# Patient Record
Sex: FEMALE | Race: WHITE | NOT HISPANIC OR LATINO | Employment: OTHER | URBAN - METROPOLITAN AREA
[De-identification: names, ages, dates, MRNs, and addresses within clinical notes are randomized per-mention and may not be internally consistent; named-entity substitution may affect disease eponyms.]

---

## 2017-04-28 ENCOUNTER — OFFICE VISIT (OUTPATIENT)
Dept: GASTROENTEROLOGY | Facility: CLINIC | Age: 57
End: 2017-04-28

## 2017-04-28 VITALS
BODY MASS INDEX: 27.17 KG/M2 | SYSTOLIC BLOOD PRESSURE: 124 MMHG | HEIGHT: 60 IN | WEIGHT: 138.4 LBS | DIASTOLIC BLOOD PRESSURE: 78 MMHG

## 2017-04-28 DIAGNOSIS — K21.9 GASTROESOPHAGEAL REFLUX DISEASE WITHOUT ESOPHAGITIS: ICD-10-CM

## 2017-04-28 DIAGNOSIS — Z83.71 FAMILY HISTORY OF POLYPS IN THE COLON: ICD-10-CM

## 2017-04-28 DIAGNOSIS — R12 HEARTBURN: Primary | ICD-10-CM

## 2017-04-28 PROCEDURE — 99204 OFFICE O/P NEW MOD 45 MIN: CPT | Performed by: INTERNAL MEDICINE

## 2017-04-28 RX ORDER — ASPIRIN 81 MG/1
81 TABLET ORAL DAILY
Status: ON HOLD | COMMUNITY
End: 2021-10-29

## 2017-04-28 NOTE — PROGRESS NOTES
Chief Complaint   Patient presents with   • Pre-op Exam     consult cs        Fiordaliza Lyons is a 57 y.o. female who presents with GERD, in need of screening EGD, family history of colon polyps multiple first-degree family members, in need of screening colonoscopy    HPI Comments: 57-year-old female who had a colonoscopy 10 years ago that was normal.  Her father has had polyps with advanced precancer.  Her brother has had a large polyps removed just recently.  She is here to schedule high risk screening colonoscopy.  She also has GERD.  Well controlled on omeprazole.  No dysphagia, odynophagia.  She's never had a screening EGD for Pacheco's esophagus.    No weight loss, nausea, vomiting, dysphagia,  change in bowel habits, rectal bleeding, melena or abdominal pain      Past Medical History:   Diagnosis Date   • Breast cyst    • Diabetes mellitus    • Endometriosis    • GERD (gastroesophageal reflux disease)    • Ovarian cyst    • Pancreatitis    • Postmenopausal    • Sleep apnea        Past Surgical History:   Procedure Laterality Date   • BILATERAL SALPINGO OOPHORECTOMY  2008   • CHOLECYSTECTOMY  1996   • COLONOSCOPY  2007    Dr Murphy- per patient normal    • DILATION AND CURETTAGE, DIAGNOSTIC / THERAPEUTIC     • KNEE SURGERY  1982         Current Outpatient Prescriptions:   •  aspirin 81 MG EC tablet, Take 81 mg by mouth Daily., Disp: , Rfl:   •  insulin lispro (HumaLOG) 100 UNIT/ML injection, Inject  under the skin 3 (three) times a day before meals., Disp: , Rfl:   •  l-methylfolate-algae-B6-B12 (METANX) 3-90.314-2-35 MG capsule capsule, , Disp: , Rfl:   •  lisinopril (PRINIVIL,ZESTRIL) 10 MG tablet, , Disp: , Rfl:   •  Multiple Vitamins-Minerals (MULTI COMPLETE PO), Take  by mouth., Disp: , Rfl:   •  naproxen sodium (ALEVE) 220 MG tablet, Take 220 mg by mouth 2 (two) times a day as needed for mild pain (1-3)., Disp: , Rfl:   •  omeprazole (PriLOSEC) 40 MG capsule, Take 40 mg by mouth daily., Disp: , Rfl:   •   pravastatin (PRAVACHOL) 10 MG tablet, Take 10 mg by mouth daily., Disp: , Rfl:   •  FLUoxetine (PROzac) 20 MG capsule, Take 20 mg by mouth daily., Disp: , Rfl:   •  Insulin Glargine (LANTUS SOLOSTAR SC), Inject  under the skin., Disp: , Rfl:   •  LANTUS SOLOSTAR 100 UNIT/ML injection pen, , Disp: , Rfl:     Allergies   Allergen Reactions   • Augmentin [Amoxicillin-Pot Clavulanate]        Social History     Social History   • Marital status: Single     Spouse name: N/A   • Number of children: N/A   • Years of education: N/A     Occupational History   • Not on file.     Social History Main Topics   • Smoking status: Never Smoker   • Smokeless tobacco: Never Used   • Alcohol use Yes   • Drug use: No   • Sexual activity: Not on file     Other Topics Concern   • Not on file     Social History Narrative       Family History   Problem Relation Age of Onset   • Ovarian cancer Mother    • Ovarian cancer Maternal Aunt    • Ovarian cancer Maternal Grandmother    • Ovarian cancer Paternal Grandmother    • Colon polyps Father    • Colon polyps Brother        Review of Systems   Constitutional: Negative.    All other systems reviewed and are negative.      Vitals:    04/28/17 1032   BP: 124/78       Physical Exam   Constitutional: She is oriented to person, place, and time. She appears well-developed and well-nourished.   HENT:   Head: Normocephalic and atraumatic.   Eyes: Pupils are equal, round, and reactive to light.   Cardiovascular: Normal rate, regular rhythm and normal heart sounds.    Pulmonary/Chest: Effort normal and breath sounds normal.   Abdominal: Soft. Bowel sounds are normal. She exhibits no shifting dullness, no distension, no pulsatile liver, no fluid wave, no abdominal bruit, no ascites, no pulsatile midline mass and no mass. There is no hepatosplenomegaly. There is no tenderness. There is no rigidity and no guarding. No hernia.   Musculoskeletal: Normal range of motion.   Neurological: She is alert and  oriented to person, place, and time.   Skin: Skin is warm and dry.   Psychiatric: She has a normal mood and affect. Her behavior is normal. Thought content normal.   Nursing note and vitals reviewed.      Problem list    GERD  Family history of colon polyps multiple family members      Assessment/Plan    based on the above issues, we will schedule EGD and colonoscopy    An EGD and  colonoscopy will be scheduled by my staff, the instructions will either be handed to you or mailed to you.  You'll receive an appointment date and time.  You will need to bring a  with you on that day to drive you home.    Please begin a probiotic.  You can try activia yogurt, align, or florastor.  These can be found over-the-counter at a local grocery store.    Begin a fiber supplement.  Benefiber, Citrucel or Metamucil is acceptable.  Fiber gummies are also acceptable.  Please take 1 dose of fiber in the morning and 1 in the evening for 4 weeks and increase to 2 doses of fiber in the morning and 2 in the evening after that. Please  try to maintain a high-fiber diet.  We obtain 10 g of fiber from a high-fiber diet every day.  Women should consume a total of 25 grams of fiber a day, men 30 grams a day.  We can reach the total daily recommended dose of fiber a day utilizing the high-fiber diet and also fiber supplements.    GERD lifestyle modifications discussed    Continue PPI for GERD    EGD every 10 years    Colonoscopy every 5 years

## 2017-05-03 ENCOUNTER — OUTSIDE FACILITY SERVICE (OUTPATIENT)
Dept: GASTROENTEROLOGY | Facility: CLINIC | Age: 57
End: 2017-05-03

## 2017-05-03 PROCEDURE — 43239 EGD BIOPSY SINGLE/MULTIPLE: CPT | Performed by: INTERNAL MEDICINE

## 2017-05-03 PROCEDURE — 45378 DIAGNOSTIC COLONOSCOPY: CPT | Performed by: INTERNAL MEDICINE

## 2017-05-15 ENCOUNTER — TELEPHONE (OUTPATIENT)
Dept: GASTROENTEROLOGY | Facility: CLINIC | Age: 57
End: 2017-05-15

## 2017-05-22 ENCOUNTER — TRANSCRIBE ORDERS (OUTPATIENT)
Dept: ADMINISTRATIVE | Facility: HOSPITAL | Age: 57
End: 2017-05-22

## 2017-05-22 ENCOUNTER — HOSPITAL ENCOUNTER (OUTPATIENT)
Dept: CARDIOLOGY | Facility: HOSPITAL | Age: 57
Discharge: HOME | End: 2017-05-22
Attending: ORTHOPAEDIC SURGERY | Admitting: ORTHOPAEDIC SURGERY

## 2017-05-22 ENCOUNTER — LAB (OUTPATIENT)
Dept: LAB | Facility: HOSPITAL | Age: 57
End: 2017-05-22
Attending: ORTHOPAEDIC SURGERY

## 2017-05-22 DIAGNOSIS — E11.9 DIABETES MELLITUS WITHOUT COMPLICATION (HCC): ICD-10-CM

## 2017-05-22 DIAGNOSIS — E11.9 DIABETES MELLITUS WITHOUT COMPLICATION (HCC): Primary | ICD-10-CM

## 2017-05-22 LAB
ANION GAP SERPL CALCULATED.3IONS-SCNC: 12.3 MMOL/L
BUN BLD-MCNC: 7 MG/DL (ref 6–20)
BUN/CREAT SERPL: 10 (ref 7–25)
CALCIUM SPEC-SCNC: 10.1 MG/DL (ref 8.6–10.5)
CHLORIDE SERPL-SCNC: 95 MMOL/L (ref 98–107)
CO2 SERPL-SCNC: 28.7 MMOL/L (ref 22–29)
CREAT BLD-MCNC: 0.7 MG/DL (ref 0.57–1)
GFR SERPL CREATININE-BSD FRML MDRD: 87 ML/MIN/1.73
GLUCOSE BLD-MCNC: 162 MG/DL (ref 65–99)
POTASSIUM BLD-SCNC: 4.7 MMOL/L (ref 3.5–5.2)
SODIUM BLD-SCNC: 136 MMOL/L (ref 136–145)

## 2017-05-22 PROCEDURE — 93005 ELECTROCARDIOGRAM TRACING: CPT | Performed by: ORTHOPAEDIC SURGERY

## 2017-05-22 PROCEDURE — 80048 BASIC METABOLIC PNL TOTAL CA: CPT

## 2017-05-22 PROCEDURE — 36415 COLL VENOUS BLD VENIPUNCTURE: CPT

## 2017-05-22 PROCEDURE — 93010 ELECTROCARDIOGRAM REPORT: CPT | Performed by: INTERNAL MEDICINE

## 2017-09-29 ENCOUNTER — HOSPITAL ENCOUNTER (OUTPATIENT)
Dept: GENERAL RADIOLOGY | Facility: HOSPITAL | Age: 57
Discharge: HOME | End: 2017-09-29
Attending: INTERNAL MEDICINE | Admitting: INTERNAL MEDICINE

## 2017-09-29 ENCOUNTER — TRANSCRIBE ORDERS (OUTPATIENT)
Dept: ADMINISTRATIVE | Facility: HOSPITAL | Age: 57
End: 2017-09-29

## 2017-09-29 DIAGNOSIS — M54.9 BACK PAIN, UNSPECIFIED BACK LOCATION, UNSPECIFIED BACK PAIN LATERALITY, UNSPECIFIED CHRONICITY: Primary | ICD-10-CM

## 2017-09-29 DIAGNOSIS — M54.9 BACK PAIN, UNSPECIFIED BACK LOCATION, UNSPECIFIED BACK PAIN LATERALITY, UNSPECIFIED CHRONICITY: ICD-10-CM

## 2017-09-29 PROCEDURE — 72050 X-RAY EXAM NECK SPINE 4/5VWS: CPT

## 2018-05-07 ENCOUNTER — HOSPITAL ENCOUNTER (OUTPATIENT)
Dept: PHYSICAL THERAPY | Facility: HOSPITAL | Age: 58
Discharge: HOME | End: 2018-05-07

## 2018-05-07 PROCEDURE — 97799 UNLISTED PHYSCL MED/REHAB PX: CPT

## 2018-09-19 ENCOUNTER — TELEPHONE (OUTPATIENT)
Dept: INTERNAL MEDICINE | Age: 58
End: 2018-09-19

## 2018-09-21 PROCEDURE — 83036 HEMOGLOBIN GLYCOSYLATED A1C: CPT | Performed by: NURSE PRACTITIONER

## 2018-11-26 ENCOUNTER — HOSPITAL ENCOUNTER (OUTPATIENT)
Dept: GENERAL RADIOLOGY | Facility: HOSPITAL | Age: 58
Discharge: HOME | End: 2018-11-26
Attending: FAMILY MEDICINE | Admitting: FAMILY MEDICINE

## 2018-11-26 ENCOUNTER — HOSPITAL ENCOUNTER (OUTPATIENT)
Dept: GENERAL RADIOLOGY | Facility: HOSPITAL | Age: 58
Discharge: HOME | End: 2018-11-26
Attending: FAMILY MEDICINE

## 2018-11-26 ENCOUNTER — HOSPITAL ENCOUNTER (OUTPATIENT)
Dept: CT IMAGING | Facility: HOSPITAL | Age: 58
Discharge: HOME | End: 2018-11-26
Attending: FAMILY MEDICINE

## 2018-11-26 PROCEDURE — 70450 CT HEAD/BRAIN W/O DYE: CPT

## 2018-11-26 PROCEDURE — 72110 X-RAY EXAM L-2 SPINE 4/>VWS: CPT

## 2018-11-26 PROCEDURE — 73590 X-RAY EXAM OF LOWER LEG: CPT

## 2018-11-26 PROCEDURE — 72170 X-RAY EXAM OF PELVIS: CPT

## 2019-01-03 ENCOUNTER — TRANSCRIBE ORDERS (OUTPATIENT)
Dept: ADMINISTRATIVE | Facility: HOSPITAL | Age: 59
End: 2019-01-03

## 2019-01-03 ENCOUNTER — OFFICE VISIT (OUTPATIENT)
Dept: INTERNAL MEDICINE | Age: 59
End: 2019-01-03
Payer: COMMERCIAL

## 2019-01-03 VITALS
BODY MASS INDEX: 25.13 KG/M2 | OXYGEN SATURATION: 98 % | DIASTOLIC BLOOD PRESSURE: 84 MMHG | SYSTOLIC BLOOD PRESSURE: 130 MMHG | HEIGHT: 60 IN | WEIGHT: 128 LBS | HEART RATE: 100 BPM

## 2019-01-03 DIAGNOSIS — Z78.0 POSTMENOPAUSAL: Primary | ICD-10-CM

## 2019-01-03 DIAGNOSIS — F34.1 DYSTHYMIA: ICD-10-CM

## 2019-01-03 DIAGNOSIS — Z12.31 SCREENING MAMMOGRAM, ENCOUNTER FOR: Primary | ICD-10-CM

## 2019-01-03 DIAGNOSIS — M85.89 OSTEOPENIA OF MULTIPLE SITES: ICD-10-CM

## 2019-01-03 DIAGNOSIS — M25.50 ARTHRALGIA, UNSPECIFIED JOINT: ICD-10-CM

## 2019-01-03 DIAGNOSIS — E78.2 MIXED HYPERLIPIDEMIA: ICD-10-CM

## 2019-01-03 DIAGNOSIS — E10.9 TYPE 1 DIABETES MELLITUS WITHOUT COMPLICATION (HCC): Primary | ICD-10-CM

## 2019-01-03 DIAGNOSIS — Z78.0 POSTMENOPAUSAL: ICD-10-CM

## 2019-01-03 DIAGNOSIS — Z12.31 SCREENING MAMMOGRAM, ENCOUNTER FOR: ICD-10-CM

## 2019-01-03 DIAGNOSIS — K21.9 GASTROESOPHAGEAL REFLUX DISEASE WITHOUT ESOPHAGITIS: ICD-10-CM

## 2019-01-03 PROCEDURE — 99204 OFFICE O/P NEW MOD 45 MIN: CPT | Performed by: INTERNAL MEDICINE

## 2019-01-03 RX ORDER — LISINOPRIL 10 MG/1
10 TABLET ORAL DAILY
COMMUNITY
End: 2021-10-11 | Stop reason: ALTCHOICE

## 2019-01-03 RX ORDER — ASPIRIN 81 MG/1
81 TABLET ORAL DAILY
COMMUNITY
End: 2021-10-25

## 2019-01-03 RX ORDER — OMEPRAZOLE 40 MG/1
40 CAPSULE, DELAYED RELEASE ORAL DAILY
COMMUNITY
End: 2019-01-03 | Stop reason: CLARIF

## 2019-01-03 RX ORDER — FLUOXETINE HYDROCHLORIDE 40 MG/1
40 CAPSULE ORAL DAILY
Qty: 90 CAPSULE | Refills: 3 | Status: SHIPPED | OUTPATIENT
Start: 2019-01-03 | End: 2020-01-27

## 2019-01-03 RX ORDER — OMEPRAZOLE 20 MG/1
20 CAPSULE, DELAYED RELEASE ORAL 2 TIMES DAILY
Qty: 90 CAPSULE | Refills: 3 | Status: SHIPPED | OUTPATIENT
Start: 2019-01-03 | End: 2021-12-10 | Stop reason: CLARIF

## 2019-01-03 RX ORDER — PRAVASTATIN SODIUM 10 MG
10 TABLET ORAL DAILY
COMMUNITY
Start: 2018-09-21 | End: 2021-10-11 | Stop reason: ALTCHOICE

## 2019-01-03 RX ORDER — LISINOPRIL 10 MG/1
10 TABLET ORAL DAILY
Qty: 90 TABLET | Refills: 3 | Status: CANCELLED | OUTPATIENT
Start: 2019-01-03

## 2019-01-03 RX ORDER — FLUOXETINE HYDROCHLORIDE 40 MG/1
40 CAPSULE ORAL DAILY
COMMUNITY
End: 2019-01-03 | Stop reason: SDUPTHER

## 2019-01-03 RX ORDER — NAPROXEN SODIUM 220 MG
220 TABLET ORAL 2 TIMES DAILY
COMMUNITY
End: 2021-10-11 | Stop reason: ALTCHOICE

## 2019-01-03 RX ORDER — OMEPRAZOLE 20 MG/1
20 CAPSULE, DELAYED RELEASE ORAL 2 TIMES DAILY
COMMUNITY
End: 2019-01-03 | Stop reason: SDUPTHER

## 2019-01-03 RX ORDER — PRAVASTATIN SODIUM 10 MG
10 TABLET ORAL DAILY
Qty: 90 TABLET | Refills: 3 | Status: CANCELLED | OUTPATIENT
Start: 2019-01-03

## 2019-01-03 ASSESSMENT — PATIENT HEALTH QUESTIONNAIRE - PHQ9
2. FEELING DOWN, DEPRESSED OR HOPELESS: 0
SUM OF ALL RESPONSES TO PHQ QUESTIONS 1-9: 0
SUM OF ALL RESPONSES TO PHQ9 QUESTIONS 1 & 2: 0
1. LITTLE INTEREST OR PLEASURE IN DOING THINGS: 0
SUM OF ALL RESPONSES TO PHQ QUESTIONS 1-9: 0

## 2019-01-09 PROBLEM — E78.2 MIXED HYPERLIPIDEMIA: Status: ACTIVE | Noted: 2019-01-09

## 2019-01-09 PROBLEM — K21.9 GASTROESOPHAGEAL REFLUX DISEASE WITHOUT ESOPHAGITIS: Status: ACTIVE | Noted: 2019-01-09

## 2019-01-09 ASSESSMENT — ENCOUNTER SYMPTOMS
SHORTNESS OF BREATH: 0
EYE REDNESS: 0
EYE DISCHARGE: 0
SINUS PRESSURE: 0
COUGH: 0
ABDOMINAL DISTENTION: 0
ABDOMINAL PAIN: 0

## 2019-01-11 ENCOUNTER — TRANSCRIBE ORDERS (OUTPATIENT)
Dept: ADMINISTRATIVE | Facility: HOSPITAL | Age: 59
End: 2019-01-11

## 2019-01-11 DIAGNOSIS — M54.5 LOW BACK PAIN, UNSPECIFIED BACK PAIN LATERALITY, UNSPECIFIED CHRONICITY, WITH SCIATICA PRESENCE UNSPECIFIED: Primary | ICD-10-CM

## 2019-01-11 DIAGNOSIS — M51.36 DEGENERATION OF LUMBAR INTERVERTEBRAL DISC: ICD-10-CM

## 2019-01-18 ENCOUNTER — HOSPITAL ENCOUNTER (OUTPATIENT)
Dept: MRI IMAGING | Facility: HOSPITAL | Age: 59
Discharge: HOME | End: 2019-01-18
Admitting: PHYSICIAN ASSISTANT

## 2019-01-18 DIAGNOSIS — M54.5 LOW BACK PAIN, UNSPECIFIED BACK PAIN LATERALITY, UNSPECIFIED CHRONICITY, WITH SCIATICA PRESENCE UNSPECIFIED: ICD-10-CM

## 2019-01-18 DIAGNOSIS — M51.36 DEGENERATION OF LUMBAR INTERVERTEBRAL DISC: ICD-10-CM

## 2019-01-18 PROCEDURE — 72148 MRI LUMBAR SPINE W/O DYE: CPT

## 2019-01-29 ENCOUNTER — HOSPITAL ENCOUNTER (OUTPATIENT)
Dept: MAMMOGRAPHY | Facility: HOSPITAL | Age: 59
Discharge: HOME | End: 2019-01-29
Admitting: INTERNAL MEDICINE

## 2019-01-29 ENCOUNTER — HOSPITAL ENCOUNTER (OUTPATIENT)
Dept: BONE DENSITY | Facility: HOSPITAL | Age: 59
Discharge: HOME | End: 2019-01-29

## 2019-01-29 DIAGNOSIS — Z78.0 POSTMENOPAUSAL: ICD-10-CM

## 2019-01-29 DIAGNOSIS — M85.89 OSTEOPENIA OF MULTIPLE SITES: ICD-10-CM

## 2019-01-29 DIAGNOSIS — Z12.31 SCREENING MAMMOGRAM, ENCOUNTER FOR: ICD-10-CM

## 2019-01-29 PROCEDURE — 77063 BREAST TOMOSYNTHESIS BI: CPT

## 2019-01-29 PROCEDURE — 77080 DXA BONE DENSITY AXIAL: CPT

## 2019-01-29 PROCEDURE — 77067 SCR MAMMO BI INCL CAD: CPT

## 2019-01-30 DIAGNOSIS — E10.9 TYPE 1 DIABETES MELLITUS WITHOUT COMPLICATION (HCC): ICD-10-CM

## 2019-01-30 LAB
ALBUMIN SERPL-MCNC: 4.6 G/DL (ref 3.5–5.2)
ALP BLD-CCNC: 273 U/L (ref 35–104)
ALT SERPL-CCNC: 56 U/L (ref 5–33)
ANION GAP SERPL CALCULATED.3IONS-SCNC: 15 MMOL/L (ref 7–19)
AST SERPL-CCNC: 97 U/L (ref 5–32)
BILIRUB SERPL-MCNC: 0.9 MG/DL (ref 0.2–1.2)
BUN BLDV-MCNC: 5 MG/DL (ref 6–20)
CALCIUM SERPL-MCNC: 9.7 MG/DL (ref 8.6–10)
CHLORIDE BLD-SCNC: 96 MMOL/L (ref 98–111)
CHOLESTEROL, TOTAL: 227 MG/DL (ref 160–199)
CO2: 28 MMOL/L (ref 22–29)
CREAT SERPL-MCNC: <0.5 MG/DL (ref 0.5–0.9)
GFR NON-AFRICAN AMERICAN: >60
GLUCOSE BLD-MCNC: 138 MG/DL (ref 74–109)
HBA1C MFR BLD: 5.9 % (ref 4–6)
HCT VFR BLD CALC: 44.2 % (ref 37–47)
HDLC SERPL-MCNC: 100 MG/DL (ref 65–121)
HEMOGLOBIN: 15.1 G/DL (ref 12–16)
LDL CHOLESTEROL CALCULATED: 101 MG/DL
MCH RBC QN AUTO: 31.9 PG (ref 27–31)
MCHC RBC AUTO-ENTMCNC: 34.2 G/DL (ref 33–37)
MCV RBC AUTO: 93.2 FL (ref 81–99)
PDW BLD-RTO: 11.9 % (ref 11.5–14.5)
PLATELET # BLD: 168 K/UL (ref 130–400)
PMV BLD AUTO: 8.8 FL (ref 9.4–12.3)
POTASSIUM SERPL-SCNC: 4.5 MMOL/L (ref 3.5–5)
RBC # BLD: 4.74 M/UL (ref 4.2–5.4)
SODIUM BLD-SCNC: 139 MMOL/L (ref 136–145)
TOTAL PROTEIN: 7.4 G/DL (ref 6.6–8.7)
TRIGL SERPL-MCNC: 132 MG/DL (ref 0–149)
TSH SERPL DL<=0.05 MIU/L-ACNC: 1.56 UIU/ML (ref 0.27–4.2)
WBC # BLD: 2.6 K/UL (ref 4.8–10.8)

## 2019-01-31 ENCOUNTER — OFFICE VISIT (OUTPATIENT)
Dept: INTERNAL MEDICINE | Age: 59
End: 2019-01-31
Payer: COMMERCIAL

## 2019-01-31 VITALS
WEIGHT: 127 LBS | HEART RATE: 88 BPM | BODY MASS INDEX: 24.8 KG/M2 | DIASTOLIC BLOOD PRESSURE: 72 MMHG | SYSTOLIC BLOOD PRESSURE: 130 MMHG

## 2019-01-31 DIAGNOSIS — D72.818 OTHER DECREASED WHITE BLOOD CELL (WBC) COUNT: ICD-10-CM

## 2019-01-31 DIAGNOSIS — F10.10 ALCOHOL ABUSE: ICD-10-CM

## 2019-01-31 DIAGNOSIS — R74.8 ELEVATED ALKALINE PHOSPHATASE LEVEL: ICD-10-CM

## 2019-01-31 DIAGNOSIS — E10.9 TYPE 1 DIABETES MELLITUS WITHOUT COMPLICATION (HCC): Primary | ICD-10-CM

## 2019-01-31 DIAGNOSIS — H61.22 HEARING LOSS OF LEFT EAR DUE TO CERUMEN IMPACTION: ICD-10-CM

## 2019-01-31 PROCEDURE — 99214 OFFICE O/P EST MOD 30 MIN: CPT | Performed by: INTERNAL MEDICINE

## 2019-02-06 PROBLEM — F10.10 ALCOHOL ABUSE: Status: ACTIVE | Noted: 2019-02-06

## 2019-02-06 ASSESSMENT — ENCOUNTER SYMPTOMS
SHORTNESS OF BREATH: 0
COUGH: 0
ABDOMINAL PAIN: 0
EYE DISCHARGE: 0
BACK PAIN: 1
EYE REDNESS: 0
ABDOMINAL DISTENTION: 0

## 2019-04-10 ENCOUNTER — TRANSCRIBE ORDERS (OUTPATIENT)
Dept: ADMINISTRATIVE | Facility: HOSPITAL | Age: 59
End: 2019-04-10

## 2019-04-10 DIAGNOSIS — R92.2 BREAST DENSITY: Primary | ICD-10-CM

## 2019-04-16 ENCOUNTER — HOSPITAL ENCOUNTER (OUTPATIENT)
Dept: MAMMOGRAPHY | Facility: HOSPITAL | Age: 59
Discharge: HOME | End: 2019-04-16

## 2019-04-16 ENCOUNTER — HOSPITAL ENCOUNTER (OUTPATIENT)
Dept: ULTRASOUND IMAGING | Facility: HOSPITAL | Age: 59
Discharge: HOME | End: 2019-04-16
Admitting: INTERNAL MEDICINE

## 2019-04-16 DIAGNOSIS — R92.2 BREAST DENSITY: ICD-10-CM

## 2019-04-16 PROCEDURE — 77065 DX MAMMO INCL CAD UNI: CPT

## 2019-04-16 PROCEDURE — 76642 ULTRASOUND BREAST LIMITED: CPT

## 2019-04-16 PROCEDURE — G0279 TOMOSYNTHESIS, MAMMO: HCPCS

## 2019-04-19 ENCOUNTER — LAB (OUTPATIENT)
Dept: LAB | Facility: HOSPITAL | Age: 59
End: 2019-04-19

## 2019-04-19 ENCOUNTER — HOSPITAL ENCOUNTER (OUTPATIENT)
Dept: CT IMAGING | Facility: HOSPITAL | Age: 59
Discharge: HOME | End: 2019-04-19

## 2019-04-19 ENCOUNTER — TRANSCRIBE ORDERS (OUTPATIENT)
Dept: ADMINISTRATIVE | Facility: HOSPITAL | Age: 59
End: 2019-04-19

## 2019-04-19 ENCOUNTER — HOSPITAL ENCOUNTER (OUTPATIENT)
Dept: GENERAL RADIOLOGY | Facility: HOSPITAL | Age: 59
Discharge: HOME | End: 2019-04-19
Admitting: PEDIATRICS

## 2019-04-19 DIAGNOSIS — R51.9 NONINTRACTABLE HEADACHE, UNSPECIFIED CHRONICITY PATTERN, UNSPECIFIED HEADACHE TYPE: ICD-10-CM

## 2019-04-19 DIAGNOSIS — E11.3212: Primary | ICD-10-CM

## 2019-04-19 DIAGNOSIS — R06.00 DYSPNEA, UNSPECIFIED TYPE: ICD-10-CM

## 2019-04-19 DIAGNOSIS — E11.3212: ICD-10-CM

## 2019-04-19 LAB
ALBUMIN SERPL-MCNC: 3.9 G/DL (ref 3.5–5)
ALBUMIN/GLOB SERPL: 1.3 G/DL (ref 1.1–2.5)
ALP SERPL-CCNC: 167 U/L (ref 24–120)
ALT SERPL W P-5'-P-CCNC: 20 U/L (ref 0–54)
ANION GAP SERPL CALCULATED.3IONS-SCNC: 14 MMOL/L (ref 4–13)
AST SERPL-CCNC: 28 U/L (ref 7–45)
AUTO MIXED CELLS #: 0.7 10*3/MM3 (ref 0.1–2.6)
AUTO MIXED CELLS %: 10.4 % (ref 0.1–24)
BACTERIA UR QL AUTO: ABNORMAL /HPF
BILIRUB SERPL-MCNC: 1.2 MG/DL (ref 0.1–1)
BILIRUB UR QL STRIP: NEGATIVE
BUN BLD-MCNC: 11 MG/DL (ref 5–21)
BUN/CREAT SERPL: 22.4
CALCIUM SPEC-SCNC: 9.3 MG/DL (ref 8.4–10.4)
CHLORIDE SERPL-SCNC: 87 MMOL/L (ref 98–110)
CLARITY UR: CLEAR
CO2 SERPL-SCNC: 28 MMOL/L (ref 24–31)
COLOR UR: YELLOW
CREAT BLD-MCNC: 0.49 MG/DL (ref 0.5–1.4)
CRP SERPL-MCNC: 7.55 MG/DL (ref 0–0.99)
ERYTHROCYTE [DISTWIDTH] IN BLOOD BY AUTOMATED COUNT: 11.7 % (ref 12–15)
ERYTHROCYTE [SEDIMENTATION RATE] IN BLOOD: 7 MM/HR (ref 0–20)
GFR SERPL CREATININE-BSD FRML MDRD: 130 ML/MIN/1.73
GLOBULIN UR ELPH-MCNC: 3 GM/DL
GLUCOSE BLD-MCNC: 313 MG/DL (ref 70–100)
GLUCOSE UR STRIP-MCNC: ABNORMAL MG/DL
HBA1C MFR BLD: 8.8 %
HCT VFR BLD AUTO: 40.3 % (ref 37–47)
HGB BLD-MCNC: 13.6 G/DL (ref 12–16)
HGB UR QL STRIP.AUTO: ABNORMAL
HYALINE CASTS UR QL AUTO: ABNORMAL /LPF
KETONES UR QL STRIP: ABNORMAL
LEUKOCYTE ESTERASE UR QL STRIP.AUTO: ABNORMAL
LYMPHOCYTES # BLD AUTO: 0.4 10*3/MM3 (ref 0.7–3.1)
LYMPHOCYTES NFR BLD AUTO: 5.5 % (ref 15–45)
MCH RBC QN AUTO: 30.4 PG (ref 28–32)
MCHC RBC AUTO-ENTMCNC: 33.7 G/DL (ref 33–36)
MCV RBC AUTO: 90.2 FL (ref 82–98)
NEUTROPHILS # BLD AUTO: 5.3 10*3/MM3 (ref 1.5–8.3)
NEUTROPHILS NFR BLD AUTO: 84.1 % (ref 39–78)
NITRITE UR QL STRIP: POSITIVE
PH UR STRIP.AUTO: 6 [PH] (ref 5–8)
PLATELET # BLD AUTO: 159 10*3/MM3 (ref 130–400)
PMV BLD AUTO: 8.7 FL (ref 6–12)
POTASSIUM BLD-SCNC: 3.8 MMOL/L (ref 3.5–5.3)
PROT SERPL-MCNC: 6.9 G/DL (ref 6.3–8.7)
PROT UR QL STRIP: NEGATIVE
RBC # BLD AUTO: 4.47 10*6/MM3 (ref 4.2–5.4)
RBC # UR: ABNORMAL /HPF
REF LAB TEST METHOD: ABNORMAL
SODIUM BLD-SCNC: 129 MMOL/L (ref 135–145)
SP GR UR STRIP: 1.01 (ref 1–1.03)
SQUAMOUS #/AREA URNS HPF: ABNORMAL /HPF
UROBILINOGEN UR QL STRIP: ABNORMAL
WBC NRBC COR # BLD: 6.4 10*3/MM3 (ref 4.8–10.8)
WBC UR QL AUTO: ABNORMAL /HPF

## 2019-04-19 PROCEDURE — 87088 URINE BACTERIA CULTURE: CPT | Performed by: PEDIATRICS

## 2019-04-19 PROCEDURE — 87077 CULTURE AEROBIC IDENTIFY: CPT | Performed by: PEDIATRICS

## 2019-04-19 PROCEDURE — 85025 COMPLETE CBC W/AUTO DIFF WBC: CPT

## 2019-04-19 PROCEDURE — 70450 CT HEAD/BRAIN W/O DYE: CPT

## 2019-04-19 PROCEDURE — 36415 COLL VENOUS BLD VENIPUNCTURE: CPT

## 2019-04-19 PROCEDURE — 87186 SC STD MICRODIL/AGAR DIL: CPT | Performed by: PEDIATRICS

## 2019-04-19 PROCEDURE — 83036 HEMOGLOBIN GLYCOSYLATED A1C: CPT

## 2019-04-19 PROCEDURE — 81001 URINALYSIS AUTO W/SCOPE: CPT

## 2019-04-19 PROCEDURE — 85652 RBC SED RATE AUTOMATED: CPT

## 2019-04-19 PROCEDURE — 71046 X-RAY EXAM CHEST 2 VIEWS: CPT

## 2019-04-19 PROCEDURE — 86140 C-REACTIVE PROTEIN: CPT | Performed by: PEDIATRICS

## 2019-04-19 PROCEDURE — 80053 COMPREHEN METABOLIC PANEL: CPT

## 2019-04-19 PROCEDURE — 87086 URINE CULTURE/COLONY COUNT: CPT | Performed by: PEDIATRICS

## 2019-04-22 ENCOUNTER — LAB (OUTPATIENT)
Dept: LAB | Facility: HOSPITAL | Age: 59
End: 2019-04-22

## 2019-04-22 ENCOUNTER — TRANSCRIBE ORDERS (OUTPATIENT)
Dept: ADMINISTRATIVE | Facility: HOSPITAL | Age: 59
End: 2019-04-22

## 2019-04-22 DIAGNOSIS — J18.9 PNEUMONIA DUE TO INFECTIOUS ORGANISM, UNSPECIFIED LATERALITY, UNSPECIFIED PART OF LUNG: ICD-10-CM

## 2019-04-22 DIAGNOSIS — N39.0 URINARY TRACT INFECTION WITHOUT HEMATURIA, SITE UNSPECIFIED: ICD-10-CM

## 2019-04-22 DIAGNOSIS — J18.9 PNEUMONIA DUE TO INFECTIOUS ORGANISM, UNSPECIFIED LATERALITY, UNSPECIFIED PART OF LUNG: Primary | ICD-10-CM

## 2019-04-22 LAB
ALBUMIN SERPL-MCNC: 3.5 G/DL (ref 3.5–5)
ALBUMIN/GLOB SERPL: 1.1 G/DL (ref 1.1–2.5)
ALP SERPL-CCNC: 152 U/L (ref 24–120)
ALT SERPL W P-5'-P-CCNC: 23 U/L (ref 0–54)
ANION GAP SERPL CALCULATED.3IONS-SCNC: 8 MMOL/L (ref 4–13)
AST SERPL-CCNC: 26 U/L (ref 7–45)
AUTO MIXED CELLS #: 0.5 10*3/MM3 (ref 0.1–2.6)
AUTO MIXED CELLS %: 13.1 % (ref 0.1–24)
BACTERIA SPEC AEROBE CULT: ABNORMAL
BACTERIA SPEC AEROBE CULT: ABNORMAL
BACTERIA UR QL AUTO: ABNORMAL /HPF
BILIRUB SERPL-MCNC: 0.7 MG/DL (ref 0.1–1)
BILIRUB UR QL STRIP: NEGATIVE
BUN BLD-MCNC: 4 MG/DL (ref 5–21)
BUN/CREAT SERPL: 9.3
CALCIUM SPEC-SCNC: 9.2 MG/DL (ref 8.4–10.4)
CHLORIDE SERPL-SCNC: 91 MMOL/L (ref 98–110)
CLARITY UR: CLEAR
CO2 SERPL-SCNC: 33 MMOL/L (ref 24–31)
COLOR UR: YELLOW
CREAT BLD-MCNC: 0.43 MG/DL (ref 0.5–1.4)
CRP SERPL-MCNC: 4.83 MG/DL (ref 0–0.99)
ERYTHROCYTE [DISTWIDTH] IN BLOOD BY AUTOMATED COUNT: 11.5 % (ref 12–15)
GFR SERPL CREATININE-BSD FRML MDRD: >150 ML/MIN/1.73
GLOBULIN UR ELPH-MCNC: 3.1 GM/DL
GLUCOSE BLD-MCNC: 291 MG/DL (ref 70–100)
GLUCOSE UR STRIP-MCNC: ABNORMAL MG/DL
HCT VFR BLD AUTO: 37.6 % (ref 37–47)
HGB BLD-MCNC: 13.1 G/DL (ref 12–16)
HGB UR QL STRIP.AUTO: ABNORMAL
HYALINE CASTS UR QL AUTO: ABNORMAL /LPF
KETONES UR QL STRIP: ABNORMAL
LEUKOCYTE ESTERASE UR QL STRIP.AUTO: ABNORMAL
LYMPHOCYTES # BLD AUTO: 0.4 10*3/MM3 (ref 0.7–3.1)
LYMPHOCYTES NFR BLD AUTO: 10.4 % (ref 15–45)
MCH RBC QN AUTO: 31 PG (ref 28–32)
MCHC RBC AUTO-ENTMCNC: 34.8 G/DL (ref 33–36)
MCV RBC AUTO: 88.9 FL (ref 82–98)
NEUTROPHILS # BLD AUTO: 3.1 10*3/MM3 (ref 1.5–8.3)
NEUTROPHILS NFR BLD AUTO: 76.5 % (ref 39–78)
NITRITE UR QL STRIP: NEGATIVE
PH UR STRIP.AUTO: 6 [PH] (ref 5–8)
PLATELET # BLD AUTO: 206 10*3/MM3 (ref 130–400)
PMV BLD AUTO: 9.1 FL (ref 6–12)
POTASSIUM BLD-SCNC: 4.3 MMOL/L (ref 3.5–5.3)
PROT SERPL-MCNC: 6.6 G/DL (ref 6.3–8.7)
PROT UR QL STRIP: NEGATIVE
RBC # BLD AUTO: 4.23 10*6/MM3 (ref 4.2–5.4)
RBC # UR: ABNORMAL /HPF
REF LAB TEST METHOD: ABNORMAL
SODIUM BLD-SCNC: 132 MMOL/L (ref 135–145)
SP GR UR STRIP: <=1.005 (ref 1–1.03)
SQUAMOUS #/AREA URNS HPF: ABNORMAL /HPF
UROBILINOGEN UR QL STRIP: ABNORMAL
WBC NRBC COR # BLD: 4 10*3/MM3 (ref 4.8–10.8)
WBC UR QL AUTO: ABNORMAL /HPF

## 2019-04-22 PROCEDURE — 85025 COMPLETE CBC W/AUTO DIFF WBC: CPT | Performed by: PEDIATRICS

## 2019-04-22 PROCEDURE — 80053 COMPREHEN METABOLIC PANEL: CPT | Performed by: PEDIATRICS

## 2019-04-22 PROCEDURE — 36415 COLL VENOUS BLD VENIPUNCTURE: CPT | Performed by: PEDIATRICS

## 2019-04-22 PROCEDURE — 81001 URINALYSIS AUTO W/SCOPE: CPT | Performed by: PEDIATRICS

## 2019-04-22 PROCEDURE — 86140 C-REACTIVE PROTEIN: CPT | Performed by: PEDIATRICS

## 2019-04-22 PROCEDURE — 87086 URINE CULTURE/COLONY COUNT: CPT | Performed by: PEDIATRICS

## 2019-04-24 LAB — BACTERIA SPEC AEROBE CULT: NORMAL

## 2019-10-07 ENCOUNTER — HOSPITAL ENCOUNTER (OUTPATIENT)
Dept: GENERAL RADIOLOGY | Facility: HOSPITAL | Age: 59
Discharge: HOME | End: 2019-10-07
Admitting: PEDIATRICS

## 2019-10-07 ENCOUNTER — TRANSCRIBE ORDERS (OUTPATIENT)
Dept: ADMINISTRATIVE | Facility: HOSPITAL | Age: 59
End: 2019-10-07

## 2019-10-07 DIAGNOSIS — S99.921A INJURY OF FOOT, RIGHT, INITIAL ENCOUNTER: ICD-10-CM

## 2019-10-07 DIAGNOSIS — S99.921A INJURY OF FOOT, RIGHT, INITIAL ENCOUNTER: Primary | ICD-10-CM

## 2019-10-07 PROCEDURE — 73630 X-RAY EXAM OF FOOT: CPT

## 2019-10-08 ENCOUNTER — TRANSCRIBE ORDERS (OUTPATIENT)
Dept: ADMINISTRATIVE | Facility: HOSPITAL | Age: 59
End: 2019-10-08

## 2019-10-08 DIAGNOSIS — S99.921A UNSPECIFIED INJURY OF RIGHT FOOT, INITIAL ENCOUNTER: Primary | ICD-10-CM

## 2019-10-11 ENCOUNTER — HOSPITAL ENCOUNTER (OUTPATIENT)
Dept: MRI IMAGING | Facility: HOSPITAL | Age: 59
Discharge: HOME | End: 2019-10-11
Admitting: PEDIATRICS

## 2019-10-11 DIAGNOSIS — S99.921A UNSPECIFIED INJURY OF RIGHT FOOT, INITIAL ENCOUNTER: ICD-10-CM

## 2019-10-11 PROCEDURE — 73718 MRI LOWER EXTREMITY W/O DYE: CPT

## 2020-01-27 RX ORDER — FLUOXETINE HYDROCHLORIDE 40 MG/1
40 CAPSULE ORAL DAILY
Qty: 30 CAPSULE | Refills: 0 | Status: SHIPPED | OUTPATIENT
Start: 2020-01-27 | End: 2021-10-11 | Stop reason: ALTCHOICE

## 2021-10-05 ENCOUNTER — HOSPITAL ENCOUNTER (OUTPATIENT)
Dept: GENERAL RADIOLOGY | Facility: HOSPITAL | Age: 61
Discharge: HOME | End: 2021-10-05

## 2021-10-05 ENCOUNTER — HOSPITAL ENCOUNTER (EMERGENCY)
Facility: HOSPITAL | Age: 61
Discharge: HOME | End: 2021-10-05
Attending: EMERGENCY MEDICINE | Admitting: EMERGENCY MEDICINE

## 2021-10-05 ENCOUNTER — LAB (OUTPATIENT)
Dept: LAB | Facility: HOSPITAL | Age: 61
End: 2021-10-05

## 2021-10-05 ENCOUNTER — OFFICE VISIT (OUTPATIENT)
Dept: FAMILY MEDICINE CLINIC | Facility: CLINIC | Age: 61
End: 2021-10-05

## 2021-10-05 ENCOUNTER — HOSPITAL ENCOUNTER (OUTPATIENT)
Dept: CT IMAGING | Facility: HOSPITAL | Age: 61
Discharge: HOME | End: 2021-10-05

## 2021-10-05 VITALS
SYSTOLIC BLOOD PRESSURE: 108 MMHG | RESPIRATION RATE: 20 BRPM | BODY MASS INDEX: 23.95 KG/M2 | DIASTOLIC BLOOD PRESSURE: 72 MMHG | OXYGEN SATURATION: 99 % | HEIGHT: 60 IN | HEART RATE: 94 BPM | TEMPERATURE: 98.8 F | WEIGHT: 122 LBS

## 2021-10-05 VITALS
TEMPERATURE: 97.7 F | WEIGHT: 120 LBS | SYSTOLIC BLOOD PRESSURE: 152 MMHG | HEART RATE: 110 BPM | OXYGEN SATURATION: 100 % | BODY MASS INDEX: 23.56 KG/M2 | RESPIRATION RATE: 20 BRPM | DIASTOLIC BLOOD PRESSURE: 87 MMHG | HEIGHT: 60 IN

## 2021-10-05 DIAGNOSIS — R18.8 CIRRHOSIS OF LIVER WITH ASCITES, UNSPECIFIED HEPATIC CIRRHOSIS TYPE (HCC): ICD-10-CM

## 2021-10-05 DIAGNOSIS — G89.29 CHRONIC HEEL PAIN, LEFT: ICD-10-CM

## 2021-10-05 DIAGNOSIS — R60.0 PEDAL EDEMA: ICD-10-CM

## 2021-10-05 DIAGNOSIS — E10.65 TYPE 1 DIABETES MELLITUS WITH HYPERGLYCEMIA (HCC): ICD-10-CM

## 2021-10-05 DIAGNOSIS — K70.31 ASCITES DUE TO ALCOHOLIC CIRRHOSIS (HCC): ICD-10-CM

## 2021-10-05 DIAGNOSIS — D50.8 IRON DEFICIENCY ANEMIA SECONDARY TO INADEQUATE DIETARY IRON INTAKE: ICD-10-CM

## 2021-10-05 DIAGNOSIS — K74.60 CIRRHOSIS OF LIVER WITH ASCITES, UNSPECIFIED HEPATIC CIRRHOSIS TYPE (HCC): ICD-10-CM

## 2021-10-05 DIAGNOSIS — M79.672 CHRONIC HEEL PAIN, LEFT: ICD-10-CM

## 2021-10-05 DIAGNOSIS — K56.690 OTHER PARTIAL INTESTINAL OBSTRUCTION (HCC): ICD-10-CM

## 2021-10-05 DIAGNOSIS — S93.401A SPRAIN OF RIGHT ANKLE, UNSPECIFIED LIGAMENT, INITIAL ENCOUNTER: Primary | ICD-10-CM

## 2021-10-05 DIAGNOSIS — R60.9 PERIPHERAL EDEMA: ICD-10-CM

## 2021-10-05 DIAGNOSIS — D64.9 ANEMIA, UNSPECIFIED TYPE: ICD-10-CM

## 2021-10-05 DIAGNOSIS — E83.42 HYPOMAGNESEMIA: ICD-10-CM

## 2021-10-05 DIAGNOSIS — E87.6 HYPOKALEMIA: ICD-10-CM

## 2021-10-05 DIAGNOSIS — A09 COLITIS, INFECTIOUS: ICD-10-CM

## 2021-10-05 DIAGNOSIS — R60.0 PEDAL EDEMA: Primary | ICD-10-CM

## 2021-10-05 LAB
ALBUMIN SERPL-MCNC: 3.7 G/DL (ref 3.5–5)
ALBUMIN/GLOB SERPL: 1.3 G/DL (ref 1.1–2.5)
ALP SERPL-CCNC: 334 U/L (ref 24–120)
ALT SERPL W P-5'-P-CCNC: 39 U/L (ref 0–35)
ANION GAP SERPL CALCULATED.3IONS-SCNC: 11 MMOL/L (ref 4–13)
AST SERPL-CCNC: 206 U/L (ref 7–45)
AUTO MIXED CELLS #: 0.5 10*3/MM3 (ref 0.1–2.6)
AUTO MIXED CELLS %: 11.3 % (ref 0.1–24)
BILIRUB SERPL-MCNC: 1.1 MG/DL (ref 0.1–1)
BILIRUB UR QL STRIP: NEGATIVE
BUN SERPL-MCNC: 2 MG/DL (ref 5–21)
BUN/CREAT SERPL: 5.3
CALCIUM SPEC-SCNC: 8.5 MG/DL (ref 8.4–10.4)
CHLORIDE SERPL-SCNC: 89 MMOL/L (ref 98–110)
CLARITY UR: CLEAR
CO2 SERPL-SCNC: 30 MMOL/L (ref 24–31)
COLOR UR: YELLOW
CREAT BLDA-MCNC: 0.6 MG/DL (ref 0.6–1.3)
CREAT SERPL-MCNC: 0.38 MG/DL (ref 0.5–1.4)
DEVELOPER EXPIRATION DATE: NORMAL
DEVELOPER LOT NUMBER: 203
ERYTHROCYTE [DISTWIDTH] IN BLOOD BY AUTOMATED COUNT: 20 % (ref 12.3–15.4)
EXPIRATION DATE: NORMAL
FECAL OCCULT BLOOD SCREEN, POC: NEGATIVE
GFR SERPL CREATININE-BSD FRML MDRD: >150 ML/MIN/1.73
GLOBULIN UR ELPH-MCNC: 2.8 GM/DL
GLUCOSE BLDC GLUCOMTR-MCNC: 293 MG/DL (ref 70–130)
GLUCOSE SERPL-MCNC: 390 MG/DL (ref 70–100)
GLUCOSE UR STRIP-MCNC: ABNORMAL MG/DL
HCT VFR BLD AUTO: 32.2 % (ref 34–46.6)
HGB BLD-MCNC: 8.9 G/DL (ref 12–15.9)
HGB UR QL STRIP.AUTO: NEGATIVE
KETONES UR QL STRIP: ABNORMAL
LEUKOCYTE ESTERASE UR QL STRIP.AUTO: NEGATIVE
LIPASE SERPL-CCNC: 7 U/L (ref 13–60)
LYMPHOCYTES # BLD AUTO: 0.4 10*3/MM3 (ref 0.7–3.1)
LYMPHOCYTES NFR BLD AUTO: 8.6 % (ref 19.6–45.3)
Lab: 203
MAGNESIUM SERPL-MCNC: 1.4 MG/DL (ref 1.6–2.6)
MCH RBC QN AUTO: 20.7 PG (ref 26.6–33)
MCHC RBC AUTO-ENTMCNC: 27.6 G/DL (ref 31.5–35.7)
MCV RBC AUTO: 74.9 FL (ref 79–97)
NEGATIVE CONTROL: NEGATIVE
NEUTROPHILS NFR BLD AUTO: 3.8 10*3/MM3 (ref 1.7–7)
NEUTROPHILS NFR BLD AUTO: 80.1 % (ref 42.7–76)
NITRITE UR QL STRIP: NEGATIVE
NT-PROBNP SERPL-MCNC: 145.7 PG/ML (ref 0–900)
PH UR STRIP.AUTO: 6 [PH] (ref 5–8)
PLATELET # BLD AUTO: 259 10*3/MM3 (ref 140–450)
PMV BLD AUTO: 8.5 FL (ref 6–12)
POSITIVE CONTROL: POSITIVE
POTASSIUM SERPL-SCNC: 2.9 MMOL/L (ref 3.5–5.3)
PROT SERPL-MCNC: 6.5 G/DL (ref 6.3–8.7)
PROT UR QL STRIP: NEGATIVE
RBC # BLD AUTO: 4.3 10*6/MM3 (ref 3.77–5.28)
SARS-COV-2 RNA PNL SPEC NAA+PROBE: NOT DETECTED
SODIUM SERPL-SCNC: 130 MMOL/L (ref 135–145)
SP GR UR STRIP: <=1.005 (ref 1–1.03)
UROBILINOGEN UR QL STRIP: ABNORMAL
WBC # BLD AUTO: 4.7 10*3/MM3 (ref 3.4–10.8)

## 2021-10-05 PROCEDURE — 71046 X-RAY EXAM CHEST 2 VIEWS: CPT

## 2021-10-05 PROCEDURE — 81003 URINALYSIS AUTO W/O SCOPE: CPT

## 2021-10-05 PROCEDURE — 83880 ASSAY OF NATRIURETIC PEPTIDE: CPT | Performed by: EMERGENCY MEDICINE

## 2021-10-05 PROCEDURE — 83880 ASSAY OF NATRIURETIC PEPTIDE: CPT

## 2021-10-05 PROCEDURE — 83735 ASSAY OF MAGNESIUM: CPT | Performed by: EMERGENCY MEDICINE

## 2021-10-05 PROCEDURE — 96365 THER/PROPH/DIAG IV INF INIT: CPT

## 2021-10-05 PROCEDURE — 99283 EMERGENCY DEPT VISIT LOW MDM: CPT

## 2021-10-05 PROCEDURE — 25010000002 IOPAMIDOL 61 % SOLUTION: Performed by: PEDIATRICS

## 2021-10-05 PROCEDURE — 82962 GLUCOSE BLOOD TEST: CPT

## 2021-10-05 PROCEDURE — 96375 TX/PRO/DX INJ NEW DRUG ADDON: CPT

## 2021-10-05 PROCEDURE — 80053 COMPREHEN METABOLIC PANEL: CPT

## 2021-10-05 PROCEDURE — 36415 COLL VENOUS BLD VENIPUNCTURE: CPT

## 2021-10-05 PROCEDURE — 87635 SARS-COV-2 COVID-19 AMP PRB: CPT | Performed by: EMERGENCY MEDICINE

## 2021-10-05 PROCEDURE — 25010000002 KETOROLAC TROMETHAMINE PER 15 MG: Performed by: EMERGENCY MEDICINE

## 2021-10-05 PROCEDURE — 83690 ASSAY OF LIPASE: CPT | Performed by: EMERGENCY MEDICINE

## 2021-10-05 PROCEDURE — C9803 HOPD COVID-19 SPEC COLLECT: HCPCS | Performed by: EMERGENCY MEDICINE

## 2021-10-05 PROCEDURE — 25010000002 MAGNESIUM SULFATE IN D5W 1G/100ML (PREMIX) 1-5 GM/100ML-% SOLUTION: Performed by: EMERGENCY MEDICINE

## 2021-10-05 PROCEDURE — 82565 ASSAY OF CREATININE: CPT

## 2021-10-05 PROCEDURE — 63710000001 INSULIN REGULAR HUMAN PER 5 UNITS: Performed by: EMERGENCY MEDICINE

## 2021-10-05 PROCEDURE — 85025 COMPLETE CBC W/AUTO DIFF WBC: CPT

## 2021-10-05 PROCEDURE — 82270 OCCULT BLOOD FECES: CPT | Performed by: EMERGENCY MEDICINE

## 2021-10-05 PROCEDURE — 99215 OFFICE O/P EST HI 40 MIN: CPT | Performed by: PEDIATRICS

## 2021-10-05 PROCEDURE — 73610 X-RAY EXAM OF ANKLE: CPT

## 2021-10-05 PROCEDURE — 74177 CT ABD & PELVIS W/CONTRAST: CPT

## 2021-10-05 RX ORDER — POTASSIUM CHLORIDE 1.5 G/1.77G
40 POWDER, FOR SOLUTION ORAL 2 TIMES DAILY
Status: DISCONTINUED | OUTPATIENT
Start: 2021-10-05 | End: 2021-10-06 | Stop reason: HOSPADM

## 2021-10-05 RX ORDER — KETOROLAC TROMETHAMINE 15 MG/ML
15 INJECTION, SOLUTION INTRAMUSCULAR; INTRAVENOUS ONCE
Status: COMPLETED | OUTPATIENT
Start: 2021-10-05 | End: 2021-10-05

## 2021-10-05 RX ORDER — POTASSIUM CHLORIDE 750 MG/1
10 TABLET, FILM COATED, EXTENDED RELEASE ORAL 2 TIMES DAILY
Qty: 14 TABLET | Refills: 0 | Status: SHIPPED | OUTPATIENT
Start: 2021-10-05 | End: 2022-03-30

## 2021-10-05 RX ORDER — MAGNESIUM SULFATE 1 G/100ML
1 INJECTION INTRAVENOUS ONCE
Status: COMPLETED | OUTPATIENT
Start: 2021-10-05 | End: 2021-10-05

## 2021-10-05 RX ADMIN — SODIUM CHLORIDE, POTASSIUM CHLORIDE, SODIUM LACTATE AND CALCIUM CHLORIDE 1000 ML: 600; 310; 30; 20 INJECTION, SOLUTION INTRAVENOUS at 20:33

## 2021-10-05 RX ADMIN — MAGNESIUM SULFATE IN DEXTROSE 1 G: 10 INJECTION, SOLUTION INTRAVENOUS at 21:09

## 2021-10-05 RX ADMIN — KETOROLAC TROMETHAMINE 15 MG: 15 INJECTION, SOLUTION INTRAMUSCULAR; INTRAVENOUS at 20:31

## 2021-10-05 RX ADMIN — IOPAMIDOL 100 ML: 612 INJECTION, SOLUTION INTRAVENOUS at 12:39

## 2021-10-05 RX ADMIN — INSULIN HUMAN 5 UNITS: 100 INJECTION, SOLUTION PARENTERAL at 20:31

## 2021-10-05 NOTE — ASSESSMENT & PLAN NOTE
Having significant problem moving bowels, sounds like intermitent prolapse at times past two weeks about when noted swelling of legs bilaterally

## 2021-10-05 NOTE — ASSESSMENT & PLAN NOTE
She has ignored it lately      She is aware of significant backslide in her condition    She is hyperglycemia hyponatremic and hypokalemic due to her condition

## 2021-10-05 NOTE — PROGRESS NOTES
"Chief Complaint  Edema (bilateral leg swelling), Ankle Pain (left), and Foot Pain (left heel feels burning and tearing)    Subjective    History of Present Illness      Patient presents to [facility] for   Patient is here today with complaints of left ankle pain, and bilateral leg swelling. She also states she has left heel burning, feels like it's tearing. Patient says this started about 2 weeks ago she twisted it and 2 days ago she twisted it again.     Legs swelling past two weeks,  They are getting heavy.   Having trouble walking due to heel pain.  No sob or angina /chest pain.    bp is still up.    Having problem with evacuation of bowels , there is an obstruction or prolapse going on, can only defecate when sitting on sofa.    Noted infection after mission trip to [place], her and another had major gi issues with purulent diarrhea (July 19)  Has had issues since then, but it has markedly increased lately.       Review of Systems    I have reviewed and agree with the HPI information as above.  Imtiaz Carvajal MD     Objective   Vital Signs:   /87   Pulse 110   Temp 97.7 °F (36.5 °C)   Resp 20   Ht 152.4 cm (60\")   Wt 54.4 kg (120 lb)   SpO2 100%   BMI 23.44 kg/m²       Physical Exam  Constitutional:       Appearance: Normal appearance. She is normal weight.   Cardiovascular:      Rate and Rhythm: Normal rate and regular rhythm.      Heart sounds: Normal heart sounds.   Pulmonary:      Effort: Pulmonary effort is normal.      Breath sounds: Normal breath sounds.   Abdominal:      General: There is distension (upper quadrants very swollen.).      Palpations: There is mass.   Musculoskeletal:         General: Swelling and tenderness (left heel) present.      Right lower leg: 3+ Edema present.      Left lower leg: 3+ Edema present.        Legs:    Neurological:      Mental Status: She is alert.   Psychiatric:         Mood and Affect: Mood normal.         Behavior: Behavior " normal.          Result Review  Data Reviewed:              CT PAD ABD PELVIS W CONTRAST (10/05/2021)  XR pad chest 2 vw (10/05/2021)  BNP (10/05/2021 12:28)  Urinalysis With Culture If Indicated - Urine, Clean Catch (10/05/2021 12:28)  Comprehensive Metabolic Panel (10/05/2021 12:28)  CBC Auto Differential (10/05/2021 12:28)       Assessment and Plan      Problem List Items Addressed This Visit        Endocrine and Metabolic    Type 1 diabetes mellitus with hyperglycemia (HCC)    Current Assessment & Plan     She has ignored it lately      She is aware of significant backslide in her condition            Gastrointestinal Abdominal     Other partial intestinal obstruction (HCC)    Current Assessment & Plan     Having significant problem moving bowels, sounds like intermitent prolapse at times past two weeks about when noted swelling of legs bilaterally         Relevant Orders    CT Abdomen Pelvis With Contrast (Completed)    Ascites due to alcoholic cirrhosis (HCC)    Current Assessment & Plan     Ct evidence of ascites and liver dz            Hematology and Neoplasia    Iron deficiency anemia secondary to inadequate dietary iron intake       Infectious Diseases    Colitis, infectious    Current Assessment & Plan     Abnormal ct with significant thickening colon    Admission to hospital recommended            Musculoskeletal and Injuries    Chronic heel pain, left    Relevant Orders    XR Ankle 3+ View Left (Completed)       Symptoms and Signs    Pedal edema - Primary    Relevant Orders    CBC Auto Differential (Completed)    Comprehensive Metabolic Panel (Completed)    Urinalysis With Culture If Indicated - Urine, Clean Catch (Completed)    BNP    XR Chest 2 View (Completed)              Follow Up   No follow-ups on file.  Patient was given instructions and counseling regarding her condition or for health maintenance advice. Please see specific information pulled into the AVS if appropriate.

## 2021-10-06 LAB — NT-PROBNP SERPL-MCNC: 125.2 PG/ML (ref 0–900)

## 2021-10-06 NOTE — ED PROVIDER NOTES
Subjective   61-year-old female presents to the ER from outpatient clinic to be evaluated for lower extremity swelling, anemia, poorly controlled glucose, left ankle pain, abdominal pain and possibly new onset cirrhosis.  She has a history of diabetes, daily alcohol use, pancreatitis, GERD.  She takes omeprazole for GERD, supposed to be on Lantus and NovoLog but is been noncompliant for the past several months with her insulin regimen.  She is not currently followed routinely by primary care provider.  Today she went to outpatient clinic for left ankle pain and swelling.  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  States she now has left ankle pain is worse with weightbearing.  She also complained of some epigastric discomfort.  When she went to clinic the provider noted she had some symmetric bilateral lower extremity swelling and obtain labs, CT abdomen and pelvis, left lower extremity x-ray.  Patient was found to have several mild laboratory derangements as well as some abnormal CT findings which prompted patient be sent to the ER for evaluation.  Patient's glucose was 390 but there is no additional evidence for DKA, bicarb was normal at 30.  Sodium was 130 but corrected to 135 for hyperglycemia.  Potassium 2.9.  She had a CT of her abdomen pelvis that revealed some ascites and CT evidence for cirrhosis as well as gastric wall thickening suggestive of gastritis..  Patient does states she drinks several alcoholic beverages nightly.  This has increased in frequency since the beginning of the year.  She is not having any complaints of chest pain, shortness of breath, nausea, vomiting, fevers or chills, cough or congestion, changes in smell or taste sensation.  Her main complaint is left ankle pain which she rates as moderate severity.      History provided by:  Patient      Review of Systems   All other systems  reviewed and are negative.      Past Medical History:   Diagnosis Date   • Breast cyst    • Diabetes mellitus (HCC)    • Endometriosis    • GERD (gastroesophageal reflux disease)    • Ovarian cyst    • Pancreatitis    • Postmenopausal    • Sleep apnea        Allergies   Allergen Reactions   • Augmentin [Amoxicillin-Pot Clavulanate] Nausea And Vomiting       Past Surgical History:   Procedure Laterality Date   • BILATERAL SALPINGO OOPHORECTOMY  2008   • CHOLECYSTECTOMY  1996   • COLONOSCOPY  07/2017    Dr Murphy- per patient normal    • DILATION AND CURETTAGE, DIAGNOSTIC / THERAPEUTIC     • KNEE SURGERY  1982       Family History   Problem Relation Age of Onset   • Ovarian cancer Mother    • Ovarian cancer Maternal Aunt    • Ovarian cancer Maternal Grandmother    • Ovarian cancer Paternal Grandmother    • Colon polyps Father    • Colon polyps Brother    • Breast cancer Neg Hx        Social History     Socioeconomic History   • Marital status: Single     Spouse name: Not on file   • Number of children: Not on file   • Years of education: Not on file   • Highest education level: Not on file   Tobacco Use   • Smoking status: Never Smoker   • Smokeless tobacco: Never Used   Vaping Use   • Vaping Use: Never used   Substance and Sexual Activity   • Alcohol use: Yes     Comment: occ   • Drug use: No   • Sexual activity: Defer           Objective   Physical Exam  Vitals and nursing note reviewed.   Constitutional:       General: She is not in acute distress.     Appearance: Normal appearance. She is normal weight.   HENT:      Head: Normocephalic and atraumatic.      Nose: Nose normal.      Mouth/Throat:      Mouth: Mucous membranes are moist.      Pharynx: Oropharynx is clear. No oropharyngeal exudate or posterior oropharyngeal erythema.   Eyes:      Extraocular Movements: Extraocular movements intact.      Conjunctiva/sclera: Conjunctivae normal.      Pupils: Pupils are equal, round, and reactive to light.   Cardiovascular:       Rate and Rhythm: Normal rate and regular rhythm.      Pulses: Normal pulses.      Heart sounds: Normal heart sounds.   Pulmonary:      Effort: Pulmonary effort is normal.      Breath sounds: Normal breath sounds. No wheezing, rhonchi or rales.   Abdominal:      General: Abdomen is flat. Bowel sounds are normal. There is no distension.      Palpations: Abdomen is soft.      Tenderness: There is abdominal tenderness (Mild epigastric).   Musculoskeletal:         General: No tenderness. Normal range of motion.      Cervical back: Normal range of motion and neck supple. No rigidity. No muscular tenderness.      Right lower leg: Edema present.      Left lower leg: Edema present.   Skin:     General: Skin is warm and dry.      Capillary Refill: Capillary refill takes less than 2 seconds.      Findings: No rash.   Neurological:      General: No focal deficit present.      Mental Status: She is alert and oriented to person, place, and time. Mental status is at baseline.      Cranial Nerves: No cranial nerve deficit.      Sensory: No sensory deficit.      Motor: No weakness.   Psychiatric:         Mood and Affect: Mood normal.         Behavior: Behavior normal.         Thought Content: Thought content normal.         Procedures       Lab Results (last 24 hours)     Procedure Component Value Units Date/Time    CBC Auto Differential [[id number]]  (Abnormal) Collected: 10/05/21 1228    Specimen: Blood Updated: 10/05/21 1232     WBC 4.70 10*3/mm3      RBC 4.30 10*6/mm3      Hemoglobin 8.9 g/dL      Hematocrit 32.2 %      MCV 74.9 fL      MCH 20.7 pg      MCHC 27.6 g/dL      RDW 20.0 %      MPV 8.5 fL      Platelets 259 10*3/mm3      Neutrophil % 80.1 %      Lymphocyte % 8.6 %      Auto Mixed Cells % 11.3 %      Neutrophils, Absolute 3.80 10*3/mm3      Lymphocytes, Absolute 0.40 10*3/mm3      Auto Mixed Cells # 0.50 10*3/mm3     Comprehensive Metabolic Panel [[id number]]  (Abnormal) Collected: 10/05/21 1228    Specimen: Blood  Updated: 10/05/21 1248     Glucose 390 mg/dL      BUN 2 mg/dL      Creatinine 0.38 mg/dL      Sodium 130 mmol/L      Potassium 2.9 mmol/L      Chloride 89 mmol/L      CO2 30.0 mmol/L      Calcium 8.5 mg/dL      Total Protein 6.5 g/dL      Albumin 3.70 g/dL      ALT (SGPT) 39 U/L      AST (SGOT) 206 U/L      Alkaline Phosphatase 334 U/L      Total Bilirubin 1.1 mg/dL      eGFR Non African Amer >150 mL/min/1.73      Globulin 2.8 gm/dL      A/G Ratio 1.3 g/dL      BUN/Creatinine Ratio 5.3     Anion Gap 11.0 mmol/L     Urinalysis With Culture If Indicated - Urine, Clean Catch [[id number]]  (Abnormal) Collected: 10/05/21 1228    Specimen: Urine, Clean Catch Updated: 10/05/21 1255     Color, UA Yellow     Appearance, UA Clear     pH, UA 6.0     Specific Gravity, UA <=1.005     Glucose, UA >=1000 mg/dL (3+)     Ketones, UA Trace     Bilirubin, UA Negative     Blood, UA Negative     Protein, UA Negative     Leuk Esterase, UA Negative     Nitrite, UA Negative     Urobilinogen, UA 0.2 E.U./dL    Narrative:      Urine microscopic not indicated.    BNP [[id number]] Collected: 10/05/21 1228    Specimen: Blood Updated: 10/05/21 1228    POC Creatinine [[id number]]  (Normal) Collected: 10/05/21 1230    Specimen: Blood Updated: 10/05/21 1614     Creatinine 0.60 mg/dL      Comment: Serial Number: [id number]:  [id number]       POC Occult Blood Stool [[id number]]  (Normal) Collected: 10/05/21 1811    Specimen: Stool Updated: 10/05/21 1813     Fecal Occult Blood Negative     Lot Number [id number]     Expiration Date 4,302,022     DEVELOPER LOT NUMBER [id number]     DEVELOPER EXPIRATION DATE 4,302,022     Positive Control Positive     Negative Control Negative    COVID PRE-OP / PRE-PROCEDURE SCREENING ORDER (NO ISOLATION) - Swab, Nasal Cavity [[id number]]  (Normal) Collected: 10/05/21 1818    Specimen: Swab from Nasal Cavity Updated: 10/05/21 1917    Narrative:      The following orders were created for panel order COVID PRE-OP / PRE-PROCEDURE SCREENING  ORDER (NO ISOLATION) - Swab, Nasal Cavity.  Procedure                               Abnormality         Status                     ---------                               -----------         ------                     COVID-19,[facility]...[[id number]]  Normal              Final result                 Please view results for these tests on the individual orders.    COVID-19,[facility] IN-HOUSE,Nasal Swab No Transport Media 3-4 HR TAT - Swab, Nasal Cavity [[id number]]  (Normal) Collected: 10/05/21 1818    Specimen: Swab from Nasal Cavity Updated: 10/05/21 1917     COVID19 Not Detected    Narrative:      Fact sheet for providers: https://www.fda.gov/media/837977/download     Fact sheet for patients: https://www.fda.gov/media/833397/download    Test performed by PCR.    Consider negative results in combination with clinical observations, patient history, and epidemiological information.    POC Glucose Once [[id number]]  (Abnormal) Collected: 10/05/21 1936    Specimen: Blood Updated: 10/05/21 1948     Glucose 293 mg/dL      Comment: : [id number] [facility] ID: [id number]       Lipase [[id number]]  (Abnormal) Collected: 10/05/21 2037    Specimen: Blood Updated: 10/05/21 2057     Lipase 7 U/L     Magnesium [[id number]]  (Abnormal) Collected: 10/05/21 2037    Specimen: Blood Updated: 10/05/21 2056     Magnesium 1.4 mg/dL     BNP [[id number]]  (Normal) Collected: 10/05/21 2037    Specimen: Blood Updated: 10/05/21 2100     proBNP 145.7 pg/mL     Narrative:      Among patients with dyspnea, NT-proBNP is highly sensitive for the detection of acute congestive heart failure. In addition NT-proBNP of <300 pg/ml effectively rules out acute congestive heart failure with 99% negative predictive value.    Results may be falsely decreased if patient taking Biotin.        XR Chest 2 View    Result Date: 10/5/2021  EXAMINATION: XR CHEST 2 VW-  10/5/2021 1:21 PM CDT  TWO-VIEW CHEST:  HISTORY: Pedal edema, question congestive  heart failure  Frontal and lateral projection chest radiograph obtained.  COMPARISON:  4/19/2019 chest radiography  FINDINGS:  Level inspiration is relative shallow and lung volumes mildly diminished.  The lungs are clear without parenchymal infiltrates.  The heart is within the upper limits of normal in size. The pulmonary circulation appropriate, without heart failure.  Bony structures are intact.                                                                                                                  1. No acute cardiopulmonary process.   This report was finalized on 10/05/2021 13:22 by Dr. Luis Felipe Evangelista MD.    XR Ankle 3+ View Left    Result Date: 10/5/2021  EXAMINATION:  XR ANKLE 3+ VW LEFT-  10/5/2021 12:44 PM CDT  HISTORY: pain left heel; M79.672-Pain in left foot; G89.29-Other chronic pain  COMPARISON: No comparison study.  TECHNIQUE: 3 views: AP lateral oblique projection imaging  FINDINGS:    There is diffuse soft tissue swelling particularly on the lateral malleolus.  The tibiotalar relationship and ankle mortise are maintained.  There is no fracture.      1. No acute osseous abnormality. 2. Soft tissue swelling. This report was finalized on 10/05/2021 13:22 by Dr. Luis Felipe Evangelista MD.    CT Abdomen Pelvis With Contrast    Result Date: 10/5/2021  EXAM: CT ABDOMEN PELVIS W CONTRAST- - 10/5/2021 12:29 PM CDT  HISTORY: Bowel obstruction suspected; K56.690-Other partial intestinal obstruction   COMPARISON: No existing relevant imaging studies available.  DOSE LENGTH PRODUCT: 190 mGy cm. Automatic exposure control was utilized to make radiation dose as low as reasonably achievable.  TECHNIQUE: Enhanced  axial images of the abdomen and pelvis obtained with multiplanar reformats.  FINDINGS: VISUALIZED CHEST: No pleural or pericardial effusion. Lung bases clear.  LIVER: Fatty liver. No focal liver lesion. Patent portal and hepatic veins.  BILIARY: Cholecystectomy clips. No bile duct dilation.  PANCREAS:  Marked atrophy.  SPLEEN: Normal size. Homogeneous enhancement.  ADRENAL: Normal appearance of the bilateral adrenal glands.  GENITOURINARY: Scarring at the right upper renal pole. A few nonobstructing punctate renal calcifications. No hydronephrosis or ureteral calculus. Urinary bladder is within normal limits. Uterus favored to be atrophic. Adnexa not discretely identified.  PERITONEUM: Small volume ascites.  GI TRACT: Small hiatal hernia. Diffuse gastric wall thickening, greatest at the distal stomach. Thickening of the ascending colon. No evidence of bowel obstruction. Distal sigmoid colon diverticula without evidence of acute diverticulitis. Normal appendix on axial images 38-49.  VESSELS: Aorta normal in course and caliber with mild calcified atherosclerosis. Main branch vessels patent. Bilateral renal veins patent.  RETROPERITONEUM: No mass, lymphadenopathy or hemorrhage.  SOFT TISSUES: Body wall edema.  BONES: T6-7 partially visualized and appears to have sclerotic endplate changes, incompletely visualized and characterized on this exam.       1. Diffuse gastric wall thickening, greatest at the distal stomach raising consideration of gastritis. 2. Thickening of the ascending colon, which could represent hepatic colopathy or other infectious/inflammatory or less likely ischemic colitis. 3. Markedly fatty liver. Small ascites. 4. Partially visualized T6-7 has sclerotic endplate changes, incompletely visualized and characterized on this exam. This could represent degenerative change, but discitis osteomyelitis would not be excluded in the appropriate clinical setting.  This report was finalized on 10/05/2021 13:08 by Dr Paula Simeon MD.      ED Course  ED Course as of Oct 05 2156   Tue Oct 05, 2021   2150 61-year-old female with history of daily alcohol use, poorly controlled diabetes, pancreatitis, GERD who presents to the ED from urgent care after she was seen initially for left ankle sprain.  She had no  additional complaints, but was found to have several abnormal physical exam findings which prompted a more extensive work-up which revealed several lab abnormalities.  Patient does have an ankle sprain, no fracture noted on imaging.  Will place an Ace wrap and have patient weight-bear as tolerated.  She had mild hypokalemia, begin repletion in the emergency department.  She also has anemia, fecal occult blood was negative.  Hemodynamically stable with no evidence of symptomatic anemia.  Mild hypomag, repleted in the ED.  CT obtained on outpatient basis revealed findings consistent with cirrhosis.    She has a several new findings but nothing that will warrant inpatient work-up or treatment.  He does not have a primary care doctor but has access to a PCP, states she is going to call tomorrow to establish primary care.        [AW]      ED Course User Index  [AW] Lalo Licea MD                                           MDM  Number of Diagnoses or Management Options  Anemia, unspecified type: new and requires workup  Cirrhosis of liver with ascites, unspecified hepatic cirrhosis type (HCC): new and requires workup  Hypokalemia: new and requires workup  Hypomagnesemia: new and requires workup  Peripheral edema: new and requires workup  Sprain of right ankle, unspecified ligament, initial encounter: new and requires workup     Amount and/or Complexity of Data Reviewed  Clinical lab tests: reviewed  Tests in the radiology section of CPT®: reviewed    Risk of Complications, Morbidity, and/or Mortality  Presenting problems: high  Diagnostic procedures: high  Management options: high    Patient Progress  Patient progress: stable      Final diagnoses:   Sprain of right ankle, unspecified ligament, initial encounter   Anemia, unspecified type   Hypokalemia   Hypomagnesemia   Peripheral edema   Cirrhosis of liver with ascites, unspecified hepatic cirrhosis type (HCC)       ED Disposition  ED Disposition     ED  Disposition Condition Comment    Discharge Stable             Follow-up with PCP of her choice, can follow-up with her PCP from the list provided below.             Medication List      No changes were made to your prescriptions during this visit.          Lalo Licea MD  10/05/21 1700

## 2021-10-11 ENCOUNTER — OFFICE VISIT (OUTPATIENT)
Dept: INTERNAL MEDICINE | Age: 61
End: 2021-10-11

## 2021-10-11 VITALS
DIASTOLIC BLOOD PRESSURE: 70 MMHG | HEIGHT: 60 IN | SYSTOLIC BLOOD PRESSURE: 130 MMHG | WEIGHT: 126 LBS | BODY MASS INDEX: 24.74 KG/M2 | HEART RATE: 68 BPM

## 2021-10-11 DIAGNOSIS — F10.10 ALCOHOL ABUSE: Primary | ICD-10-CM

## 2021-10-11 DIAGNOSIS — K62.89 RECTAL MASS: ICD-10-CM

## 2021-10-11 DIAGNOSIS — E11.65 TYPE 2 DIABETES MELLITUS WITH HYPERGLYCEMIA, WITH LONG-TERM CURRENT USE OF INSULIN (HCC): ICD-10-CM

## 2021-10-11 DIAGNOSIS — Z79.4 TYPE 2 DIABETES MELLITUS WITH HYPERGLYCEMIA, WITH LONG-TERM CURRENT USE OF INSULIN (HCC): ICD-10-CM

## 2021-10-11 DIAGNOSIS — M79.604 PAIN IN BOTH LOWER EXTREMITIES: ICD-10-CM

## 2021-10-11 DIAGNOSIS — K70.11 ASCITES DUE TO ALCOHOLIC HEPATITIS: ICD-10-CM

## 2021-10-11 DIAGNOSIS — R60.1 GENERALIZED EDEMA: ICD-10-CM

## 2021-10-11 DIAGNOSIS — R19.8 CHANGE IN BOWEL MOVEMENT: ICD-10-CM

## 2021-10-11 DIAGNOSIS — M79.605 PAIN IN BOTH LOWER EXTREMITIES: ICD-10-CM

## 2021-10-11 DIAGNOSIS — K76.0 FATTY LIVER: ICD-10-CM

## 2021-10-11 DIAGNOSIS — R79.89 ELEVATED D-DIMER: ICD-10-CM

## 2021-10-11 PROCEDURE — 99999 PR OFFICE/OUTPT VISIT,PROCEDURE ONLY: CPT | Performed by: INTERNAL MEDICINE

## 2021-10-11 PROCEDURE — 1111F DSCHRG MED/CURRENT MED MERGE: CPT | Performed by: INTERNAL MEDICINE

## 2021-10-11 RX ORDER — POTASSIUM CHLORIDE 750 MG/1
10 CAPSULE, EXTENDED RELEASE ORAL 2 TIMES DAILY
COMMUNITY
Start: 2021-10-07 | End: 2021-10-25

## 2021-10-11 SDOH — ECONOMIC STABILITY: FOOD INSECURITY: WITHIN THE PAST 12 MONTHS, THE FOOD YOU BOUGHT JUST DIDN'T LAST AND YOU DIDN'T HAVE MONEY TO GET MORE.: NEVER TRUE

## 2021-10-11 SDOH — ECONOMIC STABILITY: FOOD INSECURITY: WITHIN THE PAST 12 MONTHS, YOU WORRIED THAT YOUR FOOD WOULD RUN OUT BEFORE YOU GOT MONEY TO BUY MORE.: NEVER TRUE

## 2021-10-11 ASSESSMENT — PATIENT HEALTH QUESTIONNAIRE - PHQ9
2. FEELING DOWN, DEPRESSED OR HOPELESS: 0
1. LITTLE INTEREST OR PLEASURE IN DOING THINGS: 0
SUM OF ALL RESPONSES TO PHQ QUESTIONS 1-9: 0
SUM OF ALL RESPONSES TO PHQ9 QUESTIONS 1 & 2: 0

## 2021-10-11 ASSESSMENT — SOCIAL DETERMINANTS OF HEALTH (SDOH): HOW HARD IS IT FOR YOU TO PAY FOR THE VERY BASICS LIKE FOOD, HOUSING, MEDICAL CARE, AND HEATING?: NOT VERY HARD

## 2021-10-11 NOTE — PROGRESS NOTES
Chief Complaint   Patient presents with    Follow-Up from [facility] ER follow up - rt, ankle sprain, anemia       HPI: Patient comes in for follow-up with recent visit to [facility] ER. Patient has not come to our office in several years she accompanies her mother who she brings in but we have not seen her as a patient in some time. Patient with history of alcohol abuse but has been more in denial of it today she comes in and admits that she is an alcoholic she admits that she knows this could be fatal if cannot get it in control she seems highly motivated to stop drinking she has family and friends support and really seems very motivated today to feel better she is having a lot of significant issues she is having edema started into her left leg but then progressed to both legs she has abdominal distention weakness fatigue she then twisted and sprained her left ankle at a yard sale. That leg foot is more swollen than the right she has difficulty with her bowel movement she has some rectal prolapse and recently she has to stand up to have a bowel movement or push the rectal mucosa up for the bowel movements come out not having a bowel movement in a normal fashion.   She got some abdominal distention when she eats she feels like her abdomen distends weakness fatigue she has some difficulty when getting on the exam table    Past Medical History:   Diagnosis Date    Alcohol abuse 2/6/2019    Allergic rhinitis     Diabetes mellitus ([address]     GERD (gastroesophageal reflux disease)     Hyperlipidemia     Hypertension     Postmenopausal 1/3/2019    Sleep apnea        Past Surgical History:   Procedure Laterality Date    CHOLECYSTECTOMY      KNEE ARTHROPLASTY      LIANA AND BSO         Family History   Problem Relation Age of Onset    Cancer Mother         ovarian    Cancer Maternal Grandmother         ovarian    Cancer Paternal Grandmother         ovarian       Social History     Socioeconomic History  Marital status: Single     Spouse name: Not on file    Number of children: Not on file    Years of education: Not on file    Highest education level: Not on file   Occupational History    Not on file   Tobacco Use    Smoking status: Never Smoker    Smokeless tobacco: Never Used   Substance and Sexual Activity    Alcohol use: Not on file    Drug use: Not on file    Sexual activity: Not on file   Other Topics Concern    Not on file   Social History Narrative    Not on file     Social Determinants of Health     Financial Resource Strain: Low Risk     Difficulty of Paying Living Expenses: Not very hard   Food Insecurity: No Food Insecurity    Worried About Running Out of Food in the Last Year: Never true    Dennys of Food in the Last Year: Never true   Transportation Needs:     Lack of Transportation (Medical):  Lack of Transportation (Non-Medical):    Physical Activity:     Days of Exercise per Week:     Minutes of Exercise per Session:    Stress:     Feeling of Stress :    Social Connections:     Frequency of Communication with Friends and Family:     Frequency of Social Gatherings with Friends and Family:     Attends Rastafari Services:     Active Member of Clubs or Organizations:     Attends Club or Organization Meetings:     Marital Status:    Intimate Partner Violence:     Fear of Current or Ex-Partner:     Emotionally Abused:     Physically Abused:     Sexually Abused:         Allergies   Allergen Reactions    Amoxicillin-Pot Clavulanate Nausea And Vomiting       Current Outpatient Medications   Medication Sig Dispense Refill    spironolactone (ALDACTONE) 50 MG tablet Take 1 tablet by mouth daily 30 tablet 5    furosemide (LASIX) 20 MG tablet Take 1 tablet by mouth daily 60 tablet 3    potassium chloride (MICRO-K) 10 MEQ extended release capsule Take 10 mEq by mouth 2 times daily      insulin glargine (LANTUS SOLOSTAR) 100 UNIT/ML injection pen Start at 5 units sq nightly 5 pen 3    aspirin EC 81 MG EC tablet Take 81 mg by mouth daily      omeprazole (PRILOSEC) 20 MG delayed release capsule Take 1 capsule by mouth 2 times daily 90 capsule 3     No current facility-administered medications for this visit. Review of Systems   Constitutional: Positive for fatigue. Negative for chills and fever. HENT: Negative for congestion and sinus pressure. Eyes: Negative for discharge and redness. Respiratory: Positive for shortness of breath. Negative for cough. Cardiovascular: Positive for leg swelling. Negative for chest pain and palpitations. Gastrointestinal: Positive for abdominal distention and anal bleeding. Negative for abdominal pain. Abnormal bm   Genitourinary: Negative for dysuria, frequency and urgency. Musculoskeletal: Positive for arthralgias and gait problem. Skin: Negative for rash and wound. Neurological: Positive for weakness. Negative for dizziness, light-headedness and headaches. Psychiatric/Behavioral: Negative for dysphoric mood and sleep disturbance. The patient is not nervous/anxious. /70   Pulse 68   Ht 5' (1.524 m)   Wt 126 lb (57.2 kg)   BMI 24.61 kg/m²   BP Readings from Last 7 Encounters:   10/11/21 130/70   01/31/19 130/72   01/03/19 130/84     Wt Readings from Last 7 Encounters:   10/11/21 126 lb (57.2 kg)   01/31/19 127 lb (57.6 kg)   01/03/19 128 lb (58.1 kg)     BMI Readings from Last 7 Encounters:   10/11/21 24.61 kg/m²   01/31/19 24.80 kg/m²   01/03/19 25.00 kg/m²     Resp Readings from Last 7 Encounters:   No data found for Resp       Physical Exam  Constitutional:       General: She is not in acute distress. Appearance: Normal appearance. She is well-developed. HENT:      Right Ear: External ear normal. Tympanic membrane is not injected. Left Ear: External ear normal. Tympanic membrane is not injected. Mouth/Throat:      Pharynx: No oropharyngeal exudate.    Eyes:      General: No scleral icterus. Conjunctiva/sclera: Conjunctivae normal.   Neck:      Thyroid: No thyroid mass or thyromegaly. Vascular: No carotid bruit. Cardiovascular:      Rate and Rhythm: Normal rate and regular rhythm. Heart sounds: S1 normal and S2 normal. No murmur heard. No S3 or S4 sounds. Pulmonary:      Effort: Pulmonary effort is normal. No respiratory distress. Breath sounds: Normal breath sounds. No wheezing or rales. Abdominal:      General: Bowel sounds are normal. There is distension. Palpations: There is no mass. Tenderness: There is no abdominal tenderness. Comments: Slightly full abdomen, slight rectal prolapse seen on rectal exam there is an abnormality felt about the size of the end of my finger to the knuckle about an inch long irregular abnormality felt right within the rectal vault along the posterior wall. Stool with visible blood. Musculoskeletal:      Cervical back: Neck supple. Right lower leg: Edema present. Left lower leg: Edema present. Lymphadenopathy:      Cervical: No cervical adenopathy. Upper Body:      Right upper body: No supraclavicular adenopathy. Left upper body: No supraclavicular adenopathy. Skin:     Findings: No rash. Neurological:      Mental Status: She is alert and oriented to person, place, and time. Cranial Nerves: No cranial nerve deficit.    Psychiatric:      Comments: Little tearful but actually seems very peaceful and accepting and ready for help with alcohol cessation no alcohol for a week         Results for orders placed or performed in visit on 02/28/19   Comprehensive Metabolic Panel   Result Value Ref Range    Sodium 134 (L) 136 - 145 mmol/L    Potassium 5.4 (H) 3.5 - 5.0 mmol/L    Chloride 92 (L) 98 - 111 mmol/L    CO2 27 22 - 29 mmol/L    Anion Gap 15 7 - 19 mmol/L    Glucose 236 (H) 74 - 109 mg/dL    BUN 7 6 - 20 mg/dL    CREATININE 0.5 0.5 - 0.9 mg/dL    GFR Non-African American >60 >60    Calcium 10. 2 (H) 8.6 - 10.0 mg/dL    Total Protein 7.8 6.6 - 8.7 g/dL    Albumin 4.9 3.5 - 5.2 g/dL    Total Bilirubin 1.0 0.2 - 1.2 mg/dL    Alkaline Phosphatase 230 (H) 35 - 104 U/L    ALT 27 5 - 33 U/L    AST 27 5 - 32 U/L   CBC Auto Differential   Result Value Ref Range    WBC 5.2 4.8 - 10.8 K/uL    RBC 4.82 4.20 - 5.40 M/uL    Hemoglobin 15.5 12.0 - 16.0 g/dL    Hematocrit 45.1 37.0 - 47.0 %    MCV 93.6 81.0 - 99.0 fL    MCH 32.2 (H) 27.0 - 31.0 pg    MCHC 34.4 33.0 - 37.0 g/dL    RDW 11.6 11.5 - 14.5 %    Platelets 753 262 - 496 K/uL    MPV 9.0 (L) 9.4 - 12.3 fL    Neutrophils % 74.5 (H) 50.0 - 65.0 %    Lymphocytes % 11.7 (L) 20.0 - 40.0 %    Monocytes % 9.6 0.0 - 10.0 %    Eosinophils % 2.3 0.0 - 5.0 %    Basophils % 0.8 0.0 - 1.0 %    Neutrophils Absolute 3.9 1.5 - 7.5 K/uL    Lymphocytes Absolute 0.6 (L) 1.1 - 4.5 K/uL    Monocytes Absolute 0.50 0.00 - 0.90 K/uL    Eosinophils Absolute 0.10 0.00 - 0.60 K/uL    Basophils Absolute 0.00 0.00 - 0.20 K/uL       ASSESSMENT/ PLAN:  1. Alcohol abuse  We encouraged the patient and the biggest step is that she is asking for care and admitting that she has a problem with alcohol abuse we told her that is the biggest step and encouraged her that with her commitment to this she can stay off of alcohol we are willing to help in any manner. I talked her about referring locally to counselors were alcohol support systems. We talked about a referral to [facility] and [place]. Spoke about this for some time she gave me permission and asked me to speak with her sister. We will do so. Patient's been in communication with her about this. 2. Fatty liver  Most important thing medically is stopping alcohol use we need to repeat labs just to make sure things are headed in the right direction no alcohol for the last week. She did have some ascites and edema on exam before adding diuretic therapy and want to see what her labs show.   When I reviewed her labs we are going to add low-dose spironolactone and Lasix and follow this closely. - CBC; Future  - Comprehensive Metabolic Panel; Future  - TSH without Reflex; Future  - Iron; Future  - Ferritin; Future    3. Type 2 diabetes mellitus with hyperglycemia, with long-term current use of insulin (HCC)  Sounds like diabetes has not been in good control which is going to reassess A1c stopping alcohol use will greatly help her with compliance and having more stable regimen her sister is working with her on low-carb healthy diet plan  - Hemoglobin A1C; Future    4. Pain in both lower extremities  Needs a lower extremity Doppler but now she has bilateral edema this may be more related to underlying alcoholic cirrhosis the only thing it is that the left leg was swelling prior to the right going to get a D-dimer if it is positive then we will need lower extremity Dopplers. When lab came back D-dimer was elevated I went ahead and ordered bilateral lower extremity Dopplers  - D-Dimer, Quantitative; Future    5. Change in bowel movement  Have a lot of concern regarding this felt an irregular somewhat coliform-anastacio feeling narrow elevation in the rectal vault. Needs referral to GI. She is going to speak to family about who she wants to see    6. Rectal mass  Abnormal finding on rectal exam needs referral to GI    7. Generalized edema  Alcohol cessation adding low-dose spironolactone Lasix following up closely    8. Elevated d-dimer  Needs bilateral lower extremity Dopplers  - VL EXTREMITY VENOUS BILATERAL; Future    9. Ascites due to alcoholic hepatitis  As above great caution and follow patient will have health insurance again November 1 we will order further evaluation at that time with an ultrasound I think that would be totally acceptable we reviewed the ER records and CT we will proceed from there  - spironolactone (ALDACTONE) 50 MG tablet; Take 1 tablet by mouth daily  Dispense: 30 tablet; Refill: 5  - furosemide (LASIX) 20 MG tablet;  Take 1 tablet by mouth daily  Dispense: 60 tablet;  Refill: 3

## 2021-10-12 ENCOUNTER — HOSPITAL ENCOUNTER (OUTPATIENT)
Dept: NON INVASIVE DIAGNOSTICS | Age: 61
Discharge: HOME | End: 2021-10-12
Payer: MEDICAID

## 2021-10-12 DIAGNOSIS — R79.89 ELEVATED D-DIMER: ICD-10-CM

## 2021-10-12 DIAGNOSIS — E03.9 ACQUIRED HYPOTHYROIDISM: Primary | ICD-10-CM

## 2021-10-12 PROBLEM — R60.1 GENERALIZED EDEMA: Status: ACTIVE | Noted: 2021-10-12

## 2021-10-12 PROBLEM — R19.8 CHANGE IN BOWEL MOVEMENT: Status: ACTIVE | Noted: 2021-10-12

## 2021-10-12 PROBLEM — K62.89 RECTAL MASS: Status: ACTIVE | Noted: 2021-10-12

## 2021-10-12 PROBLEM — O12.00 GESTATIONAL EDEMA: Status: ACTIVE | Noted: 2021-10-12

## 2021-10-12 PROBLEM — O12.00 GESTATIONAL EDEMA: Status: RESOLVED | Noted: 2021-10-12 | Resolved: 2021-10-12

## 2021-10-12 PROCEDURE — 93970 EXTREMITY STUDY: CPT

## 2021-10-12 RX ORDER — LEVOTHYROXINE SODIUM 0.05 MG/1
50 TABLET ORAL DAILY
Qty: 30 TABLET | Refills: 5 | Status: SHIPPED | OUTPATIENT
Start: 2021-10-12 | End: 2022-03-18

## 2021-10-12 RX ORDER — FUROSEMIDE 20 MG/1
20 TABLET ORAL DAILY
Qty: 60 TABLET | Refills: 3 | Status: SHIPPED | OUTPATIENT
Start: 2021-10-12 | End: 2021-11-15 | Stop reason: SDUPTHER

## 2021-10-12 RX ORDER — SPIRONOLACTONE 50 MG/1
50 TABLET, FILM COATED ORAL DAILY
Qty: 30 TABLET | Refills: 5 | Status: SHIPPED | OUTPATIENT
Start: 2021-10-12 | End: 2021-10-25

## 2021-10-12 ASSESSMENT — ENCOUNTER SYMPTOMS
EYE REDNESS: 0
SINUS PRESSURE: 0
COUGH: 0
SHORTNESS OF BREATH: 1
ABDOMINAL DISTENTION: 1
ANAL BLEEDING: 1
EYE DISCHARGE: 0
ABDOMINAL PAIN: 0

## 2021-10-13 ENCOUNTER — TELEPHONE (OUTPATIENT)
Dept: INTERNAL MEDICINE | Age: 61
End: 2021-10-13

## 2021-10-13 NOTE — TELEPHONE ENCOUNTER
I called vascular and it has not been read yet, but she said that if it was positive, they are not supposed to let the pt. Leave.

## 2021-10-13 NOTE — TELEPHONE ENCOUNTER
She is calling for results of venous scan yesterday, but results are not in epic yet. Watch for results later today. Leticia Swartz

## 2021-10-13 NOTE — TELEPHONE ENCOUNTER
I dont see a report in the chart and they read right away so probably did not get put in for some reason- call vascular and see f they have the report

## 2021-10-15 ENCOUNTER — TELEPHONE (OUTPATIENT)
Dept: INTERNAL MEDICINE | Age: 61
End: 2021-10-15

## 2021-10-15 DIAGNOSIS — F10.20 ALCOHOLISM (HCC): Primary | ICD-10-CM

## 2021-10-15 DIAGNOSIS — K62.89 RECTAL MASS: ICD-10-CM

## 2021-10-15 DIAGNOSIS — K70.11 ASCITES DUE TO ALCOHOLIC HEPATITIS: ICD-10-CM

## 2021-10-15 PROBLEM — K70.31 ASCITES DUE TO ALCOHOLIC CIRRHOSIS (HCC): Status: ACTIVE | Noted: 2021-10-05

## 2021-10-15 PROBLEM — D50.8 IRON DEFICIENCY ANEMIA SECONDARY TO INADEQUATE DIETARY IRON INTAKE: Status: ACTIVE | Noted: 2021-10-05

## 2021-10-15 PROBLEM — E10.65 TYPE 1 DIABETES MELLITUS WITH HYPERGLYCEMIA (HCC): Status: ACTIVE | Noted: 2021-10-05

## 2021-10-15 NOTE — TELEPHONE ENCOUNTER
She says that legs have not gone down any and she is not putting out any more urine than usual.  She did not want to go all weekend with legs this swollen.

## 2021-10-15 NOTE — TELEPHONE ENCOUNTER
I explained to her that it will take some time to see how the diuresis helps she is coming to get labs on Tuesday I also made a referral to Dr. Mandi Lundberg for rectal mass on exam and stool incontinence and I made a referral to behavioral health for help with alcoholism.

## 2021-10-19 DIAGNOSIS — K70.11 ASCITES DUE TO ALCOHOLIC HEPATITIS: ICD-10-CM

## 2021-10-19 DIAGNOSIS — E03.9 ACQUIRED HYPOTHYROIDISM: ICD-10-CM

## 2021-10-19 LAB
ALBUMIN SERPL-MCNC: 3.3 G/DL (ref 3.5–5.2)
ALP BLD-CCNC: 169 U/L (ref 35–104)
ALT SERPL-CCNC: 14 U/L (ref 5–33)
ANION GAP SERPL CALCULATED.3IONS-SCNC: 12 MMOL/L (ref 7–19)
AST SERPL-CCNC: 28 U/L (ref 5–32)
BILIRUB SERPL-MCNC: 0.5 MG/DL (ref 0.2–1.2)
BUN BLDV-MCNC: 6 MG/DL (ref 6–20)
CALCIUM SERPL-MCNC: 8.5 MG/DL (ref 8.6–10)
CHLORIDE BLD-SCNC: 84 MMOL/L (ref 98–111)
CO2: 33 MMOL/L (ref 22–29)
CREAT SERPL-MCNC: 0.5 MG/DL (ref 0.5–0.9)
GFR AFRICAN AMERICAN: >59
GFR NON-AFRICAN AMERICAN: >60
GLUCOSE BLD-MCNC: 289 MG/DL (ref 74–109)
HCT VFR BLD CALC: 30.5 % (ref 37–47)
HEMOGLOBIN: 8.2 G/DL (ref 12–16)
MCH RBC QN AUTO: 20.2 PG (ref 27–31)
MCHC RBC AUTO-ENTMCNC: 26.9 G/DL (ref 33–37)
MCV RBC AUTO: 75.1 FL (ref 81–99)
PDW BLD-RTO: 19 % (ref 11.5–14.5)
PLATELET # BLD: 288 K/UL (ref 130–400)
PMV BLD AUTO: 10.5 FL (ref 9.4–12.3)
POTASSIUM SERPL-SCNC: 3.5 MMOL/L (ref 3.5–5)
RBC # BLD: 4.06 M/UL (ref 4.2–5.4)
SODIUM BLD-SCNC: 129 MMOL/L (ref 136–145)
T4 FREE: 1.15 NG/DL (ref 0.93–1.7)
TOTAL PROTEIN: 5.7 G/DL (ref 6.6–8.7)
TSH SERPL DL<=0.05 MIU/L-ACNC: 11.09 UIU/ML (ref 0.27–4.2)
WBC # BLD: 5.8 K/UL (ref 4.8–10.8)

## 2021-10-20 DIAGNOSIS — K70.31 ASCITES DUE TO ALCOHOLIC CIRRHOSIS (HCC): Primary | ICD-10-CM

## 2021-10-20 DIAGNOSIS — D50.8 IRON DEFICIENCY ANEMIA SECONDARY TO INADEQUATE DIETARY IRON INTAKE: ICD-10-CM

## 2021-10-25 ENCOUNTER — OFFICE VISIT (OUTPATIENT)
Dept: INTERNAL MEDICINE | Age: 61
End: 2021-10-25
Payer: MEDICAID

## 2021-10-25 VITALS
DIASTOLIC BLOOD PRESSURE: 60 MMHG | OXYGEN SATURATION: 96 % | WEIGHT: 129 LBS | HEIGHT: 60 IN | SYSTOLIC BLOOD PRESSURE: 122 MMHG | HEART RATE: 92 BPM | BODY MASS INDEX: 25.32 KG/M2

## 2021-10-25 DIAGNOSIS — K62.89 RECTAL MASS: ICD-10-CM

## 2021-10-25 DIAGNOSIS — K70.31 ASCITES DUE TO ALCOHOLIC CIRRHOSIS (HCC): Primary | ICD-10-CM

## 2021-10-25 DIAGNOSIS — E10.65 TYPE 1 DIABETES MELLITUS WITH HYPERGLYCEMIA (HCC): ICD-10-CM

## 2021-10-25 DIAGNOSIS — F10.10 ALCOHOL ABUSE: ICD-10-CM

## 2021-10-25 DIAGNOSIS — K70.31 ASCITES DUE TO ALCOHOLIC CIRRHOSIS (HCC): ICD-10-CM

## 2021-10-25 DIAGNOSIS — E61.1 IRON DEFICIENCY: ICD-10-CM

## 2021-10-25 DIAGNOSIS — D50.8 IRON DEFICIENCY ANEMIA SECONDARY TO INADEQUATE DIETARY IRON INTAKE: ICD-10-CM

## 2021-10-25 LAB
ALBUMIN SERPL-MCNC: 3.1 G/DL (ref 3.5–5.2)
ALP BLD-CCNC: 188 U/L (ref 35–104)
ALT SERPL-CCNC: 18 U/L (ref 5–33)
ANION GAP SERPL CALCULATED.3IONS-SCNC: 10 MMOL/L (ref 7–19)
AST SERPL-CCNC: 36 U/L (ref 5–32)
BILIRUB SERPL-MCNC: 0.7 MG/DL (ref 0.2–1.2)
BUN BLDV-MCNC: 4 MG/DL (ref 6–20)
CALCIUM SERPL-MCNC: 8.1 MG/DL (ref 8.6–10)
CHLORIDE BLD-SCNC: 85 MMOL/L (ref 98–111)
CO2: 35 MMOL/L (ref 22–29)
CREAT SERPL-MCNC: 0.5 MG/DL (ref 0.5–0.9)
GFR AFRICAN AMERICAN: >59
GFR NON-AFRICAN AMERICAN: >60
GLUCOSE BLD-MCNC: 353 MG/DL (ref 74–109)
HCT VFR BLD CALC: 28.5 % (ref 37–47)
HEMOGLOBIN: 8 G/DL (ref 12–16)
MCH RBC QN AUTO: 20.1 PG (ref 27–31)
MCHC RBC AUTO-ENTMCNC: 28.1 G/DL (ref 33–37)
MCV RBC AUTO: 71.4 FL (ref 81–99)
PDW BLD-RTO: 18.4 % (ref 11.5–14.5)
PLATELET # BLD: 281 K/UL (ref 130–400)
PMV BLD AUTO: 10.5 FL (ref 9.4–12.3)
POTASSIUM SERPL-SCNC: 3.3 MMOL/L (ref 3.5–5)
RBC # BLD: 3.99 M/UL (ref 4.2–5.4)
SODIUM BLD-SCNC: 130 MMOL/L (ref 136–145)
TOTAL PROTEIN: 5.7 G/DL (ref 6.6–8.7)
WBC # BLD: 4.7 K/UL (ref 4.8–10.8)

## 2021-10-25 PROCEDURE — 99214 OFFICE O/P EST MOD 30 MIN: CPT | Performed by: INTERNAL MEDICINE

## 2021-10-25 RX ORDER — SPIRONOLACTONE 100 MG/1
100 TABLET, FILM COATED ORAL DAILY
Qty: 60 TABLET | Refills: 3 | Status: SHIPPED | OUTPATIENT
Start: 2021-10-25 | End: 2021-11-28 | Stop reason: SDUPTHER

## 2021-10-25 RX ORDER — IRON ASPGLY,PS/C/SUCCINIC ACID 150-50-50
1 CAPSULE ORAL DAILY
Qty: 30 CAPSULE | Refills: 0 | Status: SHIPPED | OUTPATIENT
Start: 2021-10-25 | End: 2021-10-25

## 2021-10-25 NOTE — PROGRESS NOTES
Chief Complaint   Patient presents with    Follow-up     edema       HPI: Patient walked into our office today. Edema in her legs and abdomen continue to get worse despite aggressive diuretic therapy. She feels miserable. Abdomen is especially getting more swollen she says she does not urinate that much she is weak fatigued still having difficulty with bowel movements has to stand up to try to have a bowel movement and the bowel movement just comes out without control.   Ascites --1 unit for 40 points -- lantus 6 units    Past Medical History:   Diagnosis Date    Alcohol abuse 2/6/2019    Allergic rhinitis     Diabetes mellitus ([address]     GERD (gastroesophageal reflux disease)     Hyperlipidemia     Hypertension     Postmenopausal 1/3/2019    Sleep apnea        Past Surgical History:   Procedure Laterality Date    CHOLECYSTECTOMY      KNEE ARTHROPLASTY      LIANA AND BSO         Family History   Problem Relation Age of Onset    Cancer Mother         ovarian    Cancer Maternal Grandmother         ovarian    Cancer Paternal Grandmother         ovarian       Social History     Socioeconomic History    Marital status: Single     Spouse name: Not on file    Number of children: Not on file    Years of education: Not on file    Highest education level: Not on file   Occupational History    Not on file   Tobacco Use    Smoking status: Never Smoker    Smokeless tobacco: Never Used   Substance and Sexual Activity    Alcohol use: Not on file    Drug use: Not on file    Sexual activity: Not on file   Other Topics Concern    Not on file   Social History Narrative    Not on file     Social Determinants of Health     Financial Resource Strain: Low Risk     Difficulty of Paying Living Expenses: Not very hard   Food Insecurity: No Food Insecurity    Worried About Running Out of Food in the Last Year: Never true    Dennys of Food in the Last Year: Never true   Transportation Needs:     Lack of Transportation (Medical):  Lack of Transportation (Non-Medical):    Physical Activity:     Days of Exercise per Week:     Minutes of Exercise per Session:    Stress:     Feeling of Stress :    Social Connections:     Frequency of Communication with Friends and Family:     Frequency of Social Gatherings with Friends and Family:     Attends Quaker Services:     Active Member of Clubs or Organizations:     Attends Club or Organization Meetings:     Marital Status:    Intimate Partner Violence:     Fear of Current or Ex-Partner:     Emotionally Abused:     Physically Abused:     Sexually Abused: Allergies   Allergen Reactions    Amoxicillin-Pot Clavulanate Nausea And Vomiting       Current Outpatient Medications   Medication Sig Dispense Refill    spironolactone (ALDACTONE) 100 MG tablet Take 1 tablet by mouth daily 60 tablet 3    furosemide (LASIX) 20 MG tablet Take 1 tablet by mouth daily 60 tablet 3    levothyroxine (SYNTHROID) 50 MCG tablet Take 1 tablet by mouth daily 30 tablet 5    insulin glargine (LANTUS SOLOSTAR) 100 UNIT/ML injection pen Start at 5 units sq nightly 5 pen 3    omeprazole (PRILOSEC) 20 MG delayed release capsule Take 1 capsule by mouth 2 times daily 90 capsule 3     No current facility-administered medications for this visit.        Review of Systems    /60   Pulse 92   Ht 5' (1.524 m)   Wt 129 lb (58.5 kg)   SpO2 96%   BMI 25.19 kg/m²   BP Readings from Last 7 Encounters:   10/25/21 122/60   10/11/21 130/70   01/31/19 130/72   01/03/19 130/84     Wt Readings from Last 7 Encounters:   10/25/21 129 lb (58.5 kg)   10/11/21 126 lb (57.2 kg)   01/31/19 127 lb (57.6 kg)   01/03/19 128 lb (58.1 kg)     BMI Readings from Last 7 Encounters:   10/25/21 25.19 kg/m²   10/11/21 24.61 kg/m²   01/31/19 24.80 kg/m²   01/03/19 25.00 kg/m²     Resp Readings from Last 7 Encounters:   No data found for Resp       Physical Exam  Constitutional:       General: She is not in acute distress. Appearance: She is ill-appearing. Eyes:      General: No scleral icterus. Cardiovascular:      Heart sounds: Normal heart sounds. Pulmonary:      Breath sounds: Normal breath sounds. Abdominal:      General: There is distension. Comments: Abdomen with ascites   Musculoskeletal:      Cervical back: Neck supple. Right lower leg: Edema present. Left lower leg: Edema present. Lymphadenopathy:      Cervical: No cervical adenopathy. Skin:     Findings: No rash. Results for orders placed or performed in visit on 10/25/21   Comprehensive Metabolic Panel   Result Value Ref Range    Sodium 130 (L) 136 - 145 mmol/L    Potassium 3.3 (L) 3.5 - 5.0 mmol/L    Chloride 85 (L) 98 - 111 mmol/L    CO2 35 (H) 22 - 29 mmol/L    Anion Gap 10 7 - 19 mmol/L    Glucose 353 (H) 74 - 109 mg/dL    BUN 4 (L) 6 - 20 mg/dL    CREATININE 0.5 0.5 - 0.9 mg/dL    GFR Non-African American >60 >60    GFR African American >59 >59    Calcium 8.1 (L) 8.6 - 10.0 mg/dL    Total Protein 5.7 (L) 6.6 - 8.7 g/dL    Albumin 3.1 (L) 3.5 - 5.2 g/dL    Total Bilirubin 0.7 0.2 - 1.2 mg/dL    Alkaline Phosphatase 188 (H) 35 - 104 U/L    ALT 18 5 - 33 U/L    AST 36 (H) 5 - 32 U/L   CBC   Result Value Ref Range    WBC 4.7 (L) 4.8 - 10.8 K/uL    RBC 3.99 (L) 4.20 - 5.40 M/uL    Hemoglobin 8.0 (L) 12.0 - 16.0 g/dL    Hematocrit 28.5 (L) 37.0 - 47.0 %    MCV 71.4 (L) 81.0 - 99.0 fL    MCH 20.1 (L) 27.0 - 31.0 pg    MCHC 28.1 (L) 33.0 - 37.0 g/dL    RDW 18.4 (H) 11.5 - 14.5 %    Platelets 822 036 - 535 K/uL    MPV 10.5 9.4 - 12.3 fL       ASSESSMENT/ PLAN:  1. Ascites due to alcoholic cirrhosis [facility])  Patient was wanting to put off procedures until November 1 when she has insurance she is not responding to our plan of care were going to have to increase her diuretic therapy. I have been very cautious not wanting to throw her into hepatorenal syndrome.   We started with very low-dose diuretics right now she is on Lasix 20 twice a day Aldactone 50 twice a day we are going to increase her Aldactone to 100 twice a day repeat her chemistry panel and labs early next week or sooner if needed. Potassium is low so we will start with increasing aldactone to 100mg bid  Were going to try to get a paracentesis as soon as possible. We have her scheduled to see Dr. Jesus Meza next week. After November 1. I also have great concern with a mass I felt in the rectum and with her anemia. - spironolactone (ALDACTONE) 100 MG tablet; Take 1 tablet by mouth twice daily dispense: 60 tablet; Refill: 3  - US ABDOMEN COMPLETE; Future  - US GUIDED PARACENTESIS; Future  - Culture, Body Fluid; Future  - Lactate Dehydrogenase, Body Fluid; Future  - Glucose, Body Fluid; Future  - Protein, Total; Future  - Body Fluid Cell Count with Differential; Future  - Cytology, Non-Gyn; Future    2. Type 1 diabetes mellitus with hyperglycemia (HCC)  Patient with history of insulin-dependent diabetes. She has not had follow-up on this in years blood sugar out of control. Apparently she uses a very low-dose Lantus and a sliding scale blood sugar not in control but we are going to make any change this needs to be observed and things need to be stabilized with her liver. 3. Iron deficiency  I worry that she is losing blood either from a rectal mass or possibly esophageal varices she needs further very extensive GI evaluation    4. Rectal mass  Needs evaluation/scope    5. Alcohol abuse  Patient seems to be admitting for the first time alcohol abuse alcoholism and hopefully more willing for help. We had arranged counseling in our office but really needs specific alcohol counseling I talked to her briefly today about a program I noticed at [place] care have a right now she has insurance will be back on insurance soon but she may qualify for that.     Addendum 10/29/2021 her hemoglobin has dropped to 7.9 her sodium is 128 her back is hurting very weak had to stay in the bed most of today I called the hospitalist at [facility] and we are direct admitting her to be better evaluated--- I do not feel safe continuing this management as an outpatient.

## 2021-10-26 ENCOUNTER — TRANSCRIBE ORDERS (OUTPATIENT)
Dept: ADMINISTRATIVE | Facility: HOSPITAL | Age: 61
End: 2021-10-26

## 2021-10-26 DIAGNOSIS — K70.31 ALCOHOLIC CIRRHOSIS OF LIVER WITH ASCITES (HCC): Primary | ICD-10-CM

## 2021-10-27 ENCOUNTER — HOSPITAL ENCOUNTER (OUTPATIENT)
Dept: ULTRASOUND IMAGING | Facility: HOSPITAL | Age: 61
Discharge: HOME | End: 2021-10-27
Admitting: INTERNAL MEDICINE

## 2021-10-27 DIAGNOSIS — K70.31 ALCOHOLIC CIRRHOSIS OF LIVER WITH ASCITES (HCC): ICD-10-CM

## 2021-10-27 PROCEDURE — 76700 US EXAM ABDOM COMPLETE: CPT

## 2021-10-29 ENCOUNTER — HOSPITAL ENCOUNTER (INPATIENT)
Facility: HOSPITAL | Age: 61
LOS: 3 days | Discharge: HOME | End: 2021-11-01
Attending: FAMILY MEDICINE | Admitting: INTERNAL MEDICINE

## 2021-10-29 ENCOUNTER — TELEPHONE (OUTPATIENT)
Dept: FAMILY MEDICINE CLINIC | Age: 61
End: 2021-10-29

## 2021-10-29 ENCOUNTER — LAB (OUTPATIENT)
Dept: LAB | Facility: HOSPITAL | Age: 61
End: 2021-10-29

## 2021-10-29 PROBLEM — D50.9 MICROCYTIC ANEMIA: Status: ACTIVE | Noted: 2021-10-29

## 2021-10-29 PROBLEM — K59.00 CONSTIPATION: Status: ACTIVE | Noted: 2021-10-29

## 2021-10-29 PROBLEM — D64.9 ANEMIA: Status: ACTIVE | Noted: 2021-10-05

## 2021-10-29 PROBLEM — R60.1 ANASARCA: Status: ACTIVE | Noted: 2021-10-29

## 2021-10-29 PROBLEM — K70.30 ALCOHOLIC CIRRHOSIS: Status: ACTIVE | Noted: 2021-10-29

## 2021-10-29 PROBLEM — E87.1 HYPONATREMIA: Status: ACTIVE | Noted: 2021-10-29

## 2021-10-29 PROBLEM — M54.6 THORACIC SPINE PAIN: Status: ACTIVE | Noted: 2021-10-29

## 2021-10-29 PROBLEM — K62.3 PARTIAL RECTAL PROLAPSE: Status: ACTIVE | Noted: 2021-10-29

## 2021-10-29 LAB
ALBUMIN SERPL-MCNC: 2.9 G/DL (ref 3.5–5)
ALBUMIN/GLOB SERPL: 1 G/DL (ref 1.1–2.5)
ALP SERPL-CCNC: 199 U/L (ref 24–120)
ALT SERPL W P-5'-P-CCNC: 19 U/L (ref 0–35)
ANION GAP SERPL CALCULATED.3IONS-SCNC: 4 MMOL/L (ref 4–13)
AST SERPL-CCNC: 50 U/L (ref 7–45)
AUTO MIXED CELLS #: 0.7 10*3/MM3 (ref 0.1–2.6)
AUTO MIXED CELLS %: 8.1 % (ref 0.1–24)
BACTERIA UR QL AUTO: ABNORMAL /HPF
BILIRUB SERPL-MCNC: 0.6 MG/DL (ref 0.1–1)
BILIRUB UR QL STRIP: NEGATIVE
BUN SERPL-MCNC: 6 MG/DL (ref 5–21)
BUN/CREAT SERPL: 10.5
CALCIUM SPEC-SCNC: 8.6 MG/DL (ref 8.4–10.4)
CHLORIDE SERPL-SCNC: 87 MMOL/L (ref 98–110)
CLARITY UR: ABNORMAL
CO2 SERPL-SCNC: 37 MMOL/L (ref 24–31)
COLOR UR: ABNORMAL
CREAT SERPL-MCNC: 0.57 MG/DL (ref 0.5–1.4)
CRP SERPL-MCNC: 0.37 MG/DL (ref 0–0.5)
ERYTHROCYTE [DISTWIDTH] IN BLOOD BY AUTOMATED COUNT: 18.1 % (ref 12.3–15.4)
ERYTHROCYTE [SEDIMENTATION RATE] IN BLOOD: 28 MM/HR (ref 0–30)
FERRITIN SERPL-MCNC: 10.8 NG/ML (ref 13–150)
GFR SERPL CREATININE-BSD FRML MDRD: 109 ML/MIN/1.73
GLOBULIN UR ELPH-MCNC: 3 GM/DL
GLUCOSE BLDC GLUCOMTR-MCNC: 220 MG/DL (ref 70–130)
GLUCOSE SERPL-MCNC: 232 MG/DL (ref 70–100)
GLUCOSE UR STRIP-MCNC: NEGATIVE MG/DL
HCT VFR BLD AUTO: 27.5 % (ref 34–46.6)
HGB BLD-MCNC: 7.9 G/DL (ref 12–15.9)
HGB UR QL STRIP.AUTO: NEGATIVE
HYALINE CASTS UR QL AUTO: ABNORMAL /LPF
INR PPP: 1.04 (ref 0.91–1.09)
IRON 24H UR-MRATE: 13 MCG/DL (ref 37–145)
IRON SATN MFR SERPL: 4 % (ref 20–50)
KETONES UR QL STRIP: ABNORMAL
LEUKOCYTE ESTERASE UR QL STRIP.AUTO: ABNORMAL
LYMPHOCYTES # BLD AUTO: 0.5 10*3/MM3 (ref 0.7–3.1)
LYMPHOCYTES NFR BLD AUTO: 5.7 % (ref 19.6–45.3)
MCH RBC QN AUTO: 20.5 PG (ref 26.6–33)
MCHC RBC AUTO-ENTMCNC: 28.7 G/DL (ref 31.5–35.7)
MCV RBC AUTO: 71.2 FL (ref 79–97)
NEUTROPHILS NFR BLD AUTO: 7.6 10*3/MM3 (ref 1.7–7)
NEUTROPHILS NFR BLD AUTO: 86.2 % (ref 42.7–76)
NITRITE UR QL STRIP: POSITIVE
PH UR STRIP.AUTO: 6.5 [PH] (ref 5–8)
PLATELET # BLD AUTO: 282 10*3/MM3 (ref 140–450)
PMV BLD AUTO: 8.8 FL (ref 6–12)
POTASSIUM SERPL-SCNC: 3.7 MMOL/L (ref 3.5–5.3)
PROCALCITONIN SERPL-MCNC: 0.23 NG/ML (ref 0–0.25)
PROT SERPL-MCNC: 5.9 G/DL (ref 6.3–8.7)
PROT UR QL STRIP: ABNORMAL
PROTHROMBIN TIME: 13.2 SECONDS (ref 11.9–14.6)
RBC # BLD AUTO: 3.86 10*6/MM3 (ref 3.77–5.28)
RBC # UR: ABNORMAL /HPF
REF LAB TEST METHOD: ABNORMAL
SARS-COV-2 RNA PNL SPEC NAA+PROBE: NOT DETECTED
SODIUM SERPL-SCNC: 128 MMOL/L (ref 135–145)
SP GR UR STRIP: 1.02 (ref 1–1.03)
SQUAMOUS #/AREA URNS HPF: ABNORMAL /HPF
TIBC SERPL-MCNC: 322 MCG/DL (ref 298–536)
TRANSFERRIN SERPL-MCNC: 216 MG/DL (ref 200–360)
UROBILINOGEN UR QL STRIP: ABNORMAL
WBC # BLD AUTO: 8.8 10*3/MM3 (ref 3.4–10.8)
WBC UR QL AUTO: ABNORMAL /HPF

## 2021-10-29 PROCEDURE — 82962 GLUCOSE BLOOD TEST: CPT

## 2021-10-29 PROCEDURE — 81001 URINALYSIS AUTO W/SCOPE: CPT | Performed by: NURSE PRACTITIONER

## 2021-10-29 PROCEDURE — 84466 ASSAY OF TRANSFERRIN: CPT | Performed by: NURSE PRACTITIONER

## 2021-10-29 PROCEDURE — 85610 PROTHROMBIN TIME: CPT | Performed by: NURSE PRACTITIONER

## 2021-10-29 PROCEDURE — 85025 COMPLETE CBC W/AUTO DIFF WBC: CPT | Performed by: INTERNAL MEDICINE

## 2021-10-29 PROCEDURE — 36415 COLL VENOUS BLD VENIPUNCTURE: CPT | Performed by: INTERNAL MEDICINE

## 2021-10-29 PROCEDURE — G0378 HOSPITAL OBSERVATION PER HR: HCPCS

## 2021-10-29 PROCEDURE — 80053 COMPREHEN METABOLIC PANEL: CPT | Performed by: INTERNAL MEDICINE

## 2021-10-29 PROCEDURE — 84145 PROCALCITONIN (PCT): CPT | Performed by: NURSE PRACTITIONER

## 2021-10-29 PROCEDURE — 83540 ASSAY OF IRON: CPT | Performed by: NURSE PRACTITIONER

## 2021-10-29 PROCEDURE — 82728 ASSAY OF FERRITIN: CPT | Performed by: NURSE PRACTITIONER

## 2021-10-29 PROCEDURE — 85652 RBC SED RATE AUTOMATED: CPT | Performed by: NURSE PRACTITIONER

## 2021-10-29 PROCEDURE — 87635 SARS-COV-2 COVID-19 AMP PRB: CPT | Performed by: INTERNAL MEDICINE

## 2021-10-29 PROCEDURE — 82607 VITAMIN B-12: CPT | Performed by: NURSE PRACTITIONER

## 2021-10-29 PROCEDURE — 86140 C-REACTIVE PROTEIN: CPT | Performed by: NURSE PRACTITIONER

## 2021-10-29 PROCEDURE — 63710000001 INSULIN LISPRO (HUMAN) PER 5 UNITS: Performed by: NURSE PRACTITIONER

## 2021-10-29 RX ORDER — ONDANSETRON 2 MG/ML
4 INJECTION INTRAMUSCULAR; INTRAVENOUS EVERY 6 HOURS PRN
Status: DISCONTINUED | OUTPATIENT
Start: 2021-10-29 | End: 2021-11-01 | Stop reason: HOSPADM

## 2021-10-29 RX ORDER — FOLIC ACID 1 MG/1
1 TABLET ORAL DAILY
Status: DISCONTINUED | OUTPATIENT
Start: 2021-10-30 | End: 2021-11-01 | Stop reason: HOSPADM

## 2021-10-29 RX ORDER — DEXTROSE MONOHYDRATE 25 G/50ML
25 INJECTION, SOLUTION INTRAVENOUS
Status: DISCONTINUED | OUTPATIENT
Start: 2021-10-29 | End: 2021-11-01 | Stop reason: HOSPADM

## 2021-10-29 RX ORDER — LEVOTHYROXINE SODIUM 0.05 MG/1
50 TABLET ORAL
COMMUNITY

## 2021-10-29 RX ORDER — ACETAMINOPHEN 160 MG/5ML
650 SOLUTION ORAL EVERY 4 HOURS PRN
Status: DISCONTINUED | OUTPATIENT
Start: 2021-10-29 | End: 2021-11-01 | Stop reason: HOSPADM

## 2021-10-29 RX ORDER — SODIUM CHLORIDE 0.9 % (FLUSH) 0.9 %
10 SYRINGE (ML) INJECTION AS NEEDED
Status: DISCONTINUED | OUTPATIENT
Start: 2021-10-29 | End: 2021-11-01 | Stop reason: HOSPADM

## 2021-10-29 RX ORDER — NICOTINE POLACRILEX 4 MG
15 LOZENGE BUCCAL
Status: DISCONTINUED | OUTPATIENT
Start: 2021-10-29 | End: 2021-11-01 | Stop reason: HOSPADM

## 2021-10-29 RX ORDER — ACETAMINOPHEN 650 MG/1
650 SUPPOSITORY RECTAL EVERY 4 HOURS PRN
Status: DISCONTINUED | OUTPATIENT
Start: 2021-10-29 | End: 2021-11-01 | Stop reason: HOSPADM

## 2021-10-29 RX ORDER — LEVOTHYROXINE SODIUM 0.05 MG/1
50 TABLET ORAL
Status: DISCONTINUED | OUTPATIENT
Start: 2021-10-30 | End: 2021-11-01 | Stop reason: HOSPADM

## 2021-10-29 RX ORDER — SUCRALFATE ORAL 1 G/10ML
1 SUSPENSION ORAL
Status: DISCONTINUED | OUTPATIENT
Start: 2021-10-29 | End: 2021-11-01 | Stop reason: HOSPADM

## 2021-10-29 RX ORDER — ONDANSETRON 4 MG/1
4 TABLET, FILM COATED ORAL EVERY 6 HOURS PRN
Status: DISCONTINUED | OUTPATIENT
Start: 2021-10-29 | End: 2021-11-01 | Stop reason: HOSPADM

## 2021-10-29 RX ORDER — SODIUM CHLORIDE 0.9 % (FLUSH) 0.9 %
10 SYRINGE (ML) INJECTION EVERY 12 HOURS SCHEDULED
Status: DISCONTINUED | OUTPATIENT
Start: 2021-10-29 | End: 2021-11-01 | Stop reason: HOSPADM

## 2021-10-29 RX ORDER — POTASSIUM CHLORIDE 750 MG/1
20 CAPSULE, EXTENDED RELEASE ORAL 2 TIMES DAILY WITH MEALS
Status: DISCONTINUED | OUTPATIENT
Start: 2021-10-29 | End: 2021-10-31

## 2021-10-29 RX ORDER — ACETAMINOPHEN 325 MG/1
650 TABLET ORAL EVERY 4 HOURS PRN
Status: DISCONTINUED | OUTPATIENT
Start: 2021-10-29 | End: 2021-11-01 | Stop reason: HOSPADM

## 2021-10-29 RX ORDER — PANTOPRAZOLE SODIUM 40 MG/1
40 TABLET, DELAYED RELEASE ORAL
Status: DISCONTINUED | OUTPATIENT
Start: 2021-10-30 | End: 2021-11-01 | Stop reason: HOSPADM

## 2021-10-29 RX ADMIN — Medication 10 ML: at 22:10

## 2021-10-29 RX ADMIN — POTASSIUM CHLORIDE 20 MEQ: 10 CAPSULE, COATED, EXTENDED RELEASE ORAL at 22:07

## 2021-10-29 RX ADMIN — INSULIN LISPRO 5 UNITS: 100 INJECTION, SOLUTION INTRAVENOUS; SUBCUTANEOUS at 22:11

## 2021-10-29 RX ADMIN — SUCRALFATE 1 G: 1 SUSPENSION ORAL at 22:07

## 2021-10-29 RX ADMIN — BUMETANIDE 0.5 MG/HR: 0.25 INJECTION INTRAMUSCULAR; INTRAVENOUS at 22:10

## 2021-10-30 ENCOUNTER — APPOINTMENT (OUTPATIENT)
Dept: ULTRASOUND IMAGING | Facility: HOSPITAL | Age: 61
End: 2021-10-30

## 2021-10-30 ENCOUNTER — APPOINTMENT (OUTPATIENT)
Dept: MRI IMAGING | Facility: HOSPITAL | Age: 61
End: 2021-10-30

## 2021-10-30 LAB
ALBUMIN SERPL-MCNC: 3.1 G/DL (ref 3.5–5.2)
ALBUMIN/GLOB SERPL: 1.2 G/DL
ALP SERPL-CCNC: 193 U/L (ref 39–117)
ALT SERPL W P-5'-P-CCNC: 19 U/L (ref 1–33)
AMYLASE FLD-CCNC: 8 U/L
ANION GAP SERPL CALCULATED.3IONS-SCNC: 8 MMOL/L (ref 5–15)
ANISOCYTOSIS BLD QL: ABNORMAL
APPEARANCE FLD: ABNORMAL
AST SERPL-CCNC: 34 U/L (ref 1–32)
BILIRUB SERPL-MCNC: 0.5 MG/DL (ref 0–1.2)
BUN SERPL-MCNC: 6 MG/DL (ref 6–20)
BUN/CREAT SERPL: 13 (ref 7–25)
CALCIUM SPEC-SCNC: 8.4 MG/DL (ref 8.6–10.5)
CHLORIDE SERPL-SCNC: 87 MMOL/L (ref 98–107)
CHOLEST SERPL-MCNC: 167 MG/DL (ref 0–200)
CO2 SERPL-SCNC: 38 MMOL/L (ref 22–29)
COLOR FLD: YELLOW
CREAT SERPL-MCNC: 0.46 MG/DL (ref 0.57–1)
DEPRECATED RDW RBC AUTO: 46.8 FL (ref 37–54)
EOSINOPHIL # BLD MANUAL: 0.06 10*3/MM3 (ref 0–0.4)
EOSINOPHIL NFR BLD MANUAL: 1 % (ref 0.3–6.2)
EOSINOPHIL NFR FLD MANUAL: 1 %
ERYTHROCYTE [DISTWIDTH] IN BLOOD BY AUTOMATED COUNT: 18.2 % (ref 12.3–15.4)
GFR SERPL CREATININE-BSD FRML MDRD: 139 ML/MIN/1.73
GIANT PLATELETS: ABNORMAL
GLOBULIN UR ELPH-MCNC: 2.6 GM/DL
GLUCOSE BLDC GLUCOMTR-MCNC: 121 MG/DL (ref 70–130)
GLUCOSE BLDC GLUCOMTR-MCNC: 161 MG/DL (ref 70–130)
GLUCOSE BLDC GLUCOMTR-MCNC: 169 MG/DL (ref 70–130)
GLUCOSE BLDC GLUCOMTR-MCNC: 224 MG/DL (ref 70–130)
GLUCOSE FLD-MCNC: 162 MG/DL
GLUCOSE SERPL-MCNC: 95 MG/DL (ref 65–99)
HCT VFR BLD AUTO: 26.8 % (ref 34–46.6)
HDLC SERPL-MCNC: 39 MG/DL (ref 40–60)
HGB BLD-MCNC: 7.6 G/DL (ref 12–15.9)
HYPOCHROMIA BLD QL: ABNORMAL
LDH FLD-CCNC: 68 U/L
LDLC SERPL CALC-MCNC: 113 MG/DL (ref 0–100)
LDLC/HDLC SERPL: 2.88 {RATIO}
LYMPHOCYTES # BLD MANUAL: 0.46 10*3/MM3 (ref 0.7–3.1)
LYMPHOCYTES NFR BLD MANUAL: 7.1 % (ref 19.6–45.3)
LYMPHOCYTES NFR BLD MANUAL: 7.1 % (ref 5–12)
LYMPHOCYTES NFR FLD MANUAL: 14 %
MCH RBC QN AUTO: 20.4 PG (ref 26.6–33)
MCHC RBC AUTO-ENTMCNC: 28.4 G/DL (ref 31.5–35.7)
MCV RBC AUTO: 71.8 FL (ref 79–97)
MONOCYTES # BLD AUTO: 0.4 10*3/MM3 (ref 0.1–0.9)
MONOCYTES NFR FLD: 23 %
NEUTROPHILS # BLD AUTO: 4.74 10*3/MM3 (ref 1.7–7)
NEUTROPHILS NFR BLD MANUAL: 83.8 % (ref 42.7–76)
NEUTROPHILS NFR FLD MANUAL: 6 %
PLATELET # BLD AUTO: 235 10*3/MM3 (ref 140–450)
PMV BLD AUTO: 11 FL (ref 6–12)
POIKILOCYTOSIS BLD QL SMEAR: ABNORMAL
POLYCHROMASIA BLD QL SMEAR: ABNORMAL
POTASSIUM SERPL-SCNC: 3.7 MMOL/L (ref 3.5–5.2)
PROT FLD-MCNC: <1 G/DL
PROT SERPL-MCNC: 5.7 G/DL (ref 6–8.5)
RBC # BLD AUTO: 3.73 10*6/MM3 (ref 3.77–5.28)
RBC # FLD AUTO: <1000 /MM3
SODIUM SERPL-SCNC: 133 MMOL/L (ref 136–145)
TRIGL SERPL-MCNC: 78 MG/DL (ref 0–150)
UNCLASSIFIED CELLS, FLUID: 56 %
VARIANT LYMPHS NFR BLD MANUAL: 1 % (ref 0–5)
VIT B12 BLD-MCNC: 1243 PG/ML (ref 211–946)
VLDLC SERPL-MCNC: 15 MG/DL (ref 5–40)
WBC # BLD AUTO: 5.66 10*3/MM3 (ref 3.4–10.8)
WBC # FLD AUTO: 207 /MM3
WBC MORPH BLD: NORMAL

## 2021-10-30 PROCEDURE — 82150 ASSAY OF AMYLASE: CPT | Performed by: NURSE PRACTITIONER

## 2021-10-30 PROCEDURE — 83615 LACTATE (LD) (LDH) ENZYME: CPT | Performed by: NURSE PRACTITIONER

## 2021-10-30 PROCEDURE — 72146 MRI CHEST SPINE W/O DYE: CPT

## 2021-10-30 PROCEDURE — 89051 BODY FLUID CELL COUNT: CPT | Performed by: NURSE PRACTITIONER

## 2021-10-30 PROCEDURE — 80061 LIPID PANEL: CPT | Performed by: NURSE PRACTITIONER

## 2021-10-30 PROCEDURE — 25010000002 IRON SUCROSE PER 1 MG: Performed by: INTERNAL MEDICINE

## 2021-10-30 PROCEDURE — 80053 COMPREHEN METABOLIC PANEL: CPT | Performed by: NURSE PRACTITIONER

## 2021-10-30 PROCEDURE — 0W9G3ZX DRAINAGE OF PERITONEAL CAVITY, PERCUTANEOUS APPROACH, DIAGNOSTIC: ICD-10-PCS | Performed by: RADIOLOGY

## 2021-10-30 PROCEDURE — 85007 BL SMEAR W/DIFF WBC COUNT: CPT | Performed by: NURSE PRACTITIONER

## 2021-10-30 PROCEDURE — 87075 CULTR BACTERIA EXCEPT BLOOD: CPT | Performed by: NURSE PRACTITIONER

## 2021-10-30 PROCEDURE — 82945 GLUCOSE OTHER FLUID: CPT | Performed by: NURSE PRACTITIONER

## 2021-10-30 PROCEDURE — 87070 CULTURE OTHR SPECIMN AEROBIC: CPT | Performed by: NURSE PRACTITIONER

## 2021-10-30 PROCEDURE — 88112 CYTOPATH CELL ENHANCE TECH: CPT | Performed by: NURSE PRACTITIONER

## 2021-10-30 PROCEDURE — 82962 GLUCOSE BLOOD TEST: CPT

## 2021-10-30 PROCEDURE — 87205 SMEAR GRAM STAIN: CPT | Performed by: NURSE PRACTITIONER

## 2021-10-30 PROCEDURE — 85025 COMPLETE CBC W/AUTO DIFF WBC: CPT | Performed by: NURSE PRACTITIONER

## 2021-10-30 PROCEDURE — 63710000001 INSULIN LISPRO (HUMAN) PER 5 UNITS: Performed by: NURSE PRACTITIONER

## 2021-10-30 PROCEDURE — 84157 ASSAY OF PROTEIN OTHER: CPT | Performed by: NURSE PRACTITIONER

## 2021-10-30 PROCEDURE — 76942 ECHO GUIDE FOR BIOPSY: CPT

## 2021-10-30 PROCEDURE — 87102 FUNGUS ISOLATION CULTURE: CPT | Performed by: NURSE PRACTITIONER

## 2021-10-30 RX ORDER — MULTIVIT/IRON SULF/FOLIC ACID 15MG-0.4MG
1 TABLET ORAL DAILY
COMMUNITY

## 2021-10-30 RX ADMIN — Medication 10 ML: at 08:57

## 2021-10-30 RX ADMIN — POTASSIUM CHLORIDE 20 MEQ: 10 CAPSULE, COATED, EXTENDED RELEASE ORAL at 08:57

## 2021-10-30 RX ADMIN — IRON SUCROSE 300 MG: 20 INJECTION, SOLUTION INTRAVENOUS at 13:01

## 2021-10-30 RX ADMIN — FOLIC ACID 1 MG: 1 TABLET ORAL at 08:57

## 2021-10-30 RX ADMIN — PANTOPRAZOLE SODIUM 40 MG: 40 TABLET, DELAYED RELEASE ORAL at 06:06

## 2021-10-30 RX ADMIN — THIAMINE HCL TAB 100 MG 100 MG: 100 TAB at 08:57

## 2021-10-30 RX ADMIN — SUCRALFATE 1 G: 1 SUSPENSION ORAL at 08:08

## 2021-10-30 RX ADMIN — BUMETANIDE 0.5 MG/HR: 0.25 INJECTION INTRAMUSCULAR; INTRAVENOUS at 20:12

## 2021-10-30 RX ADMIN — SUCRALFATE 1 G: 1 SUSPENSION ORAL at 17:04

## 2021-10-30 RX ADMIN — INSULIN LISPRO 3 UNITS: 100 INJECTION, SOLUTION INTRAVENOUS; SUBCUTANEOUS at 21:26

## 2021-10-30 RX ADMIN — INSULIN LISPRO 3 UNITS: 100 INJECTION, SOLUTION INTRAVENOUS; SUBCUTANEOUS at 17:04

## 2021-10-30 RX ADMIN — LEVOTHYROXINE SODIUM 50 MCG: 50 TABLET ORAL at 06:06

## 2021-10-30 RX ADMIN — INSULIN LISPRO 5 UNITS: 100 INJECTION, SOLUTION INTRAVENOUS; SUBCUTANEOUS at 12:38

## 2021-10-30 RX ADMIN — POTASSIUM CHLORIDE 20 MEQ: 10 CAPSULE, COATED, EXTENDED RELEASE ORAL at 17:05

## 2021-10-30 RX ADMIN — SUCRALFATE 1 G: 1 SUSPENSION ORAL at 12:38

## 2021-10-30 RX ADMIN — SUCRALFATE 1 G: 1 SUSPENSION ORAL at 20:12

## 2021-10-31 LAB
ALBUMIN SERPL-MCNC: 2.8 G/DL (ref 3.5–5.2)
ALBUMIN/GLOB SERPL: 1.2 G/DL
ALP SERPL-CCNC: 180 U/L (ref 39–117)
ALT SERPL W P-5'-P-CCNC: 14 U/L (ref 1–33)
ANION GAP SERPL CALCULATED.3IONS-SCNC: 7 MMOL/L (ref 5–15)
ANION GAP SERPL CALCULATED.3IONS-SCNC: 8 MMOL/L (ref 5–15)
AST SERPL-CCNC: 34 U/L (ref 1–32)
BASOPHILS # BLD AUTO: 0.03 10*3/MM3 (ref 0–0.2)
BASOPHILS NFR BLD AUTO: 0.4 % (ref 0–1.5)
BILIRUB SERPL-MCNC: 0.5 MG/DL (ref 0–1.2)
BUN SERPL-MCNC: 7 MG/DL (ref 6–20)
BUN SERPL-MCNC: 7 MG/DL (ref 6–20)
BUN/CREAT SERPL: 13.2 (ref 7–25)
BUN/CREAT SERPL: 13.7 (ref 7–25)
CALCIUM SPEC-SCNC: 7.6 MG/DL (ref 8.6–10.5)
CALCIUM SPEC-SCNC: 7.7 MG/DL (ref 8.6–10.5)
CHLORIDE SERPL-SCNC: 88 MMOL/L (ref 98–107)
CHLORIDE SERPL-SCNC: 88 MMOL/L (ref 98–107)
CO2 SERPL-SCNC: 37 MMOL/L (ref 22–29)
CO2 SERPL-SCNC: 39 MMOL/L (ref 22–29)
CREAT SERPL-MCNC: 0.51 MG/DL (ref 0.57–1)
CREAT SERPL-MCNC: 0.53 MG/DL (ref 0.57–1)
DEPRECATED RDW RBC AUTO: 46.1 FL (ref 37–54)
EOSINOPHIL # BLD AUTO: 0.06 10*3/MM3 (ref 0–0.4)
EOSINOPHIL NFR BLD AUTO: 0.8 % (ref 0.3–6.2)
ERYTHROCYTE [DISTWIDTH] IN BLOOD BY AUTOMATED COUNT: 18.3 % (ref 12.3–15.4)
GFR SERPL CREATININE-BSD FRML MDRD: 118 ML/MIN/1.73
GFR SERPL CREATININE-BSD FRML MDRD: 123 ML/MIN/1.73
GLOBULIN UR ELPH-MCNC: 2.4 GM/DL
GLUCOSE BLDC GLUCOMTR-MCNC: 148 MG/DL (ref 70–130)
GLUCOSE BLDC GLUCOMTR-MCNC: 157 MG/DL (ref 70–130)
GLUCOSE BLDC GLUCOMTR-MCNC: 166 MG/DL (ref 70–130)
GLUCOSE BLDC GLUCOMTR-MCNC: 336 MG/DL (ref 70–130)
GLUCOSE SERPL-MCNC: 117 MG/DL (ref 65–99)
GLUCOSE SERPL-MCNC: 176 MG/DL (ref 65–99)
HCT VFR BLD AUTO: 25.7 % (ref 34–46.6)
HGB BLD-MCNC: 7.1 G/DL (ref 12–15.9)
IMM GRANULOCYTES # BLD AUTO: 0.03 10*3/MM3 (ref 0–0.05)
IMM GRANULOCYTES NFR BLD AUTO: 0.4 % (ref 0–0.5)
LYMPHOCYTES # BLD AUTO: 0.5 10*3/MM3 (ref 0.7–3.1)
LYMPHOCYTES NFR BLD AUTO: 6.3 % (ref 19.6–45.3)
MAGNESIUM SERPL-MCNC: 1.2 MG/DL (ref 1.6–2.6)
MAGNESIUM SERPL-MCNC: 1.3 MG/DL (ref 1.6–2.6)
MCH RBC QN AUTO: 19.5 PG (ref 26.6–33)
MCHC RBC AUTO-ENTMCNC: 27.6 G/DL (ref 31.5–35.7)
MCV RBC AUTO: 70.4 FL (ref 79–97)
MONOCYTES # BLD AUTO: 0.58 10*3/MM3 (ref 0.1–0.9)
MONOCYTES NFR BLD AUTO: 7.3 % (ref 5–12)
NEUTROPHILS NFR BLD AUTO: 6.75 10*3/MM3 (ref 1.7–7)
NEUTROPHILS NFR BLD AUTO: 84.8 % (ref 42.7–76)
NRBC BLD AUTO-RTO: 0 /100 WBC (ref 0–0.2)
PLATELET # BLD AUTO: 240 10*3/MM3 (ref 140–450)
PMV BLD AUTO: 10.5 FL (ref 6–12)
POTASSIUM SERPL-SCNC: 2.9 MMOL/L (ref 3.5–5.2)
POTASSIUM SERPL-SCNC: 3.3 MMOL/L (ref 3.5–5.2)
PROT SERPL-MCNC: 5.2 G/DL (ref 6–8.5)
RBC # BLD AUTO: 3.65 10*6/MM3 (ref 3.77–5.28)
SODIUM SERPL-SCNC: 132 MMOL/L (ref 136–145)
SODIUM SERPL-SCNC: 135 MMOL/L (ref 136–145)
WBC # BLD AUTO: 7.95 10*3/MM3 (ref 3.4–10.8)

## 2021-10-31 PROCEDURE — 82962 GLUCOSE BLOOD TEST: CPT

## 2021-10-31 PROCEDURE — 80053 COMPREHEN METABOLIC PANEL: CPT | Performed by: INTERNAL MEDICINE

## 2021-10-31 PROCEDURE — 63710000001 INSULIN LISPRO (HUMAN) PER 5 UNITS: Performed by: NURSE PRACTITIONER

## 2021-10-31 PROCEDURE — 83735 ASSAY OF MAGNESIUM: CPT | Performed by: INTERNAL MEDICINE

## 2021-10-31 PROCEDURE — 85025 COMPLETE CBC W/AUTO DIFF WBC: CPT | Performed by: INTERNAL MEDICINE

## 2021-10-31 PROCEDURE — 25010000002 IRON SUCROSE PER 1 MG: Performed by: INTERNAL MEDICINE

## 2021-10-31 PROCEDURE — 25010000002 MAGNESIUM SULFATE 2 GM/50ML SOLUTION: Performed by: INTERNAL MEDICINE

## 2021-10-31 RX ORDER — POTASSIUM CHLORIDE 750 MG/1
40 CAPSULE, EXTENDED RELEASE ORAL AS NEEDED
Status: DISCONTINUED | OUTPATIENT
Start: 2021-10-31 | End: 2021-11-01 | Stop reason: HOSPADM

## 2021-10-31 RX ORDER — POTASSIUM CHLORIDE 1.5 G/1.77G
40 POWDER, FOR SOLUTION ORAL AS NEEDED
Status: DISCONTINUED | OUTPATIENT
Start: 2021-10-31 | End: 2021-11-01 | Stop reason: HOSPADM

## 2021-10-31 RX ORDER — MAGNESIUM SULFATE HEPTAHYDRATE 40 MG/ML
4 INJECTION, SOLUTION INTRAVENOUS AS NEEDED
Status: DISCONTINUED | OUTPATIENT
Start: 2021-10-31 | End: 2021-11-01 | Stop reason: HOSPADM

## 2021-10-31 RX ORDER — FUROSEMIDE 40 MG/1
40 TABLET ORAL DAILY
Status: DISCONTINUED | OUTPATIENT
Start: 2021-10-31 | End: 2021-11-01 | Stop reason: HOSPADM

## 2021-10-31 RX ORDER — POTASSIUM CHLORIDE 750 MG/1
40 CAPSULE, EXTENDED RELEASE ORAL
Status: COMPLETED | OUTPATIENT
Start: 2021-10-31 | End: 2021-10-31

## 2021-10-31 RX ORDER — MAGNESIUM SULFATE HEPTAHYDRATE 40 MG/ML
2 INJECTION, SOLUTION INTRAVENOUS AS NEEDED
Status: DISCONTINUED | OUTPATIENT
Start: 2021-10-31 | End: 2021-11-01 | Stop reason: HOSPADM

## 2021-10-31 RX ADMIN — SUCRALFATE 1 G: 1 SUSPENSION ORAL at 07:39

## 2021-10-31 RX ADMIN — Medication 10 ML: at 21:29

## 2021-10-31 RX ADMIN — MAGNESIUM SULFATE HEPTAHYDRATE 2 G: 2 INJECTION, SOLUTION INTRAVENOUS at 14:47

## 2021-10-31 RX ADMIN — MAGNESIUM SULFATE HEPTAHYDRATE 2 G: 2 INJECTION, SOLUTION INTRAVENOUS at 17:10

## 2021-10-31 RX ADMIN — THIAMINE HCL TAB 100 MG 100 MG: 100 TAB at 08:54

## 2021-10-31 RX ADMIN — SUCRALFATE 1 G: 1 SUSPENSION ORAL at 21:28

## 2021-10-31 RX ADMIN — SUCRALFATE 1 G: 1 SUSPENSION ORAL at 11:55

## 2021-10-31 RX ADMIN — MAGNESIUM SULFATE HEPTAHYDRATE 2 G: 2 INJECTION, SOLUTION INTRAVENOUS at 21:28

## 2021-10-31 RX ADMIN — INSULIN LISPRO 12 UNITS: 100 INJECTION, SOLUTION INTRAVENOUS; SUBCUTANEOUS at 21:34

## 2021-10-31 RX ADMIN — IRON SUCROSE 300 MG: 20 INJECTION, SOLUTION INTRAVENOUS at 11:55

## 2021-10-31 RX ADMIN — POTASSIUM CHLORIDE 40 MEQ: 10 CAPSULE, COATED, EXTENDED RELEASE ORAL at 11:55

## 2021-10-31 RX ADMIN — POTASSIUM CHLORIDE 40 MEQ: 10 CAPSULE, COATED, EXTENDED RELEASE ORAL at 17:10

## 2021-10-31 RX ADMIN — INSULIN LISPRO 3 UNITS: 100 INJECTION, SOLUTION INTRAVENOUS; SUBCUTANEOUS at 08:54

## 2021-10-31 RX ADMIN — FOLIC ACID 1 MG: 1 TABLET ORAL at 08:54

## 2021-10-31 RX ADMIN — LEVOTHYROXINE SODIUM 50 MCG: 50 TABLET ORAL at 05:31

## 2021-10-31 RX ADMIN — INSULIN LISPRO 3 UNITS: 100 INJECTION, SOLUTION INTRAVENOUS; SUBCUTANEOUS at 12:39

## 2021-10-31 RX ADMIN — FUROSEMIDE 40 MG: 40 TABLET ORAL at 15:36

## 2021-10-31 RX ADMIN — PANTOPRAZOLE SODIUM 40 MG: 40 TABLET, DELAYED RELEASE ORAL at 05:30

## 2021-10-31 RX ADMIN — POTASSIUM CHLORIDE 40 MEQ: 10 CAPSULE, COATED, EXTENDED RELEASE ORAL at 21:28

## 2021-10-31 RX ADMIN — SUCRALFATE 1 G: 1 SUSPENSION ORAL at 17:08

## 2021-10-31 RX ADMIN — Medication 10 ML: at 08:55

## 2021-10-31 RX ADMIN — POTASSIUM CHLORIDE 40 MEQ: 10 CAPSULE, COATED, EXTENDED RELEASE ORAL at 08:54

## 2021-10-31 RX ADMIN — POTASSIUM CHLORIDE 20 MEQ: 10 CAPSULE, COATED, EXTENDED RELEASE ORAL at 07:39

## 2021-11-01 VITALS
OXYGEN SATURATION: 98 % | HEIGHT: 60 IN | TEMPERATURE: 97.4 F | DIASTOLIC BLOOD PRESSURE: 68 MMHG | BODY MASS INDEX: 22.97 KG/M2 | HEART RATE: 65 BPM | WEIGHT: 117 LBS | SYSTOLIC BLOOD PRESSURE: 112 MMHG | RESPIRATION RATE: 18 BRPM

## 2021-11-01 PROBLEM — N30.00 ACUTE CYSTITIS: Status: ACTIVE | Noted: 2021-11-01

## 2021-11-01 LAB
ANION GAP SERPL CALCULATED.3IONS-SCNC: 4 MMOL/L (ref 5–15)
BASOPHILS # BLD AUTO: 0.04 10*3/MM3 (ref 0–0.2)
BASOPHILS NFR BLD AUTO: 0.4 % (ref 0–1.5)
BUN SERPL-MCNC: 7 MG/DL (ref 6–20)
BUN/CREAT SERPL: 13.7 (ref 7–25)
CALCIUM SPEC-SCNC: 8.3 MG/DL (ref 8.6–10.5)
CHLORIDE SERPL-SCNC: 93 MMOL/L (ref 98–107)
CO2 SERPL-SCNC: 34 MMOL/L (ref 22–29)
CREAT SERPL-MCNC: 0.51 MG/DL (ref 0.57–1)
DEPRECATED RDW RBC AUTO: 47.3 FL (ref 37–54)
EOSINOPHIL # BLD AUTO: 0.08 10*3/MM3 (ref 0–0.4)
EOSINOPHIL NFR BLD AUTO: 0.9 % (ref 0.3–6.2)
ERYTHROCYTE [DISTWIDTH] IN BLOOD BY AUTOMATED COUNT: 18.4 % (ref 12.3–15.4)
GFR SERPL CREATININE-BSD FRML MDRD: 123 ML/MIN/1.73
GLUCOSE BLDC GLUCOMTR-MCNC: 158 MG/DL (ref 70–130)
GLUCOSE BLDC GLUCOMTR-MCNC: 287 MG/DL (ref 70–130)
GLUCOSE SERPL-MCNC: 79 MG/DL (ref 65–99)
HCT VFR BLD AUTO: 25.1 % (ref 34–46.6)
HGB BLD-MCNC: 7 G/DL (ref 12–15.9)
LYMPHOCYTES # BLD AUTO: 0.71 10*3/MM3 (ref 0.7–3.1)
LYMPHOCYTES NFR BLD AUTO: 8 % (ref 19.6–45.3)
MAGNESIUM SERPL-MCNC: 2.7 MG/DL (ref 1.6–2.6)
MCH RBC QN AUTO: 20.1 PG (ref 26.6–33)
MCHC RBC AUTO-ENTMCNC: 27.9 G/DL (ref 31.5–35.7)
MCV RBC AUTO: 71.9 FL (ref 79–97)
MONOCYTES # BLD AUTO: 0.75 10*3/MM3 (ref 0.1–0.9)
MONOCYTES NFR BLD AUTO: 8.4 % (ref 5–12)
NEUTROPHILS NFR BLD AUTO: 7.26 10*3/MM3 (ref 1.7–7)
NEUTROPHILS NFR BLD AUTO: 81.7 % (ref 42.7–76)
PLATELET # BLD AUTO: 240 10*3/MM3 (ref 140–450)
PMV BLD AUTO: 10.4 FL (ref 6–12)
POTASSIUM SERPL-SCNC: 4.4 MMOL/L (ref 3.5–5.2)
RBC # BLD AUTO: 3.49 10*6/MM3 (ref 3.77–5.28)
SODIUM SERPL-SCNC: 131 MMOL/L (ref 136–145)
WBC # BLD AUTO: 8.89 10*3/MM3 (ref 3.4–10.8)

## 2021-11-01 PROCEDURE — 85025 COMPLETE CBC W/AUTO DIFF WBC: CPT | Performed by: INTERNAL MEDICINE

## 2021-11-01 PROCEDURE — 63710000001 INSULIN LISPRO (HUMAN) PER 5 UNITS: Performed by: NURSE PRACTITIONER

## 2021-11-01 PROCEDURE — 94799 UNLISTED PULMONARY SVC/PX: CPT

## 2021-11-01 PROCEDURE — 82962 GLUCOSE BLOOD TEST: CPT

## 2021-11-01 PROCEDURE — 80048 BASIC METABOLIC PNL TOTAL CA: CPT | Performed by: INTERNAL MEDICINE

## 2021-11-01 PROCEDURE — 25010000002 CEFTRIAXONE PER 250 MG: Performed by: INTERNAL MEDICINE

## 2021-11-01 PROCEDURE — 83735 ASSAY OF MAGNESIUM: CPT | Performed by: INTERNAL MEDICINE

## 2021-11-01 RX ORDER — CEFDINIR 300 MG/1
300 CAPSULE ORAL 2 TIMES DAILY
Qty: 8 CAPSULE | Refills: 0 | Status: SHIPPED | OUTPATIENT
Start: 2021-11-02 | End: 2021-11-06

## 2021-11-01 RX ADMIN — THIAMINE HCL TAB 100 MG 100 MG: 100 TAB at 08:21

## 2021-11-01 RX ADMIN — LEVOTHYROXINE SODIUM 50 MCG: 50 TABLET ORAL at 06:20

## 2021-11-01 RX ADMIN — SUCRALFATE 1 G: 1 SUSPENSION ORAL at 12:00

## 2021-11-01 RX ADMIN — FUROSEMIDE 40 MG: 40 TABLET ORAL at 08:21

## 2021-11-01 RX ADMIN — ACETAMINOPHEN 650 MG: 325 TABLET ORAL at 08:27

## 2021-11-01 RX ADMIN — SODIUM CHLORIDE 1 G: 9 INJECTION, SOLUTION INTRAVENOUS at 12:24

## 2021-11-01 RX ADMIN — SUCRALFATE 1 G: 1 SUSPENSION ORAL at 08:21

## 2021-11-01 RX ADMIN — FOLIC ACID 1 MG: 1 TABLET ORAL at 08:21

## 2021-11-01 RX ADMIN — Medication 10 ML: at 08:21

## 2021-11-01 RX ADMIN — PANTOPRAZOLE SODIUM 40 MG: 40 TABLET, DELAYED RELEASE ORAL at 06:20

## 2021-11-01 RX ADMIN — INSULIN LISPRO 8 UNITS: 100 INJECTION, SOLUTION INTRAVENOUS; SUBCUTANEOUS at 12:00

## 2021-11-01 NOTE — PLAN OF CARE
Goal Outcome Evaluation:  Plan of Care Reviewed With: patient        Progress: improving   VSS. No c/o pain. S/ST  on tele. Pt up ad trina. BP soft, but pt is asymptomatic. Pt is hoping to discharge home today. Safety maintained.

## 2021-11-01 NOTE — PAYOR COMM NOTE
"11/1/21 [facility] [phone]  [phone]    PATIENT ER ADMISSION ON 10/29/21. INPATIENT ORDER.  PENDING  AUTH A [id number].            Emilie Rucker (61 y.o. Female)             Date of Birth Social Security Number Address Home Phone MRN    1960  [address] [place] [address] [phone] [id number]    Mandaeism Marital Status             [facility] Single       Admission Date Admission Type Admitting Provider Attending Provider Department, Room/Bed    10/29/21 Urgent Efra Sutton, Efra Fortune DO [address], 409/1    Discharge Date Discharge Disposition Discharge Destination           Home or Self Care              Attending Provider: Efra Sutton DO    Allergies: Augmentin [Amoxicillin-pot Clavulanate]    Isolation: None   Infection: None   Code Status: CPR   Advance Care Planning Activity    Ht: 152.4 cm (60\")   Wt: 53.1 kg (117 lb)    Admission Cmt: None   Principal Problem: None                Active Insurance as of 10/29/2021     Primary Coverage     Payor Plan Insurance Group Employer/Plan Group    [facility] COMMUNITY PLAN [facility] COMMUNITY PLAN [facility]     Payor Plan Address Payor Plan Phone Number Payor Plan Fax Number Effective Dates    [address]   10/1/2021 - None Entered    [facility] [id number]       Subscriber Name Subscriber Birth Date Member ID       EMILIE RUCKER 1960 [id number]                 Emergency Contacts      (Rel.) Home Phone Work Phone Mobile Phone    mary andino (Friend) [phone] -- --                              [facility] Encounter Date/Time: 10/29/2021 [facility]   Hospital Account: [id number]    MRN: [id number]   Patient:  Emilie Rucker   Contact Serial #: [id number]   SSN:          ENCOUNTER             Patient Class: Inpatient   Unit: [address] Service: Medicine     Bed: 409/1   Admitting Provider: Efra Sutton DO   " Referring Physician: Bhavesh Rodas   Attending Provider: Efra Sutton DO   Adm Diagnosis: Alcoholic cirrhosis (HCC*               PATIENT  Name: Fiordaliza Rucker : 1960 (61 yrs)   Address: [address] [place] [address] Sex: Female   City: [place] [address]   County: [facility]   Marital Status: SINGLE Ethnicity: NOT                                                                              Race: WHITE   Primary Care Provider: Brittany Solis MD Patients Phone: Home Phone: [phone]     Mobile Phone: [phone]   EMERGENCY CONTACT   Contact Name Legal Guardian? Relationship to Patient Home Phone Work Phone   1. mary andino  2. *No Contact Specified* No    Friend    [phone]              GUARANTOR            Guarantor: Fiordaliza Rucker     : 1960   Address: [address] [place] [address] Sex: Female     [place] [address]     Relation to Patient: Self       Home Phone: [phone]   Guarantor ID: [id number]       Work Phone:     GUARANTOR EMPLOYER   Employer:           Status: RETIRED   COVERAGE          PRIMARY INSURANCE   Payor: [facility] [place] Plan: [facility] [place]   Group Number: [id number] Insurance Type: INDEMNITY   Subscriber Name: FIORDALIZA RUCKER Subscriber : 1960   Subscriber ID: [id number] Coverage Address: [address] [address]   Pat. Rel. to Subscriber: Self Coverage Phone:     SECONDARY INSURANCE   Payor: N/A Plan: N/A   Group Number:   Insurance Type:     Subscriber Name:   Subscriber :     Subscriber ID:   Coverage Address:     Pat. Rel. to Subscriber:   Coverage Phone:        Contact Serial # ([id number])  `       2021    Chart ID ([id number])             Department Encounter #   10/29/2021  6:31 PM  PAD 4B [id number]     Jonn Sifuentes DO   Physician   Medicine   H&P       Signed   Date of Service:  10/29/21 1924   Creation Time:  10/29/21 1924              Signed        Expand AllCollapse All   "          Show:Clear all  [x]Manual[x]Template[x]Copied    Added by:  [x]Jonn Sifuentes DO[x]Kyia Alvarez APRN      []Amelia for details         [facility] Medicine Services  HISTORY AND PHYSICAL     Date of Admission: 10/29/2021  Primary Care Physician: Brittany Solis MD     Subjective      Chief Complaint: Direct from Dr. Brittany Solis for care and treatment of alcoholic cirrhosis/ascites     History of Present Illness  Fiordaliza Lyons is a 61-year-old female with a past medical history of ongoing alcohol abuse, type 2 diabetes, gastroesophageal reflux disease, hyperlipidemia, hypertension, sleep apnea, please see below for complete list.  Patient recently reestablished with Dr. Brittany Solis after being seen at [facility] for lower extremity edema, anemia, poorly controlled glucose, abdominal pain all felt to be secondary to cirrhosis however patient does not have a definite diagnosis.  Due to declining condition, hospitalist were asked to direct admit for evaluation and treatment of anemia, hyponatremia, ascites and edema.  Patient states she has been having worsening edema despite increasing Lasix from 20 mg twice a day to 40 mg twice a day.  She started yesterday evening and has had 2 doses of 40mg without significant decrease in edema.  She is complaining of mid back pain, abdominal fullness which causes her to have dyspnea and inability to eat full meals.  She occasionally has abdominal pain that she feels is secondary to the ascites.  Patient states, \"my rectum comes out when I sit on the toilet so I have to stand to have a bowel movement\" .  Dr. Solis has concerns for possible initial etiology.  Patient has an appointment next Friday with Dr. Ray Serna, GI.  She denies chest pain.  She is admitted for further evaluation treatment.     Review of Systems   A 10 point review of systems was completed, all negative except for " "those discussed in HPI     Past Medical History:   Medical History        Past Medical History:   Diagnosis Date   • Alcoholic hepatitis with ascites     • Allergic rhinitis     • Breast cyst     • Diabetes mellitus (HCC)     • Elevated d-dimer     • Endometriosis     • Fatty liver     • GERD (gastroesophageal reflux disease)     • Hyperlipidemia     • Hypertension     • Ovarian cyst     • Pancreatitis     • Postmenopausal     • Rectal mass     • Sleep apnea              Past Surgical History:   Surgical History         Past Surgical History:   Procedure Laterality Date   • BILATERAL SALPINGO OOPHORECTOMY   2008   • CHOLECYSTECTOMY   1996   • COLONOSCOPY   07/2017     Dr Murphy- per patient normal    • DILATION AND CURETTAGE, DIAGNOSTIC / THERAPEUTIC       • HYSTERECTOMY       • KNEE SURGERY   1982            Family History: family history includes Colon polyps in her brother and father; Ovarian cancer in her maternal aunt, maternal grandmother, mother, and paternal grandmother.     Social History:  reports that she has never smoked. She has never used smokeless tobacco. She reports current alcohol use. She reports that she does not use drugs.     Code Status: Full, if unable speak for self her family will speak for her        Allergies:       Allergies   Allergen                      /85 (BP Location: Left arm, Patient Position: Lying)   Pulse 98   Temp 98.7 °F (37.1 °C) (Oral)   Resp 14   Ht 152.4 cm (60\")   Wt 58.5 kg (129 lb)   SpO2 99%   BMI 25.19 kg/m²   Physical Exam  Vitals reviewed.   Constitutional:       Appearance: She is not ill-appearing.   HENT:      Head: Normocephalic and atraumatic.      Mouth/Throat:      Mouth: Mucous membranes are moist.      Pharynx: Oropharynx is clear.   Eyes:      Extraocular Movements: Extraocular movements intact.      Pupils: Pupils are equal, round, and reactive to light.   Cardiovascular:      Rate and Rhythm: Normal rate and regular rhythm.   Pulmonary:      " Effort: Pulmonary effort is normal.      Breath sounds: Normal breath sounds.   Abdominal:      General: There is distension.      Tenderness: There is no abdominal tenderness.      Comments: Firm, ascites   Musculoskeletal:         General: Swelling ( Anasarca) present.      Cervical back: Normal range of motion and neck supple.      Comments: Generalized weakness and debility   Skin:     General: Skin is warm and dry.   Neurological:      General: No focal deficit present.      Mental Status: She is alert and oriented to person, place, and time.   Psychiatric:         Mood and Affect: Mood normal.         Behavior: Behavior normal.         Pertinent Data:   10/29/2021 labs obtained at outlying facility today  Glucose 232, sodium 128, potassium 3.7, CO2 37, chloride 87, creatinine 0.57, BUN 6, calcium 8.6, , alkaline phosphatase 199, total protein 5.9, ALT 19, AST 50, total bilirubin 0.6, albumin 2.9     WBC 8.80, hemoglobin 7.9, hematocrit 27.5, RDW 18.1, MCV 71.2, MCH 20.5, MCHC 28.7, platelet count 282,000 neutrophil 82.2% lymphocyte 5.7%, absolute neutrophil count 7.60, absolute lymphocyte 0.50     10/19/2021 TSH 11.090, free T4 1.15     10/11/2021 hemoglobin A1c 9.4, iron 18, ferritin 15.3, D-dimer 0.96     10/5/2021   proBNP 125.2, glucose 390, sodium 130, potassium 2.9, CO2 30, creatinine 0.38, chloride 89, alkaline phosphatase 334, ALT 39, , total bilirubin 1.1     WBC 4.7, hemoglobin 8.9, hematocrit 32.2, platelets 259,000     10/27/2021   Study Result     Narrative & Impression   Exam: Ultrasound of the abdomen, complete     Indication: Ascites due to alcoholic cirrhosis; K70.31-Alcoholic  cirrhosis of liver with ascites     Comparison: None available     Findings:      Pancreas is obscured by bowel gas. The abdominal aorta is nonaneurysmal.  The juxtahepatic IVC is unremarkable. Patent portal vein with  appropriate directional flow.     Hepatic echogenicity is increased. Liver contour  nodularity is noted. No  evidence of focal liver mass or intrahepatic ductal dilatation. The  common bile duct measures 5 mm. Prior cholecystectomy. Moderate volume  ascites.     The right kidney measures 8.9 cm longitudinally. The left kidney  measures 9.4 cm longitudinally. Renal echogenicity is normal. No  hydronephrosis or shadowing calculi.     The spleen is normal and measures 11.0 cm in greatest dimension.     IMPRESSION:     Steatotic liver with surface contour nodularity suggesting cirrhosis. No  solid liver lesion identified. Moderate volume ascites.  This report was finalized on 10/27/2021 12:22 by Dr. Carlos Erickson MD.            10/5/2021 Study Result     Narrative & Impression   EXAM: CT ABDOMEN PELVIS W CONTRAST- - 10/5/2021 12:29 PM CDT     HISTORY: Bowel obstruction suspected; K56.690-Other partial intestinal  obstruction       COMPARISON: No existing relevant imaging studies available.      DOSE LENGTH PRODUCT: 190 mGy cm. Automatic exposure control was utilized  to make radiation dose as low as reasonably achievable.     TECHNIQUE: Enhanced  axial images of the abdomen and pelvis obtained  with multiplanar reformats.     FINDINGS:  VISUALIZED CHEST: No pleural or pericardial effusion. Lung bases clear.     LIVER: Fatty liver. No focal liver lesion. Patent portal and hepatic  veins.     BILIARY: Cholecystectomy clips. No bile duct dilation.     PANCREAS: Marked atrophy.     SPLEEN: Normal size. Homogeneous enhancement.     ADRENAL: Normal appearance of the bilateral adrenal glands.     GENITOURINARY:   Scarring at the right upper renal pole. A few nonobstructing punctate  renal calcifications. No hydronephrosis or ureteral calculus.   Urinary bladder is within normal limits.  Uterus favored to be atrophic. Adnexa not discretely identified.     PERITONEUM: Small volume ascites.     GI TRACT: Small hiatal hernia. Diffuse gastric wall thickening, greatest  at the distal stomach. Thickening of the  ascending colon. No evidence of  bowel obstruction. Distal sigmoid colon diverticula without evidence of  acute diverticulitis. Normal appendix on axial images 38-49.     VESSELS: Aorta normal in course and caliber with mild calcified  atherosclerosis. Main branch vessels patent. Bilateral renal veins  patent.     RETROPERITONEUM: No mass, lymphadenopathy or hemorrhage.     SOFT TISSUES: Body wall edema.     BONES: T6-7 partially visualized and appears to have sclerotic endplate  changes, incompletely visualized and characterized on this exam.        IMPRESSION:  1. Diffuse gastric wall thickening, greatest at the distal stomach  raising consideration of gastritis.  2. Thickening of the ascending colon, which could represent hepatic  colopathy or other infectious/inflammatory or less likely ischemic  colitis.  3. Markedly fatty liver. Small ascites.  4. Partially visualized T6-7 has sclerotic endplate changes,  incompletely visualized and characterized on this exam. This could  represent degenerative change, but discitis osteomyelitis would not be  excluded in the appropriate clinical setting.     This report was finalized on 10/05/2021 13:08 by Dr Paula Simeon MD.            Assessment / Plan      Assessment:        Active Hospital Problems     Diagnosis     • Alcoholic cirrhosis (HCC)     • Iron deficiency anemia     • Partial rectal prolapse     • Anasarca     • Thoracic spine pain, abnormal CT T 6-7     • Hyponatremia     • Type 1 diabetes mellitus with hyperglycemia (HCC)     • Ascites due to alcoholic cirrhosis (HCC)           Plan:   1.  Admit as observation  2.  Stat labs, labs in a.m., obtain urine for routine analysis and stool for occult blood  3.  Bumex drip  4.  Home medications reviewed and restarted as appropriate  5.  DVT prophylaxis with SCDs  6.  Add Carafate 1 g suspension before meals and at bedtime  7.  Monitor glucose before meals and at bedtime with regular insulin sliding scale coverage,  hold long-acting for now\  8.  Supplemental oxygen as needed, cardiac monitor  10.  MRI of the thoracic spine without contrast in a.m.  11.  Ultrasound-guided paracentesis in a.m.  12.  Hold spironolactone, monitor sodium level  13.  Patient to keep scheduled appointment with Dr. José GI 11/5/2021        I discussed the patient's findings and my recommendations with: Jonn Sifuentes DO  Time spent: 55 minutes     Patient presents as a transfer. Generalized anasarca. Supposed to see Gi as OP. Discussed with NP and plan formulated.      Patient seen and examined by me on 10/2021 at 7:25 PM.     Electronically signed by Kiya Alvarez, RAYMON, 10/29/21, 21:33 CDT.               Admission (Current) on 10/29/2021       Department Encounter #   10/29/2021  6:31 PM [address] [id number]     Richard Fernandez MD   Physician   Hospitalist   Progress Notes       Signed   Date of Service:  10/30/21 1117   Creation Time:  10/30/21 1117              Signed                   Show:Clear all  [x]Manual[x]Template[]Copied    Added by:  [x]Richard Fernandez MD      [][facility] for details    1                                                                 [facility] Medicine Services  INPATIENT PROGRESS NOTE     Patient Name: Fiordaliza Lyons  Date of Admission: 10/29/2021  Today's Date: 10/30/21  Length of Stay: 0  Primary Care Physician: Brittany Solis MD     Subjective   Chief Complaint:  Follow up   HPI   Patient is a 61-year-old directly admitted from Dr. Solis's clinic for care and treatment of alcoholic cirrhosis with ascites.     From reviewing the admitting H&P patient has not been diagnosed with cirrhosis but on imaging study dated October 27 has nodularity contour of the liver.  Further imaging from October 5 showed ascites, diffuse gastric wall thickening, thickening of ascending colon.  On ultrasound reportedly has moderate ascites.  Has anemia with low ferritin at  15.3, issue with hypokalemia, elevated TSH, issue with hyponatremia     Patient on Bumex drip.  Outlined plan from admission includes ultrasound-guided paracentesis, MRI thoracic in reference to the thoracic pain.     Told me that she had blood test sometime Thursday.  She further narrates that her family doctor was not comfortable about the past that she had.  Her diuretic was increased to 2 tablets twice daily.  She was sent for further evaluation here in the hospital.     Patient states she is feeling better  She is on Bumex drip  She had her ultrasound-guided paracentesis or 1500 mL of clear-colored fluid was drawn.  There was lots of RBC.  WBC is no more than 250 count.  Cell count predominates neutrophils  Fluid was sent for further testing.     Symptoms wise she said it was hard for her to come to the because of abdominal distention.  She has some swelling in her legs which despite increasing diuretic has not improved.  Noted from review of record that she had venous duplex ultrasound of bilateral lower extremities sometime October 12 which showed no evidence of DVT.     She is an alcoholic her last drink was October 4.  She used to drink 3 glasses off vodka mixed with Sprite nightly.     Denies any hematemesis, hematochezia or melena     Review of Systems      All pertinent negatives and positives are as above. All other systems have been reviewed and are negative unless otherwise stated.      Objective    Temp:  [97.9 °F (36.6 °C)-98.7 °F (37.1 °C)] 98.6 °F (37 °C)  Heart Rate:  [83-98] 88  Resp:  [14-16] 16  BP: (101-120)/(61-85) 101/63  Physical Exam   Bumex drip  GEN: Awake, alert, interactive, in NAD no scleral icterus  HEENT: Atraumatic, PERRLA, EOMI, Anicteric, Trachea midline  Lungs: CTAB, no wheezing/rales/rhonchi  Heart: RRR, +S1/s2, no rub  ABD: soft, nt/nd, +BS, no guarding/rebound no gross organomegaly  Extremities: atraumatic, no cyanosis, positive edema edema  Skin: no rashes or  lesions  Neuro: AAOx3, no focal deficits  Psych: normal mood & affect           Results Review:  I have reviewed the labs, radiology results, and diagnostic studies.     Laboratory Data:          Results from last 7 days   Lab Units 10/30/21  0328 10/29/21  1210 10/25/21  0949   WBC 10*3/mm3 5.66 8.80 4.7*   HEMOGLOBIN g/dL 7.6* 7.9* 8.0*   HEMATOCRIT % 26.8* 27.5* 28.5*   PLATELETS 10*3/mm3 235 282 281                Results from last 7 days   Lab Units 10/30/21  0328 10/29/21  1210 10/25/21  0949   SODIUM mmol/L 133* 128* 130*   POTASSIUM mmol/L 3.7 3.7 3.3*   CHLORIDE mmol/L 87* 87* 85*   TOTAL CO2 mmol/L  --   --  35*   CO2 mmol/L 38.0* 37.0*  --    BUN mg/dL 6 6 4*   CREATININE mg/dL 0.46* 0.57 0.5   CALCIUM mg/dL 8.4* 8.6 8.1*   BILIRUBIN mg/dL 0.5 0.6 0.7   ALK PHOS U/L 193* 199* 188*   ALT (SGPT) U/L 19 19 18   AST (SGOT) U/L 34* 50* 36*   GLUCOSE mg/dL 95 232* 353*                  MRI Thoracic Spine Without Contrast [[id number]] Resulted: 10/30/21 1101        Updated: 10/30/21 1101     US Paracentesis [[id number]] Collected: 10/30/21 1015     Specimen: Body Fluid Updated: 10/30/21 1019     Narrative:       EXAM: US PARACENTESIS- - 10/30/2021 9:43 AM CDT     HISTORY: ascites       COMPARISON: 10/27/2021.      PROCEDURE:  Risks, benefits and alternatives to the procedure were discussed with  the patient. Specifically, risks of bleeding, infection, fluid  reaccumulation were discussed with the patient. Verbal and written  informed consent was obtained.     Time Out:   Immediately prior to the procedure, a time out was performed. The  patient's name, date of birth, and procedure to be performed were  verified.     Using ultrasound guidance, a site in left lower quadrant was selected  and prepped in sterile fashion. 1 percent lidocaine was used for local  anesthesia. A 5 Uzbek one-step catheter was advanced into the  peritoneal cavity and 1500 mL of clear yellow colored fluid was drained.  The catheter was  removed.      Patient tolerated the procedure well and no immediate complications were  observed. The fluid was sent to the lab. Patient returned to the floor  in stable condition.           Impression:       Successful ultrasound-guided diagnostic and therapeutic paracentesis was  performed with drainage of  1500 mL clear yellow ascites fluid.     This report was finalized on 10/30/2021 10:16 by Dr Paula Simeon MD.         Hospital Problems     Diagnosis     • Alcoholic cirrhosis (HCC)     • Iron deficiency anemia     • Partial rectal prolapse     • Anasarca     • Thoracic spine pain, abnormal CT T 6-7     • Hyponatremia     • Type 1 diabetes mellitus with hyperglycemia (HCC)     • Ascites due to alcoholic cirrhosis (HCC)           Start on Venofer (ferritin 15)  Continue on Bumex drip  Follow chemistry and CBC in the morning  MRIs of spine showed indeterminant appearance of T6 which could represent a bone lesion or marrow heterogeneity.  Defer to primary care provider and nonemergent Dopplers medicine bone scan for further evaluation.  Otherwise patient has multilevel degenerative changes  Follow peritoneal fluid studies  Patient has a follow-up appointment with Dr. Serna this Friday.  She also has a clinic appointment with Dr. Solis this Tuesday.  If remains stable by tomorrow and lab tests are back, anticipate that she can be discharged home.     Discussed with nurse              Discharge Planning: To be determined  Electronically signed by Richard Thompson           Current Facility-Administered Medications   Medication Dose Route Frequency Provider Last Rate Last Admin   • acetaminophen (TYLENOL) tablet 650 mg  650 mg Oral Q4H PRN Kiya Alvarez APRN   650 mg at 11/01/21 0827    Or   • acetaminophen (TYLENOL) 160 MG/5ML solution 650 mg  650 mg Oral Q4H PRN Kiya Alvarez APRN        Or   • acetaminophen (TYLENOL) suppository 650 mg  650 mg Rectal Q4H PRN Kiya Alvarez APRN       • dextrose (D50W)  (25 g/50 mL) IV injection 25 g  25 g Intravenous Q15 Min PRN Kiya Alvarez, APRN       • dextrose (GLUTOSE) oral gel 15 g  15 g Oral Q15 Min PRN Kiya Alvarez, APRNORBERT       • folic acid (FOLVITE) tablet 1 mg  1 mg Oral Daily Kiya Alvarez APRN   1 mg at 11/01/21 0821   • furosemide (LASIX) tablet 40 mg  40 mg Oral Daily Richard Fernandez MD   40 mg at 11/01/21 0821   • glucagon (human recombinant) (GLUCAGEN DIAGNOSTIC) injection 1 mg  1 mg Subcutaneous Q15 Min PRN Kiya Alvarez APRN       • insulin lispro (humaLOG) injection 0-14 Units  0-14 Units Subcutaneous 4x Daily With Meals & Nightly Kiya Alvarez APRN   8 Units at 11/01/21 1200   • levothyroxine (SYNTHROID, LEVOTHROID) tablet 50 mcg  50 mcg Oral Q AM Kiya Alvarez APRN   50 mcg at 11/01/21 0620   • Magnesium Sulfate 2 gram Bolus, followed by 8 gram infusion (total Mg dose 10 grams)- Mg less than or equal to 1mg/dL  2 g Intravenous PRN Richard Fernandez MD        Or   • Magnesium Sulfate 2 gram / 50mL Infusion (GIVE X 3 BAGS TO EQUAL 6GM TOTAL DOSE) - Mg 1.1 - 1.5 mg/dl  2 g Intravenous PRN Richard Fernandez MD 25 mL/hr at 10/31/21 2128 2 g at 10/31/21 2128    Or   • Magnesium Sulfate 4 gram infusion- Mg 1.6-1.9 mg/dL  4 g Intravenous PRN Richard Fernandez MD       • ondansetron (ZOFRAN) tablet 4 mg  4 mg Oral Q6H PRN Kiya Alvarez APRN        Or   • ondansetron (ZOFRAN) injection 4 mg  4 mg Intravenous Q6H PRN Kiya Alvarez, APRNORBERT       • pantoprazole (PROTONIX) EC tablet 40 mg  40 mg Oral Q AM Kiya Alvarez APRN   40 mg at 11/01/21 0620   • potassium chloride (KLOR-CON) packet 40 mEq  40 mEq Oral PRN Richard Fernandez MD       • potassium chloride (MICRO-K) CR capsule 40 mEq  40 mEq Oral PRN Richard Fernandez MD   40 mEq at 10/31/21 2128   • sodium chloride 0.9 % flush 10 mL  10 mL Intravenous Q12H Kiya Alvarez APRN   10 mL at 11/01/21 0821   • sodium chloride 0.9 %  flush 10 mL  10 mL Intravenous PRN Kiya Alvarez, APRN       • sucralfate (CARAFATE) 1 GM/10ML suspension 1 g  1 g Oral 4x Daily AC & at Bedtime Kiya Alvarez APRN   1 g at 11/01/21 1200   • thiamine (VITAMIN B-1) tablet 100 mg  100 mg Oral Daily Kiya Alvarez APRN   100 mg at 11/01/21 0821

## 2021-11-02 ENCOUNTER — OFFICE VISIT (OUTPATIENT)
Dept: INTERNAL MEDICINE | Age: 61
End: 2021-11-02
Payer: COMMERCIAL

## 2021-11-02 ENCOUNTER — READMISSION MANAGEMENT (OUTPATIENT)
Dept: CALL CENTER | Facility: HOSPITAL | Age: 61
End: 2021-11-02

## 2021-11-02 ENCOUNTER — TELEPHONE (OUTPATIENT)
Dept: INTERNAL MEDICINE | Age: 61
End: 2021-11-02

## 2021-11-02 VITALS
WEIGHT: 115 LBS | SYSTOLIC BLOOD PRESSURE: 118 MMHG | OXYGEN SATURATION: 95 % | HEART RATE: 100 BPM | HEIGHT: 60 IN | BODY MASS INDEX: 22.58 KG/M2 | DIASTOLIC BLOOD PRESSURE: 58 MMHG

## 2021-11-02 DIAGNOSIS — K21.9 GASTROESOPHAGEAL REFLUX DISEASE WITHOUT ESOPHAGITIS: ICD-10-CM

## 2021-11-02 DIAGNOSIS — K70.9 ALCOHOLIC LIVER DISEASE (HCC): Primary | ICD-10-CM

## 2021-11-02 DIAGNOSIS — E10.65 TYPE 1 DIABETES MELLITUS WITH HYPERGLYCEMIA (HCC): ICD-10-CM

## 2021-11-02 DIAGNOSIS — E87.6 HYPOKALEMIA: ICD-10-CM

## 2021-11-02 DIAGNOSIS — D50.0 IRON DEFICIENCY ANEMIA DUE TO CHRONIC BLOOD LOSS: ICD-10-CM

## 2021-11-02 DIAGNOSIS — F10.10 ALCOHOL ABUSE: ICD-10-CM

## 2021-11-02 DIAGNOSIS — K62.5 GASTROINTESTINAL HEMORRHAGE ASSOCIATED WITH ANORECTAL SOURCE: ICD-10-CM

## 2021-11-02 DIAGNOSIS — K62.89 RECTAL MASS: ICD-10-CM

## 2021-11-02 PROBLEM — K70.30 ALCOHOLIC CIRRHOSIS (HCC): Status: ACTIVE | Noted: 2021-10-29

## 2021-11-02 PROBLEM — K62.3 PARTIAL RECTAL PROLAPSE: Status: ACTIVE | Noted: 2021-10-29

## 2021-11-02 PROBLEM — D50.9 IRON DEFICIENCY ANEMIA: Status: ACTIVE | Noted: 2021-10-29

## 2021-11-02 PROBLEM — N30.00 ACUTE CYSTITIS: Status: ACTIVE | Noted: 2021-11-01

## 2021-11-02 PROBLEM — E87.1 HYPONATREMIA: Status: ACTIVE | Noted: 2021-10-29

## 2021-11-02 PROBLEM — M54.6 THORACIC SPINE PAIN: Status: ACTIVE | Noted: 2021-10-29

## 2021-11-02 LAB
BACTERIA FLD CULT: NORMAL
GRAM STN SPEC: NORMAL
GRAM STN SPEC: NORMAL

## 2021-11-02 PROCEDURE — 1111F DSCHRG MED/CURRENT MED MERGE: CPT | Performed by: INTERNAL MEDICINE

## 2021-11-02 PROCEDURE — 99215 OFFICE O/P EST HI 40 MIN: CPT | Performed by: INTERNAL MEDICINE

## 2021-11-02 RX ORDER — POTASSIUM CHLORIDE 20 MEQ/1
20 TABLET, EXTENDED RELEASE ORAL DAILY
Qty: 30 TABLET | Refills: 0 | Status: SHIPPED | OUTPATIENT
Start: 2021-11-02 | End: 2021-12-17

## 2021-11-02 RX ORDER — PANTOPRAZOLE SODIUM 40 MG/1
40 TABLET, DELAYED RELEASE ORAL
Qty: 90 TABLET | Refills: 1 | Status: SHIPPED | OUTPATIENT
Start: 2021-11-02 | End: 2022-01-27

## 2021-11-02 NOTE — PAYOR COMM NOTE
"[place] HOME 11-1-21  UR  [phone]  [id number]      Fiordaliza Rucker (61 y.o. Female)             Date of Birth Social Security Number Address Home Phone MRN    1960  [address] [id number] [phone] [id number]    [employer] Marital Status             [facility], [place], [facility] Single       Admission Date Admission Type Admitting Provider Attending Provider Department, Room/Bed    10/29/21 Urgent Efra Sutton DO  [facility] 4B, 409/1    Discharge Date Discharge Disposition Discharge Destination          11/1/2021 Home or Self Care              Attending Provider: (none)   Allergies: Augmentin [Amoxicillin-pot Clavulanate]    Isolation: None   Infection: None   Code Status: Prior   Advance Care Planning Activity    Ht: 152.4 cm (60\")   Wt: 53.1 kg (117 lb)    Admission Cmt: None   Principal Problem: None                Active Insurance as of 10/29/2021     Primary Coverage     Payor Plan Insurance Group Employer/Plan Group    [facility] COMMUNITY PLAN [facility] COMMUNITY PLAN [facility]     Payor Plan Address Payor Plan Phone Number Payor Plan Fax Number Effective Dates    [address]   10/1/2021 - None Entered    [facility] [id number]       Subscriber Name Subscriber Birth Date Member ID       FIORDALIZA RUCKER 1960 [id number]                 Emergency Contacts      (Rel.) Home Phone Work Phone Mobile Phone    andinomary (Friend) [phone] -- --               Discharge Summary      Efra Sutton DO at 11/01/21 [address] [place] [address] Medicine Services  DISCHARGE SUMMARY       Date of Admission: 10/29/2021  Date of Discharge:  11/1/2021  Primary Care Physician: Brittany Solis MD    Presenting Problem/History of Present Illness:  Alcoholic cirrhosis (HCC) [K70.30]     Final Discharge Diagnoses:  Active Hospital Problems    Diagnosis    • Acute cystitis    • Alcoholic cirrhosis (HCC)    • Iron deficiency anemia    • Partial " rectal prolapse    • Anasarca    • Thoracic spine pain, abnormal CT T 6-7    • Hyponatremia    • Type 1 diabetes mellitus with hyperglycemia (HCC)    • Ascites due to alcoholic cirrhosis (HCC)        Consults: none    Procedures Performed:   #1 u/s paracentesis on 10/30 by Dr. Simeon    Pertinent Test Results:   Procedure Component Value Units Date/Time   MRI Thoracic Spine Without Contrast [[id number]] Jeffrey as Reviewed   Order Status: Completed Collected: 10/30/21 1152    Updated: 10/30/21 1202   Narrative:     EXAM: MRI THORACIC SPINE WO CONTRAST- - 10/30/2021 11:00 AM CDT       HISTORY: Mid-back pain         COMPARISON: No existing relevant imaging studies available       TECHNIQUE: Routine MRI performed of the thoracic spine without   intravenous contrast.       FINDINGS:   Thoracic spine has normal number when counted from the odontoid. Normal   alignment.       Vague indeterminate T1 and T2 hypointensity in the T6 vertebral body.       Mild diffuse disc height loss, severe at T6-7. Degenerative endplate   changes throughout the thoracic spine. T12 and L1 with Schmorl's nodes,   similar compared to 1/18/2019. 2017, T12-L1 and L1-2 disc bulges.       No evidence of severe spinal canal or foraminal stenosis.       Cord signal within normal limits. No evidence of intrinsic or extrinsic   lesion.       Extraspinal soft tissues are within normal limits.           Impression:     1. Indeterminant appearance of T6. This could represent a bone lesion or   marrow heterogeneity. Recommend correlation with routine age appropriate   screening and consider nonemergent nuclear medicine bone scan for   further evaluation.   2. Multilevel degenerative changes as above. No severe spinal canal   stenosis.   This report was finalized on 10/30/2021 11:59 by Dr Paula Simeon MD.   US Paracentesis [[id number]] Jeffrey as Reviewed   Order Status: Completed Specimen: Body Fluid Collected: 10/30/21 1015    Updated: 10/30/21 1019    Narrative:     EXAM: US PARACENTESIS- - 10/30/2021 9:43 AM CDT       HISTORY: ascites         COMPARISON: 10/27/2021.       PROCEDURE:   Risks, benefits and alternatives to the procedure were discussed with   the patient. Specifically, risks of bleeding, infection, fluid   reaccumulation were discussed with the patient. Verbal and written   informed consent was obtained.       Time Out:   Immediately prior to the procedure, a time out was performed. The   patient's name, date of birth, and procedure to be performed were   verified.       Using ultrasound guidance, a site in left lower quadrant was selected   and prepped in sterile fashion. 1 percent lidocaine was used for local   anesthesia. A 5 Cuban one-step catheter was advanced into the   peritoneal cavity and 1500 mL of clear yellow colored fluid was drained.   The catheter was removed.       Patient tolerated the procedure well and no immediate complications were   observed. The fluid was sent to the lab. Patient returned to the floor   in stable condition.           Impression:     Successful ultrasound-guided diagnostic and therapeutic paracentesis was   performed with drainage of  1500 mL clear yellow ascites fluid.       This report was finalized on 10/30/2021 10:16 by Dr Paula Simeon MD.   US Abdomen Complete [[id number]] Jeffrey as Reviewed   Order Status: Completed Collected: 10/27/21 1215    Updated: 10/27/21 1226   Narrative:     Exam: Ultrasound of the abdomen, complete       Indication: Ascites due to alcoholic cirrhosis; K70.31-Alcoholic   cirrhosis of liver with ascites       Comparison: None available       Findings:       Pancreas is obscured by bowel gas. The abdominal aorta is nonaneurysmal.   The juxtahepatic IVC is unremarkable. Patent portal vein with   appropriate directional flow.       Hepatic echogenicity is increased. Liver contour nodularity is noted. No   evidence of focal liver mass or intrahepatic ductal dilatation. The   common  bile duct measures 5 mm. Prior cholecystectomy. Moderate volume   ascites.       The right kidney measures 8.9 cm longitudinally. The left kidney   measures 9.4 cm longitudinally. Renal echogenicity is normal. No   hydronephrosis or shadowing calculi.       The spleen is normal and measures 11.0 cm in greatest dimension.       Impression:         Steatotic liver with surface contour nodularity suggesting cirrhosis. No   solid liver lesion identified. Moderate volume ascites.   This report was finalized on 10/27/2021 12:22 by Dr. Carlos Erickson MD.           Chief Complaint on Day of Discharge:   F/u edema/ascites    History of Present Illness on Day of Discharge:   Patient is doing well on day of discharge.  She denies any chest pain, shortness breath, nausea.  She is had significant decrease in her overall body swelling.  She is having some lower abdominal/pelvic pain and dysuria.  Increased urinary hesitancy.  Afebrile.  Denies back pain.    Hospital Course:  The patient is a 61 y.o. female with history of liver disease/cirrhosis, GERD, hypertension who was sent in for direct admit from Dr. Brittany Solis's office for increasing lower extremity edema and ascites.  She admitted to the hospital and started on diuretics and actually increased to an IV diuretic drip.  She had a good response to that with over 5 L out.  Generalized swelling improved.  Her overall physical condition improved.  Her breathing feels fine and she feels essentially back to baseline.  Her oral Lasix was resumed on 10/31.  We will plan to resume her Aldactone at discharge.  She did have a paracentesis done while here which was 1.5 L of clear fluid removal.  No signs of infection on that.  Of note patient is complaining on discharge day of urinary hesitancy and dysuria.  She is complaining of suprapubic/pelvic pain.  Of note on arrival a urinalysis was performed which appeared significantly abnormal and evidence for infection.  She has not had a  "white count or fever was not started on antibiotics.  Unfortunately urine was not cultured.  Overall patient feels well today and wants to go home however.  We will give her a dose of ceftriaxone x1.  We will then start her on Omnicef to complete a 5 total day course of antibiotics for UTI.  Also of note patient at one point must of been complaining of thoracic pain and was sent for an MRI of her spine which had an indeterminate appearance of T6 without clear diagnosis.  Recommendation was for clinical correlation.  She is not complaining of any pain to me at this time.  Will allow for close interval follow-up with PCP.  Could potentially send for nuc med bone scan for better evaluation.  Again otherwise stable at this time and wants to go home.  Will DC home today with outpatient follow-up with PCP.  Patient tells me she is scheduled to see Dr. Solis in 2 days.  Also follows with GI as an outpatient.    Condition on Discharge: Able on room air    Physical Exam on Discharge:  /68 (BP Location: Right arm, Patient Position: Sitting)   Pulse 65   Temp 97.4 °F (36.3 °C) (Oral)   Resp 18   Ht 152.4 cm (60\")   Wt 53.1 kg (117 lb)   SpO2 98%   BMI 22.85 kg/m²   Physical Exam  GEN: Awake, alert, interactive, in NAD  HEENT: PERRLA, EOMI, Anicteric, Trachea midline  Lungs:  no wheezing/rales/rhonchi  Heart: RRR, +S1/s2, no rub  ABD: soft, nt/nd, +BS, no guarding/rebound  Extremities: atraumatic, no cyanosis, trace LE edema b/l, skin wrinkling  Skin: no rashes or lesions  Neuro: AAOx3, no focal deficits  Psych: normal mood & affect      Discharge Disposition:  Home or Self Care    Discharge Medications:     Discharge Medications      New Medications      Instructions Start Date   cefdinir 300 MG capsule  Commonly known as: OMNICEF   300 mg, Oral, 2 Times Daily   Start Date: November 2, 2021        Continue These Medications      Instructions Start Date   furosemide 20 MG tablet  Commonly known as: LASIX   40 mg, " Oral, 2 Times Daily      insulin glargine 100 UNIT/ML injection  Commonly known as: LANTUS SEMGLEE   8 Units, Subcutaneous, Nightly      levothyroxine 50 MCG tablet  Commonly known as: SYNTHROID, LEVOTHROID   50 mcg, Oral, Every Early Morning      multivitamin with iron tablet tablet   1 tablet, Oral, Daily      Omeprazole 20 MG tablet delayed-release   20 mg, Oral, Daily      potassium chloride 10 MEQ CR tablet   10 mEq, Oral, 2 Times Daily      spironolactone 100 MG tablet  Commonly known as: ALDACTONE   100 mg, Oral, 2 Times Daily             Activity at Discharge:  Increase to baseline as tolerated     Discharge Care Plan/Instructions:   Complete antibiotics UTI.  Resume chronic liver meds as prior.  Monitor blood pressures at home.  Close interval follow-up with PCP.  Outpatient follow-up on T6 spinal lesion and possible bone scan if necessary.  This can be done through PCP.    Follow-up Appointments:   Future Appointments   Date Time Provider Department Center   11/5/2021  9:15 AM Neva Vázquez APRN [facility]       Test Results Pending at Discharge: none    Electronically signed by Efra Sutton DO, 11/01/21, 13:04 CDT.    Time: 34 minutes          Electronically signed by Efra Sutton DO at 11/01/21 9441

## 2021-11-02 NOTE — PROGRESS NOTES
Patient presents with    Follow-Up from Hospital     TCM       HPI patient is here today for high level TCM follow-up I have been working with her with alcoholic liver disease and ascites patient saw me many years ago had not been back for care came back a few weeks ago first thing she said was \"I am an alcoholic \"she said she was ready to quit drinking she needed to help she had stopped drinking she was having edema ascites very weak having issues with bowel movements where the only way she can have a bowel movement is standing up and then she does not have control if she sits down and has a bowel movement she has significant rectal prolapse. That first SR when I did an exam I felt a index finger size raised coliform-like mass along the anterior wall of the rectum I could not get this to move perhaps it was stool but it was adherent and seem more consistent with a mass she had mucousy pink stool. We have been working with diuretics cautiously increasing and trying to diurese her we had gotten to 40 of Lasix twice a day and 100 of spironolactone twice a day I was very hesitant with that then her sodium dropped and potassium dropped and I called the hospitalist at [facility] to admit her they gave her some IV diuresis she was able to go on with the paracentesis. We had a delay in doing paracentesis because she thought she had no health insurance until November 1. Her office found that she did have some health insurance so we were able to get an ultrasound and we had ordered paracentesis but it was done at the hospital it sounds like they removed 2 L and they sent the fluid for testing    Inpatient course: Discharge summary reviewed- see chart.     Interval history/Current status: Has not been home 24 hours she is much stronger than the last time I saw her she is still edematous with mild ascites but greatly improved she is lost 14 pounds  Vitals:    11/02/21 1528   BP: (!) 118/58   Pulse: 100 SpO2: 95%   Weight: 115 lb (52.2 kg)   Height: 5' (1.524 m)     Body mass index is 22.46 kg/m². Wt Readings from Last 3 Encounters:   11/02/21 115 lb (52.2 kg)   10/25/21 129 lb (58.5 kg)   10/11/21 126 lb (57.2 kg)     BP Readings from Last 3 Encounters:   11/02/21 (!) 118/58   10/25/21 122/60   10/11/21 130/70       Review of Systems weakness edema    Physical Exam  Neck is supple sclera anicteric heart S1 is 2 lungs clear abdomen soft question trace ascites on exam no ascites. Lower extremity edema up to the upper calf prior to that it was up almost to her hips still significant 2+ pitting edema but greatly improved. No rashes. Assessment/Plan:  1. Alcoholic liver disease ([facility] [address]  She has not had a drink now in a month she had significant ascites with failure to our attempts at outpatient diuresis she had been hesitant for more help because of the lack of insurance she now has insurance. She did have hospitalization some diuresis in the hospital 14 pounds of fluid removed she had 2 L removed with paracentesis she feels better she questioned what medications to be on was not clear with the discharge. Right now she is going to take Lasix 20 and spironolactone 100 twice a day but I explained we are likely going to need to decrease that. Her potassium was 3.3 at discharge it was lower than that throughout the hospitalization despite spironolactone I am going to call in potassium 20 mEq a day I am seeing her back next week we will recheck. She seems very committed to stopping and staying off alcohol she stopped alcohol use a month ago she has a friend has been with her at every visit I feel like she is committed to this    2. Alcohol abuse  Stop drinking she is going to see behavioral health in our office Thursday she seems committed to staying off alcohol but we been very blunt with her that if she resumes drinking she will likely die have high mortality morbidity.   She seems motivated to stay off alcohol    3. Type 1 diabetes mellitus with hyperglycemia (HCC)  Diabetes not in good control had not been under any management for years she did let us increase her insulin and she is really changed her diet right now are tolerating some mild hyperglycemia till we get everything else more stable going to risk hypoglycemia for seeing her back next week    4. Gastroesophageal reflux disease without esophagitis  Change Prilosec to prescription Protonix she is having more GERD follow-up  - pantoprazole (PROTONIX) 40 MG tablet; Take 1 tablet by mouth every morning (before breakfast)  Dispense: 90 tablet; Refill: 1    5. Hypokalemia  He is on spironolactone but will cautiously go to give her potassium follow-up in a week or so  - potassium chloride (KLOR-CON M) 20 MEQ extended release tablet; Take 1 tablet by mouth daily  Dispense: 30 tablet; Refill: 0    6. Gastrointestinal hemorrhage associated with anorectal source  Mucousy stool pink with ongoing dropping of her blood count is down to 7.0 I called and discussed with the hospitalist she needs a transfusion they did give approval for 1 unit when we will follow this with Lasix 20 mg IV this is arranged for 1 PM tomorrow Wednesday 11/ 3  - pantoprazole (PROTONIX) 40 MG tablet; Take 1 tablet by mouth every morning (before breakfast)  Dispense: 90 tablet; Refill: 1  - potassium chloride (KLOR-CON M) 20 MEQ extended release tablet; Take 1 tablet by mouth daily  Dispense: 30 tablet; Refill: 0    7. Iron deficiency anemia due to chronic blood loss  He had what felt to be a mass on exam and she has severe rectal prolapse she is got to see GI this is scheduled for Friday appointment could not be made sooner with multiple issues and then she wanted to wait till her insurance was in effect which is is    8.  Rectal mass  Concerning rectal exam and inability to have a bowel movement without standing        Medical Decision Making: high level TCM

## 2021-11-02 NOTE — OUTREACH NOTE
Prep Survey      Responses   [facility] discharged [facility]? [facility]   Is LACE score < 7 ? No   Emergency Room discharge w/ pulse ox? No   Eligibility Not Eligible   What are the reasons patient is not eligible? Other   Does the patient have one of the following disease processes/diagnoses(primary or secondary)? Other   Prep survey completed? Yes          Bailey Valera RN

## 2021-11-03 ENCOUNTER — HOSPITAL ENCOUNTER (OUTPATIENT)
Dept: INFUSION THERAPY | Age: 61
Setting detail: INFUSION SERIES
Discharge: HOME | End: 2021-11-03
Payer: COMMERCIAL

## 2021-11-03 ENCOUNTER — APPOINTMENT (OUTPATIENT)
Dept: ULTRASOUND IMAGING | Facility: HOSPITAL | Age: 61
End: 2021-11-03

## 2021-11-03 VITALS
DIASTOLIC BLOOD PRESSURE: 72 MMHG | SYSTOLIC BLOOD PRESSURE: 121 MMHG | TEMPERATURE: 97 F | OXYGEN SATURATION: 100 % | HEART RATE: 82 BPM

## 2021-11-03 DIAGNOSIS — D50.0 IRON DEFICIENCY ANEMIA SECONDARY TO BLOOD LOSS (CHRONIC): ICD-10-CM

## 2021-11-03 DIAGNOSIS — D50.0 IRON DEFICIENCY ANEMIA SECONDARY TO BLOOD LOSS (CHRONIC): Primary | ICD-10-CM

## 2021-11-03 LAB
ABO/RH: NORMAL
ANTIBODY SCREEN: NORMAL
BLOOD BANK DISPENSE STATUS: NORMAL
BLOOD BANK PRODUCT CODE: NORMAL
BPU ID: NORMAL
CYTO UR: NORMAL
DESCRIPTION BLOOD BANK: NORMAL
LAB AP CASE REPORT: NORMAL
PATH REPORT.FINAL DX SPEC: NORMAL
PATH REPORT.GROSS SPEC: NORMAL

## 2021-11-03 PROCEDURE — 86900 BLOOD TYPING SEROLOGIC ABO: CPT

## 2021-11-03 PROCEDURE — 86901 BLOOD TYPING SEROLOGIC RH(D): CPT

## 2021-11-03 PROCEDURE — 36430 TRANSFUSION BLD/BLD COMPNT: CPT

## 2021-11-03 PROCEDURE — 86920 COMPATIBILITY TEST SPIN: CPT

## 2021-11-03 PROCEDURE — P9016 RBC LEUKOCYTES REDUCED: HCPCS

## 2021-11-03 PROCEDURE — 86850 RBC ANTIBODY SCREEN: CPT

## 2021-11-03 PROCEDURE — 2580000003 HC RX 258: Performed by: INTERNAL MEDICINE

## 2021-11-03 RX ORDER — DIPHENHYDRAMINE HYDROCHLORIDE 50 MG/ML
50 INJECTION INTRAMUSCULAR; INTRAVENOUS ONCE
Status: CANCELLED | OUTPATIENT
Start: 2021-11-03 | End: 2021-11-03

## 2021-11-03 RX ORDER — SODIUM CHLORIDE 0.9 % (FLUSH) 0.9 %
5-40 SYRINGE (ML) INJECTION PRN
Status: DISCONTINUED | OUTPATIENT
Start: 2021-11-03 | End: 2021-11-04 | Stop reason: HOSPADM

## 2021-11-03 RX ORDER — FUROSEMIDE 10 MG/ML
20 INJECTION INTRAMUSCULAR; INTRAVENOUS ONCE
Status: CANCELLED | OUTPATIENT
Start: 2021-11-03 | End: 2021-11-03

## 2021-11-03 RX ORDER — FUROSEMIDE 10 MG/ML
20 INJECTION INTRAMUSCULAR; INTRAVENOUS ONCE
Status: DISCONTINUED | OUTPATIENT
Start: 2021-11-03 | End: 2021-11-05 | Stop reason: HOSPADM

## 2021-11-03 RX ORDER — EPINEPHRINE 1 MG/ML
0.3 INJECTION, SOLUTION, CONCENTRATE INTRAVENOUS PRN
Status: CANCELLED | OUTPATIENT
Start: 2021-11-03

## 2021-11-03 RX ORDER — METHYLPREDNISOLONE SODIUM SUCCINATE 125 MG/2ML
125 INJECTION, POWDER, LYOPHILIZED, FOR SOLUTION INTRAMUSCULAR; INTRAVENOUS ONCE
Status: CANCELLED | OUTPATIENT
Start: 2021-11-03 | End: 2021-11-03

## 2021-11-03 RX ORDER — SODIUM CHLORIDE 9 MG/ML
INJECTION, SOLUTION INTRAVENOUS CONTINUOUS
Status: CANCELLED | OUTPATIENT
Start: 2021-11-03

## 2021-11-03 RX ORDER — SODIUM CHLORIDE 9 MG/ML
20 INJECTION, SOLUTION INTRAVENOUS CONTINUOUS
Status: CANCELLED | OUTPATIENT
Start: 2021-11-03

## 2021-11-03 RX ORDER — SODIUM CHLORIDE 9 MG/ML
20 INJECTION, SOLUTION INTRAVENOUS CONTINUOUS
Status: DISCONTINUED | OUTPATIENT
Start: 2021-11-03 | End: 2021-11-05 | Stop reason: HOSPADM

## 2021-11-03 RX ORDER — SODIUM CHLORIDE 0.9 % (FLUSH) 0.9 %
5-40 SYRINGE (ML) INJECTION PRN
Status: CANCELLED | OUTPATIENT
Start: 2021-11-03

## 2021-11-03 RX ADMIN — SODIUM CHLORIDE 20 ML/HR: 9 INJECTION, SOLUTION INTRAVENOUS at 15:00

## 2021-11-04 ENCOUNTER — OFFICE VISIT (OUTPATIENT)
Dept: PSYCHOLOGY | Age: 61
End: 2021-11-04
Payer: MEDICAID

## 2021-11-04 DIAGNOSIS — F10.10 ALCOHOL ABUSE: Primary | ICD-10-CM

## 2021-11-04 LAB — BACTERIA SPEC ANAEROBE CULT: NORMAL

## 2021-11-04 PROCEDURE — 90791 PSYCH DIAGNOSTIC EVALUATION: CPT | Performed by: SOCIAL WORKER

## 2021-11-04 ASSESSMENT — PATIENT HEALTH QUESTIONNAIRE - PHQ9
3. TROUBLE FALLING OR STAYING ASLEEP: 3
SUM OF ALL RESPONSES TO PHQ QUESTIONS 1-9: 6
1. LITTLE INTEREST OR PLEASURE IN DOING THINGS: 0
10. IF YOU CHECKED OFF ANY PROBLEMS, HOW DIFFICULT HAVE THESE PROBLEMS MADE IT FOR YOU TO DO YOUR WORK, TAKE CARE OF THINGS AT HOME, OR GET ALONG WITH OTHER PEOPLE: 0
5. POOR APPETITE OR OVEREATING: 0
SUM OF ALL RESPONSES TO PHQ QUESTIONS 1-9: 6
8. MOVING OR SPEAKING SO SLOWLY THAT OTHER PEOPLE COULD HAVE NOTICED. OR THE OPPOSITE, BEING SO FIGETY OR RESTLESS THAT YOU HAVE BEEN MOVING AROUND A LOT MORE THAN USUAL: 0
2. FEELING DOWN, DEPRESSED OR HOPELESS: 0
7. TROUBLE CONCENTRATING ON THINGS, SUCH AS READING THE NEWSPAPER OR WATCHING TELEVISION: 0
6. FEELING BAD ABOUT YOURSELF - OR THAT YOU ARE A FAILURE OR HAVE LET YOURSELF OR YOUR FAMILY DOWN: 0
SUM OF ALL RESPONSES TO PHQ QUESTIONS 1-9: 6
9. THOUGHTS THAT YOU WOULD BE BETTER OFF DEAD, OR OF HURTING YOURSELF: 0
SUM OF ALL RESPONSES TO PHQ9 QUESTIONS 1 & 2: 0
4. FEELING TIRED OR HAVING LITTLE ENERGY: 3

## 2021-11-04 ASSESSMENT — ANXIETY QUESTIONNAIRES
6. BECOMING EASILY ANNOYED OR IRRITABLE: 0
3. WORRYING TOO MUCH ABOUT DIFFERENT THINGS: 0
2. NOT BEING ABLE TO STOP OR CONTROL WORRYING: 0
GAD7 TOTAL SCORE: 1
4. TROUBLE RELAXING: 0
1. FEELING NERVOUS, ANXIOUS, OR ON EDGE: 1
5. BEING SO RESTLESS THAT IT IS HARD TO SIT STILL: 0
7. FEELING AFRAID AS IF SOMETHING AWFUL MIGHT HAPPEN: 0

## 2021-11-04 NOTE — PATIENT INSTRUCTIONS
Zac[place]:  Relax Melodies or Calm             Four As for Managing Alcohol Consumption   AVOID. What are the highly tempting situations in which you might drink more than your plan? Avoid these situations if possible over the next month. 1._____________________________________________________________________  2._____________________________________________________________________    Ledon Grancharu. For situations you cant avoid, how can you alter them to make them easier? 1._____________________________________________________________________  2.____________________________________________________________________     ALTERNATIVES. What can you do with your mouth and hands when you want to drink and it is a day you are not drinking or have already reached your limit? 1._____________________________________________________________________  2.____________________________________________________________________     ACTION. When you get the urge to drink and it does not fit with your drinking plan, what can you do to be active or busy until the urge passes? 1.____________________________________________________________________   2._____________________________________________________________________    Are there situations in which it will be a challenge to stay within your drinking limits? If so, list them and what you will do to effectively manage those situations. 1._____________________________________________________________________  Plan___________________________________________________________________  2._____________________________________________________________________  Plan__________________________________________________________________       --------------------------------------------------------------------------------------------------------------------------------------------    [email or web address]    Online meetings available. Online community.   Videos, printables, online support (all bowl of cereal can promote sleep. You should avoid the following foods at bedtime:  any caffeinated foods (e.g., chocolate), peanuts, beans, most raw fruits and vegetables (since they may cause gas), and high-fat foods such as potato chips or corn chips. Avoid snacks in the middle of the nights since awakening may become associated with hunger. If you have trouble with regurgitation, be especially careful to avid heavy meals and spices in the evening. Do not go to bed too hungry or too full. It may help to elevate you head with some pillows. Avoid Naps       Avoid naps, the sleep you obtain during the day takes away from you sleep need that night resulting in lighter, more restless sleep, difficulty falling asleep or early morning awakening. If you must nap, keep it brief, and take the nap about 8 hours after arising. It is best to set an alarm to ensure you dont sleep more than 10-15 minutes. Limit Your Time in Bed        Restrict your sleep period to the average number of hours you have actually slept per night during the preceding week. Quality of sleep is important. Too much time in bed can decrease the quality on subsequent night and contribute to the maintenance of existing sleep problems. Dont lay in bed for extended times not sleep. If you arent asleep in about 15-20 minutes go ahead and get up. Do something outside the bedroom that is relaxing. When you feel sleepy (i.e., yawning, head bobbing, eyes closing, concentration decreasing, then return to bed. Dont confuse tiredness with sleepiness, they are different. Tiredness doesnt lead to sleep, only sleepiness does. Regular Sleep Schedule       Keep a regular time each day, 7 days a week, to get out of bed. Keeping a regular awaking time helps set your circadian rhythm set so that your body learns to sleep at the desired time. Use the attached form to develop a plan for improving you sleep hygiene.   It will take time for you sleep to get back in line so once you begin your sleep hygiene plan, stick with if for at least 6-8 weeks. Planned Improvements of My Sleep Hygiene    Check Those  That Apply  _____ Avoid Caffeine 6-8 Hours Before Bedtime. I will not have caffeine after ________ PM.    ____ Avoid Nicotine Before Bedtime. I will not have a cigarette after _________ PM.    ______  Limit Alcohol Use. I will not have more than _______ drinks in the evening.    ______ Avoid Use of Sleeping Pills. (If you are currently using them regularly, all changes should be   medical supervised by your medical provider). ______ Do Exercise Regularly, But Not Within 2 Hours of Bedtime. I ________________ for ____   minutes, on the following days ____________________________________________________    ______ Ensure your Bedroom is a Comfortable Temperature, Quiet, and Dark and Your   Mattress and Pillow are good. I will make the  following changes to my bedroom   ____________________________________________________________________________    ______ Do Take a Hot Bath 1-2 Hours Prior to Bedtime. I will take a hot bath about ______ PM.    ______ Eat a Light Snack at Bedtime but Avoid Large or Problematic Foods. I will eat     __________________  or _____________________ or __________________ before bed.    ______ Avoid Naps. I try not to nap, if I must, I will limit it to _______ minutes, about 8 hours after I   awoke and will use alarm to limit my nap time. ______ Limit Time In Bed. I have been sleeping on average ______ hours per night, therefore I will   limit my time in bed to _____ hours (the same number). If Im not asleep in about 15 to 20   minutes I will get up and not return to bed until Im sleepy.    ______ Stay on a Regular Sleep Schedule  I will get up at _______ AM, 7 days a week, no matter   how poorly I slept that night.             Recommendations to patient:                            1. Practice new coping, stress management, relaxation skills at least                   two times a day for at least 10-30 minutes. 2. Find at least one positive outlet per day that makes you feel better. 3. Talk things over with a good friend. Practice letting things go. 4. Stop, breathe, reset. \"I am ok. \"              5. It's ok, not to be ok. Scheduled follow up appointment. Call for a sooner appointment if needed or if you need to change or cancel you appointment. Carolyne De Souza, [phone] and choose the option for behavioral health. You can also send her a message on My Chart. Be aware that any "[employer] message send to me is linked to Carolyne.     --------------------------------------------------------------------------------------------------------------------------------------------    SMART Recovery Tool: ABCs of Coping with Urges      The ABC Crash Course  The ABCs are an exercise from Rational Emotive Behavioral Therapy (REBT), which is a form of cognitive therapy that is simple enough and effective enough to be used by anybody and -- it works. You can learn to do this straight out of a book, or off the computer screen. We use it to examine the beliefs we have (or the thinking we are doing) as some of this may be causing us problems. The ABCs are an exercise that help stop you from being victimized by your own thinking. A common example is the issue of someone else's behavior making you angry. This is a very common way of expressing something and we hear it often, but in fact, it distorts the situation it attempts to describe. A more accurate description of someone making you angry (as above) is to say that you feel angry about their behavior. They are not making you anything--they are simply behaving in a way that you are getting angry about. You notice their behavior and then become angry. The responsibility for the anger is yours, not theirs.  This can sound strange at first, but when dealing with problematic anger and frustration, this is the way it works.

## 2021-11-04 NOTE — PROGRESS NOTES
Behavioral Health Consultation  Cyndi Hobbs MSSW, LCSW          Time spent with Patient: 45 minutes  This is patient's first  [facility] appointment. Reason for Consult:    Chief Complaint   Patient presents with    Addiction Problem     Referring Provider: Ele Martinez MD  [address]  [address] [facility],  [place] [address]    Pt provided informed consent for the behavioral health program. Discussed with patient model of service to include the limits of confidentiality (i.e. abuse reporting, suicide intervention, etc.) and short-term intervention focused approach. Advised patient/parent to guard AVS and file at home to protect private information. Advised patient/parent to only hand in excuse if needed and not AVS.  Pt indicated understanding. Feedback given to PCP. S:  Patient reports problems with feeling stressed, chronic health issues, alcoholism, chronic sleep issues. I'm ready to make change. Being honest is liberating. O:  Pt. Reports no alcohol since 10/4/2021. Compliance with medical care is reported as good. Relationships with others is improving. Sleep disturbance is reported. Weight is changing, but due to excess fluid. MSE:     Mood    Depressed  Affect    normal affect  Appetite normal  Sleep disturbance Yes  Fatigue Yes  Loss of pleasure No  Attention/Concentration    intact  Morbid ideation No  Suicide Assessment    no suicidal ideation      History:    Medications:   Current Outpatient Medications   Medication Sig Dispense Refill    pantoprazole (PROTONIX) 40 MG tablet Take 1 tablet by mouth every morning (before breakfast) 90 tablet 1    potassium chloride (KLOR-CON M) 20 MEQ extended release tablet Take 1 tablet by mouth daily 30 tablet 0    spironolactone (ALDACTONE) 100 MG tablet Take 1 tablet by mouth daily 60 tablet 3    furosemide (LASIX) 20 MG tablet Take 1 tablet by mouth daily 60 tablet 3    levothyroxine (SYNTHROID) 50 MCG tablet Take 1 tablet by mouth daily 30 tablet 5    insulin glargine (LANTUS SOLOSTAR) 100 UNIT/ML injection pen Start at 5 units sq nightly 5 pen 3    omeprazole (PRILOSEC) 20 MG delayed release capsule Take 1 capsule by mouth 2 times daily 90 capsule 3     No current facility-administered medications for this visit. Facility-Administered Medications Ordered in Other Visits   Medication Dose Route Frequency Provider Last Rate Last Admin    0.9 % sodium chloride infusion  20 mL/hr IntraVENous Continuous Keerthi Moore MD 20 mL/hr at 11/03/21 1500 20 mL/hr at 11/03/21 1500    furosemide (LASIX) injection 20 mg  20 mg IntraVENous Once Keerthi Moore MD           Social History:   Social History     Socioeconomic History    Marital status: Single     Spouse name: Not on file    Number of children: Not on file    Years of education: Not on file    Highest education level: Not on file   Occupational History    Not on file   Tobacco Use    Smoking status: Never Smoker    Smokeless tobacco: Never Used   Substance and Sexual Activity    Alcohol use: Not on file    Drug use: Not on file    Sexual activity: Not on file   Other Topics Concern    Not on file   Social History Narrative    Not on file     Social Determinants of Health     Financial Resource Strain: Low Risk     Difficulty of Paying Living Expenses: Not very hard   Food Insecurity: No Food Insecurity    Worried About Running Out of Food in the Last Year: Never true    [address] in the Last Year: Never true   Transportation Needs:     Lack of Transportation (Medical):      Lack of Transportation (Non-Medical):    Physical Activity:     Days of Exercise per Week:     Minutes of Exercise per Session:    Stress:     Feeling of Stress :    Social Connections:     Frequency of Communication with Friends and Family:     Frequency of Social Gatherings with Friends and Family:     Attends Islam Services:     Active Member of Clubs or Organizations:     Attends Club or Organization Meetings:     Marital Status:    Intimate Partner Violence:     Fear of Current or Ex-Partner:     Emotionally Abused:     Physically Abused:     Sexually Abused:        TOBACCO:   reports that she has never smoked. She has never used smokeless tobacco.  ETOH:   has no history on file for alcohol use. Family History:   Family History   Problem Relation Age of Onset   Nighat Jewell Cancer Mother         ovarian    Cancer Maternal Grandmother         ovarian    Cancer Paternal Grandmother         ovarian         A:  Patient presents for consult due to problems with alcoholism and mild depression. Patient presents as mildly depressed. Speech is normal in rate, volume and articulation. Language skills are intact. Posture and attitude convey underlying depressed mood. Facial expression and general demeanor reveal depressed mood, anxiety. Affect is congruent with mood. There are no apparent signs of hallucinations, delusions, bizarre behaviors or other indicators of psychotic process. Associations are intact, thinking is logical and thought content appears appropriate. Suicidal ideas are convincingly denied. Homicidal ideas or intentions are denied. Cognitive functioning and fund of knowledge are intact. Short term and long term memory is intact. Patient is fully oriented. Insight into problems appear normal. Judgement appears normal.           Continued consultation is clinically/medically necessary to support in learning new skills and build confidence to deal better with these issues. Patient response to consults, finds new strategies helpful.      PHQ Scores 11/4/2021 10/11/2021 1/3/2019   PHQ2 Score 0 0 0   PHQ9 Score 6 0 0     Interpretation of Total Score Depression Severity: 1-4 = Minimal depression, 5-9 = Mild depression, 10-14 = Moderate depression, 15-19 = Moderately severe depression, 20-27 = Severe depression    Over the last 2 weeks, how often have you been bothered by any of the following problems? Feeling nervous, anxious, or on edge: Several days  Not being able to stop or control worrying: Not at all  Worrying too much about different things: Not at all  Trouble relaxing: Not at all  Being so restless that it is hard to sit still: Not at all  Becoming easily annoyed or irritable: Not at all  Feeling afraid as if something awful might happen: Not at all  HARSHA-7 Total Score: 1    HARSHA-7 score interpretation:  0-4 Subclinical, 5-9 Mild, 10-14 Moderate, 15-21 Severe    Diagnosis:    1. Alcohol abuse          Diagnosis Date    Alcohol abuse 2/6/2019    Allergic rhinitis     Diabetes mellitus (HCC)     GERD (gastroesophageal reflux disease)     Hyperlipidemia     Hypertension     Postmenopausal 1/3/2019    Sleep apnea          Plan:  Pt interventions:  Provided education, Discussed self-care (sleep, nutrition, rewarding activities, social support, exercise), Motivational Interviewing to determine importance and readiness for change, Established rapport, Conducted functional assessment, [place]-setting to identify pt's primary goals for [facility] visit / overall health and Provided pt book recommendation    Pt Behavioral Change Plan:    See patient instructions.

## 2021-11-05 ENCOUNTER — LAB (OUTPATIENT)
Dept: LAB | Facility: HOSPITAL | Age: 61
End: 2021-11-05

## 2021-11-05 ENCOUNTER — OFFICE VISIT (OUTPATIENT)
Dept: GASTROENTEROLOGY | Facility: CLINIC | Age: 61
End: 2021-11-05

## 2021-11-05 VITALS
SYSTOLIC BLOOD PRESSURE: 120 MMHG | OXYGEN SATURATION: 99 % | HEART RATE: 93 BPM | TEMPERATURE: 96.8 F | WEIGHT: 120 LBS | BODY MASS INDEX: 23.56 KG/M2 | DIASTOLIC BLOOD PRESSURE: 66 MMHG | HEIGHT: 60 IN

## 2021-11-05 DIAGNOSIS — K70.31 ALCOHOLIC CIRRHOSIS OF LIVER WITH ASCITES (HCC): ICD-10-CM

## 2021-11-05 DIAGNOSIS — R68.89 ABNORMAL DIGITAL RECTAL EXAM: ICD-10-CM

## 2021-11-05 DIAGNOSIS — R93.5 ABNORMAL CT OF THE ABDOMEN: ICD-10-CM

## 2021-11-05 DIAGNOSIS — Z01.818 PREOPERATIVE TESTING: Primary | ICD-10-CM

## 2021-11-05 DIAGNOSIS — K62.5 BRBPR (BRIGHT RED BLOOD PER RECTUM): ICD-10-CM

## 2021-11-05 DIAGNOSIS — K62.89 RECTAL MASS: Primary | ICD-10-CM

## 2021-11-05 LAB
ALBUMIN SERPL-MCNC: 3 G/DL (ref 3.5–5.2)
ALBUMIN/GLOB SERPL: 1.1 G/DL
ALP SERPL-CCNC: 206 U/L (ref 39–117)
ALPHA-FETOPROTEIN: 20.06 NG/ML (ref 0–8.3)
ALT SERPL W P-5'-P-CCNC: 24 U/L (ref 1–33)
ANION GAP SERPL CALCULATED.3IONS-SCNC: 6.9 MMOL/L (ref 5–15)
AST SERPL-CCNC: 49 U/L (ref 1–32)
BASOPHILS # BLD AUTO: 0.03 10*3/MM3 (ref 0–0.2)
BASOPHILS NFR BLD AUTO: 0.6 % (ref 0–1.5)
BILIRUB SERPL-MCNC: 0.4 MG/DL (ref 0–1.2)
BUN SERPL-MCNC: 4 MG/DL (ref 6–20)
BUN/CREAT SERPL: 7.7 (ref 7–25)
CALCIUM SPEC-SCNC: 8.8 MG/DL (ref 8.6–10.5)
CHLORIDE SERPL-SCNC: 93 MMOL/L (ref 98–107)
CO2 SERPL-SCNC: 30.1 MMOL/L (ref 22–29)
CREAT SERPL-MCNC: 0.52 MG/DL (ref 0.57–1)
DEPRECATED RDW RBC AUTO: 52.2 FL (ref 37–54)
EOSINOPHIL # BLD AUTO: 0.06 10*3/MM3 (ref 0–0.4)
EOSINOPHIL NFR BLD AUTO: 1.2 % (ref 0.3–6.2)
ERYTHROCYTE [DISTWIDTH] IN BLOOD BY AUTOMATED COUNT: 20.2 % (ref 12.3–15.4)
GFR SERPL CREATININE-BSD FRML MDRD: 121 ML/MIN/1.73
GLOBULIN UR ELPH-MCNC: 2.7 GM/DL
GLUCOSE SERPL-MCNC: 211 MG/DL (ref 65–99)
HCT VFR BLD AUTO: 33.5 % (ref 34–46.6)
HGB BLD-MCNC: 9.5 G/DL (ref 12–15.9)
IMM GRANULOCYTES # BLD AUTO: 0.04 10*3/MM3 (ref 0–0.05)
IMM GRANULOCYTES NFR BLD AUTO: 0.8 % (ref 0–0.5)
INR PPP: 1.06 (ref 0.91–1.09)
LYMPHOCYTES # BLD AUTO: 0.44 10*3/MM3 (ref 0.7–3.1)
LYMPHOCYTES NFR BLD AUTO: 8.8 % (ref 19.6–45.3)
MCH RBC QN AUTO: 22.2 PG (ref 26.6–33)
MCHC RBC AUTO-ENTMCNC: 28.4 G/DL (ref 31.5–35.7)
MCV RBC AUTO: 78.3 FL (ref 79–97)
MONOCYTES # BLD AUTO: 0.38 10*3/MM3 (ref 0.1–0.9)
MONOCYTES NFR BLD AUTO: 7.6 % (ref 5–12)
NEUTROPHILS NFR BLD AUTO: 4.07 10*3/MM3 (ref 1.7–7)
NEUTROPHILS NFR BLD AUTO: 81 % (ref 42.7–76)
NRBC BLD AUTO-RTO: 0 /100 WBC (ref 0–0.2)
PLATELET # BLD AUTO: 265 10*3/MM3 (ref 140–450)
PMV BLD AUTO: 10.5 FL (ref 6–12)
POTASSIUM SERPL-SCNC: 4.3 MMOL/L (ref 3.5–5.2)
PROT SERPL-MCNC: 5.7 G/DL (ref 6–8.5)
PROTHROMBIN TIME: 13.4 SECONDS (ref 11.9–14.6)
RBC # BLD AUTO: 4.28 10*6/MM3 (ref 3.77–5.28)
SODIUM SERPL-SCNC: 130 MMOL/L (ref 136–145)
WBC # BLD AUTO: 5.02 10*3/MM3 (ref 3.4–10.8)

## 2021-11-05 PROCEDURE — 82105 ALPHA-FETOPROTEIN SERUM: CPT

## 2021-11-05 PROCEDURE — 99204 OFFICE O/P NEW MOD 45 MIN: CPT | Performed by: NURSE PRACTITIONER

## 2021-11-05 PROCEDURE — 80053 COMPREHEN METABOLIC PANEL: CPT

## 2021-11-05 PROCEDURE — 36415 COLL VENOUS BLD VENIPUNCTURE: CPT

## 2021-11-05 PROCEDURE — 85610 PROTHROMBIN TIME: CPT

## 2021-11-05 PROCEDURE — 85025 COMPLETE CBC W/AUTO DIFF WBC: CPT

## 2021-11-05 RX ORDER — PANTOPRAZOLE SODIUM 40 MG/1
40 TABLET, DELAYED RELEASE ORAL DAILY
COMMUNITY

## 2021-11-05 NOTE — H&P (VIEW-ONLY)
"[facility] GASTROENTEROLOGY - OFFICE NOTE    11/5/2021    Fiordaliza Lyons   1960    Primary Physician: Brittany Solis MD    Chief Complaint   Patient presents with   • Fecal Incontinence     rectal mass, Pt states prolapse   • Abdominal Pain     distention, recent hospital stay         HISTORY OF PRESENT ILLNESS:     Fiordaliza Lyons is a 61 y.o. female presents with rectal mass. This was noted by Dr. Solis 10-11-21.  She has had problems having bm's. Dr. Solis also noted rectal prolapse and the patient reports having to \" push it back in to have a bm\". She has also noted some red , small amount, on the tissue with bm. Has had some llq pain. No fever. No weight loss. No abdominal pain. No n/v. No hematemesis. No black stool.         She has alcoholic liver disease. Last drink of etoh 10-4-21. She did drink 3 glasses of vodka/sprite.  Currently Dr. Solis is managing her diuretics  (see med list).      She was hospitalized here at [facility] 10-29-21 to 11-1-21.   Discharge diagnoses acute cystitis, alcoholic cirrhosis, SARAH, anasarca, thoracic spine pain ( abnormal CT T6-7), hyponatremia, and ascites.   Last labs 11-1-21 plt normal, hgb 7.0, mcv 71, wbc 8.8, bun 7, creat 0.51, na 131.  CMP 11-1-21 with  albumin 2.8, alt 14, ast 34, alk phos 180, bili normal. Last inr normal on 10-29-21.  She did have a ultrasound paracentesis with 1500 ml removed ( no infection).  She was on IV diuretic drip.      She did receive blood transfusion 11-3-21 for hgb 7.0.       CT abdomen/pelvis with contrast done 10-5-21 noted gastric wall thickening, thickening of ascending colon.     She had appointment with Dr. Solis 11-2-11 with f/u appointment scheduled for 11-11-21.      Past Medical History:   Diagnosis Date   • Alcoholic hepatitis with ascites    • Allergic rhinitis    • Breast cyst    • Diabetes mellitus (HCC)    • Disease of thyroid gland    • Elevated d-dimer    • Endometriosis    • Fatty liver    • GERD " (gastroesophageal reflux disease)    • Hyperlipidemia    • Hypertension    • Ovarian cyst    • Pancreatitis    • Postmenopausal    • Rectal mass    • Sleep apnea        Past Surgical History:   Procedure Laterality Date   • BILATERAL SALPINGO OOPHORECTOMY  2008   • CHOLECYSTECTOMY  1996   • COLONOSCOPY  07/2017    Dr Murphy- per patient normal    • DILATION AND CURETTAGE, DIAGNOSTIC / THERAPEUTIC     • HYSTERECTOMY     • KNEE SURGERY  1982       Outpatient Medications Marked as Taking for the 11/5/21 encounter (Office Visit) with Neva Vázquez APRN   Medication Sig Dispense Refill   • furosemide (LASIX) 20 MG tablet Take 40 mg by mouth 2 (Two) Times a Day.     • insulin glargine (LANTUS, SEMGLEE) 100 UNIT/ML injection Inject 8 Units under the skin into the appropriate area as directed Every Night.     • levothyroxine (SYNTHROID, LEVOTHROID) 50 MCG tablet Take 50 mcg by mouth Every Morning.     • Multiple Vitamins-Iron (multivitamin with iron) tablet tablet Take 1 tablet by mouth Daily.     • pantoprazole (PROTONIX) 40 MG EC tablet Take 40 mg by mouth Daily.     • potassium chloride 10 MEQ CR tablet Take 1 tablet by mouth 2 (Two) Times a Day. 14 tablet 0   • spironolactone (ALDACTONE) 100 MG tablet Take 100 mg by mouth 2 (Two) Times a Day.         Allergies   Allergen Reactions   • Augmentin [Amoxicillin-Pot Clavulanate] Nausea And Vomiting       Social History     Socioeconomic History   • Marital status: Single   Tobacco Use   • Smoking status: Never Smoker   • Smokeless tobacco: Never Used   Vaping Use   • Vaping Use: Never used   Substance and Sexual Activity   • Alcohol use: Not Currently     Comment: occ   • Drug use: No   • Sexual activity: Defer       Family History   Problem Relation Age of Onset   • Ovarian cancer Mother    • Ovarian cancer Maternal Aunt    • Ovarian cancer Maternal Grandmother    • Ovarian cancer Paternal Grandmother    • Colon polyps Father    • Colon polyps Brother    • Breast cancer Neg  "Hx    • Colon cancer Neg Hx        Review of Systems   Constitutional: Negative for chills and fever.   Respiratory: Negative for shortness of breath and wheezing.    Cardiovascular: Negative for chest pain and palpitations.   Gastrointestinal: Positive for abdominal pain and blood in stool. Negative for abdominal distention, constipation, diarrhea, nausea and vomiting.        Vitals:    11/05/21 0910   BP: 120/66   Pulse: 93   Temp: 96.8 °F (36 °C)   SpO2: 99%   Weight: 54.4 kg (120 lb)   Height: 152.4 cm (60\")      Body mass index is 23.44 kg/m².    Physical Exam  Vitals reviewed.   Constitutional:       General: She is not in acute distress.  Cardiovascular:      Rate and Rhythm: Normal rate and regular rhythm.      Heart sounds: Normal heart sounds.   Pulmonary:      Effort: Pulmonary effort is normal.      Breath sounds: Normal breath sounds.   Abdominal:      General: Bowel sounds are normal. There is no distension.      Palpations: Abdomen is soft.      Tenderness: There is abdominal tenderness (mild tenderness llq ).   Genitourinary:     Comments: CINTHIA noted abnormal area just inside , no blood on glove.   Musculoskeletal:      Right lower leg: Edema (2 +) present.      Left lower leg: Edema (2 +) present.   Skin:     General: Skin is warm and dry.   Neurological:      Mental Status: She is alert.         Results for orders placed or performed during the hospital encounter of 10/29/21   COVID-19,[facility] IN-HOUSE,Nasal Swab No Transport Media 3-4 HR TAT - Swab, Nasal Cavity    Specimen: Nasal Cavity; Swab   Result Value Ref Range    COVID19 Not Detected Not Detected - Ref. Range   Anaerobic Culture - Body Fluid, Peritoneum    Specimen: Peritoneum; Body Fluid   Result Value Ref Range    Anaerobic Culture No anaerobes isolated at 5 days    Body Fluid Culture - Body Fluid, Peritoneum    Specimen: Peritoneum; Body Fluid   Result Value Ref Range    Body Fluid Culture No growth at 3 days     Gram Stain Occasional " WBCs seen     Gram Stain No organisms seen    Fungus Culture - Body Fluid, Peritoneum    Specimen: Peritoneum; Body Fluid   Result Value Ref Range    Fungus Culture No fungus isolated at less than 1 week    Procalcitonin    Specimen: Blood   Result Value Ref Range    Procalcitonin 0.23 0.00 - 0.25 ng/mL   Iron Profile    Specimen: Blood   Result Value Ref Range    Iron 13 (L) 37 - 145 mcg/dL    Iron Saturation 4 (L) 20 - 50 %    Transferrin 216 200 - 360 mg/dL    TIBC 322 298 - 536 mcg/dL   Vitamin B12    Specimen: Blood   Result Value Ref Range    Vitamin B-12 1,243 (H) 211 - 946 pg/mL   Ferritin    Specimen: Blood   Result Value Ref Range    Ferritin 10.80 (L) 13.00 - 150.00 ng/mL   Protime-INR    Specimen: Blood   Result Value Ref Range    Protime 13.2 11.9 - 14.6 Seconds    INR 1.04 0.91 - 1.09   Sedimentation Rate    Specimen: Blood   Result Value Ref Range    Sed Rate 28 0 - 30 mm/hr   C-reactive Protein    Specimen: Blood   Result Value Ref Range    C-Reactive Protein 0.37 0.00 - 0.50 mg/dL   Urinalysis With Microscopic If Indicated (No Culture) - Urine, Clean Catch    Specimen: Urine, Clean Catch   Result Value Ref Range    Color, UA Dark Yellow (A) Yellow, Straw    Appearance, UA Cloudy (A) Clear    pH, UA 6.5 5.0 - 8.0    Specific Gravity, UA 1.018 1.005 - 1.030    Glucose, UA Negative Negative    Ketones, UA Trace (A) Negative    Bilirubin, UA Negative Negative    Blood, UA Negative Negative    Protein, UA 30 mg/dL (1+) (A) Negative    Leuk Esterase, UA Large (3+) (A) Negative    Nitrite, UA Positive (A) Negative    Urobilinogen, UA 1.0 E.U./dL 0.2 - 1.0 E.U./dL   Urinalysis, Microscopic Only - Urine, Clean Catch    Specimen: Urine, Clean Catch   Result Value Ref Range    RBC, UA 6-12 (A) None Seen /HPF    WBC, UA Too Numerous to Count (A) None Seen /HPF    Bacteria, UA 4+ (A) None Seen /HPF    Squamous Epithelial Cells, UA 0-2 None Seen, 0-2 /HPF    Hyaline Casts, UA 3-6 None Seen /LPF    Methodology  Automated Microscopy    CBC Auto Differential    Specimen: Blood   Result Value Ref Range    WBC 5.66 3.40 - 10.80 10*3/mm3    RBC 3.73 (L) 3.77 - 5.28 10*6/mm3    Hemoglobin 7.6 (L) 12.0 - 15.9 g/dL    Hematocrit 26.8 (L) 34.0 - 46.6 %    MCV 71.8 (L) 79.0 - 97.0 fL    MCH 20.4 (L) 26.6 - 33.0 pg    MCHC 28.4 (L) 31.5 - 35.7 g/dL    RDW 18.2 (H) 12.3 - 15.4 %    RDW-SD 46.8 37.0 - 54.0 fl    MPV 11.0 6.0 - 12.0 fL    Platelets 235 140 - 450 10*3/mm3   Comprehensive Metabolic Panel    Specimen: Blood   Result Value Ref Range    Glucose 95 65 - 99 mg/dL    BUN 6 6 - 20 mg/dL    Creatinine 0.46 (L) 0.57 - 1.00 mg/dL    Sodium 133 (L) 136 - 145 mmol/L    Potassium 3.7 3.5 - 5.2 mmol/L    Chloride 87 (L) 98 - 107 mmol/L    CO2 38.0 (H) 22.0 - 29.0 mmol/L    Calcium 8.4 (L) 8.6 - 10.5 mg/dL    Total Protein 5.7 (L) 6.0 - 8.5 g/dL    Albumin 3.10 (L) 3.50 - 5.20 g/dL    ALT (SGPT) 19 1 - 33 U/L    AST (SGOT) 34 (H) 1 - 32 U/L    Alkaline Phosphatase 193 (H) 39 - 117 U/L    Total Bilirubin 0.5 0.0 - 1.2 mg/dL    eGFR Non African Amer 139 >60 mL/min/1.73    Globulin 2.6 gm/dL    A/G Ratio 1.2 g/dL    BUN/Creatinine Ratio 13.0 7.0 - 25.0    Anion Gap 8.0 5.0 - 15.0 mmol/L   Lipid Panel    Specimen: Blood   Result Value Ref Range    Total Cholesterol 167 0 - 200 mg/dL    Triglycerides 78 0 - 150 mg/dL    HDL Cholesterol 39 (L) 40 - 60 mg/dL    LDL Cholesterol  113 (H) 0 - 100 mg/dL    VLDL Cholesterol 15 5 - 40 mg/dL    LDL/HDL Ratio 2.88    Manual Differential    Specimen: Blood   Result Value Ref Range    Neutrophil % 83.8 (H) 42.7 - 76.0 %    Lymphocyte % 7.1 (L) 19.6 - 45.3 %    Monocyte % 7.1 5.0 - 12.0 %    Eosinophil % 1.0 0.3 - 6.2 %    Atypical Lymphocyte % 1.0 0.0 - 5.0 %    Neutrophils Absolute 4.74 1.70 - 7.00 10*3/mm3    Lymphocytes Absolute 0.46 (L) 0.70 - 3.10 10*3/mm3    Monocytes Absolute 0.40 0.10 - 0.90 10*3/mm3    Eosinophils Absolute 0.06 0.00 - 0.40 10*3/mm3    Anisocytosis Slight/1+ None Seen     Hypochromia Mod/2+ None Seen    Poikilocytes Slight/1+ None Seen    Polychromasia Slight/1+ None Seen    WBC Morphology Normal Normal    Giant Platelets Slight/1+ None Seen   Amylase, Body Fluid - Body Fluid, Peritoneum    Specimen: Peritoneum; Body Fluid   Result Value Ref Range    Amylase, Fluid 8 U/L   Glucose, Body Fluid - Body Fluid, Peritoneum    Specimen: Peritoneum; Body Fluid   Result Value Ref Range    Glucose, Fluid 162 mg/dL   Lactate Dehydrogenase, Body Fluid - Body Fluid, Peritoneum    Specimen: Peritoneum; Body Fluid   Result Value Ref Range    Lactate Dehydrogenase (LD), Fluid 68 U/L   Protein, Body Fluid - Body Fluid, Peritoneum    Specimen: Peritoneum; Body Fluid   Result Value Ref Range    Protein, Total, Fluid <1.0 g/dL   Body fluid cell count - Body Fluid, Peritoneum    Specimen: Peritoneum; Body Fluid   Result Value Ref Range    Color, Fluid Yellow     Appearance, Fluid Slightly Hazy (A) Clear    WBC, Fluid 207 /mm3    RBC, Fluid <1,000 /mm3   Body fluid differential - Body Fluid, Peritoneum    Specimen: Peritoneum; Body Fluid   Result Value Ref Range    Neutrophils, Fluid 6 %    Lymphocytes, Fluid 14 %    Monocytes, Fluid 23 %    Eosinophils, Fluid 1 %    Unclassified Cells, Fluid 56 %   Comprehensive Metabolic Panel    Specimen: Blood   Result Value Ref Range    Glucose 117 (H) 65 - 99 mg/dL    BUN 7 6 - 20 mg/dL    Creatinine 0.51 (L) 0.57 - 1.00 mg/dL    Sodium 135 (L) 136 - 145 mmol/L    Potassium 2.9 (L) 3.5 - 5.2 mmol/L    Chloride 88 (L) 98 - 107 mmol/L    CO2 39.0 (H) 22.0 - 29.0 mmol/L    Calcium 7.6 (L) 8.6 - 10.5 mg/dL    Total Protein 5.2 (L) 6.0 - 8.5 g/dL    Albumin 2.80 (L) 3.50 - 5.20 g/dL    ALT (SGPT) 14 1 - 33 U/L    AST (SGOT) 34 (H) 1 - 32 U/L    Alkaline Phosphatase 180 (H) 39 - 117 U/L    Total Bilirubin 0.5 0.0 - 1.2 mg/dL    eGFR Non African Amer 123 >60 mL/min/1.73    Globulin 2.4 gm/dL    A/G Ratio 1.2 g/dL    BUN/Creatinine Ratio 13.7 7.0 - 25.0    Anion Gap 8.0 5.0  - 15.0 mmol/L   CBC Auto Differential    Specimen: Blood   Result Value Ref Range    WBC 7.95 3.40 - 10.80 10*3/mm3    RBC 3.65 (L) 3.77 - 5.28 10*6/mm3    Hemoglobin 7.1 (L) 12.0 - 15.9 g/dL    Hematocrit 25.7 (L) 34.0 - 46.6 %    MCV 70.4 (L) 79.0 - 97.0 fL    MCH 19.5 (L) 26.6 - 33.0 pg    MCHC 27.6 (L) 31.5 - 35.7 g/dL    RDW 18.3 (H) 12.3 - 15.4 %    RDW-SD 46.1 37.0 - 54.0 fl    MPV 10.5 6.0 - 12.0 fL    Platelets 240 140 - 450 10*3/mm3    Neutrophil % 84.8 (H) 42.7 - 76.0 %    Lymphocyte % 6.3 (L) 19.6 - 45.3 %    Monocyte % 7.3 5.0 - 12.0 %    Eosinophil % 0.8 0.3 - 6.2 %    Basophil % 0.4 0.0 - 1.5 %    Immature Grans % 0.4 0.0 - 0.5 %    Neutrophils, Absolute 6.75 1.70 - 7.00 10*3/mm3    Lymphocytes, Absolute 0.50 (L) 0.70 - 3.10 10*3/mm3    Monocytes, Absolute 0.58 0.10 - 0.90 10*3/mm3    Eosinophils, Absolute 0.06 0.00 - 0.40 10*3/mm3    Basophils, Absolute 0.03 0.00 - 0.20 10*3/mm3    Immature Grans, Absolute 0.03 0.00 - 0.05 10*3/mm3    nRBC 0.0 0.0 - 0.2 /100 WBC   Magnesium    Specimen: Blood   Result Value Ref Range    Magnesium 1.3 (L) 1.6 - 2.6 mg/dL   Basic Metabolic Panel    Specimen: Blood   Result Value Ref Range    Glucose 176 (H) 65 - 99 mg/dL    BUN 7 6 - 20 mg/dL    Creatinine 0.53 (L) 0.57 - 1.00 mg/dL    Sodium 132 (L) 136 - 145 mmol/L    Potassium 3.3 (L) 3.5 - 5.2 mmol/L    Chloride 88 (L) 98 - 107 mmol/L    CO2 37.0 (H) 22.0 - 29.0 mmol/L    Calcium 7.7 (L) 8.6 - 10.5 mg/dL    eGFR Non African Amer 118 >60 mL/min/1.73    BUN/Creatinine Ratio 13.2 7.0 - 25.0    Anion Gap 7.0 5.0 - 15.0 mmol/L   Magnesium    Specimen: Blood   Result Value Ref Range    Magnesium 1.2 (L) 1.6 - 2.6 mg/dL   Basic Metabolic Panel    Specimen: Blood   Result Value Ref Range    Glucose 79 65 - 99 mg/dL    BUN 7 6 - 20 mg/dL    Creatinine 0.51 (L) 0.57 - 1.00 mg/dL    Sodium 131 (L) 136 - 145 mmol/L    Potassium 4.4 3.5 - 5.2 mmol/L    Chloride 93 (L) 98 - 107 mmol/L    CO2 34.0 (H) 22.0 - 29.0 mmol/L     Calcium 8.3 (L) 8.6 - 10.5 mg/dL    eGFR Non African Amer 123 >60 mL/min/1.73    BUN/Creatinine Ratio 13.7 7.0 - 25.0    Anion Gap 4.0 (L) 5.0 - 15.0 mmol/L   Magnesium    Specimen: Blood   Result Value Ref Range    Magnesium 2.7 (H) 1.6 - 2.6 mg/dL   CBC Auto Differential    Specimen: Blood   Result Value Ref Range    WBC 8.89 3.40 - 10.80 10*3/mm3    RBC 3.49 (L) 3.77 - 5.28 10*6/mm3    Hemoglobin 7.0 (L) 12.0 - 15.9 g/dL    Hematocrit 25.1 (L) 34.0 - 46.6 %    MCV 71.9 (L) 79.0 - 97.0 fL    MCH 20.1 (L) 26.6 - 33.0 pg    MCHC 27.9 (L) 31.5 - 35.7 g/dL    RDW 18.4 (H) 12.3 - 15.4 %    RDW-SD 47.3 37.0 - 54.0 fl    MPV 10.4 6.0 - 12.0 fL    Platelets 240 140 - 450 10*3/mm3    Neutrophil % 81.7 (H) 42.7 - 76.0 %    Lymphocyte % 8.0 (L) 19.6 - 45.3 %    Monocyte % 8.4 5.0 - 12.0 %    Eosinophil % 0.9 0.3 - 6.2 %    Basophil % 0.4 0.0 - 1.5 %    Neutrophils, Absolute 7.26 (H) 1.70 - 7.00 10*3/mm3    Lymphocytes, Absolute 0.71 0.70 - 3.10 10*3/mm3    Monocytes, Absolute 0.75 0.10 - 0.90 10*3/mm3    Eosinophils, Absolute 0.08 0.00 - 0.40 10*3/mm3    Basophils, Absolute 0.04 0.00 - 0.20 10*3/mm3   POC Glucose Once    Specimen: Blood   Result Value Ref Range    Glucose 220 (H) 70 - 130 mg/dL   POC Glucose Once    Specimen: Blood   Result Value Ref Range    Glucose 121 70 - 130 mg/dL   POC Glucose Once    Specimen: Blood   Result Value Ref Range    Glucose 224 (H) 70 - 130 mg/dL   POC Glucose Once    Specimen: Blood   Result Value Ref Range    Glucose 169 (H) 70 - 130 mg/dL   POC Glucose Once    Specimen: Blood   Result Value Ref Range    Glucose 161 (H) 70 - 130 mg/dL   POC Glucose Once    Specimen: Blood   Result Value Ref Range    Glucose 166 (H) 70 - 130 mg/dL   POC Glucose Once    Specimen: Blood   Result Value Ref Range    Glucose 157 (H) 70 - 130 mg/dL   POC Glucose Once    Specimen: Blood   Result Value Ref Range    Glucose 148 (H) 70 - 130 mg/dL   POC Glucose Once    Specimen: Blood   Result Value Ref Range     Glucose 336 (H) 70 - 130 mg/dL   POC Glucose Once    Specimen: Blood   Result Value Ref Range    Glucose 158 (H) 70 - 130 mg/dL   POC Glucose Once    Specimen: Blood   Result Value Ref Range    Glucose 287 (H) 70 - 130 mg/dL   Non-gynecologic Cytology    Specimen: Abdominal Cavity; Body Fluid   Result Value Ref Range    Case Report       Non-gynecologic Cytology                          Case: [id number]                                  Authorizing Provider:  Kiya Alvarez APRN    Collected:           10/30/2021 10:16 AM          Ordering Location:     [address]  Received:            2021 09:59 AM          Pathologist:           Jen Negro MD                                                        Specimen:    Abdominal Cavity, peritoneal fluid                                                         Final Diagnosis       Peritoneal fluid, ThinPrep preparation (1):  A.  Numerous reactive mesothelial cells.  B.  Scattered small, mature lymphocytes and rare neutrophils.  C.  Negative for malignant cells.      Gross Description       Received fresh in the laboratory in a container labeled with patient name and  designated as peritoneal fluid.  30 mls of hazy yellow fluid.  1 thin prep slide prepared.        Microscopic Description       ThinPrep preparation of peritoneal fluid reveals numerous reactive mesothelial cells.  Scattered small, mature lymphocytes and rare neutrophils are seen.  Malignant cells are not identified.             ASSESSMENT AND PLAN    Assessment/Plan     Diagnoses and all orders for this visit:    1. Rectal mass (Primary)  -     Case Request; Standing  -     Case Request    2. BRBPR (bright red blood per rectum)    3. Abnormal digital rectal exam  -     Case Request; Standing  -     Case Request    4. Abnormal CT of the abdomen  Comments:  thickening asc colon and gastric wall.   Orders:  -     Case Request; Standing  -     Case Request  -     CBC &  Differential; Future  -     Protime-INR; Future  -     Comprehensive Metabolic Panel; Future  -     AFP Tumor Marker; Future    5. Alcoholic cirrhosis of liver with ascites (HCC)    Other orders  -     Follow Anesthesia Guidelines / Protocol; Future  -     Obtain Informed Consent; Future      In regards to rectal mass, differential diagnoses discussed.   I recommend colonoscopy and she is agreeable. Will plan for colonoscopy next week.       She did have ct abdomen/pelvis recently that noted thickening in the ascending colon and gastric wall. I recommend egd as well and she is agreeable.     She was recently diagnosed with etoh cirrhosis. Last drink of etoh was 10-4-21. She is being followed by Dr. PIA Solis who is managing her diuretics currently. She has f/u appointment 11-11-21. Recommend low sodium diet.       Recommend Emergency Room if worsening/severe symptoms.         ESOPHAGOGASTRODUODENOSCOPY WITH ANESTHESIA (N/A), COLONOSCOPY WITH ANESTHESIA (N/A)  Risk, benefits, and alternatives of endoscopy were explained in full.  They understand that there is a risk of bleeding, perforation, and infection.  The risk of perforation goes up with esophageal dilation.  Other options to evaluate UGI complaints could involve barium swallow or UGI series, but these would be diagnostic tests only.  Patient was given time to ask questions.  I answered them to their satisfaction and they are agreeable to proceedingAll risks, benefits, alternatives, and indications of colonoscopy procedure have been discussed with the patient. Risks to include perforation of the colon requiring possible surgery or colostomy, risk of bleeding from biopsies or removal of colon tissue, possibility of missing a colon polyp or cancer, or adverse drug reaction.  Benefits to include the diagnosis and management of disease of the colon and rectum. Alternatives to include barium enema, radiographic evaluation, lab testing or no intervention. Pt  verbalizes understanding and agrees.            .               Neva Vázquez, APRN

## 2021-11-05 NOTE — PROGRESS NOTES
"[facility] GASTROENTEROLOGY - OFFICE NOTE    11/5/2021    Fiordaliza Lyons   1960    Primary Physician: Brittany Solis MD    Chief Complaint   Patient presents with   • Fecal Incontinence     rectal mass, Pt states prolapse   • Abdominal Pain     distention, recent hospital stay         HISTORY OF PRESENT ILLNESS:     Fiordaliza Lyons is a 61 y.o. female presents with rectal mass. This was noted by Dr. Solis 10-11-21.  She has had problems having bm's. Dr. Solis also noted rectal prolapse and the patient reports having to \" push it back in to have a bm\". She has also noted some red , small amount, on the tissue with bm. Has had some llq pain. No fever. No weight loss. No abdominal pain. No n/v. No hematemesis. No black stool.         She has alcoholic liver disease. Last drink of etoh 10-4-21. She did drink 3 glasses of vodka/sprite.  Currently Dr. Solis is managing her diuretics  (see med list).      She was hospitalized here at [facility] 10-29-21 to 11-1-21.   Discharge diagnoses acute cystitis, alcoholic cirrhosis, SARAH, anasarca, thoracic spine pain ( abnormal CT T6-7), hyponatremia, and ascites.   Last labs 11-1-21 plt normal, hgb 7.0, mcv 71, wbc 8.8, bun 7, creat 0.51, na 131.  CMP 11-1-21 with  albumin 2.8, alt 14, ast 34, alk phos 180, bili normal. Last inr normal on 10-29-21.  She did have a ultrasound paracentesis with 1500 ml removed ( no infection).  She was on IV diuretic drip.      She did receive blood transfusion 11-3-21 for hgb 7.0.       CT abdomen/pelvis with contrast done 10-5-21 noted gastric wall thickening, thickening of ascending colon.     She had appointment with Dr. Solis 11-2-11 with f/u appointment scheduled for 11-11-21.      Past Medical History:   Diagnosis Date   • Alcoholic hepatitis with ascites    • Allergic rhinitis    • Breast cyst    • Diabetes mellitus (HCC)    • Disease of thyroid gland    • Elevated d-dimer    • Endometriosis    • Fatty liver    • GERD " (gastroesophageal reflux disease)    • Hyperlipidemia    • Hypertension    • Ovarian cyst    • Pancreatitis    • Postmenopausal    • Rectal mass    • Sleep apnea        Past Surgical History:   Procedure Laterality Date   • BILATERAL SALPINGO OOPHORECTOMY  2008   • CHOLECYSTECTOMY  1996   • COLONOSCOPY  07/2017    Dr Murphy- per patient normal    • DILATION AND CURETTAGE, DIAGNOSTIC / THERAPEUTIC     • HYSTERECTOMY     • KNEE SURGERY  1982       Outpatient Medications Marked as Taking for the 11/5/21 encounter (Office Visit) with Neva Vázquez APRN   Medication Sig Dispense Refill   • furosemide (LASIX) 20 MG tablet Take 40 mg by mouth 2 (Two) Times a Day.     • insulin glargine (LANTUS, SEMGLEE) 100 UNIT/ML injection Inject 8 Units under the skin into the appropriate area as directed Every Night.     • levothyroxine (SYNTHROID, LEVOTHROID) 50 MCG tablet Take 50 mcg by mouth Every Morning.     • Multiple Vitamins-Iron (multivitamin with iron) tablet tablet Take 1 tablet by mouth Daily.     • pantoprazole (PROTONIX) 40 MG EC tablet Take 40 mg by mouth Daily.     • potassium chloride 10 MEQ CR tablet Take 1 tablet by mouth 2 (Two) Times a Day. 14 tablet 0   • spironolactone (ALDACTONE) 100 MG tablet Take 100 mg by mouth 2 (Two) Times a Day.         Allergies   Allergen Reactions   • Augmentin [Amoxicillin-Pot Clavulanate] Nausea And Vomiting       Social History     Socioeconomic History   • Marital status: Single   Tobacco Use   • Smoking status: Never Smoker   • Smokeless tobacco: Never Used   Vaping Use   • Vaping Use: Never used   Substance and Sexual Activity   • Alcohol use: Not Currently     Comment: occ   • Drug use: No   • Sexual activity: Defer       Family History   Problem Relation Age of Onset   • Ovarian cancer Mother    • Ovarian cancer Maternal Aunt    • Ovarian cancer Maternal Grandmother    • Ovarian cancer Paternal Grandmother    • Colon polyps Father    • Colon polyps Brother    • Breast cancer Neg  "Hx    • Colon cancer Neg Hx        Review of Systems   Constitutional: Negative for chills and fever.   Respiratory: Negative for shortness of breath and wheezing.    Cardiovascular: Negative for chest pain and palpitations.   Gastrointestinal: Positive for abdominal pain and blood in stool. Negative for abdominal distention, constipation, diarrhea, nausea and vomiting.        Vitals:    11/05/21 0910   BP: 120/66   Pulse: 93   Temp: 96.8 °F (36 °C)   SpO2: 99%   Weight: 54.4 kg (120 lb)   Height: 152.4 cm (60\")      Body mass index is 23.44 kg/m².    Physical Exam  Vitals reviewed.   Constitutional:       General: She is not in acute distress.  Cardiovascular:      Rate and Rhythm: Normal rate and regular rhythm.      Heart sounds: Normal heart sounds.   Pulmonary:      Effort: Pulmonary effort is normal.      Breath sounds: Normal breath sounds.   Abdominal:      General: Bowel sounds are normal. There is no distension.      Palpations: Abdomen is soft.      Tenderness: There is abdominal tenderness (mild tenderness llq ).   Genitourinary:     Comments: CINTHIA noted abnormal area just inside , no blood on glove.   Musculoskeletal:      Right lower leg: Edema (2 +) present.      Left lower leg: Edema (2 +) present.   Skin:     General: Skin is warm and dry.   Neurological:      Mental Status: She is alert.         Results for orders placed or performed during the hospital encounter of 10/29/21   COVID-19,[facility] IN-HOUSE,Nasal Swab No Transport Media 3-4 HR TAT - Swab, Nasal Cavity    Specimen: Nasal Cavity; Swab   Result Value Ref Range    COVID19 Not Detected Not Detected - Ref. Range   Anaerobic Culture - Body Fluid, Peritoneum    Specimen: Peritoneum; Body Fluid   Result Value Ref Range    Anaerobic Culture No anaerobes isolated at 5 days    Body Fluid Culture - Body Fluid, Peritoneum    Specimen: Peritoneum; Body Fluid   Result Value Ref Range    Body Fluid Culture No growth at 3 days     Gram Stain Occasional " WBCs seen     Gram Stain No organisms seen    Fungus Culture - Body Fluid, Peritoneum    Specimen: Peritoneum; Body Fluid   Result Value Ref Range    Fungus Culture No fungus isolated at less than 1 week    Procalcitonin    Specimen: Blood   Result Value Ref Range    Procalcitonin 0.23 0.00 - 0.25 ng/mL   Iron Profile    Specimen: Blood   Result Value Ref Range    Iron 13 (L) 37 - 145 mcg/dL    Iron Saturation 4 (L) 20 - 50 %    Transferrin 216 200 - 360 mg/dL    TIBC 322 298 - 536 mcg/dL   Vitamin B12    Specimen: Blood   Result Value Ref Range    Vitamin B-12 1,243 (H) 211 - 946 pg/mL   Ferritin    Specimen: Blood   Result Value Ref Range    Ferritin 10.80 (L) 13.00 - 150.00 ng/mL   Protime-INR    Specimen: Blood   Result Value Ref Range    Protime 13.2 11.9 - 14.6 Seconds    INR 1.04 0.91 - 1.09   Sedimentation Rate    Specimen: Blood   Result Value Ref Range    Sed Rate 28 0 - 30 mm/hr   C-reactive Protein    Specimen: Blood   Result Value Ref Range    C-Reactive Protein 0.37 0.00 - 0.50 mg/dL   Urinalysis With Microscopic If Indicated (No Culture) - Urine, Clean Catch    Specimen: Urine, Clean Catch   Result Value Ref Range    Color, UA Dark Yellow (A) Yellow, Straw    Appearance, UA Cloudy (A) Clear    pH, UA 6.5 5.0 - 8.0    Specific Gravity, UA 1.018 1.005 - 1.030    Glucose, UA Negative Negative    Ketones, UA Trace (A) Negative    Bilirubin, UA Negative Negative    Blood, UA Negative Negative    Protein, UA 30 mg/dL (1+) (A) Negative    Leuk Esterase, UA Large (3+) (A) Negative    Nitrite, UA Positive (A) Negative    Urobilinogen, UA 1.0 E.U./dL 0.2 - 1.0 E.U./dL   Urinalysis, Microscopic Only - Urine, Clean Catch    Specimen: Urine, Clean Catch   Result Value Ref Range    RBC, UA 6-12 (A) None Seen /HPF    WBC, UA Too Numerous to Count (A) None Seen /HPF    Bacteria, UA 4+ (A) None Seen /HPF    Squamous Epithelial Cells, UA 0-2 None Seen, 0-2 /HPF    Hyaline Casts, UA 3-6 None Seen /LPF    Methodology  Automated Microscopy    CBC Auto Differential    Specimen: Blood   Result Value Ref Range    WBC 5.66 3.40 - 10.80 10*3/mm3    RBC 3.73 (L) 3.77 - 5.28 10*6/mm3    Hemoglobin 7.6 (L) 12.0 - 15.9 g/dL    Hematocrit 26.8 (L) 34.0 - 46.6 %    MCV 71.8 (L) 79.0 - 97.0 fL    MCH 20.4 (L) 26.6 - 33.0 pg    MCHC 28.4 (L) 31.5 - 35.7 g/dL    RDW 18.2 (H) 12.3 - 15.4 %    RDW-SD 46.8 37.0 - 54.0 fl    MPV 11.0 6.0 - 12.0 fL    Platelets 235 140 - 450 10*3/mm3   Comprehensive Metabolic Panel    Specimen: Blood   Result Value Ref Range    Glucose 95 65 - 99 mg/dL    BUN 6 6 - 20 mg/dL    Creatinine 0.46 (L) 0.57 - 1.00 mg/dL    Sodium 133 (L) 136 - 145 mmol/L    Potassium 3.7 3.5 - 5.2 mmol/L    Chloride 87 (L) 98 - 107 mmol/L    CO2 38.0 (H) 22.0 - 29.0 mmol/L    Calcium 8.4 (L) 8.6 - 10.5 mg/dL    Total Protein 5.7 (L) 6.0 - 8.5 g/dL    Albumin 3.10 (L) 3.50 - 5.20 g/dL    ALT (SGPT) 19 1 - 33 U/L    AST (SGOT) 34 (H) 1 - 32 U/L    Alkaline Phosphatase 193 (H) 39 - 117 U/L    Total Bilirubin 0.5 0.0 - 1.2 mg/dL    eGFR Non African Amer 139 >60 mL/min/1.73    Globulin 2.6 gm/dL    A/G Ratio 1.2 g/dL    BUN/Creatinine Ratio 13.0 7.0 - 25.0    Anion Gap 8.0 5.0 - 15.0 mmol/L   Lipid Panel    Specimen: Blood   Result Value Ref Range    Total Cholesterol 167 0 - 200 mg/dL    Triglycerides 78 0 - 150 mg/dL    HDL Cholesterol 39 (L) 40 - 60 mg/dL    LDL Cholesterol  113 (H) 0 - 100 mg/dL    VLDL Cholesterol 15 5 - 40 mg/dL    LDL/HDL Ratio 2.88    Manual Differential    Specimen: Blood   Result Value Ref Range    Neutrophil % 83.8 (H) 42.7 - 76.0 %    Lymphocyte % 7.1 (L) 19.6 - 45.3 %    Monocyte % 7.1 5.0 - 12.0 %    Eosinophil % 1.0 0.3 - 6.2 %    Atypical Lymphocyte % 1.0 0.0 - 5.0 %    Neutrophils Absolute 4.74 1.70 - 7.00 10*3/mm3    Lymphocytes Absolute 0.46 (L) 0.70 - 3.10 10*3/mm3    Monocytes Absolute 0.40 0.10 - 0.90 10*3/mm3    Eosinophils Absolute 0.06 0.00 - 0.40 10*3/mm3    Anisocytosis Slight/1+ None Seen     Hypochromia Mod/2+ None Seen    Poikilocytes Slight/1+ None Seen    Polychromasia Slight/1+ None Seen    WBC Morphology Normal Normal    Giant Platelets Slight/1+ None Seen   Amylase, Body Fluid - Body Fluid, Peritoneum    Specimen: Peritoneum; Body Fluid   Result Value Ref Range    Amylase, Fluid 8 U/L   Glucose, Body Fluid - Body Fluid, Peritoneum    Specimen: Peritoneum; Body Fluid   Result Value Ref Range    Glucose, Fluid 162 mg/dL   Lactate Dehydrogenase, Body Fluid - Body Fluid, Peritoneum    Specimen: Peritoneum; Body Fluid   Result Value Ref Range    Lactate Dehydrogenase (LD), Fluid 68 U/L   Protein, Body Fluid - Body Fluid, Peritoneum    Specimen: Peritoneum; Body Fluid   Result Value Ref Range    Protein, Total, Fluid <1.0 g/dL   Body fluid cell count - Body Fluid, Peritoneum    Specimen: Peritoneum; Body Fluid   Result Value Ref Range    Color, Fluid Yellow     Appearance, Fluid Slightly Hazy (A) Clear    WBC, Fluid 207 /mm3    RBC, Fluid <1,000 /mm3   Body fluid differential - Body Fluid, Peritoneum    Specimen: Peritoneum; Body Fluid   Result Value Ref Range    Neutrophils, Fluid 6 %    Lymphocytes, Fluid 14 %    Monocytes, Fluid 23 %    Eosinophils, Fluid 1 %    Unclassified Cells, Fluid 56 %   Comprehensive Metabolic Panel    Specimen: Blood   Result Value Ref Range    Glucose 117 (H) 65 - 99 mg/dL    BUN 7 6 - 20 mg/dL    Creatinine 0.51 (L) 0.57 - 1.00 mg/dL    Sodium 135 (L) 136 - 145 mmol/L    Potassium 2.9 (L) 3.5 - 5.2 mmol/L    Chloride 88 (L) 98 - 107 mmol/L    CO2 39.0 (H) 22.0 - 29.0 mmol/L    Calcium 7.6 (L) 8.6 - 10.5 mg/dL    Total Protein 5.2 (L) 6.0 - 8.5 g/dL    Albumin 2.80 (L) 3.50 - 5.20 g/dL    ALT (SGPT) 14 1 - 33 U/L    AST (SGOT) 34 (H) 1 - 32 U/L    Alkaline Phosphatase 180 (H) 39 - 117 U/L    Total Bilirubin 0.5 0.0 - 1.2 mg/dL    eGFR Non African Amer 123 >60 mL/min/1.73    Globulin 2.4 gm/dL    A/G Ratio 1.2 g/dL    BUN/Creatinine Ratio 13.7 7.0 - 25.0    Anion Gap 8.0 5.0  - 15.0 mmol/L   CBC Auto Differential    Specimen: Blood   Result Value Ref Range    WBC 7.95 3.40 - 10.80 10*3/mm3    RBC 3.65 (L) 3.77 - 5.28 10*6/mm3    Hemoglobin 7.1 (L) 12.0 - 15.9 g/dL    Hematocrit 25.7 (L) 34.0 - 46.6 %    MCV 70.4 (L) 79.0 - 97.0 fL    MCH 19.5 (L) 26.6 - 33.0 pg    MCHC 27.6 (L) 31.5 - 35.7 g/dL    RDW 18.3 (H) 12.3 - 15.4 %    RDW-SD 46.1 37.0 - 54.0 fl    MPV 10.5 6.0 - 12.0 fL    Platelets 240 140 - 450 10*3/mm3    Neutrophil % 84.8 (H) 42.7 - 76.0 %    Lymphocyte % 6.3 (L) 19.6 - 45.3 %    Monocyte % 7.3 5.0 - 12.0 %    Eosinophil % 0.8 0.3 - 6.2 %    Basophil % 0.4 0.0 - 1.5 %    Immature Grans % 0.4 0.0 - 0.5 %    Neutrophils, Absolute 6.75 1.70 - 7.00 10*3/mm3    Lymphocytes, Absolute 0.50 (L) 0.70 - 3.10 10*3/mm3    Monocytes, Absolute 0.58 0.10 - 0.90 10*3/mm3    Eosinophils, Absolute 0.06 0.00 - 0.40 10*3/mm3    Basophils, Absolute 0.03 0.00 - 0.20 10*3/mm3    Immature Grans, Absolute 0.03 0.00 - 0.05 10*3/mm3    nRBC 0.0 0.0 - 0.2 /100 WBC   Magnesium    Specimen: Blood   Result Value Ref Range    Magnesium 1.3 (L) 1.6 - 2.6 mg/dL   Basic Metabolic Panel    Specimen: Blood   Result Value Ref Range    Glucose 176 (H) 65 - 99 mg/dL    BUN 7 6 - 20 mg/dL    Creatinine 0.53 (L) 0.57 - 1.00 mg/dL    Sodium 132 (L) 136 - 145 mmol/L    Potassium 3.3 (L) 3.5 - 5.2 mmol/L    Chloride 88 (L) 98 - 107 mmol/L    CO2 37.0 (H) 22.0 - 29.0 mmol/L    Calcium 7.7 (L) 8.6 - 10.5 mg/dL    eGFR Non African Amer 118 >60 mL/min/1.73    BUN/Creatinine Ratio 13.2 7.0 - 25.0    Anion Gap 7.0 5.0 - 15.0 mmol/L   Magnesium    Specimen: Blood   Result Value Ref Range    Magnesium 1.2 (L) 1.6 - 2.6 mg/dL   Basic Metabolic Panel    Specimen: Blood   Result Value Ref Range    Glucose 79 65 - 99 mg/dL    BUN 7 6 - 20 mg/dL    Creatinine 0.51 (L) 0.57 - 1.00 mg/dL    Sodium 131 (L) 136 - 145 mmol/L    Potassium 4.4 3.5 - 5.2 mmol/L    Chloride 93 (L) 98 - 107 mmol/L    CO2 34.0 (H) 22.0 - 29.0 mmol/L     Calcium 8.3 (L) 8.6 - 10.5 mg/dL    eGFR Non African Amer 123 >60 mL/min/1.73    BUN/Creatinine Ratio 13.7 7.0 - 25.0    Anion Gap 4.0 (L) 5.0 - 15.0 mmol/L   Magnesium    Specimen: Blood   Result Value Ref Range    Magnesium 2.7 (H) 1.6 - 2.6 mg/dL   CBC Auto Differential    Specimen: Blood   Result Value Ref Range    WBC 8.89 3.40 - 10.80 10*3/mm3    RBC 3.49 (L) 3.77 - 5.28 10*6/mm3    Hemoglobin 7.0 (L) 12.0 - 15.9 g/dL    Hematocrit 25.1 (L) 34.0 - 46.6 %    MCV 71.9 (L) 79.0 - 97.0 fL    MCH 20.1 (L) 26.6 - 33.0 pg    MCHC 27.9 (L) 31.5 - 35.7 g/dL    RDW 18.4 (H) 12.3 - 15.4 %    RDW-SD 47.3 37.0 - 54.0 fl    MPV 10.4 6.0 - 12.0 fL    Platelets 240 140 - 450 10*3/mm3    Neutrophil % 81.7 (H) 42.7 - 76.0 %    Lymphocyte % 8.0 (L) 19.6 - 45.3 %    Monocyte % 8.4 5.0 - 12.0 %    Eosinophil % 0.9 0.3 - 6.2 %    Basophil % 0.4 0.0 - 1.5 %    Neutrophils, Absolute 7.26 (H) 1.70 - 7.00 10*3/mm3    Lymphocytes, Absolute 0.71 0.70 - 3.10 10*3/mm3    Monocytes, Absolute 0.75 0.10 - 0.90 10*3/mm3    Eosinophils, Absolute 0.08 0.00 - 0.40 10*3/mm3    Basophils, Absolute 0.04 0.00 - 0.20 10*3/mm3   POC Glucose Once    Specimen: Blood   Result Value Ref Range    Glucose 220 (H) 70 - 130 mg/dL   POC Glucose Once    Specimen: Blood   Result Value Ref Range    Glucose 121 70 - 130 mg/dL   POC Glucose Once    Specimen: Blood   Result Value Ref Range    Glucose 224 (H) 70 - 130 mg/dL   POC Glucose Once    Specimen: Blood   Result Value Ref Range    Glucose 169 (H) 70 - 130 mg/dL   POC Glucose Once    Specimen: Blood   Result Value Ref Range    Glucose 161 (H) 70 - 130 mg/dL   POC Glucose Once    Specimen: Blood   Result Value Ref Range    Glucose 166 (H) 70 - 130 mg/dL   POC Glucose Once    Specimen: Blood   Result Value Ref Range    Glucose 157 (H) 70 - 130 mg/dL   POC Glucose Once    Specimen: Blood   Result Value Ref Range    Glucose 148 (H) 70 - 130 mg/dL   POC Glucose Once    Specimen: Blood   Result Value Ref Range     Glucose 336 (H) 70 - 130 mg/dL   POC Glucose Once    Specimen: Blood   Result Value Ref Range    Glucose 158 (H) 70 - 130 mg/dL   POC Glucose Once    Specimen: Blood   Result Value Ref Range    Glucose 287 (H) 70 - 130 mg/dL   Non-gynecologic Cytology    Specimen: Abdominal Cavity; Body Fluid   Result Value Ref Range    Case Report       Non-gynecologic Cytology                          Case: [id number]                                  Authorizing Provider:  Kiya Alvarez APRN    Collected:           10/30/2021 10:16 AM          Ordering Location:     [address]  Received:            2021 09:59 AM          Pathologist:           Jen Negro MD                                                        Specimen:    Abdominal Cavity, peritoneal fluid                                                         Final Diagnosis       Peritoneal fluid, ThinPrep preparation (1):  A.  Numerous reactive mesothelial cells.  B.  Scattered small, mature lymphocytes and rare neutrophils.  C.  Negative for malignant cells.      Gross Description       Received fresh in the laboratory in a container labeled with patient name and  designated as peritoneal fluid.  30 mls of hazy yellow fluid.  1 thin prep slide prepared.        Microscopic Description       ThinPrep preparation of peritoneal fluid reveals numerous reactive mesothelial cells.  Scattered small, mature lymphocytes and rare neutrophils are seen.  Malignant cells are not identified.             ASSESSMENT AND PLAN    Assessment/Plan     Diagnoses and all orders for this visit:    1. Rectal mass (Primary)  -     Case Request; Standing  -     Case Request    2. BRBPR (bright red blood per rectum)    3. Abnormal digital rectal exam  -     Case Request; Standing  -     Case Request    4. Abnormal CT of the abdomen  Comments:  thickening asc colon and gastric wall.   Orders:  -     Case Request; Standing  -     Case Request  -     CBC &  Differential; Future  -     Protime-INR; Future  -     Comprehensive Metabolic Panel; Future  -     AFP Tumor Marker; Future    5. Alcoholic cirrhosis of liver with ascites (HCC)    Other orders  -     Follow Anesthesia Guidelines / Protocol; Future  -     Obtain Informed Consent; Future      In regards to rectal mass, differential diagnoses discussed.   I recommend colonoscopy and she is agreeable. Will plan for colonoscopy next week.       She did have ct abdomen/pelvis recently that noted thickening in the ascending colon and gastric wall. I recommend egd as well and she is agreeable.     She was recently diagnosed with etoh cirrhosis. Last drink of etoh was 10-4-21. She is being followed by Dr. PIA Solis who is managing her diuretics currently. She has f/u appointment 11-11-21. Recommend low sodium diet.       Recommend Emergency Room if worsening/severe symptoms.         ESOPHAGOGASTRODUODENOSCOPY WITH ANESTHESIA (N/A), COLONOSCOPY WITH ANESTHESIA (N/A)  Risk, benefits, and alternatives of endoscopy were explained in full.  They understand that there is a risk of bleeding, perforation, and infection.  The risk of perforation goes up with esophageal dilation.  Other options to evaluate UGI complaints could involve barium swallow or UGI series, but these would be diagnostic tests only.  Patient was given time to ask questions.  I answered them to their satisfaction and they are agreeable to proceedingAll risks, benefits, alternatives, and indications of colonoscopy procedure have been discussed with the patient. Risks to include perforation of the colon requiring possible surgery or colostomy, risk of bleeding from biopsies or removal of colon tissue, possibility of missing a colon polyp or cancer, or adverse drug reaction.  Benefits to include the diagnosis and management of disease of the colon and rectum. Alternatives to include barium enema, radiographic evaluation, lab testing or no intervention. Pt  verbalizes understanding and agrees.            .               Neva Vázquez, APRN

## 2021-11-06 ENCOUNTER — LAB (OUTPATIENT)
Dept: LAB | Facility: HOSPITAL | Age: 61
End: 2021-11-06

## 2021-11-06 LAB — SARS-COV-2 ORF1AB RESP QL NAA+PROBE: NOT DETECTED

## 2021-11-06 PROCEDURE — C9803 HOPD COVID-19 SPEC COLLECT: HCPCS | Performed by: INTERNAL MEDICINE

## 2021-11-06 PROCEDURE — U0004 COV-19 TEST NON-CDC HGH THRU: HCPCS | Performed by: INTERNAL MEDICINE

## 2021-11-09 ENCOUNTER — ANESTHESIA (OUTPATIENT)
Dept: GASTROENTEROLOGY | Facility: HOSPITAL | Age: 61
End: 2021-11-09

## 2021-11-09 ENCOUNTER — TELEPHONE (OUTPATIENT)
Dept: GASTROENTEROLOGY | Facility: CLINIC | Age: 61
End: 2021-11-09

## 2021-11-09 ENCOUNTER — ANESTHESIA EVENT (OUTPATIENT)
Dept: GASTROENTEROLOGY | Facility: HOSPITAL | Age: 61
End: 2021-11-09

## 2021-11-09 ENCOUNTER — HOSPITAL ENCOUNTER (OUTPATIENT)
Facility: HOSPITAL | Age: 61
Setting detail: HOSPITAL OUTPATIENT SURGERY
Discharge: HOME | End: 2021-11-09
Attending: INTERNAL MEDICINE | Admitting: INTERNAL MEDICINE

## 2021-11-09 VITALS
RESPIRATION RATE: 18 BRPM | OXYGEN SATURATION: 99 % | BODY MASS INDEX: 23.56 KG/M2 | WEIGHT: 120 LBS | HEART RATE: 83 BPM | DIASTOLIC BLOOD PRESSURE: 68 MMHG | SYSTOLIC BLOOD PRESSURE: 124 MMHG | TEMPERATURE: 96.9 F | HEIGHT: 60 IN

## 2021-11-09 DIAGNOSIS — R68.89 ABNORMAL DIGITAL RECTAL EXAM: ICD-10-CM

## 2021-11-09 DIAGNOSIS — R93.5 ABNORMAL CT OF THE ABDOMEN: ICD-10-CM

## 2021-11-09 DIAGNOSIS — K62.89 RECTAL MASS: ICD-10-CM

## 2021-11-09 LAB — GLUCOSE BLDC GLUCOMTR-MCNC: 159 MG/DL (ref 70–130)

## 2021-11-09 PROCEDURE — 82962 GLUCOSE BLOOD TEST: CPT

## 2021-11-09 PROCEDURE — 25010000002 PROPOFOL 10 MG/ML EMULSION: Performed by: NURSE ANESTHETIST, CERTIFIED REGISTERED

## 2021-11-09 PROCEDURE — 45380 COLONOSCOPY AND BIOPSY: CPT | Performed by: INTERNAL MEDICINE

## 2021-11-09 PROCEDURE — 88305 TISSUE EXAM BY PATHOLOGIST: CPT | Performed by: INTERNAL MEDICINE

## 2021-11-09 PROCEDURE — 43239 EGD BIOPSY SINGLE/MULTIPLE: CPT | Performed by: INTERNAL MEDICINE

## 2021-11-09 PROCEDURE — 45385 COLONOSCOPY W/LESION REMOVAL: CPT | Performed by: INTERNAL MEDICINE

## 2021-11-09 PROCEDURE — C1889 IMPLANT/INSERT DEVICE, NOC: HCPCS | Performed by: INTERNAL MEDICINE

## 2021-11-09 DEVICE — DEV CLIP ENDO RESOLUTION360 CONTRL ROT 235CM: Type: IMPLANTABLE DEVICE | Site: COLON | Status: FUNCTIONAL

## 2021-11-09 RX ORDER — SODIUM CHLORIDE 0.9 % (FLUSH) 0.9 %
10 SYRINGE (ML) INJECTION AS NEEDED
Status: DISCONTINUED | OUTPATIENT
Start: 2021-11-09 | End: 2021-11-09 | Stop reason: HOSPADM

## 2021-11-09 RX ORDER — LIDOCAINE HYDROCHLORIDE 20 MG/ML
INJECTION, SOLUTION EPIDURAL; INFILTRATION; INTRACAUDAL; PERINEURAL AS NEEDED
Status: DISCONTINUED | OUTPATIENT
Start: 2021-11-09 | End: 2021-11-09 | Stop reason: SURG

## 2021-11-09 RX ORDER — SODIUM CHLORIDE 9 MG/ML
500 INJECTION, SOLUTION INTRAVENOUS CONTINUOUS PRN
Status: DISCONTINUED | OUTPATIENT
Start: 2021-11-09 | End: 2021-11-09 | Stop reason: HOSPADM

## 2021-11-09 RX ORDER — PROPOFOL 10 MG/ML
VIAL (ML) INTRAVENOUS AS NEEDED
Status: DISCONTINUED | OUTPATIENT
Start: 2021-11-09 | End: 2021-11-09 | Stop reason: SURG

## 2021-11-09 RX ORDER — ONDANSETRON 2 MG/ML
4 INJECTION INTRAMUSCULAR; INTRAVENOUS ONCE AS NEEDED
Status: DISCONTINUED | OUTPATIENT
Start: 2021-11-09 | End: 2021-11-09 | Stop reason: HOSPADM

## 2021-11-09 RX ADMIN — SODIUM CHLORIDE 500 ML: 9 INJECTION, SOLUTION INTRAVENOUS at 08:01

## 2021-11-09 RX ADMIN — LIDOCAINE HYDROCHLORIDE 140 MG: 20 INJECTION, SOLUTION EPIDURAL; INFILTRATION; INTRACAUDAL; PERINEURAL at 09:34

## 2021-11-09 RX ADMIN — PROPOFOL 520 MG: 10 INJECTION, EMULSION INTRAVENOUS at 09:34

## 2021-11-09 NOTE — ANESTHESIA POSTPROCEDURE EVALUATION
"Patient: Fiordaliza Lyons    Procedure Summary     Date: 11/09/21 Room / Location: [facility] ENDOSCOPY 2 /  [facility]    Anesthesia Start: 0930 Anesthesia Stop: 1025    Procedures:       ESOPHAGOGASTRODUODENOSCOPY WITH ANESTHESIA (N/A )      COLONOSCOPY WITH ANESTHESIA (N/A ) Diagnosis:       Rectal mass      Abnormal digital rectal exam      Abnormal CT of the abdomen      (Rectal mass [K62.89])      (Abnormal digital rectal exam [R68.89])      (Abnormal CT of the abdomen [R93.5])    Surgeons: Ray Serna MD Provider: Imtiaz Munoz CRNA    Anesthesia Type: MAC ASA Status: 2          Anesthesia Type: MAC    Vitals  Vitals Value Taken Time   /66 11/09/21 1036   Temp     Pulse 83 11/09/21 1036   Resp 15 11/09/21 1020   SpO2 97 % 11/09/21 1036   Vitals shown include unvalidated device data.        Post Anesthesia Care and Evaluation    Patient location during evaluation: PACU  Patient participation: complete - patient participated  Level of consciousness: awake and alert  Pain management: adequate  Airway patency: patent  Anesthetic complications: No anesthetic complications    Cardiovascular status: acceptable  Respiratory status: acceptable  Hydration status: acceptable    Comments: Blood pressure 132/61, pulse 93, temperature 96.9 °F (36.1 °C), temperature source Temporal, resp. rate 15, height 152.4 cm (60\"), weight 54.4 kg (120 lb), SpO2 98 %, not currently breastfeeding.    Pt discharged from PACU based on paco score >8      "

## 2021-11-09 NOTE — TELEPHONE ENCOUNTER
I was able to talk to her about her labs including alpha-fetoprotein.  I explained the elevated alpha-fetoprotein and that typically at this level it is consistent with regenerative liver but it could be in worse case scenario a sign of hepatocellular carcinoma.  Fortunately, recent CT scan of the abdomen revealed no evidence of hepatocellular carcinoma.  She is told me that she is now abstaining from alcohol.  She is doing well taking care of herself.  She is motivated.    I recommended that she follow-up with us in the springtime.  She should have repeat imaging of the liver in 6 months approximately.  She and her friend expressed understanding.  Please have the office place her on recall for office visit in 6 months.  I did ask her to come see us sooner if she has any troubles or issues or concerns.  We also talked about the rectal lesions.  These appear to be inflamed hemorrhoids.  I did biopsy.  I did recommend increasing fiber intake.  I suggested she try to get total of 25 g of fiber daily.  To reach this level, I recommended diet but also fiber supplements if she is not able to compensate by diet.  Fiber supplement such as Citrucel.

## 2021-11-09 NOTE — ANESTHESIA PREPROCEDURE EVALUATION
Anesthesia Evaluation     Patient summary reviewed   no history of anesthetic complications:  NPO Solid Status: > 8 hours  NPO Liquid Status: > 8 hours           Airway   Mallampati: I  TM distance: >3 FB  Neck ROM: full  No difficulty expected  Dental - normal exam     Pulmonary - normal exam   (+) sleep apnea,   Cardiovascular - normal exam  Exercise tolerance: poor (<4 METS)    (+) hypertension well controlled less than 2 medications, hyperlipidemia,       Neuro/Psych- negative ROS  GI/Hepatic/Renal/Endo    (+)  GERD well controlled,  liver disease fatty liver disease, diabetes mellitus type 1 poorly controlled using insulin,     Musculoskeletal     (+) joint swelling,   Abdominal  - normal exam   Substance History - negative use     OB/GYN          Other - negative ROS                       Anesthesia Plan    ASA 2     MAC     intravenous induction     Anesthetic plan, all risks, benefits, and alternatives have been provided, discussed and informed consent has been obtained with: patient.

## 2021-11-10 LAB
LAB AP CASE REPORT: NORMAL
PATH REPORT.FINAL DX SPEC: NORMAL
PATH REPORT.GROSS SPEC: NORMAL

## 2021-11-11 ENCOUNTER — OFFICE VISIT (OUTPATIENT)
Dept: INTERNAL MEDICINE | Age: 61
End: 2021-11-11
Payer: COMMERCIAL

## 2021-11-11 VITALS
BODY MASS INDEX: 23.95 KG/M2 | HEIGHT: 60 IN | WEIGHT: 122 LBS | OXYGEN SATURATION: 99 % | HEART RATE: 87 BPM | DIASTOLIC BLOOD PRESSURE: 70 MMHG | SYSTOLIC BLOOD PRESSURE: 122 MMHG

## 2021-11-11 DIAGNOSIS — E10.65 TYPE 1 DIABETES MELLITUS WITH HYPERGLYCEMIA (HCC): ICD-10-CM

## 2021-11-11 DIAGNOSIS — K70.9 ALCOHOLIC LIVER DISEASE (HCC): Primary | ICD-10-CM

## 2021-11-11 DIAGNOSIS — D64.9 ANEMIA, UNSPECIFIED TYPE: ICD-10-CM

## 2021-11-11 DIAGNOSIS — F10.10 ALCOHOL ABUSE: ICD-10-CM

## 2021-11-11 LAB
ALBUMIN SERPL-MCNC: 2.9 G/DL (ref 3.5–5.2)
ALP BLD-CCNC: 195 U/L (ref 35–104)
ALT SERPL-CCNC: 19 U/L (ref 5–33)
ANION GAP SERPL CALCULATED.3IONS-SCNC: 10 MMOL/L (ref 7–19)
AST SERPL-CCNC: 37 U/L (ref 5–32)
BILIRUB SERPL-MCNC: 0.4 MG/DL (ref 0.2–1.2)
BUN BLDV-MCNC: 5 MG/DL (ref 6–20)
CALCIUM SERPL-MCNC: 8.8 MG/DL (ref 8.6–10)
CHLORIDE BLD-SCNC: 97 MMOL/L (ref 98–111)
CO2: 26 MMOL/L (ref 22–29)
CREAT SERPL-MCNC: 0.6 MG/DL (ref 0.5–0.9)
FERRITIN: 276.3 NG/ML (ref 13–150)
GFR AFRICAN AMERICAN: >59
GFR NON-AFRICAN AMERICAN: >60
GLUCOSE BLD-MCNC: 186 MG/DL (ref 74–109)
HCT VFR BLD CALC: 33.1 % (ref 37–47)
HEMOGLOBIN: 9.9 G/DL (ref 12–16)
IRON: 38 UG/DL (ref 37–145)
MCH RBC QN AUTO: 24 PG (ref 27–31)
MCHC RBC AUTO-ENTMCNC: 29.9 G/DL (ref 33–37)
MCV RBC AUTO: 80.3 FL (ref 81–99)
PDW BLD-RTO: 24.6 % (ref 11.5–14.5)
PLATELET # BLD: 222 K/UL (ref 130–400)
PMV BLD AUTO: 10.2 FL (ref 9.4–12.3)
POTASSIUM SERPL-SCNC: 4.1 MMOL/L (ref 3.5–5)
RBC # BLD: 4.12 M/UL (ref 4.2–5.4)
SODIUM BLD-SCNC: 133 MMOL/L (ref 136–145)
TOTAL PROTEIN: 5.5 G/DL (ref 6.6–8.7)
WBC # BLD: 6 K/UL (ref 4.8–10.8)

## 2021-11-11 PROCEDURE — 99214 OFFICE O/P EST MOD 30 MIN: CPT | Performed by: INTERNAL MEDICINE

## 2021-11-11 NOTE — PROGRESS NOTES
Chief Complaint   Patient presents with    Follow-up     one week        HPI: Patient is here today to follow-up ascites alcohol abuse and alcoholic liver disease she has remained off alcohol now for several weeks she is really doing well she feels better we reviewed her records from the hospital she also saw Dr. Fátima Ruff and had her colonoscopy we reviewed that. Past Medical History:   Diagnosis Date    Alcohol abuse 2/6/2019    Allergic rhinitis     Diabetes mellitus (HCC)     GERD (gastroesophageal reflux disease)     Hyperlipidemia     Hypertension     Postmenopausal 1/3/2019    Sleep apnea        Past Surgical History:   Procedure Laterality Date    CHOLECYSTECTOMY      KNEE ARTHROPLASTY      LIANA AND BSO         Family History   Problem Relation Age of Onset    Cancer Mother         ovarian    Cancer Maternal Grandmother         ovarian    Cancer Paternal Grandmother         ovarian       Social History     Socioeconomic History    Marital status: Single     Spouse name: Not on file    Number of children: Not on file    Years of education: Not on file    Highest education level: Not on file   Occupational History    Not on file   Tobacco Use    Smoking status: Never Smoker    Smokeless tobacco: Never Used   Substance and Sexual Activity    Alcohol use: Not on file    Drug use: Not on file    Sexual activity: Not on file   Other Topics Concern    Not on file   Social History Narrative    Not on file     Social Determinants of Health     Financial Resource Strain: Low Risk     Difficulty of Paying Living Expenses: Not very hard   Food Insecurity: No Food Insecurity    Worried About Running Out of Food in the Last Year: Never true    Dennys of Food in the Last Year: Never true   Transportation Needs:     Lack of Transportation (Medical): Not on file    Lack of Transportation (Non-Medical):  Not on file   Physical Activity:     Days of Exercise per Week: Not on file    Minutes of 122/60   10/11/21 130/70   01/31/19 130/72     Wt Readings from Last 7 Encounters:   11/28/21 102 lb 12.8 oz (46.6 kg)   11/11/21 122 lb (55.3 kg)   11/02/21 115 lb (52.2 kg)   10/25/21 129 lb (58.5 kg)   10/11/21 126 lb (57.2 kg)   01/31/19 127 lb (57.6 kg)   01/03/19 128 lb (58.1 kg)     BMI Readings from Last 7 Encounters:   11/28/21 20.08 kg/m²   11/11/21 23.83 kg/m²   11/02/21 22.46 kg/m²   10/25/21 25.19 kg/m²   10/11/21 24.61 kg/m²   01/31/19 24.80 kg/m²   01/03/19 25.00 kg/m²     Resp Readings from Last 7 Encounters:   11/28/21 18       Physical Exam  Constitutional:       General: She is not in acute distress. HENT:      Head: Normocephalic. Eyes:      General: No scleral icterus. Cardiovascular:      Heart sounds: Normal heart sounds. Pulmonary:      Breath sounds: Normal breath sounds. Musculoskeletal:      Cervical back: Neck supple. Right lower leg: Edema present. Left lower leg: Edema present. Lymphadenopathy:      Cervical: No cervical adenopathy. Skin:     Findings: No rash. Results for orders placed or performed during the hospital encounter of 11/03/21   TYPE AND SCREEN   Result Value Ref Range    ABO/Rh A POS     Antibody Screen NEG    PREPARE RBC (CROSSMATCH), 1 Units   Result Value Ref Range    Product Code Blood Bank [id number]     Description Blood Bank Red Blood Cells, Leuko-reduced     Unit Number [id number]     Dispense Status Blood Bank transfused        ASSESSMENT/ PLAN:  1. Alcoholic liver disease ([facility] [address]  We reviewed her record in chart we congratulated her on staying off alcohol right now take her Lasix and Aldactone twice a day and 1 potassium per day until we get this totally sorted out reviewed GIs work-up and her record we will see her soon and repeat labs soon    2.  Alcohol abuse  She has seen the counselor in our office we offered also referral to an outpatient program or inpatient program if needed right now she is doing well with support friend is with her each time she comes in.    3. Anemia, unspecified type  Further check labs and follow abnormality on rectal exam fortunately consistent with hemorrhoids on colonoscopy hemorrhoids diverticulosis bowel movement starting to improve with some changes in her regimen and diet and was staying off alcohol  - CBC; Future  - Comprehensive Metabolic Panel; Future  - Iron; Future  - Ferritin; Future    4. Type 1 diabetes mellitus with hyperglycemia ([facility] [address]  Diabetes has been in poor control she says it is actually improving of course with the discontinuation of alcohol watch closely    We reviewed labs colonoscopy hospital records.   We reviewed and discussed and interpreted the findings with the patient altered her diuretics will follow up with further lab at the next visit  Labs interpretted and reviewed

## 2021-11-15 ENCOUNTER — TELEPHONE (OUTPATIENT)
Dept: INTERNAL MEDICINE | Age: 61
End: 2021-11-15

## 2021-11-15 DIAGNOSIS — K70.11 ASCITES DUE TO ALCOHOLIC HEPATITIS: ICD-10-CM

## 2021-11-15 RX ORDER — FUROSEMIDE 20 MG/1
20 TABLET ORAL 2 TIMES DAILY
Qty: 60 TABLET | Refills: 3 | Status: SHIPPED | OUTPATIENT
Start: 2021-11-15 | End: 2021-11-28 | Stop reason: SDUPTHER

## 2021-11-24 ENCOUNTER — TELEPHONE (OUTPATIENT)
Dept: INTERNAL MEDICINE | Age: 61
End: 2021-11-24

## 2021-11-26 ENCOUNTER — TELEPHONE (OUTPATIENT)
Dept: PRIMARY CARE CLINIC | Age: 61
End: 2021-11-26

## 2021-11-26 DIAGNOSIS — K70.11 ASCITES DUE TO ALCOHOLIC HEPATITIS: ICD-10-CM

## 2021-11-26 DIAGNOSIS — K70.31 ASCITES DUE TO ALCOHOLIC CIRRHOSIS (HCC): ICD-10-CM

## 2021-11-27 NOTE — TELEPHONE ENCOUNTER
----- Message from Dimas Andree sent at 11/26/2021 11:02 AM CST -----  Subject: Message to Provider    QUESTIONS  Information for Provider? patient is having UTI symptoms , and lower back   pain , she is wanting to see if the doctor can call her an antibiotic   ---------------------------------------------------------------------------  --------------  [address]  What is the best way for the office to contact you? OK to leave message on   voicemail  Preferred Call Back Phone Number? [phone]  ---------------------------------------------------------------------------  --------------  SCRIPT ANSWERS  Relationship to Patient?  Self

## 2021-11-28 ENCOUNTER — TELEPHONE (OUTPATIENT)
Dept: INTERNAL MEDICINE | Age: 61
End: 2021-11-28

## 2021-11-28 ENCOUNTER — OFFICE VISIT (OUTPATIENT)
Dept: URGENT CARE | Age: 61
End: 2021-11-28
Payer: COMMERCIAL

## 2021-11-28 VITALS
SYSTOLIC BLOOD PRESSURE: 113 MMHG | HEIGHT: 60 IN | WEIGHT: 102.8 LBS | HEART RATE: 92 BPM | RESPIRATION RATE: 18 BRPM | DIASTOLIC BLOOD PRESSURE: 75 MMHG | OXYGEN SATURATION: 100 % | BODY MASS INDEX: 20.18 KG/M2 | TEMPERATURE: 96.8 F

## 2021-11-28 DIAGNOSIS — R10.32 LLQ PAIN: ICD-10-CM

## 2021-11-28 DIAGNOSIS — D64.9 ANEMIA, UNSPECIFIED TYPE: ICD-10-CM

## 2021-11-28 DIAGNOSIS — N30.00 ACUTE CYSTITIS WITHOUT HEMATURIA: Primary | ICD-10-CM

## 2021-11-28 DIAGNOSIS — Z13.21 ENCOUNTER FOR VITAMIN DEFICIENCY SCREENING: ICD-10-CM

## 2021-11-28 DIAGNOSIS — R10.84 GENERALIZED ABDOMINAL PAIN: Primary | ICD-10-CM

## 2021-11-28 DIAGNOSIS — K70.9 ALCOHOLIC LIVER DISEASE (HCC): ICD-10-CM

## 2021-11-28 LAB
APPEARANCE FLUID: CLEAR
BILIRUBIN, POC: ABNORMAL
BLOOD URINE, POC: ABNORMAL
CLARITY, POC: CLEAR
COLOR, POC: YELLOW
GLUCOSE URINE, POC: ABNORMAL
KETONES, POC: ABNORMAL
LEUKOCYTE EST, POC: ABNORMAL
NITRITE, POC: ABNORMAL
PH, POC: 5.5
PROTEIN, POC: ABNORMAL
SPECIFIC GRAVITY, POC: 1.01
UROBILINOGEN, POC: ABNORMAL

## 2021-11-28 PROCEDURE — 81002 URINALYSIS NONAUTO W/O SCOPE: CPT | Performed by: NURSE PRACTITIONER

## 2021-11-28 PROCEDURE — 99214 OFFICE O/P EST MOD 30 MIN: CPT | Performed by: NURSE PRACTITIONER

## 2021-11-28 RX ORDER — SPIRONOLACTONE 100 MG/1
100 TABLET, FILM COATED ORAL DAILY
Qty: 90 TABLET | Refills: 3
Start: 2021-11-28 | End: 2021-12-17 | Stop reason: SDUPTHER

## 2021-11-28 RX ORDER — FUROSEMIDE 20 MG/1
20 TABLET ORAL DAILY
Qty: 90 TABLET | Refills: 3
Start: 2021-11-28 | End: 2021-12-17 | Stop reason: SDUPTHER

## 2021-11-28 ASSESSMENT — ENCOUNTER SYMPTOMS
FLATUS: 0
DIARRHEA: 0
BACK PAIN: 1
NAUSEA: 0
VOMITING: 0
CONSTIPATION: 0
RESPIRATORY NEGATIVE: 1
ABDOMINAL PAIN: 1

## 2021-11-28 NOTE — PATIENT INSTRUCTIONS
Patient Education      Do not eat or drink tomorrow for ultrasound    If pain worsens, go to ER    Eat a bland, liquid diet    Follow up with PCP tomorrow as scheduled  Learning About Diverticulosis and Diverticulitis  What are diverticulosis and diverticulitis? In diverticulosis and diverticulitis, pouches called diverticula form in the wall of the large intestine, or colon. · In diverticulosis, the pouches do not cause any pain or other symptoms. · In diverticulitis, the pouches get inflamed or infected and cause symptoms. Doctors aren't sure what causes these pouches in the colon. But they think that a low-fiber diet may play a role. Without fiber to add bulk to the stool, the colon has to work harder than normal to push the stool forward. The pressure from this may cause pouches to form in weak spots along the colon. Some people with diverticulosis get diverticulitis. But experts don't know why this happens. What are the symptoms? · In diverticulosis, most people don't have symptoms. But pouches sometimes bleed. · In diverticulitis, symptoms may last from a few hours to a week or more. They include:  ? Belly pain. This is usually in the lower left side. It is sometimes worse when you move. This is the most common symptom. ? Fever and chills. ? Bloating and gas. ? Diarrhea or constipation. ? Nausea and sometimes vomiting.  ? Not feeling like eating. How can you prevent diverticulitis? You may be able to lower your chance of getting diverticulitis. You can do this by taking steps to prevent constipation. · Eat fruits, vegetables, beans, and whole grains every day. These foods are high in fiber. · Drink plenty of fluids. If you have kidney, heart, or liver disease and have to limit fluids, talk with your doctor before you increase the amount of fluids you drink. · Get at least 30 minutes of exercise on most days of the week. Walking is a good choice.  You also may want to do other activities, such as running, swimming, cycling, or playing tennis or team sports. · Take a fiber supplement, such as Citrucel or Metamucil, every day if needed. Read and follow all instructions on the label. · Schedule time each day for a bowel movement. Having a daily routine may help. Take your time and do not strain when having a bowel movement. Some people avoid nuts, seeds, berries, and popcorn. They believe that these foods might get trapped in the diverticula and cause pain. But there is no proof that these foods cause diverticulitis or make it worse. How are these problems treated? · The best way to treat diverticulosis is to avoid constipation. · Treatment for diverticulitis includes antibiotics. It often includes a change in your diet. You may need only liquids at first. Your doctor may suggest pain medicines for pain or belly cramps. In some cases, surgery may be needed. Follow-up care is a key part of your treatment and safety. Be sure to make and go to all appointments, and call your doctor if you are having problems. It's also a good idea to know your test results and keep a list of the medicines you take. Where can you learn more? Go to https://[email or web address].com.Pulmonx. org and sign in to your [employer] account. Enter N816 in the [facility] box to learn more about \"Learning About Diverticulosis and Diverticulitis. \"     If you do not have an account, please click on the \"Sign Up Now\" link. Current as of: February 10, 2021               Content Version: 13.0  © 2006-2021 Healthwise, [facility]. Care instructions adapted under license by [facility] ([facility]). If you have questions about a medical condition or this instruction, always ask your healthcare professional. [place] [address] any warranty or liability for your use of this information.

## 2021-11-28 NOTE — PROGRESS NOTES
[address] [place] [address]   [address], [address]     Phone:  [phone]  Fax:  [phone]      David Sharp is a 63 y.o. female who presents today for her medical conditions/complaints as noted below. David Sharp is c/o of Hip Pain, Abdominal Pain (lower left side), and Dysuria (urine smells and cloudy)      Chief Complaint   Patient presents with    Hip Pain    Abdominal Pain     lower left side    Dysuria     urine smells and cloudy       HPI:     David Sharp presents today for   Abdominal Pain  This is a new problem. The current episode started in the past 7 days (2 days ago). The onset quality is gradual. The problem occurs intermittently. The problem has been waxing and waning. The pain is located in the LLQ. Pain scale: can't given number, but keeps her awake at night. The pain is moderate. The quality of the pain is aching and dull. The abdominal pain radiates to the back. Associated symptoms include arthralgias and myalgias. Pertinent negatives include no constipation (last BM this am, normal), diarrhea, dysuria (foul smelling, cloudy urine), fever, flatus, nausea or vomiting. The pain is aggravated by certain positions. The pain is relieved by nothing. She has tried nothing for the symptoms. The treatment provided no relief. hx diveritculosis   Low back ache. She has not had a recent injury. Not tender with movement, more on left side. She is concerned for UTI.    Past Medical History:   Diagnosis Date    Alcohol abuse 2/6/2019    Allergic rhinitis     Diabetes mellitus (HCC)     GERD (gastroesophageal reflux disease)     Hyperlipidemia     Hypertension     Postmenopausal 1/3/2019    Sleep apnea         Past Surgical History:   Procedure Laterality Date    CHOLECYSTECTOMY      KNEE ARTHROPLASTY      LIANA AND BSO         Social History     Tobacco Use    Smoking status: Never Smoker    Smokeless tobacco: Never Used   Substance Use Topics    Alcohol use: Not on file        Current Outpatient Medications   Medication Sig Dispense Refill    furosemide (LASIX) 20 MG tablet Take 1 tablet by mouth 2 times daily 60 tablet 3    pantoprazole (PROTONIX) 40 MG tablet Take 1 tablet by mouth every morning (before breakfast) 90 tablet 1    potassium chloride (KLOR-CON M) 20 MEQ extended release tablet Take 1 tablet by mouth daily 30 tablet 0    spironolactone (ALDACTONE) 100 MG tablet Take 1 tablet by mouth daily (Patient taking differently: Take 100 mg by mouth 2 times daily ) 60 tablet 3    levothyroxine (SYNTHROID) 50 MCG tablet Take 1 tablet by mouth daily 30 tablet 5    insulin glargine (LANTUS SOLOSTAR) 100 UNIT/ML injection pen Start at 5 units sq nightly 5 pen 3    omeprazole (PRILOSEC) 20 MG delayed release capsule Take 1 capsule by mouth 2 times daily 90 capsule 3     No current facility-administered medications for this visit. Allergies   Allergen Reactions    Amoxicillin-Pot Clavulanate Nausea And Vomiting       Family History   Problem Relation Age of Onset    Cancer Mother         ovarian    Cancer Maternal Grandmother         ovarian    Cancer Paternal Grandmother         ovarian               Review of Systems   Constitutional: Negative for fatigue and fever. HENT: Negative. Respiratory: Negative. Cardiovascular: Negative. Gastrointestinal: Positive for abdominal pain. Negative for constipation (last BM this am, normal), diarrhea, flatus, nausea and vomiting. Genitourinary: Negative for dysuria (foul smelling, cloudy urine). Musculoskeletal: Positive for arthralgias, back pain and myalgias. Objective:     Physical Exam  Vitals and nursing note reviewed. Constitutional:       General: She is not in acute distress. Appearance: Normal appearance. She is well-developed. She is not ill-appearing, toxic-appearing or diaphoretic. HENT:      Head: Normocephalic and atraumatic.       Right Ear: Tympanic membrane, ear canal and Blood, UA POC traced- lysed     pH, UA 5.5     Protein, UA POC neg     Urobilinogen, UA 0.2 E.U. / dl     Leukocytes, UA neg     Nitrite, UA neg     Appearance, Fluid Clear Clear, Slightly Cloudy       Plan:     VSS    UA: neg, send for culture    US ordered for LLQ pain- she wishes to go to [facility] imaging in [facility] tomorrow. Order was given to patient. She refused to go to ER. She states she has an appt with PCP in the morning. She is afebrile and not in pain at this time. I have advised her to go to ER with worsening pain. She states understanding. Return if symptoms worsen or fail to improve. Orders Placed This Encounter   Procedures    Culture, Urine     Order Specific Question:   Specify (ex-cath, midstream, cysto, etc)? Answer:   midstream    US ABDOMEN LIMITED     This procedure can be scheduled via [employer]. Access your [employer] account by visiting [email or web address]. Standing Status:   Future     Standing Expiration Date:   11/28/2022     Order Specific Question:   Reason for exam:     Answer:   LLQ, diverticulitis    POCT Urinalysis no Micro       No orders of the defined types were placed in this encounter. Patient offered educational materials - see patient instructions for any instruction needed. Discussed use, benefit, and side effects of prescribed medications. All patient questions answered. Instructed to continue current medications, diet and exercise. Patient agreed with treatment plan. Follow up as directed. Patient was advised to go to the ED if condition ever becomes emergent.        Electronically signed by RHONDA Rowell on 11/28/2021 at 11:52 AM

## 2021-11-29 ENCOUNTER — TELEPHONE (OUTPATIENT)
Dept: URGENT CARE | Age: 61
End: 2021-11-29

## 2021-11-29 ENCOUNTER — OFFICE VISIT (OUTPATIENT)
Dept: INTERNAL MEDICINE | Age: 61
End: 2021-11-29
Payer: COMMERCIAL

## 2021-11-29 ENCOUNTER — TRANSCRIBE ORDERS (OUTPATIENT)
Dept: ADMINISTRATIVE | Facility: HOSPITAL | Age: 61
End: 2021-11-29

## 2021-11-29 ENCOUNTER — TELEPHONE (OUTPATIENT)
Dept: INTERNAL MEDICINE | Age: 61
End: 2021-11-29

## 2021-11-29 VITALS
HEART RATE: 84 BPM | BODY MASS INDEX: 19.92 KG/M2 | OXYGEN SATURATION: 98 % | SYSTOLIC BLOOD PRESSURE: 100 MMHG | DIASTOLIC BLOOD PRESSURE: 60 MMHG | WEIGHT: 102 LBS

## 2021-11-29 DIAGNOSIS — E11.65 TYPE 2 DIABETES MELLITUS WITH HYPERGLYCEMIA, WITH LONG-TERM CURRENT USE OF INSULIN (HCC): ICD-10-CM

## 2021-11-29 DIAGNOSIS — E10.65 TYPE 1 DIABETES MELLITUS WITH HYPERGLYCEMIA (HCC): ICD-10-CM

## 2021-11-29 DIAGNOSIS — Z79.4 TYPE 2 DIABETES MELLITUS WITH HYPERGLYCEMIA, WITH LONG-TERM CURRENT USE OF INSULIN (HCC): ICD-10-CM

## 2021-11-29 DIAGNOSIS — R10.32 ABDOMINAL PAIN, LEFT LOWER QUADRANT: Primary | ICD-10-CM

## 2021-11-29 DIAGNOSIS — K70.9 ALCOHOLIC LIVER DISEASE (HCC): Primary | ICD-10-CM

## 2021-11-29 DIAGNOSIS — D64.9 ANEMIA, UNSPECIFIED TYPE: ICD-10-CM

## 2021-11-29 DIAGNOSIS — Z13.21 ENCOUNTER FOR VITAMIN DEFICIENCY SCREENING: ICD-10-CM

## 2021-11-29 DIAGNOSIS — N30.00 ACUTE CYSTITIS WITHOUT HEMATURIA: ICD-10-CM

## 2021-11-29 DIAGNOSIS — K70.9 ALCOHOLIC LIVER DISEASE (HCC): ICD-10-CM

## 2021-11-29 DIAGNOSIS — E83.51 HYPOCALCEMIA: Primary | ICD-10-CM

## 2021-11-29 DIAGNOSIS — R53.1 WEAKNESS: ICD-10-CM

## 2021-11-29 DIAGNOSIS — E55.9 VITAMIN D DEFICIENCY: ICD-10-CM

## 2021-11-29 DIAGNOSIS — K57.92 DIVERTICULITIS: ICD-10-CM

## 2021-11-29 LAB
ALBUMIN SERPL-MCNC: 3.4 G/DL (ref 3.5–5.2)
ALP BLD-CCNC: 182 U/L (ref 35–104)
ALT SERPL-CCNC: 12 U/L (ref 5–33)
ANION GAP SERPL CALCULATED.3IONS-SCNC: 11 MMOL/L (ref 7–19)
AST SERPL-CCNC: 15 U/L (ref 5–32)
BACTERIA: NEGATIVE /HPF
BILIRUB SERPL-MCNC: 0.4 MG/DL (ref 0.2–1.2)
BILIRUBIN URINE: NEGATIVE
BLOOD, URINE: ABNORMAL
BUN BLDV-MCNC: 9 MG/DL (ref 6–20)
CALCIUM SERPL-MCNC: 9 MG/DL (ref 8.6–10)
CHLORIDE BLD-SCNC: 86 MMOL/L (ref 98–111)
CLARITY: CLEAR
CO2: 26 MMOL/L (ref 22–29)
COLOR: YELLOW
CREAT SERPL-MCNC: 0.6 MG/DL (ref 0.5–0.9)
CRYSTALS, UA: ABNORMAL /HPF
EPITHELIAL CELLS, UA: 1 /HPF (ref 0–5)
FERRITIN: 149.7 NG/ML (ref 13–150)
FOLATE: >20 NG/ML (ref 4.8–37.3)
GFR AFRICAN AMERICAN: >59
GFR NON-AFRICAN AMERICAN: >60
GLUCOSE BLD-MCNC: 477 MG/DL (ref 74–109)
GLUCOSE URINE: =>1000 MG/DL
HBA1C MFR BLD: 8.3 % (ref 4–6)
HCT VFR BLD CALC: 34.9 % (ref 37–47)
HEMOGLOBIN: 10.7 G/DL (ref 12–16)
HYALINE CASTS: 0 /HPF (ref 0–8)
IRON: 33 UG/DL (ref 37–145)
KETONES, URINE: NEGATIVE MG/DL
LEUKOCYTE ESTERASE, URINE: NEGATIVE
MAGNESIUM: 1.3 MG/DL (ref 1.6–2.6)
MCH RBC QN AUTO: 24.8 PG (ref 27–31)
MCHC RBC AUTO-ENTMCNC: 30.7 G/DL (ref 33–37)
MCV RBC AUTO: 80.8 FL (ref 81–99)
NITRITE, URINE: NEGATIVE
PDW BLD-RTO: 21.4 % (ref 11.5–14.5)
PH UA: 5 (ref 5–8)
PLATELET # BLD: 192 K/UL (ref 130–400)
PMV BLD AUTO: 10.8 FL (ref 9.4–12.3)
POTASSIUM SERPL-SCNC: 4.8 MMOL/L (ref 3.5–5)
PROTEIN UA: NEGATIVE MG/DL
RBC # BLD: 4.32 M/UL (ref 4.2–5.4)
RBC UA: 1 /HPF (ref 0–4)
SODIUM BLD-SCNC: 123 MMOL/L (ref 136–145)
SPECIFIC GRAVITY UA: 1.02 (ref 1–1.03)
TOTAL PROTEIN: 6.3 G/DL (ref 6.6–8.7)
UROBILINOGEN, URINE: 0.2 E.U./DL
VITAMIN B-12: 699 PG/ML (ref 211–946)
VITAMIN D 25-HYDROXY: 11 NG/ML
WBC # BLD: 5.5 K/UL (ref 4.8–10.8)
WBC UA: 6 /HPF (ref 0–5)

## 2021-11-29 PROCEDURE — 99214 OFFICE O/P EST MOD 30 MIN: CPT | Performed by: INTERNAL MEDICINE

## 2021-11-29 PROCEDURE — 3052F HG A1C>EQUAL 8.0%<EQUAL 9.0%: CPT | Performed by: INTERNAL MEDICINE

## 2021-11-29 RX ORDER — LEVOFLOXACIN 250 MG/1
250 TABLET ORAL DAILY
Qty: 7 TABLET | Refills: 0 | Status: SHIPPED | OUTPATIENT
Start: 2021-11-29 | End: 2021-12-06

## 2021-11-29 RX ORDER — METRONIDAZOLE 250 MG/1
250 TABLET ORAL 3 TIMES DAILY
Qty: 21 TABLET | Refills: 0 | Status: SHIPPED | OUTPATIENT
Start: 2021-11-29 | End: 2021-12-06

## 2021-11-29 RX ORDER — ERGOCALCIFEROL 1.25 MG/1
50000 CAPSULE ORAL WEEKLY
Qty: 12 CAPSULE | Refills: 1 | Status: SHIPPED | OUTPATIENT
Start: 2021-11-29 | End: 2022-03-04 | Stop reason: SDUPTHER

## 2021-11-29 RX ORDER — M-VIT,TX,IRON,MINS/CALC/FOLIC 27MG-0.4MG
1 TABLET ORAL DAILY
COMMUNITY

## 2021-11-29 RX ORDER — ONDANSETRON 4 MG/1
4 TABLET, FILM COATED ORAL 3 TIMES DAILY PRN
Qty: 30 TABLET | Refills: 0 | Status: SHIPPED | OUTPATIENT
Start: 2021-11-29 | End: 2022-01-11 | Stop reason: ALTCHOICE

## 2021-11-29 NOTE — TELEPHONE ENCOUNTER
I called [address] this morning and scheduled pt appointment for 8:00 on 11/30/2021 for abdominal ultrasound. Called pt to inform her of appointment date and time. She states that she scheduled appointment with her pcp today for further evaluation. [facility] back and cancelled appointment.

## 2021-11-29 NOTE — PROGRESS NOTES
Chief Complaint   Patient presents with    Urinary Frequency       HPI: Patient is here to follow-up alcoholic liver disease with ascites she continues to improve has some urinary frequency but in general continues to improve has done a great job of staying off alcohol. Today however she is having some left groin and lower abdomen pain some different urinary frequency than just the diuretic. No fever. No diarrhea BMs are better has had a lot of change in her BM but things have been improving if anything.   Pain in left groin and abd - poor sleep- night sweats when asleep    Past Medical History:   Diagnosis Date    Alcohol abuse 2/6/2019    Allergic rhinitis     Diabetes mellitus ([address]     GERD (gastroesophageal reflux disease)     Hyperlipidemia     Hypertension     Postmenopausal 1/3/2019    Sleep apnea        Past Surgical History:   Procedure Laterality Date    CHOLECYSTECTOMY      KNEE ARTHROPLASTY      LIANA AND BSO         Family History   Problem Relation Age of Onset    Cancer Mother         ovarian    Cancer Maternal Grandmother         ovarian    Cancer Paternal Grandmother         ovarian       Social History     Socioeconomic History    Marital status: Single     Spouse name: Not on file    Number of children: Not on file    Years of education: Not on file    Highest education level: Not on file   Occupational History    Not on file   Tobacco Use    Smoking status: Never Smoker    Smokeless tobacco: Never Used   Substance and Sexual Activity    Alcohol use: Not on file    Drug use: Not on file    Sexual activity: Not on file   Other Topics Concern    Not on file   Social History Narrative    Not on file     Social Determinants of Health     Financial Resource Strain: Low Risk     Difficulty of Paying Living Expenses: Not very hard   Food Insecurity: No Food Insecurity    Worried About Running Out of Food in the Last Year: Never true    [address] in the Last Year: Never true   Transportation Needs:     Lack of Transportation (Medical): Not on file    Lack of Transportation (Non-Medical):  Not on file   Physical Activity:     Days of Exercise per Week: Not on file    Minutes of Exercise per Session: Not on file   Stress:     Feeling of Stress : Not on file   Social Connections:     Frequency of Communication with Friends and Family: Not on file    Frequency of Social Gatherings with Friends and Family: Not on file    Attends Taoism Services: Not on file    Active Member of [address] [place] [address] or Organizations: Not on file    Attends Club or Organization Meetings: Not on file    Marital Status: Not on file   Intimate Partner Violence:     Fear of Current or Ex-Partner: Not on file    Emotionally Abused: Not on file    Physically Abused: Not on file    Sexually Abused: Not on file   Housing Stability:     Unable to Pay for Housing in the Last Year: Not on file    Number of [place] in the Last Year: Not on file    Unstable Housing in the Last Year: Not on file       Allergies   Allergen Reactions    Amoxicillin-Pot Clavulanate Nausea And Vomiting       Current Outpatient Medications   Medication Sig Dispense Refill    Multiple Vitamins-Minerals (THERAPEUTIC MULTIVITAMIN-MINERALS) tablet Take 1 tablet by mouth daily      ondansetron (ZOFRAN) 4 MG tablet Take 1 tablet by mouth 3 times daily as needed for Nausea or Vomiting 30 tablet 0    spironolactone (ALDACTONE) 100 MG tablet Take 1 tablet by mouth daily 90 tablet 3    furosemide (LASIX) 20 MG tablet Take 1 tablet by mouth daily 90 tablet 3    pantoprazole (PROTONIX) 40 MG tablet Take 1 tablet by mouth every morning (before breakfast) 90 tablet 1    potassium chloride (KLOR-CON M) 20 MEQ extended release tablet Take 1 tablet by mouth daily 30 tablet 0    levothyroxine (SYNTHROID) 50 MCG tablet Take 1 tablet by mouth daily 30 tablet 5    insulin glargine (LANTUS SOLOSTAR) 100 UNIT/ML injection pen Start at 5 units sq nightly (Patient taking differently: Inject 8 Units into the skin nightly Start at 5 units sq nightly) 5 pen 3    vitamin D (ERGOCALCIFEROL) 1.25 MG (47803 UT) CAPS capsule Take 1 capsule by mouth once a week 12 capsule 1    omeprazole (PRILOSEC) 20 MG delayed release capsule Take 1 capsule by mouth 2 times daily (Patient not taking: Reported on 11/29/2021) 90 capsule 3     No current facility-administered medications for this visit. Review of Systems    /60   Pulse 84   Wt 102 lb (46.3 kg)   SpO2 98%   BMI 19.92 kg/m²   BP Readings from Last 7 Encounters:   12/03/21 102/68   11/29/21 100/60   11/28/21 113/75   11/11/21 122/70   11/03/21 121/72   11/02/21 (!) 118/58   10/25/21 122/60     Wt Readings from Last 7 Encounters:   12/03/21 103 lb (46.7 kg)   11/29/21 102 lb (46.3 kg)   11/28/21 102 lb 12.8 oz (46.6 kg)   11/11/21 122 lb (55.3 kg)   11/02/21 115 lb (52.2 kg)   10/25/21 129 lb (58.5 kg)   10/11/21 126 lb (57.2 kg)     BMI Readings from Last 7 Encounters:   12/03/21 20.12 kg/m²   11/29/21 19.92 kg/m²   11/28/21 20.08 kg/m²   11/11/21 23.83 kg/m²   11/02/21 22.46 kg/m²   10/25/21 25.19 kg/m²   10/11/21 24.61 kg/m²     Resp Readings from Last 7 Encounters:   12/03/21 16   11/28/21 18       Physical Exam  Constitutional:       General: She is not in acute distress. Eyes:      General: No scleral icterus. Cardiovascular:      Heart sounds: Normal heart sounds. Pulmonary:      Breath sounds: Normal breath sounds. Abdominal:      General: Bowel sounds are normal. There is distension. Palpations: Abdomen is soft. Tenderness: There is abdominal tenderness. Musculoskeletal:      Cervical back: Neck supple. Lymphadenopathy:      Cervical: No cervical adenopathy. Skin:     Findings: No rash.    Psychiatric:         Mood and Affect: Mood normal.         Results for orders placed or performed in visit on 11/28/21   Culture, Urine    Specimen: Urine, clean catch   Result Value Ref Range    Urine Culture, Routine <50,000 CFU/ml (A)     Organism Strep agalactiae (Beta Strep Group B) (A)     Urine Culture, Routine       Light growth  No further workup  Susceptibility testing of penicillin and other beta lactams is  not necessary for beta hemolytic Streptococci since resistant  strains have not been identified. (CLSI M100)      Organism Escherichia coli ESBL (A)     Urine Culture, Routine (A)      Light growth  CONTACT PRECAUTIONS INDICATED  Carbapenem antibiotics are the best option for infections caused  by ESBL producing organisms. Other antibiotic classes are  likely to result in treatment failure, even for organisms  demonstrating in vitro susceptibility. Susceptibility    Escherichia coli (esbl) - BACTERIAL SUSCEPTIBILITY PANEL BY NOÉ     ampicillin >=32 Resistant mcg/mL     aztreonam  Resistant mcg/mL     ceFAZolin >=64 Resistant mcg/mL     cefepime  Resistant mcg/mL     cefTRIAXone >=64 Resistant mcg/mL     ertapenem <=0.5 Sensitive mcg/mL     gentamicin >=16 Resistant mcg/mL     levofloxacin >=8 Resistant mcg/mL     meropenem <=0.25 Sensitive mcg/mL     nitrofurantoin <=16 Sensitive mcg/mL     tobramycin >=16 Resistant mcg/mL     trimethoprim-sulfamethoxazole <=20 Sensitive mcg/mL   POCT Urinalysis no Micro   Result Value Ref Range    Color, UA yellow     Clarity, UA clear     Glucose, UA  mg/ dl     Bilirubin, UA neg     Ketones, UA neg     Spec Grav, UA 1.010     Blood, UA POC traced- lysed     pH, UA 5.5     Protein, UA POC neg     Urobilinogen, UA 0.2 E.U. / dl     Leukocytes, UA neg     Nitrite, UA neg     Appearance, Fluid Clear Clear, Slightly Cloudy       ASSESSMENT/ PLAN:  1.  Alcoholic liver disease ([facility]  I reviewed her urgent care visit they had ordered another abdominal ultrasound she is just had several of these recently we will get a hold off on that she does have diverticuli and this could be consistent with diverticulitis a lot of discomfort in the left lower quadrant and tender in that area will get a treat her for diverticulitis reassess her urine follow-up closely. She has done a great job of staying off alcohol continue with the diuretics and the plan of care we will recheck her labs we will continue to adjust her medication. 2. Type 2 diabetes mellitus with hyperglycemia, with long-term current use of insulin (HCC)  Her blood sugars are running better when she checks them we will continue this current plan of care and proceed from there  - TSH without Reflex; Future    3. Diverticulitis  Mckenna presumptively treat this is diverticulitis also follow-up on urine and follow her closely if fever chills worsening status increased abdominal distention blood in the urine or change in BM she knows to let us know she knows that Levaquin and Flagyl can cause some side effects discussed medications plan of care follow-up  - levoFLOXacin (LEVAQUIN) 250 MG tablet; Take 1 tablet by mouth daily for 7 days  Dispense: 7 tablet; Refill: 0  - metroNIDAZOLE (FLAGYL) 250 MG tablet; Take 1 tablet by mouth 3 times daily for 7 days  Dispense: 21 tablet; Refill: 0    4.  Weakness  She is done a great job staying off alcohol recouping getting better she is very weak when refer her to physical therapy much of the Levaquin regarding weakness is it could cause some achiness tendinitis follow closely  - External Referral To Physical Therapy    Labs reconciled and reviewed and urgent care note reconciled and reviewed

## 2021-11-30 ENCOUNTER — APPOINTMENT (OUTPATIENT)
Dept: ULTRASOUND IMAGING | Facility: HOSPITAL | Age: 61
End: 2021-11-30

## 2021-11-30 LAB
ORGANISM: ABNORMAL
ORGANISM: ABNORMAL
URINE CULTURE, ROUTINE: ABNORMAL

## 2021-12-03 ENCOUNTER — OFFICE VISIT (OUTPATIENT)
Dept: INTERNAL MEDICINE | Age: 61
End: 2021-12-03
Payer: COMMERCIAL

## 2021-12-03 VITALS
OXYGEN SATURATION: 100 % | BODY MASS INDEX: 20.22 KG/M2 | WEIGHT: 103 LBS | HEIGHT: 60 IN | HEART RATE: 84 BPM | SYSTOLIC BLOOD PRESSURE: 102 MMHG | RESPIRATION RATE: 16 BRPM | DIASTOLIC BLOOD PRESSURE: 68 MMHG

## 2021-12-03 DIAGNOSIS — E10.9 TYPE 1 DIABETES MELLITUS WITHOUT COMPLICATION (HCC): ICD-10-CM

## 2021-12-03 DIAGNOSIS — D50.9 IRON DEFICIENCY ANEMIA, UNSPECIFIED IRON DEFICIENCY ANEMIA TYPE: ICD-10-CM

## 2021-12-03 DIAGNOSIS — F10.10 ALCOHOL ABUSE: ICD-10-CM

## 2021-12-03 DIAGNOSIS — E55.9 VITAMIN D DEFICIENCY: ICD-10-CM

## 2021-12-03 DIAGNOSIS — K70.9 ALCOHOLIC LIVER DISEASE (HCC): ICD-10-CM

## 2021-12-03 DIAGNOSIS — R53.1 WEAKNESS: Primary | ICD-10-CM

## 2021-12-03 PROCEDURE — 99214 OFFICE O/P EST MOD 30 MIN: CPT | Performed by: INTERNAL MEDICINE

## 2021-12-03 PROCEDURE — 3052F HG A1C>EQUAL 8.0%<EQUAL 9.0%: CPT | Performed by: INTERNAL MEDICINE

## 2021-12-03 NOTE — PROGRESS NOTES
Chief Complaint   Patient presents with    Follow-up     Patient reports doing better        HPI: Patient is here to follow-up ascites liver failure she is improving no edema today feeling better has had a lot of stress and grief but doing okay. Physical weakness. BMs are better and more normal.  Staying off alcohol. Some abdominal fullness and lower extremity edema that is new since being off diuretics    Past Medical History:   Diagnosis Date    Alcohol abuse 2/6/2019    Allergic rhinitis     Diabetes mellitus ([address]     GERD (gastroesophageal reflux disease)     Hyperlipidemia     Hypertension     Postmenopausal 1/3/2019    Sleep apnea        Past Surgical History:   Procedure Laterality Date    CHOLECYSTECTOMY      KNEE ARTHROPLASTY      LIANA AND BSO         Family History   Problem Relation Age of Onset    Cancer Mother         ovarian    Cancer Maternal Grandmother         ovarian    Cancer Paternal Grandmother         ovarian       Social History     Socioeconomic History    Marital status: Single     Spouse name: Not on file    Number of children: Not on file    Years of education: Not on file    Highest education level: Not on file   Occupational History    Not on file   Tobacco Use    Smoking status: Never Smoker    Smokeless tobacco: Never Used   Substance and Sexual Activity    Alcohol use: Not on file    Drug use: Not on file    Sexual activity: Not on file   Other Topics Concern    Not on file   Social History Narrative    Not on file     Social Determinants of Health     Financial Resource Strain: Low Risk     Difficulty of Paying Living Expenses: Not very hard   Food Insecurity: No Food Insecurity    Worried About Running Out of Food in the Last Year: Never true    [place]enn[place] of Food in the Last Year: Never true   Transportation Needs:     Lack of Transportation (Medical): Not on file    Lack of Transportation (Non-Medical):  Not on file   Physical Activity:     Days of (NeocutisYLE MARICRUZ 14 DAY SENSOR) [facility] [place] as directed 2 each 5     No current facility-administered medications for this visit. Review of Systems    /68   Pulse 84   Resp 16   Ht 5' (1.524 m)   Wt 103 lb (46.7 kg)   SpO2 100%   BMI 20.12 kg/m²   BP Readings from Last 7 Encounters:   12/10/21 110/68   12/03/21 102/68   11/29/21 100/60   11/28/21 113/75   11/11/21 122/70   11/03/21 121/72   11/02/21 (!) 118/58     Wt Readings from Last 7 Encounters:   12/10/21 108 lb 6.4 oz (49.2 kg)   12/03/21 103 lb (46.7 kg)   11/29/21 102 lb (46.3 kg)   11/28/21 102 lb 12.8 oz (46.6 kg)   11/11/21 122 lb (55.3 kg)   11/02/21 115 lb (52.2 kg)   10/25/21 129 lb (58.5 kg)     BMI Readings from Last 7 Encounters:   12/10/21 21.17 kg/m²   12/03/21 20.12 kg/m²   11/29/21 19.92 kg/m²   11/28/21 20.08 kg/m²   11/11/21 23.83 kg/m²   11/02/21 22.46 kg/m²   10/25/21 25.19 kg/m²     Resp Readings from Last 7 Encounters:   12/03/21 16   11/28/21 18       Physical Exam  Constitutional:       General: She is not in acute distress. Eyes:      General: No scleral icterus. Cardiovascular:      Heart sounds: Normal heart sounds. Pulmonary:      Breath sounds: Normal breath sounds. Abdominal:      General: There is distension. Palpations: Abdomen is soft. Musculoskeletal:      Cervical back: Neck supple. Right lower leg: Edema present. Left lower leg: Edema present. Comments: Slight edema    Lymphadenopathy:      Cervical: No cervical adenopathy. Skin:     Findings: No rash. Neurological:      Motor: Weakness present.          Results for orders placed or performed in visit on 11/29/21   Hemoglobin A1C   Result Value Ref Range    Hemoglobin A1C 8.3 (H) 4.0 - 6.0 %   Vitamin D 25 Hydroxy   Result Value Ref Range    Vit D, 25-Hydroxy 11.0 (L) >=30 ng/mL   Ferritin   Result Value Ref Range    Ferritin 149.7 13.0 - 150.0 ng/mL   Iron   Result Value Ref Range    Iron 33 (L) 37 - 145 ug/dL   Folate   Result Value Ref Range    Folate >20.0 4.8 - 37.3 ng/mL   Vitamin B12   Result Value Ref Range    Vitamin B-12 699 211 - 946 pg/mL   Urinalysis Reflex to Culture    Specimen: Urine, clean catch   Result Value Ref Range    Color, UA YELLOW Straw/Yellow    Clarity, UA Clear Clear    Glucose, Ur =>1000 Negative mg/dL    Bilirubin Urine Negative Negative    Ketones, Urine Negative Negative mg/dL    Specific Gravity, UA 1.017 1.005 - 1.030    Blood, Urine TRACE (A) Negative    pH, UA 5.0 5.0 - 8.0    Protein, UA Negative Negative mg/dL    Urobilinogen, Urine 0.2 <2.0 E.U./dL    Nitrite, Urine Negative Negative    Leukocyte Esterase, Urine Negative Negative   Magnesium   Result Value Ref Range    Magnesium 1.3 (L) 1.6 - 2.6 mg/dL   Comprehensive Metabolic Panel   Result Value Ref Range    Sodium 123 (L) 136 - 145 mmol/L    Potassium 4.8 3.5 - 5.0 mmol/L    Chloride 86 (L) 98 - 111 mmol/L    CO2 26 22 - 29 mmol/L    Anion Gap 11 7 - 19 mmol/L    Glucose 477 (H) 74 - 109 mg/dL    BUN 9 6 - 20 mg/dL    CREATININE 0.6 0.5 - 0.9 mg/dL    GFR Non-African American >60 >60    GFR African American >59 >59    Calcium 9.0 8.6 - 10.0 mg/dL    Total Protein 6.3 (L) 6.6 - 8.7 g/dL    Albumin 3.4 (L) 3.5 - 5.2 g/dL    Total Bilirubin 0.4 0.2 - 1.2 mg/dL    Alkaline Phosphatase 182 (H) 35 - 104 U/L    ALT 12 5 - 33 U/L    AST 15 5 - 32 U/L   CBC   Result Value Ref Range    WBC 5.5 4.8 - 10.8 K/uL    RBC 4.32 4.20 - 5.40 M/uL    Hemoglobin 10.7 (L) 12.0 - 16.0 g/dL    Hematocrit 34.9 (L) 37.0 - 47.0 %    MCV 80.8 (L) 81.0 - 99.0 fL    MCH 24.8 (L) 27.0 - 31.0 pg    MCHC 30.7 (L) 33.0 - 37.0 g/dL    RDW 21.4 (H) 11.5 - 14.5 %    Platelets 799 909 - 728 K/uL    MPV 10.8 9.4 - 12.3 fL   Microscopic Urinalysis   Result Value Ref Range    Bacteria, UA NEGATIVE (A) None Seen /HPF    Crystals, UA NEG (A) None Seen /HPF    Hyaline Casts, UA 0 0 - 8 /HPF    WBC, UA 6 (H) 0 - 5 /HPF    RBC, UA 1 0 - 4 /HPF    Epithelial Cells, UA 1 0 - 5 /HPF ASSESSMENT/ PLAN:  1. Weakness  We want to make referral to physical therapy keep working with healthy diet follow  - External Referral To Physical Therapy    2. Alcoholic liver disease ([facility]  For now go back on 20mg lasix daily and 100mg aldactone daily with weight up 5 pounds. -  sodium was 123 chloride 86 last check we will check again in a week and keep adjusting    3. Iron deficiency anemia, unspecified iron deficiency anemia type  Multifactorial iron deficiency anemia had recent EGD colonoscopy is having severe issues with bowel movements we also could palpate a rectal abnormality that colonoscopy showed to be internal hemorrhoids. Healthy diet follow reassess labs want to be cautious with iron therapy due to constipation abnormal BMs etc.    4. Alcohol abuse  I feel alcohol rehab would still be very important she wants to gain her strength consider an outpatient day program or inpatient program    5. Type 1 diabetes mellitus without complication ([facility]  Diabetes not well controlled I do not think is been controlled for years from the sounds of it because she is not really had consistent care we are monitoring this having to be a little cautious because of the liver disease working hemoglobin A1c was 8.3 which is probably better than previous     6. Vitamin D deficiency  Replace and follow closely - likely lower dose in a few months    Chart, medications, labs, vaccines reviewed. Keep up to date with routine care and follow up. Call with any problems or complaints. Keep up to date with routine screening recomendations and vaccines.   We need to clarify her COVID-19 vaccine status check hep C with future lab

## 2021-12-09 DIAGNOSIS — E83.51 HYPOCALCEMIA: ICD-10-CM

## 2021-12-09 DIAGNOSIS — Z79.4 TYPE 2 DIABETES MELLITUS WITH HYPERGLYCEMIA, WITH LONG-TERM CURRENT USE OF INSULIN (HCC): ICD-10-CM

## 2021-12-09 DIAGNOSIS — E11.65 TYPE 2 DIABETES MELLITUS WITH HYPERGLYCEMIA, WITH LONG-TERM CURRENT USE OF INSULIN (HCC): ICD-10-CM

## 2021-12-09 LAB
ALBUMIN SERPL-MCNC: 3.5 G/DL (ref 3.5–5.2)
ALP BLD-CCNC: 170 U/L (ref 35–104)
ALT SERPL-CCNC: 20 U/L (ref 5–33)
ANION GAP SERPL CALCULATED.3IONS-SCNC: 10 MMOL/L (ref 7–19)
AST SERPL-CCNC: 35 U/L (ref 5–32)
BILIRUB SERPL-MCNC: 0.3 MG/DL (ref 0.2–1.2)
BUN BLDV-MCNC: 5 MG/DL (ref 6–20)
CALCIUM SERPL-MCNC: 9.2 MG/DL (ref 8.6–10)
CHLORIDE BLD-SCNC: 95 MMOL/L (ref 98–111)
CO2: 27 MMOL/L (ref 22–29)
CREAT SERPL-MCNC: 0.8 MG/DL (ref 0.5–0.9)
GFR AFRICAN AMERICAN: >59
GFR NON-AFRICAN AMERICAN: >60
GLUCOSE BLD-MCNC: 375 MG/DL (ref 74–109)
MAGNESIUM: 1.6 MG/DL (ref 1.6–2.6)
POTASSIUM SERPL-SCNC: 4.3 MMOL/L (ref 3.5–5)
SODIUM BLD-SCNC: 132 MMOL/L (ref 136–145)
TOTAL PROTEIN: 6.2 G/DL (ref 6.6–8.7)
TSH SERPL DL<=0.05 MIU/L-ACNC: 1.02 UIU/ML (ref 0.27–4.2)

## 2021-12-10 ENCOUNTER — OFFICE VISIT (OUTPATIENT)
Dept: INTERNAL MEDICINE | Age: 61
End: 2021-12-10
Payer: COMMERCIAL

## 2021-12-10 VITALS
HEART RATE: 79 BPM | OXYGEN SATURATION: 98 % | WEIGHT: 108.4 LBS | DIASTOLIC BLOOD PRESSURE: 68 MMHG | BODY MASS INDEX: 21.28 KG/M2 | SYSTOLIC BLOOD PRESSURE: 110 MMHG | HEIGHT: 60 IN

## 2021-12-10 DIAGNOSIS — K70.9 ALCOHOLIC LIVER DISEASE (HCC): ICD-10-CM

## 2021-12-10 DIAGNOSIS — D50.9 IRON DEFICIENCY ANEMIA, UNSPECIFIED IRON DEFICIENCY ANEMIA TYPE: ICD-10-CM

## 2021-12-10 DIAGNOSIS — E10.9 TYPE 1 DIABETES MELLITUS WITHOUT COMPLICATION (HCC): Primary | ICD-10-CM

## 2021-12-10 PROCEDURE — 99213 OFFICE O/P EST LOW 20 MIN: CPT | Performed by: INTERNAL MEDICINE

## 2021-12-10 PROCEDURE — 3052F HG A1C>EQUAL 8.0%<EQUAL 9.0%: CPT | Performed by: INTERNAL MEDICINE

## 2021-12-10 NOTE — PROGRESS NOTES
Chief Complaint   Patient presents with    Follow-up       HPI: Patient is here today to follow-up alcoholic liver disease type 1 diabetes blood sugar running too high we need to get this better adjusted have really been focused on her liver disease and other medical issues and possible hypoglycemia with 2 large of adjustments. Patient starting to hold fluid in her abdomen and ankles again. Past Medical History:   Diagnosis Date    Alcohol abuse 2/6/2019    Allergic rhinitis     Diabetes mellitus (HCC)     GERD (gastroesophageal reflux disease)     Hyperlipidemia     Hypertension     Postmenopausal 1/3/2019    Sleep apnea        Past Surgical History:   Procedure Laterality Date    CHOLECYSTECTOMY      KNEE ARTHROPLASTY      LIANA AND BSO         Family History   Problem Relation Age of Onset    Cancer Mother         ovarian    Cancer Maternal Grandmother         ovarian    Cancer Paternal Grandmother         ovarian       Social History     Socioeconomic History    Marital status: Single     Spouse name: Not on file    Number of children: Not on file    Years of education: Not on file    Highest education level: Not on file   Occupational History    Not on file   Tobacco Use    Smoking status: Never Smoker    Smokeless tobacco: Never Used   Substance and Sexual Activity    Alcohol use: Not on file    Drug use: Not on file    Sexual activity: Not on file   Other Topics Concern    Not on file   Social History Narrative    Not on file     Social Determinants of Health     Financial Resource Strain: Low Risk     Difficulty of Paying Living Expenses: Not very hard   Food Insecurity: No Food Insecurity    Worried About Running Out of Food in the Last Year: Never true    Dennys of Food in the Last Year: Never true   Transportation Needs:     Lack of Transportation (Medical): Not on file    Lack of Transportation (Non-Medical):  Not on file   Physical Activity:     Days of Exercise per Week: Not on file    Minutes of Exercise per Session: Not on file   Stress:     Feeling of Stress : Not on file   Social Connections:     Frequency of Communication with Friends and Family: Not on file    Frequency of Social Gatherings with Friends and Family: Not on file    Attends Orthodoxy Services: Not on file    Active Member of [address] [place] [address] or Organizations: Not on file    Attends Club or Organization Meetings: Not on file    Marital Status: Not on file   Intimate Partner Violence:     Fear of Current or Ex-Partner: Not on file    Emotionally Abused: Not on file    Physically Abused: Not on file    Sexually Abused: Not on file   Housing Stability:     Unable to Pay for Housing in the Last Year: Not on file    Number of [place] in the Last Year: Not on file    Unstable Housing in the Last Year: Not on file       Allergies   Allergen Reactions    Amoxicillin-Pot Clavulanate Nausea And Vomiting       Current Outpatient Medications   Medication Sig Dispense Refill    Multiple Vitamins-Minerals (THERAPEUTIC MULTIVITAMIN-MINERALS) tablet Take 1 tablet by mouth daily      ondansetron (ZOFRAN) 4 MG tablet Take 1 tablet by mouth 3 times daily as needed for Nausea or Vomiting 30 tablet 0    vitamin D (ERGOCALCIFEROL) 1.25 MG (12328 UT) CAPS capsule Take 1 capsule by mouth once a week 12 capsule 1    pantoprazole (PROTONIX) 40 MG tablet Take 1 tablet by mouth every morning (before breakfast) 90 tablet 1    levothyroxine (SYNTHROID) 50 MCG tablet Take 1 tablet by mouth daily 30 tablet 5    spironolactone (ALDACTONE) 100 MG tablet Take 1 tablet by mouth daily 90 tablet 3    furosemide (LASIX) 20 MG tablet Take 1 tablet by mouth daily 90 tablet 3    insulin glargine (LANTUS SOLOSTAR) 100 UNIT/ML injection pen Inject 12 Units into the skin nightly 5 pen 3    Continuous Blood Gluc  (FREESTYLE MARICRUZ 14 DAY READER) JUSTYNA Use as directed 2 each 5    Continuous Blood Gluc Sensor (FREESTYLE MARICRUZ 14 DAY SENSOR) MISC Use as directed 2 each 5     No current facility-administered medications for this visit. Review of Systems    /68   Pulse 79   Ht 5' (1.524 m)   Wt 108 lb 6.4 oz (49.2 kg)   SpO2 98%   BMI 21.17 kg/m²   BP Readings from Last 7 Encounters:   12/10/21 110/68   12/03/21 102/68   11/29/21 100/60   11/28/21 113/75   11/11/21 122/70   11/03/21 121/72   11/02/21 (!) 118/58     Wt Readings from Last 7 Encounters:   12/10/21 108 lb 6.4 oz (49.2 kg)   12/03/21 103 lb (46.7 kg)   11/29/21 102 lb (46.3 kg)   11/28/21 102 lb 12.8 oz (46.6 kg)   11/11/21 122 lb (55.3 kg)   11/02/21 115 lb (52.2 kg)   10/25/21 129 lb (58.5 kg)     BMI Readings from Last 7 Encounters:   12/10/21 21.17 kg/m²   12/03/21 20.12 kg/m²   11/29/21 19.92 kg/m²   11/28/21 20.08 kg/m²   11/11/21 23.83 kg/m²   11/02/21 22.46 kg/m²   10/25/21 25.19 kg/m²     Resp Readings from Last 7 Encounters:   12/03/21 16   11/28/21 18       Physical Exam  Constitutional:       General: She is not in acute distress. Eyes:      General: No scleral icterus. Cardiovascular:      Heart sounds: Normal heart sounds. Pulmonary:      Breath sounds: Normal breath sounds. Abdominal:      General: There is distension. Palpations: Abdomen is soft. Musculoskeletal:      Cervical back: Neck supple. Right lower leg: Edema present. Left lower leg: Edema present. Lymphadenopathy:      Cervical: No cervical adenopathy. Skin:     Findings: No rash.          Results for orders placed or performed in visit on 12/09/21   Magnesium   Result Value Ref Range    Magnesium 1.6 1.6 - 2.6 mg/dL   Comprehensive Metabolic Panel   Result Value Ref Range    Sodium 132 (L) 136 - 145 mmol/L    Potassium 4.3 3.5 - 5.0 mmol/L    Chloride 95 (L) 98 - 111 mmol/L    CO2 27 22 - 29 mmol/L    Anion Gap 10 7 - 19 mmol/L    Glucose 375 (H) 74 - 109 mg/dL    BUN 5 (L) 6 - 20 mg/dL    CREATININE 0.8 0.5 - 0.9 mg/dL    GFR Non-African American >60 >60    GFR African American >59 >59    Calcium 9.2 8.6 - 10.0 mg/dL    Total Protein 6.2 (L) 6.6 - 8.7 g/dL    Albumin 3.5 3.5 - 5.2 g/dL    Total Bilirubin 0.3 0.2 - 1.2 mg/dL    Alkaline Phosphatase 170 (H) 35 - 104 U/L    ALT 20 5 - 33 U/L    AST 35 (H) 5 - 32 U/L   TSH without Reflex   Result Value Ref Range    TSH 1.020 0.270 - 4.200 uIU/mL       ASSESSMENT/ PLAN:  1. Type 1 diabetes mellitus without complication ([facility]  She needs to stay on 100mg aldactone daily and lasix 20mg daily we will check lab again in about a week we will follow  - Comprehensive Metabolic Panel; Future  - CBC; Future    2. Alcoholic liver disease ([facility]  Still monitoring closely she says she is completely still off alcohol we congratulated her on this she knows the importance and seems resolved. Inpatient or intensive outpatient therapy would be helpful. 3. Iron deficiency anemia, unspecified iron deficiency anemia type  Check CBC next lab continue current care and follow avoiding iron because of bowel irregularities hopefully next lab will have improved.

## 2021-12-11 LAB — FUNGUS WND CULT: NORMAL

## 2021-12-16 DIAGNOSIS — E10.9 TYPE 1 DIABETES MELLITUS WITHOUT COMPLICATION (HCC): ICD-10-CM

## 2021-12-16 LAB
ALBUMIN SERPL-MCNC: 3.5 G/DL (ref 3.5–5.2)
ALP BLD-CCNC: 188 U/L (ref 35–104)
ALT SERPL-CCNC: 17 U/L (ref 5–33)
ANION GAP SERPL CALCULATED.3IONS-SCNC: 10 MMOL/L (ref 7–19)
AST SERPL-CCNC: 27 U/L (ref 5–32)
BILIRUB SERPL-MCNC: 0.3 MG/DL (ref 0.2–1.2)
BUN BLDV-MCNC: 5 MG/DL (ref 6–20)
CALCIUM SERPL-MCNC: 9.3 MG/DL (ref 8.6–10)
CHLORIDE BLD-SCNC: 94 MMOL/L (ref 98–111)
CO2: 27 MMOL/L (ref 22–29)
CREAT SERPL-MCNC: 0.6 MG/DL (ref 0.5–0.9)
GFR AFRICAN AMERICAN: >59
GFR NON-AFRICAN AMERICAN: >60
GLUCOSE BLD-MCNC: 517 MG/DL (ref 74–109)
HCT VFR BLD CALC: 36 % (ref 37–47)
HEMOGLOBIN: 11 G/DL (ref 12–16)
MCH RBC QN AUTO: 25.8 PG (ref 27–31)
MCHC RBC AUTO-ENTMCNC: 30.6 G/DL (ref 33–37)
MCV RBC AUTO: 84.5 FL (ref 81–99)
PDW BLD-RTO: 19.6 % (ref 11.5–14.5)
PLATELET # BLD: 151 K/UL (ref 130–400)
PMV BLD AUTO: 9.9 FL (ref 9.4–12.3)
POTASSIUM SERPL-SCNC: 4.1 MMOL/L (ref 3.5–5)
RBC # BLD: 4.26 M/UL (ref 4.2–5.4)
SODIUM BLD-SCNC: 131 MMOL/L (ref 136–145)
TOTAL PROTEIN: 6 G/DL (ref 6.6–8.7)
WBC # BLD: 3 K/UL (ref 4.8–10.8)

## 2021-12-17 DIAGNOSIS — K70.31 ASCITES DUE TO ALCOHOLIC CIRRHOSIS (HCC): ICD-10-CM

## 2021-12-17 DIAGNOSIS — K70.11 ASCITES DUE TO ALCOHOLIC HEPATITIS: ICD-10-CM

## 2021-12-17 DIAGNOSIS — E10.9 TYPE 1 DIABETES MELLITUS WITHOUT COMPLICATION (HCC): ICD-10-CM

## 2021-12-17 RX ORDER — INSULIN GLARGINE 100 [IU]/ML
12 INJECTION, SOLUTION SUBCUTANEOUS NIGHTLY
Qty: 5 PEN | Refills: 3
Start: 2021-12-17 | End: 2021-12-21 | Stop reason: SDUPTHER

## 2021-12-17 RX ORDER — FLASH GLUCOSE SENSOR
KIT MISCELLANEOUS
Qty: 2 EACH | Refills: 5 | Status: SHIPPED | OUTPATIENT
Start: 2021-12-17 | End: 2022-01-21 | Stop reason: ALTCHOICE

## 2021-12-17 RX ORDER — FUROSEMIDE 20 MG/1
20 TABLET ORAL DAILY
Qty: 90 TABLET | Refills: 3
Start: 2021-12-17 | End: 2022-03-18

## 2021-12-17 RX ORDER — FLASH GLUCOSE SCANNING READER
EACH MISCELLANEOUS
Qty: 2 EACH | Refills: 5 | Status: SHIPPED | OUTPATIENT
Start: 2021-12-17 | End: 2022-01-21 | Stop reason: ALTCHOICE

## 2021-12-17 RX ORDER — SPIRONOLACTONE 100 MG/1
100 TABLET, FILM COATED ORAL DAILY
Qty: 90 TABLET | Refills: 3
Start: 2021-12-17 | End: 2022-03-22

## 2021-12-19 PROBLEM — E55.9 VITAMIN D DEFICIENCY: Status: ACTIVE | Noted: 2021-12-19

## 2021-12-21 ENCOUNTER — TELEPHONE (OUTPATIENT)
Dept: INTERNAL MEDICINE | Age: 61
End: 2021-12-21

## 2021-12-21 DIAGNOSIS — E10.9 TYPE 1 DIABETES MELLITUS WITHOUT COMPLICATION (HCC): ICD-10-CM

## 2021-12-21 RX ORDER — INSULIN LISPRO 100 [IU]/ML
INJECTION, SUSPENSION SUBCUTANEOUS
Qty: 5 PEN | Refills: 3 | Status: SHIPPED | OUTPATIENT
Start: 2021-12-21 | End: 2022-01-11

## 2021-12-21 RX ORDER — INSULIN GLARGINE 100 [IU]/ML
12 INJECTION, SOLUTION SUBCUTANEOUS NIGHTLY
Qty: 5 PEN | Refills: 3 | Status: SHIPPED | OUTPATIENT
Start: 2021-12-21 | End: 2022-01-11 | Stop reason: SDUPTHER

## 2022-01-10 DIAGNOSIS — E10.9 TYPE 1 DIABETES MELLITUS WITHOUT COMPLICATION (HCC): ICD-10-CM

## 2022-01-10 LAB
ALBUMIN SERPL-MCNC: 4 G/DL (ref 3.5–5.2)
ALP BLD-CCNC: 212 U/L (ref 35–104)
ALT SERPL-CCNC: 18 U/L (ref 5–33)
ANION GAP SERPL CALCULATED.3IONS-SCNC: 9 MMOL/L (ref 7–19)
AST SERPL-CCNC: 23 U/L (ref 5–32)
BILIRUB SERPL-MCNC: 0.4 MG/DL (ref 0.2–1.2)
BUN BLDV-MCNC: 9 MG/DL (ref 6–20)
CALCIUM SERPL-MCNC: 10.9 MG/DL (ref 8.6–10)
CHLORIDE BLD-SCNC: 93 MMOL/L (ref 98–111)
CO2: 33 MMOL/L (ref 22–29)
CREAT SERPL-MCNC: 0.6 MG/DL (ref 0.5–0.9)
GFR AFRICAN AMERICAN: >59
GFR NON-AFRICAN AMERICAN: >60
GLUCOSE BLD-MCNC: 197 MG/DL (ref 74–109)
POTASSIUM SERPL-SCNC: 4.7 MMOL/L (ref 3.5–5)
SODIUM BLD-SCNC: 135 MMOL/L (ref 136–145)
TOTAL PROTEIN: 6.7 G/DL (ref 6.6–8.7)
TSH SERPL DL<=0.05 MIU/L-ACNC: 1.45 UIU/ML (ref 0.27–4.2)

## 2022-01-11 ENCOUNTER — OFFICE VISIT (OUTPATIENT)
Dept: INTERNAL MEDICINE | Age: 62
End: 2022-01-11
Payer: COMMERCIAL

## 2022-01-11 VITALS
WEIGHT: 102 LBS | OXYGEN SATURATION: 98 % | BODY MASS INDEX: 20.03 KG/M2 | DIASTOLIC BLOOD PRESSURE: 54 MMHG | HEIGHT: 60 IN | SYSTOLIC BLOOD PRESSURE: 100 MMHG | HEART RATE: 90 BPM

## 2022-01-11 DIAGNOSIS — E78.2 MIXED HYPERLIPIDEMIA: ICD-10-CM

## 2022-01-11 DIAGNOSIS — E11.65 TYPE 2 DIABETES MELLITUS WITH HYPERGLYCEMIA, WITH LONG-TERM CURRENT USE OF INSULIN (HCC): Primary | ICD-10-CM

## 2022-01-11 DIAGNOSIS — Z79.4 TYPE 2 DIABETES MELLITUS WITH HYPERGLYCEMIA, WITH LONG-TERM CURRENT USE OF INSULIN (HCC): Primary | ICD-10-CM

## 2022-01-11 DIAGNOSIS — K62.3 PARTIAL RECTAL PROLAPSE: ICD-10-CM

## 2022-01-11 DIAGNOSIS — F10.11 HISTORY OF ALCOHOL ABUSE: ICD-10-CM

## 2022-01-11 PROCEDURE — 99214 OFFICE O/P EST MOD 30 MIN: CPT | Performed by: INTERNAL MEDICINE

## 2022-01-11 RX ORDER — INSULIN GLARGINE 100 [IU]/ML
20 INJECTION, SOLUTION SUBCUTANEOUS NIGHTLY
Qty: 5 PEN | Refills: 3 | Status: SHIPPED | OUTPATIENT
Start: 2022-01-11 | End: 2022-03-09

## 2022-01-11 RX ORDER — CALCIUM CITRATE/VITAMIN D3 200MG-6.25
TABLET ORAL
Qty: 100 EACH | Refills: 3 | Status: SHIPPED | OUTPATIENT
Start: 2022-01-11 | End: 2022-10-19 | Stop reason: SDUPTHER

## 2022-01-11 RX ORDER — INSULIN ASPART 100 [IU]/ML
INJECTION, SOLUTION INTRAVENOUS; SUBCUTANEOUS
Qty: 5 PEN | Refills: 3 | Status: SHIPPED | OUTPATIENT
Start: 2022-01-11 | End: 2022-03-19 | Stop reason: SDUPTHER

## 2022-01-11 NOTE — PROGRESS NOTES
Chief Complaint   Patient presents with    1 Month Follow-Up    Diabetes       HPI: Patient is here today to follow-up diabetes and other medical problems just a lot of confusion about her diabetes she is on Lantus and NovoLog short acting I received a refill request for NovoLog 75/25 and had sent a chart note back that I would not refill that because you do not mix that with Lantus and for staff to clarify the short acting insulin. It appears 75/25 mix was still sent in. Fortunately the patient has not had hypoglycemia with this with make sure. She is still having significant hyperglycemia she has probably had very out-of-control diabetes for a long time she has stopped drinking and that is the biggest factor toward us gaining control she is also very motivated to get in good health and to get good diabetes control she has been very compliant and hard-working about the alcohol cessation she is really  Much better.      Past Medical History:   Diagnosis Date    Alcohol abuse 2/6/2019    Allergic rhinitis     Diabetes mellitus (HCC)     GERD (gastroesophageal reflux disease)     Hyperlipidemia     Hypertension     Postmenopausal 1/3/2019    Sleep apnea        Past Surgical History:   Procedure Laterality Date    CHOLECYSTECTOMY      KNEE ARTHROPLASTY      LIANA AND BSO         Family History   Problem Relation Age of Onset    Cancer Mother         ovarian    Cancer Maternal Grandmother         ovarian    Cancer Paternal Grandmother         ovarian       Social History     Socioeconomic History    Marital status: Single     Spouse name: Not on file    Number of children: Not on file    Years of education: Not on file    Highest education level: Not on file   Occupational History    Not on file   Tobacco Use    Smoking status: Never Smoker    Smokeless tobacco: Never Used   Substance and Sexual Activity    Alcohol use: Not on file    Drug use: Not on file    Sexual activity: Not on file Other Topics Concern    Not on file   Social History Narrative    Not on file     Social Determinants of Health     Financial Resource Strain: Low Risk     Difficulty of Paying Living Expenses: Not very hard   Food Insecurity: No Food Insecurity    Worried About Running Out of Food in the Last Year: Never true    Dennys of Food in the Last Year: Never true   Transportation Needs:     Lack of Transportation (Medical): Not on file    Lack of Transportation (Non-Medical): Not on file   Physical Activity:     Days of Exercise per Week: Not on file    Minutes of Exercise per Session: Not on file   Stress:     Feeling of Stress : Not on file   Social Connections:     Frequency of Communication with Friends and Family: Not on file    Frequency of Social Gatherings with Friends and Family: Not on file    Attends Confucianist Services: Not on file    Active Member of [address] [place] [address] or Organizations: Not on file    Attends Club or Organization Meetings: Not on file    Marital Status: Not on file   Intimate Partner Violence:     Fear of Current or Ex-Partner: Not on file    Emotionally Abused: Not on file    Physically Abused: Not on file    Sexually Abused: Not on file   Housing Stability:     Unable to Pay for Housing in the Last Year: Not on file    Number of [place] in the Last Year: Not on file    Unstable Housing in the Last Year: Not on file       Allergies   Allergen Reactions    Amoxicillin-Pot Clavulanate Nausea And Vomiting       Current Outpatient Medications   Medication Sig Dispense Refill    insulin aspart (NOVOLOG FLEXPEN) 100 UNIT/ML injection pen Take blood sugar divided by 40 units  4 times a day 5 pen 3    insulin glargine (LANTUS SOLOSTAR) 100 UNIT/ML injection pen Inject 20 Units into the skin nightly (Patient taking differently: Inject 18 Units into the skin nightly ) 5 pen 3    blood glucose test strips (TRUE METRIX BLOOD GLUCOSE TEST) strip 5 times a day and prn .  100 each 3    Insulin Pen Needle 31G X 5 MM MISC Use 5 times a day and prn 100 each 3    Continuous Blood Gluc Transmit (DEXCOM G5 MOBILE TRANSMITTER) MISC by Does not apply route      Continuous Blood Gluc Sensor (DEXCOM G4 SENSOR) MISC by Does not apply route      spironolactone (ALDACTONE) 100 MG tablet Take 1 tablet by mouth daily 90 tablet 3    furosemide (LASIX) 20 MG tablet Take 1 tablet by mouth daily 90 tablet 3    Multiple Vitamins-Minerals (THERAPEUTIC MULTIVITAMIN-MINERALS) tablet Take 1 tablet by mouth daily      vitamin D (ERGOCALCIFEROL) 1.25 MG (70956 UT) CAPS capsule Take 1 capsule by mouth once a week 12 capsule 1    pantoprazole (PROTONIX) 40 MG tablet Take 1 tablet by mouth every morning (before breakfast) 90 tablet 1    levothyroxine (SYNTHROID) 50 MCG tablet Take 1 tablet by mouth daily 30 tablet 5     No current facility-administered medications for this visit. Review of Systems    BP (!) 100/54   Pulse 90   Ht 5' (1.524 m)   Wt 102 lb (46.3 kg)   SpO2 98%   BMI 19.92 kg/m²   BP Readings from Last 7 Encounters:   01/11/22 (!) 100/54   12/10/21 110/68   12/03/21 102/68   11/29/21 100/60   11/28/21 113/75   11/11/21 122/70   11/03/21 121/72     Wt Readings from Last 7 Encounters:   01/11/22 102 lb (46.3 kg)   12/10/21 108 lb 6.4 oz (49.2 kg)   12/03/21 103 lb (46.7 kg)   11/29/21 102 lb (46.3 kg)   11/28/21 102 lb 12.8 oz (46.6 kg)   11/11/21 122 lb (55.3 kg)   11/02/21 115 lb (52.2 kg)     BMI Readings from Last 7 Encounters:   01/11/22 19.92 kg/m²   12/10/21 21.17 kg/m²   12/03/21 20.12 kg/m²   11/29/21 19.92 kg/m²   11/28/21 20.08 kg/m²   11/11/21 23.83 kg/m²   11/02/21 22.46 kg/m²     Resp Readings from Last 7 Encounters:   12/03/21 16   11/28/21 18       Physical Exam  Constitutional:       General: She is not in acute distress. Eyes:      General: No scleral icterus. Cardiovascular:      Heart sounds: Normal heart sounds. Pulmonary:      Breath sounds: Normal breath sounds. Abdominal:      General: There is no distension. Musculoskeletal:      Cervical back: Neck supple. Right lower leg: Edema present. Left lower leg: Edema present. Comments: Trace edema   Lymphadenopathy:      Cervical: No cervical adenopathy. Skin:     Findings: No rash. Psychiatric:         Mood and Affect: Mood normal.         Results for orders placed or performed in visit on 01/10/22   TSH without Reflex   Result Value Ref Range    TSH 1.450 0.270 - 4.200 uIU/mL   Comprehensive Metabolic Panel   Result Value Ref Range    Sodium 135 (L) 136 - 145 mmol/L    Potassium 4.7 3.5 - 5.0 mmol/L    Chloride 93 (L) 98 - 111 mmol/L    CO2 33 (H) 22 - 29 mmol/L    Anion Gap 9 7 - 19 mmol/L    Glucose 197 (H) 74 - 109 mg/dL    BUN 9 6 - 20 mg/dL    CREATININE 0.6 0.5 - 0.9 mg/dL    GFR Non-African American >60 >60    GFR African American >59 >59    Calcium 10.9 (H) 8.6 - 10.0 mg/dL    Total Protein 6.7 6.6 - 8.7 g/dL    Albumin 4.0 3.5 - 5.2 g/dL    Total Bilirubin 0.4 0.2 - 1.2 mg/dL    Alkaline Phosphatase 212 (H) 35 - 104 U/L    ALT 18 5 - 33 U/L    AST 23 5 - 32 U/L       ASSESSMENT/ PLAN:  1. Type 2 diabetes mellitus with hyperglycemia, with long-term current use of insulin ([address]  Throw away the 7525 mix insulin or give it away. I sent in NovoLog FlexPen. This patient saw endocrinology in [place] and had been using a formula of blood sugar divided by 40  take 4  times a day before meals and at bedtime- External Referral To Nutrition Services  I have referred her to endocrinology but may be the dietitian here can help her set up this more quickly patient is interested in an insulin pump and I think with the compliance she is showing she would be a good candidate  - CBC; Future  - Comprehensive Metabolic Panel; Future  - Hemoglobin A1C; Future  - Lipid Panel; Future  - TSH without Reflex; Future    2.  History of alcohol abuse  Patient has been very honest about this she is quit drinking for several months now. She has found a good system of support and she is very motivated to staying sober. Labs are interpreted and reviewed still with an elevated alkaline phosphatase but we are seeing overall improvement the longer she is off alcohol I think we will continue to see things improve. 3. Mixed hyperlipidemia  Avoid statins right now with her liver disease but will assess    4.  Partial rectal prolapse  Stopping drinking his helped with her bowel movements generally things are all better had a colonoscopy and evaluation right now things are better

## 2022-01-21 RX ORDER — BLOOD-GLUCOSE TRANSMITTER
EACH MISCELLANEOUS
COMMUNITY
End: 2022-03-04 | Stop reason: SDUPTHER

## 2022-01-21 RX ORDER — BLOOD-GLUCOSE SENSOR
EACH MISCELLANEOUS
COMMUNITY
End: 2022-02-15

## 2022-01-22 PROBLEM — K62.89 RECTAL MASS: Status: RESOLVED | Noted: 2021-10-12 | Resolved: 2022-01-22

## 2022-01-22 PROBLEM — H61.22 HEARING LOSS OF LEFT EAR DUE TO CERUMEN IMPACTION: Status: RESOLVED | Noted: 2019-01-31 | Resolved: 2022-01-22

## 2022-01-22 PROBLEM — F10.11 HISTORY OF ALCOHOL ABUSE: Status: ACTIVE | Noted: 2019-02-06

## 2022-01-27 DIAGNOSIS — K62.5 GASTROINTESTINAL HEMORRHAGE ASSOCIATED WITH ANORECTAL SOURCE: ICD-10-CM

## 2022-01-27 DIAGNOSIS — K21.9 GASTROESOPHAGEAL REFLUX DISEASE WITHOUT ESOPHAGITIS: ICD-10-CM

## 2022-01-27 RX ORDER — PANTOPRAZOLE SODIUM 40 MG/1
40 TABLET, DELAYED RELEASE ORAL
Qty: 90 TABLET | Refills: 1 | Status: SHIPPED | OUTPATIENT
Start: 2022-01-27 | End: 2022-03-04 | Stop reason: SDUPTHER

## 2022-01-27 NOTE — TELEPHONE ENCOUNTER
Mini Merlin called to request a refill on her medication.       Last office visit : 1/11/2022   Next office visit : 2/28/2022     Requested Prescriptions     Pending Prescriptions Disp Refills    pantoprazole (PROTONIX) 40 MG tablet [Pharmacy Med Name: PANTOPRAZOLE SODIUM 40MG TABLET DELAYED RELEASE] 90 tablet 1     Sig: TAKE 1 TABLET BY MOUTH EVERY MORNING (BEFORE BREAKFAST)            Frances Ontiverso MA

## 2022-02-02 ENCOUNTER — NURSE ONLY (OUTPATIENT)
Dept: INTERNAL MEDICINE | Age: 62
End: 2022-02-02
Payer: COMMERCIAL

## 2022-02-02 ENCOUNTER — TELEPHONE (OUTPATIENT)
Dept: INTERNAL MEDICINE | Age: 62
End: 2022-02-02

## 2022-02-02 DIAGNOSIS — R52 BODY ACHES: ICD-10-CM

## 2022-02-02 DIAGNOSIS — R50.9 FEVER, UNSPECIFIED FEVER CAUSE: Primary | ICD-10-CM

## 2022-02-02 DIAGNOSIS — R50.9 FEVER, UNSPECIFIED FEVER CAUSE: ICD-10-CM

## 2022-02-02 LAB
INFLUENZA A ANTIBODY: NORMAL
INFLUENZA B ANTIBODY: NORMAL
SARS-COV-2, PCR: NOT DETECTED

## 2022-02-02 PROCEDURE — 87804 INFLUENZA ASSAY W/OPTIC: CPT | Performed by: INTERNAL MEDICINE

## 2022-02-02 NOTE — TELEPHONE ENCOUNTER
She had a temp of 99.8 this morning. Took Tylenol. Body is tingling all over, body aches and dyspnea. COVID test was negative.   Please advise

## 2022-02-02 NOTE — TELEPHONE ENCOUNTER
Bad headache and fever and bodyaches. Covid negative. Feels like pins and needles everywhere.   She is going to pull up and get a flu and covid swab--- I gave her number to dial but can you call her back and make sure she got it (not sure she wrote it down)

## 2022-02-14 ENCOUNTER — NURSE ONLY (OUTPATIENT)
Dept: INTERNAL MEDICINE | Age: 62
End: 2022-02-14
Payer: COMMERCIAL

## 2022-02-14 ENCOUNTER — TELEPHONE (OUTPATIENT)
Dept: INTERNAL MEDICINE | Age: 62
End: 2022-02-14

## 2022-02-14 DIAGNOSIS — R50.9 FEVER, UNSPECIFIED FEVER CAUSE: Primary | ICD-10-CM

## 2022-02-14 DIAGNOSIS — Z79.4 TYPE 2 DIABETES MELLITUS WITH HYPERGLYCEMIA, WITH LONG-TERM CURRENT USE OF INSULIN (HCC): ICD-10-CM

## 2022-02-14 DIAGNOSIS — R50.9 FEVER, UNSPECIFIED FEVER CAUSE: ICD-10-CM

## 2022-02-14 DIAGNOSIS — E11.65 TYPE 2 DIABETES MELLITUS WITH HYPERGLYCEMIA, WITH LONG-TERM CURRENT USE OF INSULIN (HCC): ICD-10-CM

## 2022-02-14 DIAGNOSIS — R82.90 FOUL SMELLING URINE: ICD-10-CM

## 2022-02-14 LAB
ALBUMIN SERPL-MCNC: 3.6 G/DL (ref 3.5–5.2)
ALP BLD-CCNC: 204 U/L (ref 35–104)
ALT SERPL-CCNC: 17 U/L (ref 5–33)
ANION GAP SERPL CALCULATED.3IONS-SCNC: 16 MMOL/L (ref 7–19)
AST SERPL-CCNC: 20 U/L (ref 5–32)
BACTERIA: ABNORMAL /HPF
BILIRUB SERPL-MCNC: 1 MG/DL (ref 0.2–1.2)
BILIRUBIN URINE: ABNORMAL
BLOOD, URINE: ABNORMAL
BUN BLDV-MCNC: 14 MG/DL (ref 6–20)
CALCIUM SERPL-MCNC: 9.4 MG/DL (ref 8.6–10)
CHLORIDE BLD-SCNC: 86 MMOL/L (ref 98–111)
CHOLESTEROL, TOTAL: 118 MG/DL (ref 160–199)
CLARITY: ABNORMAL
CO2: 24 MMOL/L (ref 22–29)
COLOR: ABNORMAL
CREAT SERPL-MCNC: 0.8 MG/DL (ref 0.5–0.9)
CRYSTALS, UA: ABNORMAL /HPF
EPITHELIAL CELLS, UA: 2 /HPF (ref 0–5)
GFR AFRICAN AMERICAN: >59
GFR NON-AFRICAN AMERICAN: >60
GLUCOSE BLD-MCNC: 276 MG/DL (ref 74–109)
GLUCOSE URINE: NEGATIVE MG/DL
HBA1C MFR BLD: 9.5 % (ref 4–6)
HCT VFR BLD CALC: 35 % (ref 37–47)
HDLC SERPL-MCNC: 44 MG/DL (ref 65–121)
HEMOGLOBIN: 11.2 G/DL (ref 12–16)
HYALINE CASTS: 3 /HPF (ref 0–8)
INFLUENZA A ANTIBODY: NORMAL
INFLUENZA B ANTIBODY: NORMAL
KETONES, URINE: ABNORMAL MG/DL
LDL CHOLESTEROL CALCULATED: 49 MG/DL
LEUKOCYTE ESTERASE, URINE: ABNORMAL
MCH RBC QN AUTO: 26.5 PG (ref 27–31)
MCHC RBC AUTO-ENTMCNC: 32 G/DL (ref 33–37)
MCV RBC AUTO: 82.7 FL (ref 81–99)
NITRITE, URINE: NEGATIVE
PDW BLD-RTO: 14.4 % (ref 11.5–14.5)
PH UA: 5 (ref 5–8)
PLATELET # BLD: 137 K/UL (ref 130–400)
PMV BLD AUTO: 9.3 FL (ref 9.4–12.3)
POTASSIUM SERPL-SCNC: 4.3 MMOL/L (ref 3.5–5)
PROTEIN UA: 30 MG/DL
RBC # BLD: 4.23 M/UL (ref 4.2–5.4)
RBC UA: 31 /HPF (ref 0–4)
SARS-COV-2, PCR: NOT DETECTED
SODIUM BLD-SCNC: 126 MMOL/L (ref 136–145)
SPECIFIC GRAVITY UA: 1.02 (ref 1–1.03)
TOTAL PROTEIN: 6.7 G/DL (ref 6.6–8.7)
TRIGL SERPL-MCNC: 126 MG/DL (ref 0–149)
TSH SERPL DL<=0.05 MIU/L-ACNC: 0.67 UIU/ML (ref 0.27–4.2)
UROBILINOGEN, URINE: 1 E.U./DL
WBC # BLD: 12.9 K/UL (ref 4.8–10.8)
WBC UA: 529 /HPF (ref 0–5)

## 2022-02-14 PROCEDURE — 87804 INFLUENZA ASSAY W/OPTIC: CPT | Performed by: INTERNAL MEDICINE

## 2022-02-14 RX ORDER — LEVOFLOXACIN 500 MG/1
500 TABLET, FILM COATED ORAL DAILY
Qty: 7 TABLET | Refills: 0 | Status: SHIPPED | OUTPATIENT
Start: 2022-02-14 | End: 2022-02-21

## 2022-02-14 NOTE — TELEPHONE ENCOUNTER
Can you call and let her know her Covid test is not back but she definitely has a very significant UTI I sent in medicine I also noticed her sodium is low I will give her a call later but if you will send this in in the meantime and tell her to stop the Aldactone and Lasix for now until I give her a call later this afternoon  Please send this back to me so I remember to call her thanks

## 2022-02-15 RX ORDER — BLOOD-GLUCOSE SENSOR
EACH MISCELLANEOUS
Qty: 3 EACH | Refills: 0 | Status: SHIPPED | OUTPATIENT
Start: 2022-02-15 | End: 2022-02-15 | Stop reason: SDUPTHER

## 2022-02-15 RX ORDER — BLOOD-GLUCOSE SENSOR
EACH MISCELLANEOUS
Qty: 3 EACH | Refills: 3 | Status: SHIPPED | OUTPATIENT
Start: 2022-02-15 | End: 2022-05-09

## 2022-02-15 NOTE — TELEPHONE ENCOUNTER
Rochele Levels called requesting a refill of the below medication which has been pended for you:     Requested Prescriptions     Pending Prescriptions Disp Refills    Continuous Blood Gluc Sensor ([address]) MIS [Pharmacy Med Name: [id number] 3 each 0     Sig: USE AS DIRECTED.        Last Appointment Date: 1/11/2022  Next Appointment Date: 2/28/2022    Allergies   Allergen Reactions    Amoxicillin-Pot Clavulanate Nausea And Vomiting

## 2022-02-16 ENCOUNTER — TELEPHONE (OUTPATIENT)
Dept: INTERNAL MEDICINE | Age: 62
End: 2022-02-16

## 2022-02-16 DIAGNOSIS — E87.1 HYPONATREMIA: Primary | ICD-10-CM

## 2022-02-16 LAB
ORGANISM: ABNORMAL
URINE CULTURE, ROUTINE: ABNORMAL
URINE CULTURE, ROUTINE: ABNORMAL

## 2022-02-16 NOTE — TELEPHONE ENCOUNTER
----- Message from Doni Biswas MD sent at 2/16/2022 12:39 PM CST -----  Please call her and make sure she is feeling better let her know the Levaquin should be working for her urinary tract infection if she is not feeling better let me know. I would also recommend she continue to stay off Lasix and Aldactone and if she could repeat lab next week at the end of the week or midweek that would be good it does not have to be fasting. Also if you could ask Victorai Ware if she would be able to help set this patient up for an insulin pump. Or to see if she could qualify for this Victoria Ware has seen her in the patient scheduled to see Dr. Crescencio Reyes in March.   Or may need to wait until sees Dr Crescencio Reyes

## 2022-02-24 ENCOUNTER — OFFICE VISIT (OUTPATIENT)
Dept: INTERNAL MEDICINE | Age: 62
End: 2022-02-24
Payer: MEDICAID

## 2022-02-24 ENCOUNTER — TELEPHONE (OUTPATIENT)
Dept: INTERNAL MEDICINE | Age: 62
End: 2022-02-24

## 2022-02-24 VITALS
OXYGEN SATURATION: 100 % | WEIGHT: 111 LBS | HEART RATE: 76 BPM | BODY MASS INDEX: 21.68 KG/M2 | DIASTOLIC BLOOD PRESSURE: 70 MMHG | SYSTOLIC BLOOD PRESSURE: 120 MMHG

## 2022-02-24 DIAGNOSIS — E87.1 HYPONATREMIA: Primary | ICD-10-CM

## 2022-02-24 DIAGNOSIS — E10.65 TYPE 1 DIABETES MELLITUS WITH HYPERGLYCEMIA (HCC): ICD-10-CM

## 2022-02-24 DIAGNOSIS — E87.1 HYPONATREMIA: ICD-10-CM

## 2022-02-24 DIAGNOSIS — K70.9 ALCOHOLIC LIVER DISEASE (HCC): ICD-10-CM

## 2022-02-24 DIAGNOSIS — Z65.9 OTHER SOCIAL STRESSOR: ICD-10-CM

## 2022-02-24 LAB
ALBUMIN SERPL-MCNC: 3.8 G/DL (ref 3.5–5.2)
ALP BLD-CCNC: 164 U/L (ref 35–104)
ALT SERPL-CCNC: 10 U/L (ref 5–33)
ANION GAP SERPL CALCULATED.3IONS-SCNC: 11 MMOL/L (ref 7–19)
AST SERPL-CCNC: 16 U/L (ref 5–32)
BILIRUB SERPL-MCNC: 0.3 MG/DL (ref 0.2–1.2)
BUN BLDV-MCNC: 6 MG/DL (ref 8–23)
CALCIUM SERPL-MCNC: 10.2 MG/DL (ref 8.8–10.2)
CHLORIDE BLD-SCNC: 95 MMOL/L (ref 98–111)
CO2: 29 MMOL/L (ref 22–29)
CREAT SERPL-MCNC: 0.5 MG/DL (ref 0.5–0.9)
GFR AFRICAN AMERICAN: >59
GFR NON-AFRICAN AMERICAN: >60
GLUCOSE BLD-MCNC: 125 MG/DL (ref 74–109)
POTASSIUM SERPL-SCNC: 4.2 MMOL/L (ref 3.5–5)
SODIUM BLD-SCNC: 135 MMOL/L (ref 136–145)
TOTAL PROTEIN: 6.6 G/DL (ref 6.6–8.7)

## 2022-02-24 PROCEDURE — 99214 OFFICE O/P EST MOD 30 MIN: CPT | Performed by: INTERNAL MEDICINE

## 2022-02-24 NOTE — TELEPHONE ENCOUNTER
See if she wants to come see me today instead of Monday. Needs to stay on current insulin until we see her. Chanel Ayala called and asked if we could see her today or else we will just see her Monday if that is better for her.

## 2022-02-24 NOTE — PROGRESS NOTES
Chief Complaint   Patient presents with    Diabetes       HPI: After speaking with Vanessa Whaley we had the patient come in for problem visit regarding her diabetes having a lot of trouble with pain at the insulin injection site and blood sugar still up and down. Patient also with some increased edema. Past Medical History:   Diagnosis Date    Alcohol abuse 2/6/2019    Allergic rhinitis     Diabetes mellitus (HCC)     GERD (gastroesophageal reflux disease)     Hyperlipidemia     Hypertension     Postmenopausal 1/3/2019    Sleep apnea        Past Surgical History:   Procedure Laterality Date    CHOLECYSTECTOMY      KNEE ARTHROPLASTY      LIANA AND BSO         Family History   Problem Relation Age of Onset    Cancer Mother         ovarian    Cancer Maternal Grandmother         ovarian    Cancer Paternal Grandmother         ovarian       Social History     Socioeconomic History    Marital status: Single     Spouse name: Not on file    Number of children: Not on file    Years of education: Not on file    Highest education level: Not on file   Occupational History    Not on file   Tobacco Use    Smoking status: Never Smoker    Smokeless tobacco: Never Used   Substance and Sexual Activity    Alcohol use: Not on file    Drug use: Not on file    Sexual activity: Not on file   Other Topics Concern    Not on file   Social History Narrative    Not on file     Social Determinants of Health     Financial Resource Strain: Low Risk     Difficulty of Paying Living Expenses: Not very hard   Food Insecurity: No Food Insecurity    Worried About Running Out of Food in the Last Year: Never true    Dennys of Food in the Last Year: Never true   Transportation Needs:     Lack of Transportation (Medical): Not on file    Lack of Transportation (Non-Medical):  Not on file   Physical Activity:     Days of Exercise per Week: Not on file    Minutes of Exercise per Session: Not on file   Stress:     Feeling of Stress : Not on file   Social Connections:     Frequency of Communication with Friends and Family: Not on file    Frequency of Social Gatherings with Friends and Family: Not on file    Attends Rastafari Services: Not on file    Active Member of Clubs or Organizations: Not on file    Attends Club or Organization Meetings: Not on file    Marital Status: Not on file   Intimate Partner Violence:     Fear of Current or Ex-Partner: Not on file    Emotionally Abused: Not on file    Physically Abused: Not on file    Sexually Abused: Not on file   Housing Stability:     Unable to Pay for Housing in the Last Year: Not on file    Number of [place] in the Last Year: Not on file    Unstable Housing in the Last Year: Not on file       Allergies   Allergen Reactions    Amoxicillin-Pot Clavulanate Nausea And Vomiting       Current Outpatient Medications   Medication Sig Dispense Refill    insulin glargine (LANTUS SOLOSTAR) 100 UNIT/ML injection pen Inject 20 Units into the skin nightly (Patient not taking: Reported on 3/4/2022) 5 pen 3    Insulin Glargine, 2 Unit Dial, (TOUJEO MAX SOLOSTAR) 300 UNIT/ML SOPN Inject 24 Units into the skin at bedtime 3 mL 2    vitamin D (ERGOCALCIFEROL) 1.25 MG (97736 UT) CAPS capsule Take 1 capsule by mouth once a week 12 capsule 1    pantoprazole (PROTONIX) 40 MG tablet Take 1 tablet by mouth every morning (before breakfast) 90 tablet 1    Continuous Blood Gluc Transmit (DEXCOM G5 MOBILE TRANSMITTER) MISC Use as directed 1 each 3    hydrocortisone (ANUSOL-HC) 2.5 % CREA rectal cream Use twice a day and as needed post bm 28 g 1    hydrocortisone (ANUSOL-HC) 25 MG suppository Place 1 suppository rectally every 12 hours 24 suppository 0    Continuous Blood Gluc Sensor (DEXCOM G6 SENSOR) MISC USE AS DIRECTED. 3 each 3    insulin aspart (NOVOLOG FLEXPEN) 100 UNIT/ML injection pen Take blood sugar divided by 40 units  4 times a day 5 pen 3    blood glucose test strips (TRUE METRIX BLOOD GLUCOSE TEST) strip 5 times a day and prn . 100 each 3    Insulin Pen Needle 31G X 5 MM MISC Use 5 times a day and prn 100 each 3    spironolactone (ALDACTONE) 100 MG tablet Take 1 tablet by mouth daily 90 tablet 3    furosemide (LASIX) 20 MG tablet Take 1 tablet by mouth daily 90 tablet 3    Multiple Vitamins-Minerals (THERAPEUTIC MULTIVITAMIN-MINERALS) tablet Take 1 tablet by mouth daily      levothyroxine (SYNTHROID) 50 MCG tablet Take 1 tablet by mouth daily 30 tablet 5     No current facility-administered medications for this visit. Review of Systems    /70   Pulse 76   Wt 111 lb (50.3 kg)   SpO2 100%   BMI 21.68 kg/m²   BP Readings from Last 7 Encounters:   03/04/22 120/78   02/24/22 120/70   01/11/22 (!) 100/54   12/10/21 110/68   12/03/21 102/68   11/29/21 100/60   11/28/21 113/75     Wt Readings from Last 7 Encounters:   03/04/22 106 lb 9.6 oz (48.4 kg)   02/24/22 111 lb (50.3 kg)   01/11/22 102 lb (46.3 kg)   12/10/21 108 lb 6.4 oz (49.2 kg)   12/03/21 103 lb (46.7 kg)   11/29/21 102 lb (46.3 kg)   11/28/21 102 lb 12.8 oz (46.6 kg)     BMI Readings from Last 7 Encounters:   03/04/22 20.82 kg/m²   02/24/22 21.68 kg/m²   01/11/22 19.92 kg/m²   12/10/21 21.17 kg/m²   12/03/21 20.12 kg/m²   11/29/21 19.92 kg/m²   11/28/21 20.08 kg/m²     Resp Readings from Last 7 Encounters:   12/03/21 16   11/28/21 18       Physical Exam  Constitutional:       General: She is not in acute distress. Eyes:      General: No scleral icterus. Cardiovascular:      Heart sounds: Normal heart sounds. Pulmonary:      Breath sounds: Normal breath sounds. Musculoskeletal:      Cervical back: Neck supple. Lymphadenopathy:      Cervical: No cervical adenopathy. Skin:     Findings: No rash.    Psychiatric:      Comments: Sad and under stress         Results for orders placed or performed in visit on 02/14/22   COVID-19   Result Value Ref Range    SARS-CoV-2, PCR Not Detected Not Detected ASSESSMENT/ PLAN:  1. Hyponatremia  We need to check her CMP again and possibly get her back on more diuretic therapy again we will do a CMP. When the lab is back we will review address and call her to adjust her medications. - Comprehensive Metabolic Panel; Future    2. Type 1 diabetes mellitus with hyperglycemia (HCC)  We are going to change her Lantus to Toujeo 20 units nightly we instructed her on her use and then we will increase. She has short acting insulin she uses in a sliding scale regimen that has fluctuated right now she is doing carb counting. We explained toujeo is longer acting-suggest taking once nightly we will adjust the dose of this and her short acting regimen over time we explained that we cannot get an insulin pump immediately and that the risk and benefit to anything we choose we have her scheduled to see endocrinology we will work with Deepti Damico in the meantime and then can revisit the idea of an insulin pump with endocrinology. We would prefer an endocrinologist to prescribe and manage an insulin pump. 3. Alcoholic liver disease ([address]  She remains off alcohol which is the best thing and she is very determined to remain off alcohol again we have offered other forms of counseling etc. she says she has good support and very motivated to stay off alcohol    4. Other social stressor  Some other social stressors we discussed these we will watch closely. Continue to encourage.     25' spent

## 2022-02-25 ENCOUNTER — TELEPHONE (OUTPATIENT)
Dept: INTERNAL MEDICINE | Age: 62
End: 2022-02-25

## 2022-02-25 NOTE — TELEPHONE ENCOUNTER
Patient advised understanding. She does not need a new script for the aldactone right now, she will begin taking it once daily again.  Marked as taking on her medlist.

## 2022-02-25 NOTE — TELEPHONE ENCOUNTER
----- Message from Viraj Warner MD sent at 2/25/2022  6:36 AM CST -----  Please call her and let her know her sodium is better at 135. Stay off lasix but go back on aldactone 100mg 1 pill a day.

## 2022-03-04 ENCOUNTER — OFFICE VISIT (OUTPATIENT)
Dept: INTERNAL MEDICINE | Age: 62
End: 2022-03-04
Payer: MEDICAID

## 2022-03-04 VITALS
HEIGHT: 60 IN | HEART RATE: 64 BPM | OXYGEN SATURATION: 99 % | WEIGHT: 106.6 LBS | DIASTOLIC BLOOD PRESSURE: 78 MMHG | SYSTOLIC BLOOD PRESSURE: 120 MMHG | BODY MASS INDEX: 20.93 KG/M2

## 2022-03-04 DIAGNOSIS — E10.9 TYPE 1 DIABETES MELLITUS WITHOUT COMPLICATION (HCC): Primary | ICD-10-CM

## 2022-03-04 DIAGNOSIS — K70.11 ASCITES DUE TO ALCOHOLIC HEPATITIS: ICD-10-CM

## 2022-03-04 DIAGNOSIS — K62.5 GASTROINTESTINAL HEMORRHAGE ASSOCIATED WITH ANORECTAL SOURCE: ICD-10-CM

## 2022-03-04 DIAGNOSIS — K21.9 GASTROESOPHAGEAL REFLUX DISEASE WITHOUT ESOPHAGITIS: ICD-10-CM

## 2022-03-04 DIAGNOSIS — E55.9 VITAMIN D DEFICIENCY: ICD-10-CM

## 2022-03-04 DIAGNOSIS — K64.4 EXTERNAL HEMORRHOID: ICD-10-CM

## 2022-03-04 PROCEDURE — 99214 OFFICE O/P EST MOD 30 MIN: CPT | Performed by: INTERNAL MEDICINE

## 2022-03-04 PROCEDURE — 3051F HG A1C>EQUAL 7.0%<8.0%: CPT | Performed by: INTERNAL MEDICINE

## 2022-03-04 RX ORDER — HYDROCORTISONE ACETATE 25 MG/1
25 SUPPOSITORY RECTAL EVERY 12 HOURS
Qty: 24 SUPPOSITORY | Refills: 0 | Status: SHIPPED | OUTPATIENT
Start: 2022-03-04

## 2022-03-04 RX ORDER — BLOOD-GLUCOSE SENSOR
EACH MISCELLANEOUS
Qty: 3 EACH | Refills: 3 | Status: CANCELLED | OUTPATIENT
Start: 2022-03-04

## 2022-03-04 RX ORDER — INSULIN GLARGINE 300 U/ML
24 INJECTION, SOLUTION SUBCUTANEOUS NIGHTLY
COMMUNITY
End: 2022-03-04 | Stop reason: SDUPTHER

## 2022-03-04 RX ORDER — INSULIN GLARGINE 300 U/ML
24 INJECTION, SOLUTION SUBCUTANEOUS NIGHTLY
Qty: 3 ML | Refills: 2 | Status: SHIPPED | OUTPATIENT
Start: 2022-03-04 | End: 2022-06-10 | Stop reason: SDUPTHER

## 2022-03-04 RX ORDER — ERGOCALCIFEROL 1.25 MG/1
50000 CAPSULE ORAL WEEKLY
Qty: 12 CAPSULE | Refills: 1 | Status: SHIPPED | OUTPATIENT
Start: 2022-03-04

## 2022-03-04 RX ORDER — BLOOD-GLUCOSE TRANSMITTER
EACH MISCELLANEOUS
Qty: 1 EACH | Refills: 3 | Status: SHIPPED | OUTPATIENT
Start: 2022-03-04 | End: 2022-10-19 | Stop reason: SDUPTHER

## 2022-03-04 RX ORDER — HYDROCORTISONE 25 MG/G
CREAM TOPICAL
Qty: 28 G | Refills: 1 | Status: SHIPPED | OUTPATIENT
Start: 2022-03-04

## 2022-03-04 RX ORDER — PANTOPRAZOLE SODIUM 40 MG/1
40 TABLET, DELAYED RELEASE ORAL
Qty: 90 TABLET | Refills: 1 | Status: SHIPPED | OUTPATIENT
Start: 2022-03-04

## 2022-03-04 RX ORDER — FUROSEMIDE 20 MG/1
20 TABLET ORAL DAILY
Qty: 90 TABLET | Refills: 3 | Status: CANCELLED | OUTPATIENT
Start: 2022-03-04

## 2022-03-04 NOTE — PROGRESS NOTES
Chief Complaint   Patient presents with    Diabetes    Follow-up     LEFT FOOT EDEMA, Hemmrhoids        HPI: Patient is here today to follow-up diabetes and other issues. 112- 130 in the mornings - spikes around 12-2 and then around 7 pm.  Some low bs still past week but overall better on toujeo. Spikes are after eating. Change to 1 unit for every 50 over 100 and then 1 units per every 15 carb    Past Medical History:   Diagnosis Date    Alcohol abuse 2/6/2019    Allergic rhinitis     Diabetes mellitus ([address]     GERD (gastroesophageal reflux disease)     Hyperlipidemia     Hypertension     Postmenopausal 1/3/2019    Sleep apnea        Past Surgical History:   Procedure Laterality Date    CHOLECYSTECTOMY      KNEE ARTHROPLASTY      LIANA AND BSO         Family History   Problem Relation Age of Onset    Cancer Mother         ovarian    Cancer Maternal Grandmother         ovarian    Cancer Paternal Grandmother         ovarian       Social History     Socioeconomic History    Marital status: Single     Spouse name: Not on file    Number of children: Not on file    Years of education: Not on file    Highest education level: Not on file   Occupational History    Not on file   Tobacco Use    Smoking status: Never Smoker    Smokeless tobacco: Never Used   Substance and Sexual Activity    Alcohol use: Not on file    Drug use: Not on file    Sexual activity: Not on file   Other Topics Concern    Not on file   Social History Narrative    Not on file     Social Determinants of Health     Financial Resource Strain: Low Risk     Difficulty of Paying Living Expenses: Not very hard   Food Insecurity: No Food Insecurity    Worried About Running Out of Food in the Last Year: Never true    Dennys of Food in the Last Year: Never true   Transportation Needs:     Lack of Transportation (Medical): Not on file    Lack of Transportation (Non-Medical):  Not on file   Physical Activity:     Days of Exercise Doing well, good fm, ready for baby, pt wants to deliver, reviewed policy of not inducing before 41 wks if all is well, cervix thk, ft, when to go to hospital, rtc 1 wk   per Week: Not on file    Minutes of Exercise per Session: Not on file   Stress:     Feeling of Stress : Not on file   Social Connections:     Frequency of Communication with Friends and Family: Not on file    Frequency of Social Gatherings with Friends and Family: Not on file    Attends Taoism Services: Not on file    Active Member of [address] [place] [address] or Organizations: Not on file    Attends Club or Organization Meetings: Not on file    Marital Status: Not on file   Intimate Partner Violence:     Fear of Current or Ex-Partner: Not on file    Emotionally Abused: Not on file    Physically Abused: Not on file    Sexually Abused: Not on file   Housing Stability:     Unable to Pay for Housing in the Last Year: Not on file    Number of [place] in the Last Year: Not on file    Unstable Housing in the Last Year: Not on file       Allergies   Allergen Reactions    Amoxicillin-Pot Clavulanate Nausea And Vomiting       Current Outpatient Medications   Medication Sig Dispense Refill    insulin aspart (NOVOLOG FLEXPEN) 100 UNIT/ML injection pen 1 unit for every 50 points over 100 bs and 1 unit per 15 carbs 5 pen 3    Insulin Glargine, 2 Unit Dial, (TOUJEO MAX SOLOSTAR) 300 UNIT/ML SOPN Inject 24 Units into the skin at bedtime 3 mL 2    vitamin D (ERGOCALCIFEROL) 1.25 MG (78782 UT) CAPS capsule Take 1 capsule by mouth once a week 12 capsule 1    pantoprazole (PROTONIX) 40 MG tablet Take 1 tablet by mouth every morning (before breakfast) 90 tablet 1    Continuous Blood Gluc Transmit (DEXCOM G5 MOBILE TRANSMITTER) MISC Use as directed 1 each 3    hydrocortisone (ANUSOL-HC) 2.5 % CREA rectal cream Use twice a day and as needed post bm 28 g 1    hydrocortisone (ANUSOL-HC) 25 MG suppository Place 1 suppository rectally every 12 hours 24 suppository 0    Continuous Blood Gluc Sensor (DEXCOM G6 SENSOR) MISC USE AS DIRECTED. 3 each 3    blood glucose test strips (TRUE METRIX BLOOD GLUCOSE TEST) strip 5 times a day and prn . 100 each 3    Insulin Pen Needle 31G X 5 MM MISC Use 5 times a day and prn 100 each 3    spironolactone (ALDACTONE) 100 MG tablet Take 1 tablet by mouth daily 90 tablet 3    Multiple Vitamins-Minerals (THERAPEUTIC MULTIVITAMIN-MINERALS) tablet Take 1 tablet by mouth daily      levothyroxine (SYNTHROID) 50 MCG tablet TAKE 1 TABLET BY MOUTH DAILY 90 tablet 1     No current facility-administered medications for this visit. Review of Systems    /78 (Site: Left Upper Arm, Position: Sitting, Cuff Size: Medium Adult)   Pulse 64   Ht 5' (1.524 m)   Wt 106 lb 9.6 oz (48.4 kg)   SpO2 99%   BMI 20.82 kg/m²   BP Readings from Last 7 Encounters:   03/04/22 120/78   02/24/22 120/70   01/11/22 (!) 100/54   12/10/21 110/68   12/03/21 102/68   11/29/21 100/60   11/28/21 113/75     Wt Readings from Last 7 Encounters:   03/04/22 106 lb 9.6 oz (48.4 kg)   02/24/22 111 lb (50.3 kg)   01/11/22 102 lb (46.3 kg)   12/10/21 108 lb 6.4 oz (49.2 kg)   12/03/21 103 lb (46.7 kg)   11/29/21 102 lb (46.3 kg)   11/28/21 102 lb 12.8 oz (46.6 kg)     BMI Readings from Last 7 Encounters:   03/04/22 20.82 kg/m²   02/24/22 21.68 kg/m²   01/11/22 19.92 kg/m²   12/10/21 21.17 kg/m²   12/03/21 20.12 kg/m²   11/29/21 19.92 kg/m²   11/28/21 20.08 kg/m²     Resp Readings from Last 7 Encounters:   12/03/21 16   11/28/21 18       Physical Exam  Constitutional:       General: She is not in acute distress. Eyes:      General: No scleral icterus. Cardiovascular:      Heart sounds: Normal heart sounds. Pulmonary:      Breath sounds: Normal breath sounds. Musculoskeletal:      Cervical back: Neck supple. Right lower leg: Edema present. Left lower leg: Edema present. Lymphadenopathy:      Cervical: No cervical adenopathy. Skin:     Findings: No rash.          Results for orders placed or performed in visit on 02/24/22   Comprehensive Metabolic Panel   Result Value Ref Range    Sodium 135 (L) 136 - 145 mmol/L Potassium 4.2 3.5 - 5.0 mmol/L    Chloride 95 (L) 98 - 111 mmol/L    CO2 29 22 - 29 mmol/L    Anion Gap 11 7 - 19 mmol/L    Glucose 125 (H) 74 - 109 mg/dL    BUN 6 (L) 8 - 23 mg/dL    CREATININE 0.5 0.5 - 0.9 mg/dL    GFR Non-African American >60 >60    GFR African American >59 >59    Calcium 10.2 8.8 - 10.2 mg/dL    Total Protein 6.6 6.6 - 8.7 g/dL    Albumin 3.8 3.5 - 5.2 g/dL    Total Bilirubin 0.3 0.2 - 1.2 mg/dL    Alkaline Phosphatase 164 (H) 35 - 104 U/L    ALT 10 5 - 33 U/L    AST 16 5 - 32 U/L       ASSESSMENT/ PLAN:  1. Type 1 diabetes mellitus without complication (HCC)  Toujeo max increased to 24 units at bedtime we increased her short acting insulin so that she is taking 1 unit for every 50 points over 100+1 unit per 15 carbs with her meal.  We have her seeing the diabetic educator and she is scheduled to see endocrinology  - Insulin Glargine, 2 Unit Dial, (TOUJEO MAX SOLOSTAR) 300 UNIT/ML SOPN; Inject 24 Units into the skin at bedtime  Dispense: 3 mL; Refill: 2  - Continuous Blood Gluc Transmit (DEXCOM G5 MOBILE TRANSMITTER) MISC; Use as directed  Dispense: 1 each; Refill: 3  - CBC; Future  - Comprehensive Metabolic Panel; Future  - Hemoglobin A1C; Future  - TSH; Future  - Microalbumin / Creatinine Urine Ratio; Future  - insulin aspart (NOVOLOG FLEXPEN) 100 UNIT/ML injection pen; 1 unit for every 50 points over 100 bs and 1 unit per 15 carbs  Dispense: 5 pen; Refill: 3    2. Ascites due to alcoholic hepatitis  We congratulated her on remaining off alcohol continue to encourage this we have suggested referral to counseling or alcohol rehab she does not feel like she needs this right now    3. Vitamin D deficiency  Continue with vitamin D replacement and follow  - vitamin D (ERGOCALCIFEROL) 1.25 MG (26724 UT) CAPS capsule; Take 1 capsule by mouth once a week  Dispense: 12 capsule; Refill: 1    4.  Gastroesophageal reflux disease without esophagitis  Tonics renewed  - pantoprazole (PROTONIX) 40 MG tablet; Take 1 tablet by mouth every morning (before breakfast)  Dispense: 90 tablet; Refill: 1    5. Gastrointestinal hemorrhage associated with anorectal source  Protonix  - pantoprazole (PROTONIX) 40 MG tablet; Take 1 tablet by mouth every morning (before breakfast)  Dispense: 90 tablet; Refill: 1    6. External hemorrhoid  Still having issues with hemorrhoids try to keep bowels regular with fiber supplementation we wrote Anusol cream and suppositories  - hydrocortisone (ANUSOL-HC) 2.5 % CREA rectal cream; Use twice a day and as needed post bm  Dispense: 28 g; Refill: 1  - hydrocortisone (ANUSOL-HC) 25 MG suppository; Place 1 suppository rectally every 12 hours  Dispense: 24 suppository; Refill: 0    Chart, medications, labs, vaccines reviewed. Keep up to date with routine care and follow up. Call with any problems or complaints. Keep up to date with routine screening recomendations and vaccines.

## 2022-03-09 PROBLEM — R60.0 LOCALIZED EDEMA: Status: ACTIVE | Noted: 2021-10-12

## 2022-03-09 PROBLEM — Z65.9 OTHER SOCIAL STRESSOR: Status: ACTIVE | Noted: 2022-03-09

## 2022-03-17 DIAGNOSIS — E10.9 TYPE 1 DIABETES MELLITUS WITHOUT COMPLICATION (HCC): ICD-10-CM

## 2022-03-17 LAB
ALBUMIN SERPL-MCNC: 4.1 G/DL (ref 3.5–5.2)
ALP BLD-CCNC: 155 U/L (ref 35–104)
ALT SERPL-CCNC: 12 U/L (ref 5–33)
ANION GAP SERPL CALCULATED.3IONS-SCNC: 12 MMOL/L (ref 7–19)
AST SERPL-CCNC: 20 U/L (ref 5–32)
BILIRUB SERPL-MCNC: 0.5 MG/DL (ref 0.2–1.2)
BUN BLDV-MCNC: 17 MG/DL (ref 8–23)
CALCIUM SERPL-MCNC: 9.7 MG/DL (ref 8.8–10.2)
CHLORIDE BLD-SCNC: 86 MMOL/L (ref 98–111)
CO2: 24 MMOL/L (ref 22–29)
CREAT SERPL-MCNC: 1 MG/DL (ref 0.5–0.9)
CREATININE URINE: 62.7 MG/DL (ref 4.2–622)
GFR AFRICAN AMERICAN: >59
GFR NON-AFRICAN AMERICAN: 57
GLUCOSE BLD-MCNC: 229 MG/DL (ref 74–109)
HBA1C MFR BLD: 7.5 % (ref 4–6)
HCT VFR BLD CALC: 33.5 % (ref 37–47)
HEMOGLOBIN: 10.8 G/DL (ref 12–16)
MCH RBC QN AUTO: 26.2 PG (ref 27–31)
MCHC RBC AUTO-ENTMCNC: 32.2 G/DL (ref 33–37)
MCV RBC AUTO: 81.3 FL (ref 81–99)
MICROALBUMIN UR-MCNC: <1.2 MG/DL (ref 0–19)
MICROALBUMIN/CREAT UR-RTO: NORMAL MG/G
PDW BLD-RTO: 14.8 % (ref 11.5–14.5)
PLATELET # BLD: 186 K/UL (ref 130–400)
PMV BLD AUTO: 9.7 FL (ref 9.4–12.3)
POTASSIUM SERPL-SCNC: 4.8 MMOL/L (ref 3.5–5)
RBC # BLD: 4.12 M/UL (ref 4.2–5.4)
SODIUM BLD-SCNC: 122 MMOL/L (ref 136–145)
TOTAL PROTEIN: 6.4 G/DL (ref 6.6–8.7)
TSH SERPL DL<=0.05 MIU/L-ACNC: 1.04 UIU/ML (ref 0.27–4.2)
WBC # BLD: 6.6 K/UL (ref 4.8–10.8)

## 2022-03-18 DIAGNOSIS — E03.9 ACQUIRED HYPOTHYROIDISM: ICD-10-CM

## 2022-03-18 RX ORDER — LEVOTHYROXINE SODIUM 0.05 MG/1
50 TABLET ORAL DAILY
Qty: 90 TABLET | Refills: 1 | Status: SHIPPED | OUTPATIENT
Start: 2022-03-18 | End: 2022-10-28

## 2022-03-19 RX ORDER — INSULIN ASPART 100 [IU]/ML
INJECTION, SOLUTION INTRAVENOUS; SUBCUTANEOUS
Qty: 5 PEN | Refills: 3
Start: 2022-03-19

## 2022-03-22 ENCOUNTER — OFFICE VISIT (OUTPATIENT)
Dept: INTERNAL MEDICINE | Age: 62
End: 2022-03-22
Payer: COMMERCIAL

## 2022-03-22 VITALS
OXYGEN SATURATION: 99 % | WEIGHT: 110 LBS | HEIGHT: 60 IN | DIASTOLIC BLOOD PRESSURE: 64 MMHG | SYSTOLIC BLOOD PRESSURE: 124 MMHG | HEART RATE: 80 BPM | BODY MASS INDEX: 21.6 KG/M2

## 2022-03-22 DIAGNOSIS — F10.11 HISTORY OF ALCOHOL ABUSE: ICD-10-CM

## 2022-03-22 DIAGNOSIS — E87.1 HYPONATREMIA: ICD-10-CM

## 2022-03-22 DIAGNOSIS — Z78.0 POSTMENOPAUSAL: ICD-10-CM

## 2022-03-22 DIAGNOSIS — E10.65 TYPE 1 DIABETES MELLITUS WITH HYPERGLYCEMIA (HCC): Primary | ICD-10-CM

## 2022-03-22 DIAGNOSIS — M25.59 PAIN IN OTHER JOINT: ICD-10-CM

## 2022-03-22 DIAGNOSIS — K70.9 ALCOHOLIC LIVER DISEASE (HCC): ICD-10-CM

## 2022-03-22 DIAGNOSIS — E03.9 ACQUIRED HYPOTHYROIDISM: ICD-10-CM

## 2022-03-22 PROCEDURE — 99214 OFFICE O/P EST MOD 30 MIN: CPT | Performed by: INTERNAL MEDICINE

## 2022-03-22 PROCEDURE — 3051F HG A1C>EQUAL 7.0%<8.0%: CPT | Performed by: INTERNAL MEDICINE

## 2022-03-22 NOTE — PROGRESS NOTES
Transportation (Medical): Not on file    Lack of Transportation (Non-Medical):  Not on file   Physical Activity:     Days of Exercise per Week: Not on file    Minutes of Exercise per Session: Not on file   Stress:     Feeling of Stress : Not on file   Social Connections:     Frequency of Communication with Friends and Family: Not on file    Frequency of Social Gatherings with Friends and Family: Not on file    Attends Yazidi Services: Not on file    Active Member of [address] [place] [address] or Organizations: Not on file    Attends Club or Organization Meetings: Not on file    Marital Status: Not on file   Intimate Partner Violence:     Fear of Current or Ex-Partner: Not on file    Emotionally Abused: Not on file    Physically Abused: Not on file    Sexually Abused: Not on file   Housing Stability:     Unable to Pay for Housing in the Last Year: Not on file    Number of [place] in the Last Year: Not on file    Unstable Housing in the Last Year: Not on file       Allergies   Allergen Reactions    Amoxicillin-Pot Clavulanate Nausea And Vomiting       Current Outpatient Medications   Medication Sig Dispense Refill    insulin aspart (NOVOLOG FLEXPEN) 100 UNIT/ML injection pen 1 unit for every 50 points over 100 bs and 1 unit per 15 carbs 5 pen 3    levothyroxine (SYNTHROID) 50 MCG tablet TAKE 1 TABLET BY MOUTH DAILY 90 tablet 1    Insulin Glargine, 2 Unit Dial, (TOUJEO MAX SOLOSTAR) 300 UNIT/ML SOPN Inject 24 Units into the skin at bedtime 3 mL 2    vitamin D (ERGOCALCIFEROL) 1.25 MG (49593 UT) CAPS capsule Take 1 capsule by mouth once a week 12 capsule 1    pantoprazole (PROTONIX) 40 MG tablet Take 1 tablet by mouth every morning (before breakfast) 90 tablet 1    Continuous Blood Gluc Transmit (DEXCOM G5 MOBILE TRANSMITTER) MISC Use as directed 1 each 3    hydrocortisone (ANUSOL-HC) 2.5 % CREA rectal cream Use twice a day and as needed post bm 28 g 1    hydrocortisone (ANUSOL-HC) 25 MG suppository Place 1 suppository rectally every 12 hours 24 suppository 0    Continuous Blood Gluc Sensor (DEXCOM G6 SENSOR) MISC USE AS DIRECTED. 3 each 3    blood glucose test strips (TRUE METRIX BLOOD GLUCOSE TEST) strip 5 times a day and prn . 100 each 3    Insulin Pen Needle 31G X 5 MM MISC Use 5 times a day and prn 100 each 3    Multiple Vitamins-Minerals (THERAPEUTIC MULTIVITAMIN-MINERALS) tablet Take 1 tablet by mouth daily       No current facility-administered medications for this visit. Review of Systems    /64   Pulse 80   Ht 5' (1.524 m)   Wt 110 lb (49.9 kg)   SpO2 99%   BMI 21.48 kg/m²   BP Readings from Last 7 Encounters:   03/22/22 124/64   03/04/22 120/78   02/24/22 120/70   01/11/22 (!) 100/54   12/10/21 110/68   12/03/21 102/68   11/29/21 100/60     Wt Readings from Last 7 Encounters:   03/22/22 110 lb (49.9 kg)   03/04/22 106 lb 9.6 oz (48.4 kg)   02/24/22 111 lb (50.3 kg)   01/11/22 102 lb (46.3 kg)   12/10/21 108 lb 6.4 oz (49.2 kg)   12/03/21 103 lb (46.7 kg)   11/29/21 102 lb (46.3 kg)     BMI Readings from Last 7 Encounters:   03/22/22 21.48 kg/m²   03/04/22 20.82 kg/m²   02/24/22 21.68 kg/m²   01/11/22 19.92 kg/m²   12/10/21 21.17 kg/m²   12/03/21 20.12 kg/m²   11/29/21 19.92 kg/m²     Resp Readings from Last 7 Encounters:   12/03/21 16   11/28/21 18       Physical Exam  Constitutional:       General: She is not in acute distress. Eyes:      General: No scleral icterus. Cardiovascular:      Heart sounds: Normal heart sounds. Pulmonary:      Breath sounds: Normal breath sounds. Musculoskeletal:      Cervical back: Neck supple. Lymphadenopathy:      Cervical: No cervical adenopathy. Skin:     Findings: No rash.          Results for orders placed or performed in visit on 03/17/22   Microalbumin / Creatinine Urine Ratio   Result Value Ref Range    Microalbumin, Random Urine <1.20 0.00 - 19.00 mg/dL    Creatinine, Ur 62.7 4.2 - 622.0 mg/dL    Microalbumin Creatinine Ratio see below mg/g   TSH   Result Value Ref Range    TSH 1.040 0.270 - 4.200 uIU/mL   Hemoglobin A1C   Result Value Ref Range    Hemoglobin A1C 7.5 (H) 4.0 - 6.0 %   Comprehensive Metabolic Panel   Result Value Ref Range    Sodium 122 (L) 136 - 145 mmol/L    Potassium 4.8 3.5 - 5.0 mmol/L    Chloride 86 (L) 98 - 111 mmol/L    CO2 24 22 - 29 mmol/L    Anion Gap 12 7 - 19 mmol/L    Glucose 229 (H) 74 - 109 mg/dL    BUN 17 8 - 23 mg/dL    CREATININE 1.0 (H) 0.5 - 0.9 mg/dL    GFR Non- 57 (A) >60    GFR African American >59 >59    Calcium 9.7 8.8 - 10.2 mg/dL    Total Protein 6.4 (L) 6.6 - 8.7 g/dL    Albumin 4.1 3.5 - 5.2 g/dL    Total Bilirubin 0.5 0.2 - 1.2 mg/dL    Alkaline Phosphatase 155 (H) 35 - 104 U/L    ALT 12 5 - 33 U/L    AST 20 5 - 32 U/L   CBC   Result Value Ref Range    WBC 6.6 4.8 - 10.8 K/uL    RBC 4.12 (L) 4.20 - 5.40 M/uL    Hemoglobin 10.8 (L) 12.0 - 16.0 g/dL    Hematocrit 33.5 (L) 37.0 - 47.0 %    MCV 81.3 81.0 - 99.0 fL    MCH 26.2 (L) 27.0 - 31.0 pg    MCHC 32.2 (L) 33.0 - 37.0 g/dL    RDW 14.8 (H) 11.5 - 14.5 %    Platelets 981 600 - 747 K/uL    MPV 9.7 9.4 - 12.3 fL       ASSESSMENT/ PLAN:  1. Type 1 diabetes mellitus with hyperglycemia ([facility] [address]  We are working with the present plan of care working with her continuous blood glucose monitor and we have her scheduled to see endocrinology. I am not can make any changes currently she has seen Tara Devlin discussed her case with Radha Grant. 2. History of alcohol abuse  Stay off alcohol follow this is the most important thing involved. 3. Alcoholic liver disease ([address]  Monitoring levels and follow up closely    4. Hyponatremia  . Hyponatremia we had her stop all her diuretics hopefully we can maintain her regimen without adding back diuretics just watch her salt intake follow-up  - Comprehensive Metabolic Panel; Future    5. Acquired hypothyroidism  Stable thyroid level with current plan of care    6.  Pain in other joint  We are going to check uric acid may have had an episode of gout we need to see what her uric acid shows go from there avoid alcohol. She is done a great job of remaining off alcohol  - Uric Acid; Future    7. Postmenopausal  She needs a bone density was scheduled this and will follow up    We reviewed and interpreted her labs made changes in her medications by discontinuing diuretics discussed risk and benefit of medications. We discussed the difficulties with insulin pumps and that we want an endocrinologist to manage this also we cannot order this and get it without a lot of other care and monitoring which endocrinology will do.

## 2022-03-23 ENCOUNTER — TRANSCRIBE ORDERS (OUTPATIENT)
Dept: ADMINISTRATIVE | Facility: HOSPITAL | Age: 62
End: 2022-03-23

## 2022-03-23 DIAGNOSIS — Z78.0 POSTMENOPAUSAL STATUS: Primary | ICD-10-CM

## 2022-03-25 ENCOUNTER — HOSPITAL ENCOUNTER (OUTPATIENT)
Dept: BONE DENSITY | Facility: HOSPITAL | Age: 62
Discharge: HOME | End: 2022-03-25
Admitting: INTERNAL MEDICINE

## 2022-03-25 DIAGNOSIS — Z78.0 POSTMENOPAUSAL STATUS: ICD-10-CM

## 2022-03-25 PROCEDURE — 77080 DXA BONE DENSITY AXIAL: CPT

## 2022-03-30 ENCOUNTER — DOCUMENTATION (OUTPATIENT)
Dept: ENDOCRINOLOGY | Facility: CLINIC | Age: 62
End: 2022-03-30

## 2022-03-30 ENCOUNTER — OFFICE VISIT (OUTPATIENT)
Dept: ENDOCRINOLOGY | Facility: CLINIC | Age: 62
End: 2022-03-30

## 2022-03-30 VITALS
SYSTOLIC BLOOD PRESSURE: 120 MMHG | DIASTOLIC BLOOD PRESSURE: 60 MMHG | HEART RATE: 70 BPM | WEIGHT: 111 LBS | HEIGHT: 60 IN | OXYGEN SATURATION: 100 % | BODY MASS INDEX: 21.79 KG/M2

## 2022-03-30 DIAGNOSIS — E10.649 TYPE 1 DIABETES MELLITUS WITH HYPOGLYCEMIA AND WITHOUT COMA: Primary | ICD-10-CM

## 2022-03-30 DIAGNOSIS — E03.9 ACQUIRED HYPOTHYROIDISM: ICD-10-CM

## 2022-03-30 DIAGNOSIS — E55.9 VITAMIN D DEFICIENCY: ICD-10-CM

## 2022-03-30 PROCEDURE — 99214 OFFICE O/P EST MOD 30 MIN: CPT | Performed by: NURSE PRACTITIONER

## 2022-03-30 RX ORDER — GLUCAGON INJECTION, SOLUTION 1 MG/.2ML
1 INJECTION, SOLUTION SUBCUTANEOUS AS NEEDED
Qty: 0.4 ML | Refills: 11 | Status: SHIPPED | OUTPATIENT
Start: 2022-03-30 | End: 2022-03-31

## 2022-03-30 RX ORDER — ERGOCALCIFEROL 1.25 MG/1
50000 CAPSULE ORAL WEEKLY
COMMUNITY

## 2022-03-30 RX ORDER — INSULIN GLARGINE 300 U/ML
24 INJECTION, SOLUTION SUBCUTANEOUS
COMMUNITY
End: 2022-05-19

## 2022-03-30 NOTE — PROGRESS NOTES
"Chief Complaint  Diabetes (T1)    Subjective          Fiordaliza Lyons presents to [facility] ENDOCRINOLOGY  History of Present Illness     In office visit     Chief Complaint   Patient presents with   • Diabetes     T1       HPI    Referring provider Brittany Solis MD     62 year old female presents for consultation         Reason -- diabetes mellitus type 1     Duration--August 2008     Context --- glucose in the office was 828 after doctor realized     Timing  Constant     Quality  Not controlled     Severity moderate         Macrovascular complications-- none         Microvascular complications --no DR, mild neuropathy, no renal disease         Current diabetes regimen       Insulin basal and bolus         Current glucose monitoring     Checks 4 to 6 times daily     Has dexcom G6    Having hypoglycemia in the middle of the night     Low of 40           Review of Systems - General ROS: negative            Objective   Vital Signs:   /60   Pulse 70   Ht 152.4 cm (60\")   Wt 50.3 kg (111 lb)   SpO2 100%   BMI 21.68 kg/m²     Physical Exam  Constitutional:       Appearance: Normal appearance.   Cardiovascular:      Rate and Rhythm: Regular rhythm.      Heart sounds: Normal heart sounds.   Pulmonary:      Breath sounds: Normal breath sounds.   Musculoskeletal:         General: Normal range of motion.      Cervical back: Normal range of motion.   Neurological:      General: No focal deficit present.      Mental Status: She is alert.   Psychiatric:         Mood and Affect: Mood normal.        Result Review :   The following data was reviewed by: RAYMON Richard on 03/30/2022:  Common labs    Common Labsle 2/14/22 2/14/22 2/14/22 2/14/22 2/24/22 3/17/22 3/17/22 3/17/22    1117 1117 1117 1117  1612 1612 1612   Glucose   276 (A)  125 (A)   229 (A)   BUN   14  6 (A)   17   Creatinine   0.8  0.5   1 (A)   eGFR Non African Am   >60  >60   57 (A)   eGFR  Am   >59  >59   " >59   Sodium   126 (A)  135 (A)   122 (A)   Potassium   4.3  4.2   4.8   Chloride   86 (A)  95 (A)   86 (A)   Calcium   9.4  10.2   9.7   Albumin   3.6  3.8   4.1   Total Bilirubin   1.0  0.3   0.5   Alkaline Phosphatase   204 (A)  164 (A)   155 (A)   AST (SGOT)   20  16   20   ALT (SGPT)   17  10   12   WBC 12.9 (A)     6.6     Hemoglobin 11.2 (A)     10.8 (A)     Hematocrit 35.0 (A)     33.5 (A)     Platelets 137     186     Total Cholesterol  118 (A)         Triglycerides  126         HDL Cholesterol  44 (A)         LDL Cholesterol   49         Hemoglobin A1C    9.5 (A)   7.5 (A)    (A) Abnormal value       Comments are available for some flowsheets but are not being displayed.                     Assessment and Plan    Diagnoses and all orders for this visit:    1. Type 1 diabetes mellitus with hypoglycemia and without coma (HCC) (Primary)  -     C-Peptide; Future  -     Comprehensive Metabolic Panel; Future    Other orders  -     Glucagon (Gvoke PFS) 1 MG/0.2ML solution prefilled syringe; Inject 1 mg under the skin into the appropriate area as directed As Needed (hypoglycemia) for up to 1 day.  Dispense: 0.4 mL; Refill: 11                 Glycemic Management:    Diabetes mellitus type 1 with hypoglycemia     Lab Results   Component Value Date    HGBA1C 7.5 (H) 03/17/2022       Uses dexcom G6 could download           Taking Toujeo 24 units once in the evening --decrease to 22 units         Taking Novolog 2 up to 5 units TID with each meal     1 unit per 25 above 125         Approve omni pod       Approve for  Insulin pump and or Continuous Glucose Sensor      #1  Patient has diabetes mellitus, insulin-dependent.     #2 She performs blood glucose testing at least times daily and blood glucose log was brought to office with variability from .     #3  She is requiring  Basal insulin  and Prandial Insulin for a total of at least  4 injections per day and has been doing this for at least 6 months      #4  Patient tests blood sugars at least 4 times daily and makes frequent self-adjustments and patient is injecting insulin at least 4 times daily. She has been doing this for more than 6 months . She tests frequently due to hypoglycemia and hyperglycemia.      #5 I have personally seen patient within the past 6 months     #6 We plan on seeing her every 2-3 months for continuous adjustment of her diabetes regimen      #7 patient has hypoglycemia with episodes of unawareness.     #8 patient has day-to-day variation in her mealtime which confounds the degree of insulin dosing with multiple daily injections.     #9 patient has completed diabetes education program with us.     #10 she has demonstrated the ability to self monitor her glucose.         #11 Patient is motivated in improving  diabetes control       Microvascular Complications Monitoring       Last eye exam---- 2019 , no DR     Neuropathy mild    No renal disease      Lipid Management:       Not on statin       Blood Pressure Management:      Not on ACEi       Thyroid Health       Hypothyroidism     Component      Latest Ref Rng & Units 3/17/2022   TSH Baseline      0.270 - 4.200 uIU/mL 1.040     Taking levothyroxine 50 mcg daily       Bone Health     Vitamin d def.       Taking vitamin d once weekly       Weight Management:      Patient's Body mass index is 21.68 kg/m². indicating that she is within normal range (BMI 18.5-24.9). No BMI management plan needed..        Preventive Care:       Non smoker     Records from  received and reviewed from [id number]  Thank you for this consultation                 Follow Up   Return in about 3 months (around 6/30/2022) for Recheck.  Patient was given instructions and counseling regarding her condition or for health maintenance advice. Please see specific information pulled into the AVS if appropriate.         This document has been electronically signed by RAYMON Richard on March 30, 2022 16:25  CDT.

## 2022-03-31 DIAGNOSIS — E87.1 HYPONATREMIA: ICD-10-CM

## 2022-03-31 DIAGNOSIS — M25.59 PAIN IN OTHER JOINT: ICD-10-CM

## 2022-03-31 LAB
ALBUMIN SERPL-MCNC: 4.7 G/DL (ref 3.5–5.2)
ALP BLD-CCNC: 174 U/L (ref 35–104)
ALT SERPL-CCNC: 18 U/L (ref 5–33)
ANION GAP SERPL CALCULATED.3IONS-SCNC: 13 MMOL/L (ref 7–19)
AST SERPL-CCNC: 27 U/L (ref 5–32)
BILIRUB SERPL-MCNC: 0.4 MG/DL (ref 0.2–1.2)
BUN BLDV-MCNC: 18 MG/DL (ref 8–23)
CALCIUM SERPL-MCNC: 10.2 MG/DL (ref 8.8–10.2)
CHLORIDE BLD-SCNC: 100 MMOL/L (ref 98–111)
CO2: 25 MMOL/L (ref 22–29)
CREAT SERPL-MCNC: 0.8 MG/DL (ref 0.5–0.9)
GFR AFRICAN AMERICAN: >59
GFR NON-AFRICAN AMERICAN: >60
GLUCOSE BLD-MCNC: 144 MG/DL (ref 74–109)
POTASSIUM SERPL-SCNC: 4.8 MMOL/L (ref 3.5–5)
SODIUM BLD-SCNC: 138 MMOL/L (ref 136–145)
TOTAL PROTEIN: 6.5 G/DL (ref 6.6–8.7)
URIC ACID, SERUM: 5 MG/DL (ref 2.4–5.7)

## 2022-04-05 LAB — C-PEPTIDE: 1.9 NG/ML (ref 1.1–4.4)

## 2022-04-08 ENCOUNTER — TELEPHONE (OUTPATIENT)
Dept: ENDOCRINOLOGY | Facility: CLINIC | Age: 62
End: 2022-04-08

## 2022-04-08 DIAGNOSIS — E10.649 TYPE 1 DIABETES MELLITUS WITH HYPOGLYCEMIA AND WITHOUT COMA: ICD-10-CM

## 2022-04-08 NOTE — TELEPHONE ENCOUNTER
Received labs from [facility], c-peptide and CMP. Sent to medical records on 4/8/22      Thanks

## 2022-04-14 DIAGNOSIS — E10.9 TYPE 1 DIABETES MELLITUS WITHOUT COMPLICATION (HCC): Primary | ICD-10-CM

## 2022-04-19 ENCOUNTER — TELEPHONE (OUTPATIENT)
Dept: PODIATRY | Facility: CLINIC | Age: 62
End: 2022-04-19

## 2022-04-20 DIAGNOSIS — E10.9 TYPE 1 DIABETES MELLITUS WITHOUT COMPLICATION (HCC): Primary | ICD-10-CM

## 2022-04-22 DIAGNOSIS — E10.649 TYPE 1 DIABETES MELLITUS WITH HYPOGLYCEMIA AND WITHOUT COMA: Primary | ICD-10-CM

## 2022-04-22 RX ORDER — INSULIN PUMP CONTROLLER
1 EACH MISCELLANEOUS CONTINUOUS
Qty: 2 EACH | Refills: 11 | Status: SHIPPED | OUTPATIENT
Start: 2022-04-22

## 2022-05-04 RX ORDER — PEN NEEDLE, DIABETIC 31 GX5/16"
NEEDLE, DISPOSABLE MISCELLANEOUS
Qty: 100 EACH | Refills: 3 | Status: SHIPPED | OUTPATIENT
Start: 2022-05-04

## 2022-05-09 ENCOUNTER — OFFICE VISIT (OUTPATIENT)
Dept: GASTROENTEROLOGY | Facility: CLINIC | Age: 62
End: 2022-05-09

## 2022-05-09 VITALS
DIASTOLIC BLOOD PRESSURE: 66 MMHG | HEIGHT: 60 IN | WEIGHT: 111 LBS | TEMPERATURE: 96.8 F | BODY MASS INDEX: 21.79 KG/M2 | HEART RATE: 70 BPM | OXYGEN SATURATION: 100 % | SYSTOLIC BLOOD PRESSURE: 128 MMHG

## 2022-05-09 DIAGNOSIS — K70.30 ALCOHOLIC CIRRHOSIS OF LIVER WITHOUT ASCITES: Primary | ICD-10-CM

## 2022-05-09 DIAGNOSIS — K64.8 OTHER HEMORRHOIDS: ICD-10-CM

## 2022-05-09 DIAGNOSIS — R93.5 ABNORMAL CT OF THE ABDOMEN: ICD-10-CM

## 2022-05-09 DIAGNOSIS — D12.6 ADENOMATOUS POLYP OF COLON, UNSPECIFIED PART OF COLON: ICD-10-CM

## 2022-05-09 PROCEDURE — 99214 OFFICE O/P EST MOD 30 MIN: CPT | Performed by: INTERNAL MEDICINE

## 2022-05-09 RX ORDER — BLOOD-GLUCOSE SENSOR
EACH MISCELLANEOUS
Qty: 3 EACH | Refills: 2 | Status: SHIPPED | OUTPATIENT
Start: 2022-05-09 | End: 2022-10-19

## 2022-05-09 NOTE — PROGRESS NOTES
"[facility] Gastroenterology    Chief Complaint   Patient presents with   • GI Problem     Rectal mass       Subjective     HPI    Fiordaliza Lyons is a 62 y.o. female who presents with a chief complaint of hemorrhoids and liver disease.    She comes in for 6-month checkup on her liver.  She is done quite well.  She states she has not had any alcohol consumption since October 4, 2021.  She denies any fluid retention.  She is not on any diuretics.  She is working hard to get her diabetes under control.  She is got her hemoglobin A1c down to 7.5.  Her only complaint today is that she still battles with her hemorrhoids.  She states she is still has them protrude at times.  She underwent colonoscopy this past fall noting internal hemorrhoids.  She notes a little bit of clear discharge from the hemorrhoids and after excessive wiping she may see a little bit of bright red blood.  She does use hydrocortisone cream prescribed by Dr. Shields.  She does not use it every night.  She did not deliver babies.  She is not having any incontinence of stool      ================= copy of November 2021 HPI==============================  HISTORY OF PRESENT ILLNESS:      Fiordaliza Lyons is a 61 y.o. female presents with rectal mass. This was noted by Dr. Shields 10-11-21.  She has had problems having bm's. Dr. Shields also noted rectal prolapse and the patient reports having to \" push it back in to have a bm\". She has also noted some red , small amount, on the tissue with bm. Has had some llq pain. No fever. No weight loss. No abdominal pain. No n/v. No hematemesis. No black stool.            She has alcoholic liver disease. Last drink of etoh 10-4-21. She did drink 3 glasses of vodka/sprite.  Currently Dr. Shields is managing her diuretics  (see med list).        She was hospitalized here at [facility] 10-29-21 to 11-1-21.   Discharge diagnoses acute cystitis, alcoholic cirrhosis, SARAH, anasarca, thoracic spine pain ( abnormal CT " T6-7), hyponatremia, and ascites.   Last labs 11-1-21 plt normal, hgb 7.0, mcv 71, wbc 8.8, bun 7, creat 0.51, na 131.  CMP 11-1-21 with  albumin 2.8, alt 14, ast 34, alk phos 180, bili normal. Last inr normal on 10-29-21.  She did have a ultrasound paracentesis with 1500 ml removed ( no infection).  She was on IV diuretic drip.       She did receive blood transfusion 11-3-21 for hgb 7.0.         CT abdomen/pelvis with contrast done 10-5-21 noted gastric wall thickening, thickening of ascending colon.      She had appointment with Dr. Solis 11-2-11 with f/u appointment scheduled for 11-11-21.    Past Medical History:   Diagnosis Date   • Alcoholic hepatitis with ascites    • Allergic rhinitis    • Breast cyst    • Diabetes mellitus (HCC)    • Disease of thyroid gland    • Elevated d-dimer    • Endometriosis    • Fatty liver    • GERD (gastroesophageal reflux disease)    • History of transfusion    • Hyperlipidemia    • Hypertension    • Ovarian cyst    • Pancreatitis    • Postmenopausal    • Rectal mass    • Rectal prolapse    • Sleep apnea        Past Surgical History:   Procedure Laterality Date   • BILATERAL SALPINGO OOPHORECTOMY  2008   • CHOLECYSTECTOMY  1996   • COLONOSCOPY  07/2017    Dr Murphy- per patient normal    • COLONOSCOPY N/A 11/9/2021    Procedure: COLONOSCOPY WITH ANESTHESIA;  Surgeon: Ray Serna MD;  Location: [facility] ENDOSCOPY;  Service: Gastroenterology;  Laterality: N/A;  pre rectal mass  post  Dr Solis   • DILATION AND CURETTAGE, DIAGNOSTIC / THERAPEUTIC     • ENDOSCOPY N/A 11/9/2021    Procedure: ESOPHAGOGASTRODUODENOSCOPY WITH ANESTHESIA;  Surgeon: Ray Serna MD;  Location: [facility] ENDOSCOPY;  Service: Gastroenterology;  Laterality: N/A;  pre anemia  post hiatal hernia; gastric polyp  Dr. Solis   • KNEE SURGERY  1982         Current Outpatient Medications:   •  insulin aspart (novoLOG FLEXPEN) 100 UNIT/ML solution pen-injector sc pen, Inject  under the skin into the  appropriate area as directed 3 (Three) Times a Day With Meals. 2-5 units with meals, Disp: , Rfl:   •  Insulin Glargine, 2 Unit Dial, (Toujeo Max SoloStar) 300 UNIT/ML solution pen-injector injection, Inject 24 Units under the skin into the appropriate area as directed., Disp: , Rfl:   •  levothyroxine (SYNTHROID, LEVOTHROID) 50 MCG tablet, Take 50 mcg by mouth Every Morning., Disp: , Rfl:   •  Multiple Vitamins-Iron (multivitamin with iron) tablet tablet, Take 1 tablet by mouth Daily., Disp: , Rfl:   •  pantoprazole (PROTONIX) 40 MG EC tablet, Take 40 mg by mouth Daily., Disp: , Rfl:   •  vitamin D (ERGOCALCIFEROL) 1.25 MG (96590 UT) capsule capsule, Take 50,000 Units by mouth 1 (One) Time Per Week., Disp: , Rfl:   •  Insulin Disposable Pump (OmniPod Dash 5 Pack Pods) misc, 1 each Continuous. USE ONE POD EVERY THREE DAYS, Disp: 2 each, Rfl: 11    Allergies   Allergen Reactions   • Gabapentin Other (See Comments)     Passed out   • Augmentin [Amoxicillin-Pot Clavulanate] Nausea And Vomiting       Social History     Socioeconomic History   • Marital status: Single   Tobacco Use   • Smoking status: Never Smoker   • Smokeless tobacco: Never Used   Vaping Use   • Vaping Use: Never used   Substance and Sexual Activity   • Alcohol use: Not Currently   • Drug use: No   • Sexual activity: Defer       Family History   Problem Relation Age of Onset   • Ovarian cancer Mother    • Ovarian cancer Maternal Aunt    • Ovarian cancer Maternal Grandmother    • Ovarian cancer Paternal Grandmother    • Colon polyps Father    • Colon polyps Brother    • Breast cancer Neg Hx    • Colon cancer Neg Hx        Review of Systems  No abdominal pains    Objective     Vitals:    05/09/22 1408   BP: 128/66   Pulse: 70   Temp: 96.8 °F (36 °C)   SpO2: 100%       Physical Exam  No acute distress. Vital signs as documented.  Sclera anicteric.  Neck without noticeable JVD. Lungs clear to auscultation. Heart exam notable for regular rhythm, normal  sounds. Abdomen is soft, nontender, non distended, normal bowel sounds and without evidence of organomegaly, masses.  Neuro alert, moves extremities.        Assessment/Plan   Problem List Items Addressed This Visit        Gastrointestinal Abdominal     Alcoholic cirrhosis (HCC) - Primary    Overview     Alcohol free since October 2021           Relevant Orders    US Liver    Abnormal CT of the abdomen    Overview     Added automatically from request for surgery [id number]           Adenomatous polyp of colon    Other hemorrhoids            Regarding her liver she is doing well.  I reviewed her outside labs.  Her LFTs have normalized except for an alkaline phosphatase that remains mildly elevated.  This is trending down.  I suspect this would improve as she can get her hemoglobin A1c under better control.  She is already abstaining from alcohol working hard on her diabetes and I commended her for doing this.    Because she has evidence of cirrhosis, I did discuss with her that she is at increased risk for hepatocellular carcinoma.  She has slightly elevated alpha-fetoprotein in the past consistent with hepatic regeneration.  She had CT imaging in October.  I do recommend hepatocellular surveillance.  We talked about once yearly CT or MRI or ultrasound every 6 months.  We talked about the pros and cons and she like to pursue ultrasound.  We will go ahead and schedule ultrasound of her liver for surveillance.  We have her scheduled for this tomorrow.    She had adenomatous polyps on colonoscopy in November.  Surveillance colonoscopy recommend again proximal in November 2024    Lastly, her hemorrhoids are still an issue for her.  I did suggest that she try an over-the-counter Preparation H suppository and use 1 every night for least 7 nights in a row.  We discussed increasing fiber in her diet and a fiber supplement.  She is trying to do so.  I suggest 10 g daily.  We did discuss the option of surgical intervention for  hemorrhoids if they persist.  After our conversation she does not want to pursue surgical intervention.  If she continues to have significant troubles, then I did suggest follow back up with this.  She agreed.    Otherwise we will go and see her back in the office in 1 year    Continue ongoing management by primary care provider and other specialists.     Body mass index is 21.68 kg/m².  Stable BMI      EMR Dragon/transcription disclaimer:  Much of this encounter note is electronic transcription/translation of spoken language to printed text.  The electronic translation of spoken language may be erroneous, or at times, nonsensical words or phrases may be inadvertently transcribed.  Although I have reviewed the note for such errors, some may still exist.    Ray Serna MD  15:59 CDT  05/09/22

## 2022-05-10 ENCOUNTER — HOSPITAL ENCOUNTER (OUTPATIENT)
Dept: ULTRASOUND IMAGING | Facility: HOSPITAL | Age: 62
Discharge: HOME | End: 2022-05-10

## 2022-05-10 DIAGNOSIS — K70.30 ALCOHOLIC CIRRHOSIS OF LIVER WITHOUT ASCITES: ICD-10-CM

## 2022-05-11 ENCOUNTER — HOSPITAL ENCOUNTER (OUTPATIENT)
Dept: ULTRASOUND IMAGING | Facility: HOSPITAL | Age: 62
Discharge: HOME | End: 2022-05-11
Admitting: INTERNAL MEDICINE

## 2022-05-11 PROCEDURE — 76705 ECHO EXAM OF ABDOMEN: CPT

## 2022-05-17 NOTE — PROGRESS NOTES
[facility] - PODIATRY    Today's Date: 05/19/2022     Patient Name: Fiordaliza Lyons  MRN: [id number]  CSN: [id number]  PCP: Brittany Solis MD  Referring Provider: Brittany Solis MD    SUBJECTIVE     Chief Complaint   Patient presents with   • Establish Care     Brittany Solis MD 03/22/2022 NEW PATIENT - TO DR HINKLE FOR DIABETIC FOOT EXAM. - pt states last fall legs/ankls swelled up 2 to 3 times normal size, cause never determined, legs are better but left foot feels tight, like plate over it and does not have range of motion used to or like the right foot. Has had issues with ingrown nails, most toes, a new bump popped up above great toe right foot about 2 months ago- pt pain 6/10 at worst, after walking or at night when elevated in bed    • Diabetes     183mg/dl BG which is high for her, is type 1 diabetic     HPI: Fiordaliza Lyons, a 62 y.o.female, comes to clinic as a(n) new patient presenting for diabetic foot exam and complaining of lower extremity edema and neuropathic pain in legs/feet. Patient has h/o alcoholic hepatitis, breast cysts, DM I, endometreosis, GERD, HLD, HTN, pancreatitis, sleep apnea. Patient is IDDM with last stated BG level of 183mg/dl. Patient states that last fall her feet and ankles became swollen, 2-3x normal size, and eventually spontaneously resolved. She states that she had testing including for DVT which were all negative. States that since that time she feels like feet and ankles are tight and that she does not seem to have the range of motion that she previously had. Does not wear compression. Notes that she also has noticed a bump over the right hallux around 2 months ago that is sometimes painful, depending upon shoe gear. Admits to history of back pain but has never been formally evaluated. Does have MRI imaging of thoracic and lumbar spine with multi level degenerative changes. Feels like legs are weak and admits to occasionally feeling like she is going to  trip and fall. Has done PT but does not feel like it helped with any of her issues. Admits pain at 6/10 level and described as aching, nagging, dull, numbness and tingling. Denies previous treatment. Denies any constitutional symptoms. No other pedal complaints at this time.    Past Medical History:   Diagnosis Date   • Alcoholic hepatitis with ascites    • Allergic rhinitis    • Breast cyst    • Diabetes mellitus (HCC)    • Disease of thyroid gland    • Elevated d-dimer    • Endometriosis    • Fatty liver    • GERD (gastroesophageal reflux disease)    • History of transfusion    • Hyperlipidemia    • Hypertension    • Ovarian cyst    • Pancreatitis    • Postmenopausal    • Rectal mass    • Rectal prolapse    • Sleep apnea      Past Surgical History:   Procedure Laterality Date   • BILATERAL SALPINGO OOPHORECTOMY  2008   • CHOLECYSTECTOMY  1996   • COLONOSCOPY  07/2017    Dr Murphy- per patient normal    • COLONOSCOPY N/A 11/9/2021    Procedure: COLONOSCOPY WITH ANESTHESIA;  Surgeon: Ray Serna MD;  Location: [facility] ENDOSCOPY;  Service: Gastroenterology;  Laterality: N/A;  pre rectal mass  post  Dr Shields   • DILATION AND CURETTAGE, DIAGNOSTIC / THERAPEUTIC     • ENDOSCOPY N/A 11/9/2021    Procedure: ESOPHAGOGASTRODUODENOSCOPY WITH ANESTHESIA;  Surgeon: Ray Serna MD;  Location: [facility] ENDOSCOPY;  Service: Gastroenterology;  Laterality: N/A;  pre anemia  post hiatal hernia; gastric polyp  Dr. Shields   • KNEE SURGERY  1982     Family History   Problem Relation Age of Onset   • Ovarian cancer Mother    • Ovarian cancer Maternal Aunt    • Ovarian cancer Maternal Grandmother    • Ovarian cancer Paternal Grandmother    • Colon polyps Father    • Colon polyps Brother    • Breast cancer Neg Hx    • Colon cancer Neg Hx      Social History     Socioeconomic History   • Marital status: Single   Tobacco Use   • Smoking status: Never Smoker   • Smokeless tobacco: Never Used   Vaping Use   • Vaping Use: Never used    Substance and Sexual Activity   • Alcohol use: Not Currently   • Drug use: No   • Sexual activity: Defer     Allergies   Allergen Reactions   • Gabapentin Other (See Comments)     Passed out   • Augmentin [Amoxicillin-Pot Clavulanate] Nausea And Vomiting     Current Outpatient Medications   Medication Sig Dispense Refill   • insulin aspart (novoLOG FLEXPEN) 100 UNIT/ML solution pen-injector sc pen Inject  under the skin into the appropriate area as directed 3 (Three) Times a Day With Meals. 2-5 units with meals     • Insulin Disposable Pump (OmniPod Dash 5 Pack Pods) misc 1 each Continuous. USE ONE POD EVERY THREE DAYS 2 each 11   • levothyroxine (SYNTHROID, LEVOTHROID) 50 MCG tablet Take 50 mcg by mouth Every Morning.     • Multiple Vitamins-Iron (multivitamin with iron) tablet tablet Take 1 tablet by mouth Daily.     • pantoprazole (PROTONIX) 40 MG EC tablet Take 40 mg by mouth Daily.     • vitamin D (ERGOCALCIFEROL) 1.25 MG (54753 UT) capsule capsule Take 50,000 Units by mouth 1 (One) Time Per Week.       No current facility-administered medications for this visit.     Review of Systems   Constitutional: Negative for chills and fever.   HENT: Negative for congestion.    Respiratory: Negative for shortness of breath.    Cardiovascular: Positive for leg swelling. Negative for chest pain.   Gastrointestinal: Negative for constipation, diarrhea, nausea and vomiting.   Musculoskeletal: Positive for arthralgias and back pain.        Foot pain   Skin: Negative for wound.   Neurological: Positive for numbness.       OBJECTIVE     Vitals:    05/19/22 1525   BP: 122/68   Pulse: 77   SpO2: 99%       PHYSICAL EXAM  GEN:   Accompanied by none.     Foot/Ankle Exam:       General:   Diabetic Foot Exam Performed    Appearance: appears stated age and healthy    Orientation: AAOx3    Affect: appropriate    Gait: unimpaired    Assistance: independent    Shoe Gear:  Casual shoes    VASCULAR      Right Foot Vascularity   Dorsalis  pedis:  2+  Posterior tibial:  2+  Skin Temperature: warm    Edema Grading:  Trace  CFT:  3  Pedal Hair Growth:  Present  Varicosities: mild varicosities       Left Foot Vascularity   Dorsalis pedis:  2+  Posterior tibial:  2+  Skin Temperature: warm    Edema Grading:  Trace  CFT:  3  Pedal Hair Growth:  Present  Varicosities: mild varicosities        NEUROLOGIC     Right Foot Neurologic   Light touch sensation:  Diminished  Vibratory sensation:  Diminished  Hot/Cold sensation: diminished    Protective Sensation using [place] Monofilament:  5     Left Foot Neurologic   Light touch sensation:  Diminished  Vibratory sensation:  Diminished  Hot/cold sensation: diminished    Protective Sensation using [place] Monofilament:  5     MUSCULOSKELETAL      Right Foot Musculoskeletal   Ecchymosis:  None  Tenderness: great toe metatarsophalangeal joint    Arch:  Normal  Hallux valgus: No    Hallux limitus: Yes (Dorsal spurring)       Left Foot Musculoskeletal   Ecchymosis:  None  Tenderness: none    Arch:  Normal  Hallux valgus: No       MUSCLE STRENGTH     Right Foot Muscle Strength   Foot dorsiflexion:  4-  Foot plantar flexion:  4-  Foot inversion:  4  Foot eversion:  4-     Left Foot Muscle Strength   Foot dorsiflexion:  4-  Foot plantar flexion:  4-  Foot inversion:  4  Foot eversion:  4-     RANGE OF MOTION      Right Foot Range of Motion   Foot and ankle ROM within normal limits       Left Foot Range of Motion   Foot and ankle ROM within normal limits       DERMATOLOGIC     Right Foot Dermatologic   Skin: skin intact    Nails: onychomycosis and abnormally thick    Nails comment:  1-5     Left Foot Dermatologic   Skin: corn    Nails: onychomycosis and abnormally thick    Nails comment:  1-5     Image:       RADIOLOGY/NUCLEAR:  US Liver    Result Date: 5/11/2022  Narrative: EXAMINATION: US LIVER-  5/11/2022 7:39 AM CDT  HISTORY: Cirrhosis; K70.30-Alcoholic cirrhosis of liver without ascites  Grayscale,  color-flow, and Doppler ultrasound evaluation of the liver.  Mildly coarse hepatic echogenicity with mild nodular liver contour compatible with the history of cirrhosis.  No focal liver lesion is seen. The gallbladder is absent. The common bile duct diameter is normal at 5 mm.  Normal Doppler and color flow imaging of the portal vein.  Normal right kidney measuring 92 x 40 x 48 mm.  No free fluid.  Pancreas partially obscured by bowel gas.  The visualized portion of the upper aorta and IVC is of normal size.  Summary: 1. The ultrasound appearance of the liver is compatible with the provided history of cirrhosis. 2. There is no focal liver lesion or bile duct dilation. 3. Patent portal vein. This report was finalized on 05/11/2022 08:17 by Dr. Khanh Esteban MD.      LABORATORY/CULTURE RESULTS:      PATHOLOGY RESULTS:       ASSESSMENT/PLAN     Diagnoses and all orders for this visit:    1. Onychomycosis (Primary)    2. Type 1 diabetes mellitus with diabetic neuropathy (HCC)    3. Hallux limitus of right foot    4. Degenerative disc disease, lumbar  -     Ambulatory Referral to Neurosurgery    5. Degenerative disc disease, thoracic  -     Ambulatory Referral to Neurosurgery    6. Encounter for diabetic foot exam (HCC)    7. Foot callus      Comprehensive lower extremity examination and evaluation was performed.  Discussed findings and treatment plan including risks, benefits, and treatment options with patient in detail. Patient agreed with treatment plan.  DFE performed.  After verbal consent obtained, nail(s) x10 debrided of length and thickness with nail nipper without incidence  After verbal consent obtained, calluses x1 pared utilizing dermal curette and/or scalpel without incidence  Patient may maintain nails and calluses at home utilizing emery board or pumice stone between visits as needed  Reviewed at home diabetic foot care including daily foot checks   Neuropathic pain in legs consistent with both diabetic  and spinal sources. Given degenerative changes noted within MRI of thoracic and lumber, numbness/tingling and weakness is likely related to spinal issues.   Refer to neurosurgery.   Discussed treatment of hallux limitus including conservative measures with hard soled shoes vs. Surgical correction with arthrodesis. Will remain conservative at this time.  An After Visit Summary was printed and given to the patient at discharge, including (if requested) any available informative/educational handouts regarding diagnosis, treatment, or medications. All questions were answered to patient/family satisfaction. Should symptoms fail to improve or worsen they agree to call or return to clinic or to go to the Emergency Department. Discussed the importance of following up with any needed screening tests/labs/specialist appointments and any requested follow-up recommended by me today. Importance of maintaining follow-up discussed and patient accepts that missed appointments can delay diagnosis and potentially lead to worsening of conditions.  Return in about 3 months (around 8/19/2022) for Follow-up with Dr. Tony., or sooner if acute issues arise.    Lab Frequency Next Occurrence   US Paracentesis Once 10/31/2021   Follow Anesthesia Guidelines / Protocol Once 11/05/2021   Obtain Informed Consent Once 11/10/2021   Diet: Once 11/09/2021   Comprehensive Metabolic Panel Once 03/31/2022       This document has been electronically signed by Morro Tony DPM on May 19, 2022 16:14 CDT

## 2022-05-19 ENCOUNTER — OFFICE VISIT (OUTPATIENT)
Dept: PODIATRY | Facility: CLINIC | Age: 62
End: 2022-05-19

## 2022-05-19 VITALS
HEART RATE: 77 BPM | OXYGEN SATURATION: 99 % | DIASTOLIC BLOOD PRESSURE: 68 MMHG | HEIGHT: 60 IN | BODY MASS INDEX: 21.83 KG/M2 | WEIGHT: 111.2 LBS | SYSTOLIC BLOOD PRESSURE: 122 MMHG

## 2022-05-19 DIAGNOSIS — L84 FOOT CALLUS: ICD-10-CM

## 2022-05-19 DIAGNOSIS — M51.36 DEGENERATIVE DISC DISEASE, LUMBAR: ICD-10-CM

## 2022-05-19 DIAGNOSIS — M51.34 DEGENERATIVE DISC DISEASE, THORACIC: ICD-10-CM

## 2022-05-19 DIAGNOSIS — M20.5X1 HALLUX LIMITUS OF RIGHT FOOT: ICD-10-CM

## 2022-05-19 DIAGNOSIS — B35.1 ONYCHOMYCOSIS: Primary | ICD-10-CM

## 2022-05-19 DIAGNOSIS — E11.9 ENCOUNTER FOR DIABETIC FOOT EXAM: ICD-10-CM

## 2022-05-19 DIAGNOSIS — E10.40 TYPE 1 DIABETES MELLITUS WITH DIABETIC NEUROPATHY: ICD-10-CM

## 2022-05-19 PROCEDURE — 11055 PARING/CUTG B9 HYPRKER LES 1: CPT | Performed by: PODIATRIST

## 2022-05-19 PROCEDURE — 99203 OFFICE O/P NEW LOW 30 MIN: CPT | Performed by: PODIATRIST

## 2022-05-19 PROCEDURE — 11721 DEBRIDE NAIL 6 OR MORE: CPT | Performed by: PODIATRIST

## 2022-05-31 ENCOUNTER — OFFICE VISIT (OUTPATIENT)
Dept: INTERNAL MEDICINE | Age: 62
End: 2022-05-31
Payer: COMMERCIAL

## 2022-05-31 VITALS
HEART RATE: 72 BPM | HEIGHT: 60 IN | DIASTOLIC BLOOD PRESSURE: 50 MMHG | OXYGEN SATURATION: 99 % | WEIGHT: 110 LBS | SYSTOLIC BLOOD PRESSURE: 104 MMHG | BODY MASS INDEX: 21.6 KG/M2

## 2022-05-31 DIAGNOSIS — K64.4 EXTERNAL HEMORRHOIDS: ICD-10-CM

## 2022-05-31 DIAGNOSIS — E10.9 TYPE 1 DIABETES MELLITUS WITHOUT COMPLICATION (HCC): Primary | ICD-10-CM

## 2022-05-31 DIAGNOSIS — K70.9 ALCOHOLIC LIVER DISEASE (HCC): ICD-10-CM

## 2022-05-31 DIAGNOSIS — F10.11 HISTORY OF ALCOHOL ABUSE: ICD-10-CM

## 2022-05-31 DIAGNOSIS — D50.9 IRON DEFICIENCY ANEMIA, UNSPECIFIED IRON DEFICIENCY ANEMIA TYPE: ICD-10-CM

## 2022-05-31 PROCEDURE — 3051F HG A1C>EQUAL 7.0%<8.0%: CPT | Performed by: INTERNAL MEDICINE

## 2022-05-31 PROCEDURE — 99214 OFFICE O/P EST MOD 30 MIN: CPT | Performed by: INTERNAL MEDICINE

## 2022-05-31 RX ORDER — TIZANIDINE 4 MG/1
4 TABLET ORAL EVERY 6 HOURS PRN
COMMUNITY

## 2022-05-31 RX ORDER — ANTIOX #8/OM3/DHA/EPA/LUT/ZEAX 250-2.5 MG
2 CAPSULE ORAL DAILY
Qty: 180 CAPSULE | Refills: 3 | Status: SHIPPED | OUTPATIENT
Start: 2022-05-31

## 2022-05-31 ASSESSMENT — PATIENT HEALTH QUESTIONNAIRE - PHQ9
SUM OF ALL RESPONSES TO PHQ QUESTIONS 1-9: 1
2. FEELING DOWN, DEPRESSED OR HOPELESS: 1
SUM OF ALL RESPONSES TO PHQ QUESTIONS 1-9: 1
1. LITTLE INTEREST OR PLEASURE IN DOING THINGS: 0
SUM OF ALL RESPONSES TO PHQ9 QUESTIONS 1 & 2: 1

## 2022-05-31 NOTE — PROGRESS NOTES
Chief Complaint   Patient presents with    Follow-up    Diabetes       HPI: Visual change - decreased vision. Bilateral cataracts / glaucoma/ BLOOD vessel changes. Cataract surgery recommended. Glaucoma not issue now - watching for now. Recommended preservison areds 2 - 2 a day. Dr Cindy Peralta saw her also - hemorrhoids and plan of care reviewed with him. Stool habits are better. Takes metamucil sometimes. BS is better but goes up midafternoon and down at night. 22 toujeo at night and she gives 2-4 units at times with meals.       Past Medical History:   Diagnosis Date    Alcohol abuse 2/6/2019    Allergic rhinitis     Diabetes mellitus (HCC)     GERD (gastroesophageal reflux disease)     Hyperlipidemia     Hypertension     Postmenopausal 1/3/2019    Sleep apnea        Past Surgical History:   Procedure Laterality Date    CHOLECYSTECTOMY      KNEE ARTHROPLASTY      TOTAL ABDOMINAL HYSTERECTOMY W/ BILATERAL SALPINGOOPHORECTOMY         Family History   Problem Relation Age of Onset    Cancer Mother         ovarian    Cancer Maternal Grandmother         ovarian    Cancer Paternal Grandmother         ovarian       Social History     Socioeconomic History    Marital status: Single     Spouse name: Not on file    Number of children: Not on file    Years of education: Not on file    Highest education level: Not on file   Occupational History    Not on file   Tobacco Use    Smoking status: Never Smoker    Smokeless tobacco: Never Used   Substance and Sexual Activity    Alcohol use: Not on file    Drug use: Not on file    Sexual activity: Not on file   Other Topics Concern    Not on file   Social History Narrative    Not on file     Social Determinants of Health     Financial Resource Strain: Low Risk     Difficulty of Paying Living Expenses: Not very hard   Food Insecurity: No Food Insecurity    Worried About Running Out of Food in the Last Year: Never true    [address] in the Last Year: Never true   Transportation Needs:     Lack of Transportation (Medical): Not on file    Lack of Transportation (Non-Medical):  Not on file   Physical Activity:     Days of Exercise per Week: Not on file    Minutes of Exercise per Session: Not on file   Stress:     Feeling of Stress : Not on file   Social Connections:     Frequency of Communication with Friends and Family: Not on file    Frequency of Social Gatherings with Friends and Family: Not on file    Attends Hindu Services: Not on file    Active Member of [address] [place] [address] or Organizations: Not on file    Attends Club or Organization Meetings: Not on file    Marital Status: Not on file   Intimate Partner Violence:     Fear of Current or Ex-Partner: Not on file    Emotionally Abused: Not on file    Physically Abused: Not on file    Sexually Abused: Not on file   Housing Stability:     Unable to Pay for Housing in the Last Year: Not on file    Number of [place] in the Last Year: Not on file    Unstable Housing in the Last Year: Not on file       Allergies   Allergen Reactions    Gabapentin Other (See Comments)     Passed out    Amoxicillin-Pot Clavulanate Nausea And Vomiting       Current Outpatient Medications   Medication Sig Dispense Refill    tiZANidine (ZANAFLEX) 4 MG tablet Take 4 mg by mouth every 6 hours as needed      Multiple Vitamins-Minerals (PRESERVISION AREDS 2) CAPS Take 2 capsules by mouth daily 180 capsule 3    Insulin Glargine, 2 Unit Dial, (TOUJEO MAX SOLOSTAR) 300 UNIT/ML SOPN Inject 24 Units into the skin at bedtime 3 mL 2    Continuous Blood Gluc Sensor (DEXCOM G6 SENSOR) MISC USE AS DIRECTED. 3 each 2    Insulin Pen Needle (B-D UF III MINI PEN NEEDLES) 31G X 5 MM MISC USE 5 TIMES DAILY AND AS NEEDED. 100 each 3    insulin aspart (NOVOLOG FLEXPEN) 100 UNIT/ML injection pen 1 unit for every 50 points over 100 bs and 1 unit per 15 carbs 5 pen 3    levothyroxine (SYNTHROID) 50 MCG tablet TAKE 1 TABLET BY MOUTH DAILY 90 tablet 1    vitamin D (ERGOCALCIFEROL) 1.25 MG (06161 UT) CAPS capsule Take 1 capsule by mouth once a week 12 capsule 1    pantoprazole (PROTONIX) 40 MG tablet Take 1 tablet by mouth every morning (before breakfast) 90 tablet 1    Continuous Blood Gluc Transmit (DEXCOM G5 MOBILE TRANSMITTER) MISC Use as directed 1 each 3    hydrocortisone (ANUSOL-HC) 2.5 % CREA rectal cream Use twice a day and as needed post bm 28 g 1    hydrocortisone (ANUSOL-HC) 25 MG suppository Place 1 suppository rectally every 12 hours 24 suppository 0    blood glucose test strips (TRUE METRIX BLOOD GLUCOSE TEST) strip 5 times a day and prn . 100 each 3    Multiple Vitamins-Minerals (THERAPEUTIC MULTIVITAMIN-MINERALS) tablet Take 1 tablet by mouth daily       No current facility-administered medications for this visit. Review of Systems    BP (!) 104/50   Pulse 72   Ht 5' (1.524 m)   Wt 110 lb (49.9 kg)   SpO2 99%   BMI 21.48 kg/m²   BP Readings from Last 7 Encounters:   05/31/22 (!) 104/50   03/22/22 124/64   03/04/22 120/78   02/24/22 120/70   01/11/22 (!) 100/54   12/10/21 110/68   12/03/21 102/68     Wt Readings from Last 7 Encounters:   05/31/22 110 lb (49.9 kg)   03/22/22 110 lb (49.9 kg)   03/04/22 106 lb 9.6 oz (48.4 kg)   02/24/22 111 lb (50.3 kg)   01/11/22 102 lb (46.3 kg)   12/10/21 108 lb 6.4 oz (49.2 kg)   12/03/21 103 lb (46.7 kg)     BMI Readings from Last 7 Encounters:   05/31/22 21.48 kg/m²   03/22/22 21.48 kg/m²   03/04/22 20.82 kg/m²   02/24/22 21.68 kg/m²   01/11/22 19.92 kg/m²   12/10/21 21.17 kg/m²   12/03/21 20.12 kg/m²     Resp Readings from Last 7 Encounters:   12/03/21 16   11/28/21 18       Physical Exam  Constitutional:       General: She is not in acute distress. Comments: Very thin appearing   Eyes:      General: No scleral icterus. Cardiovascular:      Heart sounds: Normal heart sounds. Pulmonary:      Breath sounds: Normal breath sounds. Abdominal:      General: Abdomen is flat. Palpations: Abdomen is soft. Comments: No ascites   Musculoskeletal:      Cervical back: Neck supple. Right lower leg: No edema. Left lower leg: No edema. Lymphadenopathy:      Cervical: No cervical adenopathy. Skin:     Findings: No rash. Results for orders placed or performed in visit on 03/31/22   C-Peptide   Result Value Ref Range    C-Peptide 1.9 1.1 - 4.4 ng/mL       ASSESSMENT/ PLAN:  1. Type 1 diabetes mellitus without complication (HCC)  Continue to check A1c and lab routinely. Continue follow-up with endocrinology. Doing better with the blood sugar and especially with a continuous glucose monitor able to manage this better and trending in the correct direction.  - CBC; Future  - Comprehensive Metabolic Panel; Future  - Hemoglobin A1C; Future  - TSH; Future  - Lipid Panel; Future  - Microalbumin / Creatinine Urine Ratio; Future  - Vitamin D 25 Hydroxy; Future    2. Alcoholic liver disease (HCC)  Reviewed Dr. Tico Rogers notes and other records. She has been off alcohol now since October doing much better    3. Iron deficiency anemia, unspecified iron deficiency anemia type  Continue to follow lab has improved    4. History of alcohol abuse  Talked about counseling has done okay has very good support has done excellent with remaining off alcohol    5. External hemorrhoids  Continue with his current medical regimen and plan of care    Chart, medications, labs, vaccines reviewed. Keep up to date with routine care and follow up. Call with any problems or complaints. Keep up to date with routine screening recomendations and vaccines.    And interpreted notes Dr. Oneyda Ford records and other records ordered labs

## 2022-06-10 DIAGNOSIS — E10.9 TYPE 1 DIABETES MELLITUS WITHOUT COMPLICATION (HCC): ICD-10-CM

## 2022-06-10 RX ORDER — INSULIN GLARGINE 300 U/ML
24 INJECTION, SOLUTION SUBCUTANEOUS NIGHTLY
Qty: 3 ML | Refills: 2 | Status: SHIPPED | OUTPATIENT
Start: 2022-06-10 | End: 2022-09-08

## 2022-06-13 PROBLEM — K64.4 EXTERNAL HEMORRHOIDS: Status: ACTIVE | Noted: 2022-06-13

## 2022-06-20 ENCOUNTER — LAB (OUTPATIENT)
Dept: LAB | Facility: HOSPITAL | Age: 62
End: 2022-06-20

## 2022-06-20 ENCOUNTER — TRANSCRIBE ORDERS (OUTPATIENT)
Dept: ADMINISTRATIVE | Facility: HOSPITAL | Age: 62
End: 2022-06-20

## 2022-06-20 DIAGNOSIS — E10.9 TYPE 1 DIABETES MELLITUS WITHOUT COMPLICATION: ICD-10-CM

## 2022-06-20 DIAGNOSIS — E10.9 TYPE 1 DIABETES MELLITUS WITHOUT COMPLICATION: Primary | ICD-10-CM

## 2022-06-20 LAB
25(OH)D3 SERPL-MCNC: 53.3 NG/ML (ref 30–100)
ALBUMIN SERPL-MCNC: 4.3 G/DL (ref 3.5–5)
ALBUMIN UR-MCNC: <1.2 MG/DL
ALBUMIN/GLOB SERPL: 1.8 G/DL (ref 1.1–2.5)
ALP SERPL-CCNC: 186 U/L (ref 24–120)
ALT SERPL W P-5'-P-CCNC: 24 U/L (ref 0–35)
ANION GAP SERPL CALCULATED.3IONS-SCNC: 0 MMOL/L (ref 4–13)
AST SERPL-CCNC: 37 U/L (ref 7–45)
AUTO MIXED CELLS #: 0.3 10*3/MM3 (ref 0.1–2.6)
AUTO MIXED CELLS %: 10.7 % (ref 0.1–24)
BILIRUB SERPL-MCNC: 0.5 MG/DL (ref 0.1–1)
BUN SERPL-MCNC: 22 MG/DL (ref 5–21)
BUN/CREAT SERPL: 28.6
CALCIUM SPEC-SCNC: 9.9 MG/DL (ref 8.4–10.4)
CHLORIDE SERPL-SCNC: 103 MMOL/L (ref 98–110)
CHOLEST SERPL-MCNC: 132 MG/DL (ref 130–200)
CO2 SERPL-SCNC: 33 MMOL/L (ref 24–31)
CREAT SERPL-MCNC: 0.77 MG/DL (ref 0.5–1.4)
CREAT UR-MCNC: 17.6 MG/DL
EGFRCR SERPLBLD CKD-EPI 2021: 88.4 ML/MIN/1.73
ERYTHROCYTE [DISTWIDTH] IN BLOOD BY AUTOMATED COUNT: 15.2 % (ref 12.3–15.4)
GLOBULIN UR ELPH-MCNC: 2.4 GM/DL
GLUCOSE SERPL-MCNC: 91 MG/DL (ref 70–100)
HBA1C MFR BLD: 6.2 % (ref 4.8–5.9)
HCT VFR BLD AUTO: 33.9 % (ref 34–46.6)
HDLC SERPL-MCNC: 62 MG/DL
HGB BLD-MCNC: 10.7 G/DL (ref 12–15.9)
LDLC SERPL CALC-MCNC: 58 MG/DL (ref 0–99)
LDLC/HDLC SERPL: 0.96 {RATIO}
LYMPHOCYTES # BLD AUTO: 0.5 10*3/MM3 (ref 0.7–3.1)
LYMPHOCYTES NFR BLD AUTO: 18.2 % (ref 19.6–45.3)
MCH RBC QN AUTO: 24.7 PG (ref 26.6–33)
MCHC RBC AUTO-ENTMCNC: 31.6 G/DL (ref 31.5–35.7)
MCV RBC AUTO: 78.3 FL (ref 79–97)
MICROALBUMIN/CREAT UR: NORMAL MG/G{CREAT}
NEUTROPHILS NFR BLD AUTO: 2.1 10*3/MM3 (ref 1.7–7)
NEUTROPHILS NFR BLD AUTO: 71.1 % (ref 42.7–76)
PLATELET # BLD AUTO: 147 10*3/MM3 (ref 140–450)
PMV BLD AUTO: 9.3 FL (ref 6–12)
POTASSIUM SERPL-SCNC: 4.8 MMOL/L (ref 3.5–5.3)
PROT SERPL-MCNC: 6.7 G/DL (ref 6.3–8.7)
RBC # BLD AUTO: 4.33 10*6/MM3 (ref 3.77–5.28)
SODIUM SERPL-SCNC: 136 MMOL/L (ref 135–145)
TRIGL SERPL-MCNC: 52 MG/DL (ref 0–149)
TSH SERPL DL<=0.05 MIU/L-ACNC: 2 UIU/ML (ref 0.27–4.2)
VLDLC SERPL-MCNC: 12 MG/DL (ref 5–40)
WBC NRBC COR # BLD: 2.9 10*3/MM3 (ref 3.4–10.8)

## 2022-06-20 PROCEDURE — 82570 ASSAY OF URINE CREATININE: CPT

## 2022-06-20 PROCEDURE — 36415 COLL VENOUS BLD VENIPUNCTURE: CPT | Performed by: INTERNAL MEDICINE

## 2022-06-20 PROCEDURE — 82043 UR ALBUMIN QUANTITATIVE: CPT

## 2022-06-20 PROCEDURE — 82306 VITAMIN D 25 HYDROXY: CPT

## 2022-06-20 PROCEDURE — 80050 GENERAL HEALTH PANEL: CPT

## 2022-06-20 PROCEDURE — 83036 HEMOGLOBIN GLYCOSYLATED A1C: CPT | Performed by: INTERNAL MEDICINE

## 2022-06-20 PROCEDURE — 80061 LIPID PANEL: CPT

## 2022-07-05 ENCOUNTER — TELEPHONE (OUTPATIENT)
Dept: INTERNAL MEDICINE | Age: 62
End: 2022-07-05

## 2022-07-05 DIAGNOSIS — U07.1 COVID-19: Primary | ICD-10-CM

## 2022-07-05 RX ORDER — ALBUTEROL SULFATE 90 UG/1
2 AEROSOL, METERED RESPIRATORY (INHALATION) 4 TIMES DAILY PRN
Qty: 18 G | Refills: 0 | Status: SHIPPED | OUTPATIENT
Start: 2022-07-05

## 2022-07-05 NOTE — TELEPHONE ENCOUNTER
I called her --- 8 days ago felt sick - tested positive Saturday and Sunday - temp 98 - with her history of liver disease - avoid paxlovid --- treat symptomatically and I called in albuterol and can do advil and tylenol low dose a few days

## 2022-07-05 NOTE — TELEPHONE ENCOUNTER
She tested positive for COVID this morning and would like to know what treatment she needs for this?

## 2022-07-08 ENCOUNTER — TELEPHONE (OUTPATIENT)
Dept: INTERNAL MEDICINE | Age: 62
End: 2022-07-08

## 2022-07-08 DIAGNOSIS — U07.1 COVID-19: Primary | ICD-10-CM

## 2022-07-08 RX ORDER — GUAIFENESIN, PSEUDOEPHEDRINE HYDROCHLORIDE 600; 60 MG/1; MG/1
1 TABLET, EXTENDED RELEASE ORAL EVERY 12 HOURS
Qty: 20 TABLET | Refills: 1 | Status: SHIPPED | OUTPATIENT
Start: 2022-07-08 | End: 2022-07-18

## 2022-07-08 RX ORDER — PREDNISONE 10 MG/1
10 TABLET ORAL 2 TIMES DAILY
Qty: 10 TABLET | Refills: 0 | Status: SHIPPED | OUTPATIENT
Start: 2022-07-08 | End: 2022-07-13

## 2022-07-08 NOTE — TELEPHONE ENCOUNTER
I called her --- Headache congestion achey-- 97.3 temperature --oxygen level 87% but comes back up --I offered her to come be seen -- she prefers to stay home - will reassess -- I added mucinex D bid for 10 days and prednisone bid for 5 days

## 2022-07-08 NOTE — TELEPHONE ENCOUNTER
She called and said she is on day 12 of COVID and isn't getting any better. She said she knows that Dr. Nelli Pearson doesn't want to give her much medicine due to her liver, but \"something has got to be done\".   She would like to speak with Dr. Nelli Pearson

## 2022-08-08 ENCOUNTER — OFFICE VISIT (OUTPATIENT)
Dept: INTERNAL MEDICINE | Age: 62
End: 2022-08-08
Payer: COMMERCIAL

## 2022-08-08 VITALS
SYSTOLIC BLOOD PRESSURE: 122 MMHG | HEART RATE: 78 BPM | WEIGHT: 113 LBS | DIASTOLIC BLOOD PRESSURE: 60 MMHG | BODY MASS INDEX: 22.19 KG/M2 | HEIGHT: 60 IN | OXYGEN SATURATION: 99 %

## 2022-08-08 DIAGNOSIS — F10.11 HISTORY OF ALCOHOL ABUSE: ICD-10-CM

## 2022-08-08 DIAGNOSIS — E10.65 TYPE 1 DIABETES MELLITUS WITH HYPERGLYCEMIA (HCC): Primary | ICD-10-CM

## 2022-08-08 DIAGNOSIS — D64.9 ANEMIA, UNSPECIFIED TYPE: ICD-10-CM

## 2022-08-08 DIAGNOSIS — M54.6 ACUTE BILATERAL THORACIC BACK PAIN: ICD-10-CM

## 2022-08-08 DIAGNOSIS — K70.9 ALCOHOLIC LIVER DISEASE (HCC): ICD-10-CM

## 2022-08-08 DIAGNOSIS — M51.36 LUMBAR DEGENERATIVE DISC DISEASE: ICD-10-CM

## 2022-08-08 PROCEDURE — 99214 OFFICE O/P EST MOD 30 MIN: CPT | Performed by: INTERNAL MEDICINE

## 2022-08-08 PROCEDURE — 3051F HG A1C>EQUAL 7.0%<8.0%: CPT | Performed by: INTERNAL MEDICINE

## 2022-08-08 NOTE — PROGRESS NOTES
Chief Complaint   Patient presents with    Follow-up     2 months    Diabetes       HPI: Patient is here today to follow-up diabetes and other medical issues she is doing much better in general has been 8 months or so since she last drank has done wonderful job with that she feels better she is motivated happier looking forward to things unfortunately she is having some back pain and radicular pain. Recently had COVID had a long recovery with that. Fatigue. Still issues with her bowel movements.     Past Medical History:   Diagnosis Date    Alcohol abuse 2/6/2019    Allergic rhinitis     Diabetes mellitus (HCC)     GERD (gastroesophageal reflux disease)     Hyperlipidemia     Hypertension     Postmenopausal 1/3/2019    Sleep apnea        Past Surgical History:   Procedure Laterality Date    CHOLECYSTECTOMY      KNEE ARTHROPLASTY      TOTAL ABDOMINAL HYSTERECTOMY W/ BILATERAL SALPINGOOPHORECTOMY         Family History   Problem Relation Age of Onset    Cancer Mother         ovarian    Cancer Maternal Grandmother         ovarian    Cancer Paternal Grandmother         ovarian       Social History     Socioeconomic History    Marital status: Single     Spouse name: Not on file    Number of children: Not on file    Years of education: Not on file    Highest education level: Not on file   Occupational History    Not on file   Tobacco Use    Smoking status: Never    Smokeless tobacco: Never   Substance and Sexual Activity    Alcohol use: Not on file    Drug use: Not on file    Sexual activity: Not on file   Other Topics Concern    Not on file   Social History Narrative    Not on file     Social Determinants of Health     Financial Resource Strain: Low Risk     Difficulty of Paying Living Expenses: Not very hard   Food Insecurity: No Food Insecurity    Worried About Running Out of Food in the Last Year: Never true    Ran Out of Food in the Last Year: Never true   Transportation Needs: Not on file   Physical Activity: Not current facility-administered medications for this visit. Review of Systems    /60   Pulse 78   Ht 5' (1.524 m)   Wt 113 lb (51.3 kg)   SpO2 99%   BMI 22.07 kg/m²   BP Readings from Last 7 Encounters:   08/08/22 122/60   05/31/22 (!) 104/50   03/22/22 124/64   03/04/22 120/78   02/24/22 120/70   01/11/22 (!) 100/54   12/10/21 110/68     Wt Readings from Last 7 Encounters:   08/08/22 113 lb (51.3 kg)   05/31/22 110 lb (49.9 kg)   03/22/22 110 lb (49.9 kg)   03/04/22 106 lb 9.6 oz (48.4 kg)   02/24/22 111 lb (50.3 kg)   01/11/22 102 lb (46.3 kg)   12/10/21 108 lb 6.4 oz (49.2 kg)     BMI Readings from Last 7 Encounters:   08/08/22 22.07 kg/m²   05/31/22 21.48 kg/m²   03/22/22 21.48 kg/m²   03/04/22 20.82 kg/m²   02/24/22 21.68 kg/m²   01/11/22 19.92 kg/m²   12/10/21 21.17 kg/m²     Resp Readings from Last 7 Encounters:   12/03/21 16   11/28/21 18       Physical Exam  Constitutional:       General: She is not in acute distress. Appearance: Normal appearance. She is well-developed. HENT:      Right Ear: External ear normal. Tympanic membrane is not injected. Left Ear: External ear normal. Tympanic membrane is not injected. Mouth/Throat:      Pharynx: No oropharyngeal exudate. Eyes:      General: No scleral icterus. Conjunctiva/sclera: Conjunctivae normal.   Neck:      Thyroid: No thyroid mass or thyromegaly. Vascular: No carotid bruit. Cardiovascular:      Rate and Rhythm: Normal rate and regular rhythm. Heart sounds: S1 normal and S2 normal. No murmur heard. No S3 or S4 sounds. Pulmonary:      Effort: Pulmonary effort is normal. No respiratory distress. Breath sounds: Normal breath sounds. No wheezing or rales. Chest:   Breasts:     Right: No supraclavicular adenopathy. Left: No supraclavicular adenopathy. Abdominal:      General: Bowel sounds are normal. There is no distension. Palpations: Abdomen is soft. There is no mass.       Tenderness: There is no abdominal tenderness. Musculoskeletal:      Cervical back: Neck supple. Lymphadenopathy:      Cervical: No cervical adenopathy. Upper Body:      Right upper body: No supraclavicular adenopathy. Left upper body: No supraclavicular adenopathy. Skin:     Findings: No rash. Neurological:      Mental Status: She is alert and oriented to person, place, and time. Cranial Nerves: No cranial nerve deficit. Psychiatric:         Mood and Affect: Mood normal.       Results for orders placed or performed in visit on 03/31/22   C-Peptide   Result Value Ref Range    C-Peptide 1.9 1.1 - 4.4 ng/mL       ASSESSMENT/ PLAN:  1. Type 1 diabetes mellitus with hyperglycemia (HCC)  Her diabetes control is excellent we are very happy with that had been out of control for a long time she has done very well with diabetes control and was staying off alcohol keep watching all of this closely    2. Lumbar degenerative disc disease  Very worrisome back pain we will order an MRI reassess    3. Anemia, unspecified type  Anemia we need to recheck labs and parameters for anemia  - CBC; Future  - Comprehensive Metabolic Panel; Future  - Iron; Future  - Ferritin; Future  - Vitamin B12; Future  - Folate; Future    4. Acute bilateral thoracic back pain  Acute bilateral thoracic back pain check an MRI and go from there  - MRI [address]; Future  - Sedimentation Rate; Future  - C-Reactive Protein; Future    5. History of alcohol abuse  We have offered counseling she has felt that she did not need this and she has done excellent and has made herself very involved is very active involved in her Hinduism in the community working forward to things and very motivated to stay off alcohol we have congratulated her on this but also let her know to reach out to us if she needs assistance    6.  Alcoholic liver disease ([facility] [address]  Staying off alcohol the best treatment and she has done a great job of this    Chart, medications, labs, vaccines reviewed. Keep up to date with routine care and follow up. Call with any problems or complaints. Keep up to date with routine screening recomendations and vaccines.

## 2022-08-09 ENCOUNTER — TRANSCRIBE ORDERS (OUTPATIENT)
Dept: ADMINISTRATIVE | Facility: HOSPITAL | Age: 62
End: 2022-08-09

## 2022-08-09 DIAGNOSIS — M51.36 DEGENERATION OF LUMBAR INTERVERTEBRAL DISC: Primary | ICD-10-CM

## 2022-08-15 ENCOUNTER — HOSPITAL ENCOUNTER (OUTPATIENT)
Dept: MRI IMAGING | Facility: HOSPITAL | Age: 62
Discharge: HOME | End: 2022-08-15

## 2022-08-15 DIAGNOSIS — M51.36 DEGENERATION OF LUMBAR INTERVERTEBRAL DISC: ICD-10-CM

## 2022-08-15 PROCEDURE — 72146 MRI CHEST SPINE W/O DYE: CPT

## 2022-08-15 PROCEDURE — 72148 MRI LUMBAR SPINE W/O DYE: CPT

## 2022-08-17 ENCOUNTER — TELEPHONE (OUTPATIENT)
Dept: INTERNAL MEDICINE | Age: 62
End: 2022-08-17

## 2022-08-17 DIAGNOSIS — M51.36 LUMBAR DEGENERATIVE DISC DISEASE: Primary | ICD-10-CM

## 2022-08-18 NOTE — TELEPHONE ENCOUNTER
----- Message from Mango Reyna MD sent at 8/16/2022  3:41 PM CDT -----  See below and I can make a referral -- find out if she has a preference on whom she sees
I sent a referral
She would like to see Dr Sophia Irizarry
Statement Selected

## 2022-08-30 ENCOUNTER — HOSPITAL ENCOUNTER (OUTPATIENT)
Dept: PAIN MANAGEMENT | Age: 62
Discharge: HOME | End: 2022-08-30
Payer: COMMERCIAL

## 2022-08-30 VITALS
RESPIRATION RATE: 18 BRPM | OXYGEN SATURATION: 98 % | DIASTOLIC BLOOD PRESSURE: 62 MMHG | HEART RATE: 66 BPM | TEMPERATURE: 96.7 F | SYSTOLIC BLOOD PRESSURE: 143 MMHG

## 2022-08-30 DIAGNOSIS — R52 PAIN MANAGEMENT: ICD-10-CM

## 2022-08-30 PROCEDURE — 2580000003 HC RX 258

## 2022-08-30 PROCEDURE — 6360000002 HC RX W HCPCS

## 2022-08-30 PROCEDURE — 2500000003 HC RX 250 WO HCPCS

## 2022-08-30 PROCEDURE — 62323 NJX INTERLAMINAR LMBR/SAC: CPT

## 2022-08-30 PROCEDURE — A4216 STERILE WATER/SALINE, 10 ML: HCPCS

## 2022-08-30 PROCEDURE — 3209999900 FLUORO FOR SURGICAL PROCEDURES

## 2022-08-30 RX ORDER — METHYLPREDNISOLONE ACETATE 40 MG/ML
80 INJECTION, SUSPENSION INTRA-ARTICULAR; INTRALESIONAL; INTRAMUSCULAR; SOFT TISSUE ONCE
Status: DISCONTINUED | OUTPATIENT
Start: 2022-08-30 | End: 2022-09-01 | Stop reason: HOSPADM

## 2022-08-30 RX ORDER — LIDOCAINE HYDROCHLORIDE 10 MG/ML
5 INJECTION, SOLUTION EPIDURAL; INFILTRATION; INTRACAUDAL; PERINEURAL ONCE
Status: DISCONTINUED | OUTPATIENT
Start: 2022-08-30 | End: 2022-09-01 | Stop reason: HOSPADM

## 2022-08-30 RX ORDER — SODIUM CHLORIDE 9 MG/ML
5 INJECTION INTRAVENOUS ONCE
Status: DISCONTINUED | OUTPATIENT
Start: 2022-08-30 | End: 2022-09-01 | Stop reason: HOSPADM

## 2022-08-30 ASSESSMENT — PAIN - FUNCTIONAL ASSESSMENT: PAIN_FUNCTIONAL_ASSESSMENT: 0-10

## 2022-08-30 NOTE — PROGRESS NOTES
Procedure:  Level of Consciousness: [x]Alert [x]Oriented []Disoriented []Lethargic  Anxiety Level: []Calm [x]Anxious []Depressed []Other  Skin: []Warm [x]Dry []Cool []Moist []Intact []Other  Cardiovascular: [x]Palpitations: [x]Never []Occasionally []Frequently  Chest Pain: [x]No []Yes  Respiratory:  [x]Unlabored []Labored []Cough ([] Productive []Unproductive)  HCG Required: [x]No []Yes   Results: []Negative []Positive  Knowledge Level:        [x]Patient/Other verbalized understanding of pre-procedure instructions. [x]Assessment of post-op care needs (transportation, responsible caregiver)        [x]Able to discuss health care problems and how to deal with it.   Factors that Affect Teaching:        Language Barrier: [x]No []Yes - why:        Hearing Loss:        [x]No []Yes            Corrective Device:  []Yes []No        Vision Loss:           []No [x]Yes            Corrective Device:  [x]Yes []No        Memory Loss:       [x]No []Yes            []Short Term []Long Term  Motivational Level:  [x]Asks Questions                  []Extremely Anxious       [x]Seems Interested               []Seems Uninterested                  [x]Denies need for Education  Risk for Injury:  [x]Patient oriented to person, place and time  []History of frequent falls/loss of balance  Nutritional:  []Change in appetite   []Weight Gain   []Weight Loss  Functional:  []Requires assistance with ADL's

## 2022-08-30 NOTE — INTERVAL H&P NOTE
Update History & Physical    The patient's History and Physical  was reviewed with the patient and I examined the patient. There was no change. The surgical site was confirmed by the patient and me. Plan: The risks, benefits, expected outcome, and alternative to the recommended procedure have been discussed with the patient. Patient understands and wants to proceed with the procedure.      Electronically signed by Clay Mendoza MD on 8/30/2022 at 9:55 AM

## 2022-09-15 ENCOUNTER — OFFICE VISIT (OUTPATIENT)
Dept: NEUROSURGERY | Facility: CLINIC | Age: 62
End: 2022-09-15

## 2022-09-15 VITALS — BODY MASS INDEX: 20.81 KG/M2 | HEIGHT: 60 IN | WEIGHT: 106 LBS

## 2022-09-15 DIAGNOSIS — Z78.9 NON-SMOKER: ICD-10-CM

## 2022-09-15 DIAGNOSIS — M54.16 LUMBAR RADICULOPATHY: ICD-10-CM

## 2022-09-15 DIAGNOSIS — M51.37 DEGENERATION OF LUMBAR OR LUMBOSACRAL INTERVERTEBRAL DISC: Primary | ICD-10-CM

## 2022-09-15 PROBLEM — M51.379 DEGENERATION OF LUMBAR OR LUMBOSACRAL INTERVERTEBRAL DISC: Status: ACTIVE | Noted: 2022-09-15

## 2022-09-15 PROCEDURE — 99204 OFFICE O/P NEW MOD 45 MIN: CPT | Performed by: NEUROLOGICAL SURGERY

## 2022-09-15 RX ORDER — CHOLECALCIFEROL (VITAMIN D3) 125 MCG
5 CAPSULE ORAL NIGHTLY PRN
COMMUNITY

## 2022-09-15 RX ORDER — INSULIN GLARGINE 300 U/ML
INJECTION, SOLUTION SUBCUTANEOUS
COMMUNITY
Start: 2022-08-08

## 2022-09-15 RX ORDER — PROCHLORPERAZINE 25 MG/1
SUPPOSITORY RECTAL SEE ADMIN INSTRUCTIONS
COMMUNITY
Start: 2022-09-09

## 2022-09-15 RX ORDER — FLURBIPROFEN SODIUM 0.3 MG/ML
SOLUTION/ DROPS OPHTHALMIC
COMMUNITY
Start: 2022-08-08

## 2022-09-15 RX ORDER — TIZANIDINE 4 MG/1
TABLET ORAL
COMMUNITY
Start: 2022-08-11

## 2022-09-15 RX ORDER — ALBUTEROL SULFATE 90 UG/1
AEROSOL, METERED RESPIRATORY (INHALATION)
COMMUNITY
Start: 2022-07-05

## 2022-09-15 RX ORDER — GUAIFENESIN AND PSEUDOEPHEDRINE HYDROCHLORIDE 600; 60 MG/1; MG/1
1 TABLET, EXTENDED RELEASE ORAL EVERY 12 HOURS
COMMUNITY
Start: 2022-07-08 | End: 2022-09-15

## 2022-09-15 NOTE — PROGRESS NOTES
Patient: Fiordaliza Lyons  : 1960    Primary Care Provider: Jovan Christopher MD    Requesting Provider: Jovan Christopher MD        History    Chief Complaint: Back pain  Chief Complaint   Patient presents with   • Back Pain     NEW PATIENT/DR JOVAN CHRISTOPHER: patient's imaging @ [facility].       History of Present Illness: 62-year-old female who presents with several years of back pain that is gradually gotten worse and more consistent.  She describes across the back low back pain which is pretty constant to some degree.  Its worse in the mornings worse if she does any bending and lifting.  It gets better if she gets off of her feet and tends to get better throughout the day.  She really does not describe any lower extremity radicular symptoms.  She does describe some weakness in her feet left is worse than the right along with what she describes as neuropathy in the feet.  She is diabetic.  She is had 1 injection from Dr. Cruz which she said gave her about 3 weeks of meaningful relief.  She uses a compounding cream.  She did a dedicated course of physical therapy recently which helped to some degree.  Review of Systems   Musculoskeletal: Positive for back pain.   Neurological: Positive for weakness and numbness.   All other systems reviewed and are negative.      Past Medical History:   Past Medical History:   Diagnosis Date   • Alcoholic hepatitis with ascites    • Allergic rhinitis    • Breast cyst    • Degeneration of lumbar or lumbosacral intervertebral disc 9/15/2022   • Diabetes mellitus (HCC)    • Disease of thyroid gland    • Elevated d-dimer    • Endometriosis    • Fatty liver    • GERD (gastroesophageal reflux disease)    • History of transfusion    • Hyperlipidemia    • Hypertension    • Ovarian cyst    • Pancreatitis    • Postmenopausal    • Rectal mass    • Rectal prolapse    • Sleep apnea        Past Surgical History:   Past Surgical History:   Procedure Laterality Date   • BILATERAL SALPINGO  OOPHORECTOMY  2008   • CHOLECYSTECTOMY  1996   • COLONOSCOPY  07/2017    Dr Murphy- per patient normal    • COLONOSCOPY N/A 11/9/2021    Procedure: COLONOSCOPY WITH ANESTHESIA;  Surgeon: Ray Serna MD;  Location: [facility] ENDOSCOPY;  Service: Gastroenterology;  Laterality: N/A;  pre rectal mass  post  Dr Shields   • DILATION AND CURETTAGE, DIAGNOSTIC / THERAPEUTIC     • ENDOSCOPY N/A 11/9/2021    Procedure: ESOPHAGOGASTRODUODENOSCOPY WITH ANESTHESIA;  Surgeon: Ray Serna MD;  Location: [facility] ENDOSCOPY;  Service: Gastroenterology;  Laterality: N/A;  pre anemia  post hiatal hernia; gastric polyp  Dr. Shields   • KNEE SURGERY  1982       Family History: family history includes Colon polyps in her brother and father; Ovarian cancer in her maternal aunt, maternal grandmother, mother, and paternal grandmother.    Social History:  reports that she has never smoked. She has never used smokeless tobacco. She reports previous alcohol use. She reports that she does not use drugs.    Medications:  (Not in a hospital admission)      Allergies:  Gabapentin and Augmentin [amoxicillin-pot clavulanate]    Physical Exam:     Physical Exam  Eyes:      Extraocular Movements: EOM normal.      Pupils: Pupils are equal, round, and reactive to light.   Neurological:      Mental Status: She is oriented to person, place, and time.      Coordination: Finger-Nose-Finger Test normal.      Gait: Gait is intact.      Deep Tendon Reflexes: Strength normal.      Reflex Scores:       Patellar reflexes are 0 on the right side and 0 on the left side.       Achilles reflexes are 0 on the right side and 0 on the left side.  Psychiatric:         Speech: Speech normal.         Neurologic Exam     Mental Status   Oriented to person, place, and time.   Attention: normal.   Speech: speech is normal   Level of consciousness: alert  Knowledge: good.     Cranial Nerves     CN II   Visual fields full to confrontation.     CN III, IV, VI   Pupils are  equal, round, and reactive to light.  Extraocular motions are normal.     CN V   Facial sensation intact.     CN VII   Facial expression full, symmetric.     CN VIII   CN VIII normal.     CN IX, X   CN IX normal.   CN X normal.     CN XI   CN XI normal.     CN XII   CN XII normal.     Motor Exam   Muscle bulk: normal  Overall muscle tone: normal  Right arm pronator drift: absent  Left arm pronator drift: absent    Strength   Strength 5/5 throughout. Right lower extremity dorsiflexion plantarflexion 5 out of 5 all flexion-extension movements, inversion eversion 4 out of 5    Left lower extremity dorsiflexion plantarflexion 4 out of 5, inversion eversion 3 out of 5     Sensory Exam   Light touch normal.   Pinprick normal.     Gait, Coordination, and Reflexes     Gait  Gait: normal    Coordination   Finger to nose coordination: normal    Tremor   Resting tremor: absent  Intention tremor: absent  Action tremor: absent    Reflexes   Reflexes 2+ except as noted.   Right patellar: 0  Left patellar: 0  Right achilles: 0  Left achilles: 0        Independent Review of Radiographic Studies:       ASSESSMENT/PLAN: The patient describes low back pain with no radicular component to her pain.  She is done a dedicated course of physical therapy without any improvement.  She has had some relief with the first set of injections.  Her MRI shows a spondylolisthesis listhesis at L4-5 with bilateral foraminal stenosis.  She also does have some foot weakness which I am not convinced is coming from her back.  I am a little bit more concerned about possible sensorimotor peripheral neuropathy.  Working to get an EMG nerve conduction study of her lower extremities.  I recommend for now that she continue with the injection treatments.  At some point there may be a reason to consider surgery at L5-S1 to treat the spondylolisthesis but I think we would have to exhaust all nonsurgical treatments first.  We will see her in follow-up after the  nerve conduction study.  1. Degeneration of lumbar or lumbosacral intervertebral disc    2. Lumbar radiculopathy    3. Non-smoker    4. BMI 20.0-20.9, adult          Return in about 8 years (around 9/15/2030) for SYMPTOM RECHECK (30 MINS) - CONNER VALLES ALREADY IN THE SYSTEM.      Nathaniel Tesfaye MD

## 2022-09-20 ENCOUNTER — TREATMENT (OUTPATIENT)
Dept: PHYSICAL THERAPY | Facility: CLINIC | Age: 62
End: 2022-09-20

## 2022-09-20 DIAGNOSIS — M51.37 DEGENERATION OF LUMBAR OR LUMBOSACRAL INTERVERTEBRAL DISC: ICD-10-CM

## 2022-09-20 DIAGNOSIS — M54.16 LUMBAR RADICULOPATHY: Primary | ICD-10-CM

## 2022-09-20 PROCEDURE — 97162 PT EVAL MOD COMPLEX 30 MIN: CPT | Performed by: PHYSICAL THERAPIST

## 2022-09-20 PROCEDURE — 97535 SELF CARE MNGMENT TRAINING: CPT | Performed by: PHYSICAL THERAPIST

## 2022-09-20 NOTE — PROGRESS NOTES
Physical Therapy Initial Evaluation and Plan of Care    Patient: Fiordaliza Lyons               : 1960  Visit Date: 2022  Referring practitioner: Nathaniel Tesfaye MD  Date of Initial Visit: 2022  Patient seen for 1 sessions    Visit Diagnoses:    ICD-10-CM ICD-9-CM   1. Lumbar radiculopathy  M54.16 724.4   2. Degeneration of lumbar or lumbosacral intervertebral disc  M51.37 722.52     Past Medical History:   Diagnosis Date   • Alcoholic hepatitis with ascites    • Allergic rhinitis    • Breast cyst    • Degeneration of lumbar or lumbosacral intervertebral disc 9/15/2022   • Diabetes mellitus (HCC)    • Disease of thyroid gland    • Elevated d-dimer    • Endometriosis    • Fatty liver    • GERD (gastroesophageal reflux disease)    • History of transfusion    • Hyperlipidemia    • Hypertension    • Ovarian cyst    • Pancreatitis    • Postmenopausal    • Rectal mass    • Rectal prolapse    • Sleep apnea      Past Surgical History:   Procedure Laterality Date   • BILATERAL SALPINGO OOPHORECTOMY     • CHOLECYSTECTOMY     • COLONOSCOPY  2017    Dr Murphy- per patient normal    • COLONOSCOPY N/A 2021    Procedure: COLONOSCOPY WITH ANESTHESIA;  Surgeon: Ray Serna MD;  Location: [facility] ENDOSCOPY;  Service: Gastroenterology;  Laterality: N/A;  pre rectal mass  post  Dr Shields   • DILATION AND CURETTAGE, DIAGNOSTIC / THERAPEUTIC     • ENDOSCOPY N/A 2021    Procedure: ESOPHAGOGASTRODUODENOSCOPY WITH ANESTHESIA;  Surgeon: Ray Serna MD;  Location: [facility] ENDOSCOPY;  Service: Gastroenterology;  Laterality: N/A;  pre anemia  post hiatal hernia; gastric polyp  Dr. Shields   • KNEE SURGERY           SUBJECTIVE     Subjective Evaluation    History of Present Illness  Date of onset: 10/1/2021  Mechanism of injury: She has a long h/o LBP with left radiculopathy. This flared last year and worsened by 2022. She says  her left leg has always been weaker. She is wanting to avoid surgery and will have an EMG to determine where her symptoms might be coming from.     Pain  Current pain ratin  Location: LBP constant  Relieving factors: rest  Aggravating factors: ambulation (stooping 10 secs, sitting without support, lifting)  Progression: worsening    Social Support  Lives with: alone    Diagnostic Tests  MRI studies: abnormal    Patient Goals  Patient goals for therapy: decreased pain, increased strength, independence with ADLs/IADLs and return to sport/leisure activities              OBJECTIVE     Objective          Static Posture     Lumbar Spine   Flattened.     Palpation   Left   Hypertonic in the erector spinae.     Right   Hypertonic in the erector spinae.     Tenderness     Lumbar Spine  Tenderness in the facet joint.     Neurological Testing     Sensation     Lumbar   Left   Intact: light touch    Right   Intact: light touch    Reflexes   Left   Patellar (L4): normal (2+)    Right   Patellar (L4): normal (2+)    Active Range of Motion     Lumbar   Flexion: Active lumbar flexion: 75%   Extension: Active lumbar extension: 25% with pain    Additional Active Range of Motion Details  Reproduced severe lower lumbar pain with standing extension.    Passive Range of Motion     Additional Passive Range of Motion Details  Decreased passive mobility of thoracic/lumbar spine      Tests     Lumbar     Left   Negative passive SLR.     Right   Negative passive SLR.           Therapy Education/Self Care [id number]   Education offered today Educated on results of MRI and how these results could cause her back and radicular pain.   Started HEP below   Medbride Code    Ongoing HEP   Knees to chest.   Lower trunk rotation (50%)  Piriformis stretch.   Walk/stand with PPT.    Timed Minutes 15       Total Timed Treatment:     15   mins  Total Time of Visit:            45   mins    ASSESSMENT/PLAN     GOALS:  Goals                                                   Progress Note due by 10/19/22                                                              Recert due by 12/18/22   STG by: 3 weeks Comments Date Status   Improve nikko thoracolumbar mobility       Improve muscle relaxation of nikko lumbar paraspinals               LTG by: 6 weeks       Reports no radicular symptoms in bilateral LE for a week.       Able to stand/walk x 30 min without exacerbation of pain       Able to stoop forward x 30 sec without exacerbation of pain       Reports pain no greater than 1-2/10 when it does occur.       Independent with HEP for flexibility and core/hip stability.           Assessment & Plan     Assessment  Impairments: abnormal muscle firing, abnormal muscle tone, abnormal or restricted ROM, activity intolerance, impaired physical strength, lacks appropriate home exercise program and pain with function  Functional Limitations: lifting, walking, uncomfortable because of pain, sitting and standing  Assessment details: Her back pain appears consistent with facet pain. Her MRI showed severe facet arthropathy. She is also having radicular symptoms consistent with the severe foraminal stenosis at L5/S1. She would benefit from working with PT.   Prognosis: good    Plan  Therapy options: will be seen for skilled therapy services  Planned modality interventions: dry needling, low level laser therapy, TENS and traction  Planned therapy interventions: abdominal trunk stabilization, flexibility, functional ROM exercises, home exercise program, joint mobilization, manual therapy, motor coordination training, neuromuscular re-education, postural training, soft tissue mobilization, spinal/joint mobilization, strengthening, stretching and therapeutic activities  Frequency: 2x week  Duration in weeks: 6  Treatment plan discussed with: patient  Plan details: Focus early on pain relief with soft tissue work and modalities as needed. Work on  mobility and flexibility through the spine and  hips. Progress with postural/core/hip stability to improve postural alignment and mechanics.Progress the HEP for the same.        SIGNATURE: Jacob Herron PT, [place] License #: [id number]  Electronically Signed on 9/20/2022      Initial Certification  Certification Period: 9/20/2022 through 12/18/2022  I certify that the therapy services are furnished while this patient is under my care.  The services outlined above are required by this patient, and will be reviewed every 90 days.     PHYSICIAN: Nathaniel Tesfaye MD (NPI: [id number])    Signature____________________________________________DATE: _________     Please sign and return via fax to [phone].   Thank you so much for letting us work with Fiordaliza. I appreciate your letting us work with your patients. If you have any questions or concerns, please don't hesitate to contact me.          [address] [address]  [phone]

## 2022-09-22 ENCOUNTER — HOSPITAL ENCOUNTER (OUTPATIENT)
Dept: GENERAL RADIOLOGY | Facility: HOSPITAL | Age: 62
Discharge: HOME | End: 2022-09-22
Admitting: NEUROLOGICAL SURGERY

## 2022-09-22 DIAGNOSIS — M54.16 LUMBAR RADICULOPATHY: ICD-10-CM

## 2022-09-22 DIAGNOSIS — M51.37 DEGENERATION OF LUMBAR OR LUMBOSACRAL INTERVERTEBRAL DISC: ICD-10-CM

## 2022-09-22 PROCEDURE — 72082 X-RAY EXAM ENTIRE SPI 2/3 VW: CPT

## 2022-09-23 DIAGNOSIS — M54.16 LUMBAR RADICULOPATHY: ICD-10-CM

## 2022-09-23 DIAGNOSIS — M51.37 DEGENERATION OF LUMBAR OR LUMBOSACRAL INTERVERTEBRAL DISC: ICD-10-CM

## 2022-09-27 ENCOUNTER — TREATMENT (OUTPATIENT)
Dept: PHYSICAL THERAPY | Facility: CLINIC | Age: 62
End: 2022-09-27

## 2022-09-27 DIAGNOSIS — M54.16 LUMBAR RADICULOPATHY: Primary | ICD-10-CM

## 2022-09-27 DIAGNOSIS — M51.37 DEGENERATION OF LUMBAR OR LUMBOSACRAL INTERVERTEBRAL DISC: ICD-10-CM

## 2022-09-27 PROCEDURE — 97140 MANUAL THERAPY 1/> REGIONS: CPT | Performed by: PHYSICAL THERAPIST

## 2022-09-27 NOTE — PROGRESS NOTES
Physical Therapy Treatment Note    Patient: Fiordaliza Lyons                                                 Visit Date: 2022  :     1960    Referring practitioner:    Nathaniel Tesfaye MD  Date of Initial Visit:          Type: THERAPY  Noted: 2022    Patient seen for 2 sessions    Visit Diagnoses:    ICD-10-CM ICD-9-CM   1. Lumbar radiculopathy  M54.16 724.4   2. Degeneration of lumbar or lumbosacral intervertebral disc  M51.37 722.52     SUBJECTIVE     Subjective Her back is not doing well, it is hurting. She has glaucoma and is having issues with her eyes, is scheduled for two eye surgeries in October.    PAIN: 310. 0/10 following session      OBJECTIVE     Objective      Manual Therapy     [id number]  Comments   IASTM with BL ridge roller, massage chair Thoracolumbar paraspinals   STM with massage cream, massage chair Lumbar paraspinals   Thoracic PA's, massage chair Gr 2           Timed Minutes 25     Therapy Education/Self Care [id number]   Education offered today Provided written HEP and reviewed with pt   Kee Code [id number]   Ongoing HEP   Date: 2022  Prepared by: Shameka Segovia    Exercises  Supine Lower Trunk Rotation - 1-2 x daily - 7 x weekly - 2 sets - 10 reps  Supine Piriformis Stretch with Foot on Ground - 1-2 x daily - 7 x weekly - 3 sets - 30 sec ea hold  Supine Single Knee to Chest Stretch - 1-2 x daily - 7 x weekly - 2 sets - 10 reps   Timed Minutes 5     Total Timed Treatment:     30   mins  Total Time of Visit:             30   mins         ASSESSMENT/PLAN     GOALS  Goals                                                  Progress Note due by 10/19/22                                                              Recert due by 22   STG by: 3 weeks Comments Date Status   Improve nikko thoracolumbar mobility Very hypomobile throughout  ongoing   Improve muscle relaxation of nikko lumbar paraspinals     ongoing    LTG by: 6 weeks         Reports no radicular symptoms in bilateral LE for a week.     ongoing   Able to stand/walk x 30 min without exacerbation of pain     ongoing   Able to stoop forward x 30 sec without exacerbation of pain     ongoing   Reports pain no greater than 1-2/10 when it does occur.     ongoing   Independent with HEP for flexibility and core/hip stability.    ongoing     Assessment/Plan     ASSESSMENT: This was her first tx session since initial evaluation, therefore no goals have been met at this time. Patient presents with increased pain levels following increased activity, therefore addressed with manual therapies.     PLAN: Assess response to tx today and proceed as appropriate. Cont to utilize manual therapies for mobility and guarding as needed. Consider addressing hip mobility and posterior LE flexibility.     SIGNATURE: Shameka Segovia PTA, [place] License #: [id number]    Electronically Signed on 9/27/2022        [address] [place] [address]  [phone]

## 2022-09-28 ENCOUNTER — TELEPHONE (OUTPATIENT)
Dept: NEUROSURGERY | Facility: CLINIC | Age: 62
End: 2022-09-28

## 2022-09-28 NOTE — TELEPHONE ENCOUNTER
As long the patient is okay with this appt it is fine with our office.  She knows how to contact me if she has a problem and needs something sooner.    FRANSISCA HERRMANN [facility]  PHYSICIAN LEAD  DR MARY PITTMAN  [facility] [place]

## 2022-09-28 NOTE — TELEPHONE ENCOUNTER
Caller: Fiordaliza Loyns    Relationship to patient: Self    Best call back number: [phone]    Type of visit: FOLLOW UP EXTENDED:8 wk follow up (test results, PT, PMI)     Requested date: 11/7,8,9, OR WEEK AFTER    If rescheduling, when is the original appointment: 11/10/22- PATIENT HAS EYE SURGERY AND NEEDED TO RESCHEDULE    01/03/2022- THIS WAS FIRST AVAILABLE APPT IN SYSTEM.      Additional notes:PATIENT CALLED AND NEEDED TO RESCHEDULE HER FOLLOW UP EXTENDED: APPT NOTES:8 wk follow up (test results, PT, PMI), FIRST AVAILABLE APPT WAS 1/3/23. PLEASE CALL PATIENT TO SCHEDULE SOONER APPT AS PATIENT IS COMPLETING TESTS FOR APPT.    THANK YOU

## 2022-10-10 ENCOUNTER — TELEPHONE (OUTPATIENT)
Dept: NEUROSURGERY | Facility: CLINIC | Age: 62
End: 2022-10-10

## 2022-10-10 DIAGNOSIS — M51.37 DEGENERATION OF LUMBAR OR LUMBOSACRAL INTERVERTEBRAL DISC: Primary | ICD-10-CM

## 2022-10-10 DIAGNOSIS — M54.16 LUMBAR RADICULOPATHY: ICD-10-CM

## 2022-10-10 NOTE — TELEPHONE ENCOUNTER
Patient called today and left a VM stating [facility] has cancelled her upcoming appts due to being short staffed.  She is asking if she should just do the home exercises or what.  She is also questioning whether or not she can get an injection with Dr Cruz or do we have to make the appt for her.    I called her back and told her she could call Dr Cruz and set up an appt for the injection herself.  As far as therapy, she can do the home exercises but Dr Pittman really encouraged an outpatient program.  She is going to call and talk to [facility].  I will send over an order tomorrow for her.    FRANSISCA HERRMANN [facility]  PHYSICIAN LEAD  DR MARY PITTMAN  [facility]

## 2022-10-19 DIAGNOSIS — E10.9 TYPE 1 DIABETES MELLITUS WITHOUT COMPLICATION (HCC): ICD-10-CM

## 2022-10-19 RX ORDER — CALCIUM CITRATE/VITAMIN D3 200MG-6.25
TABLET ORAL
Qty: 100 EACH | Refills: 3 | Status: SHIPPED | OUTPATIENT
Start: 2022-10-19

## 2022-10-19 RX ORDER — BLOOD-GLUCOSE SENSOR
EACH MISCELLANEOUS
Qty: 3 EACH | Refills: 2 | Status: SHIPPED | OUTPATIENT
Start: 2022-10-19

## 2022-10-19 RX ORDER — BLOOD-GLUCOSE TRANSMITTER
EACH MISCELLANEOUS
Qty: 1 EACH | Refills: 3 | Status: SHIPPED | OUTPATIENT
Start: 2022-10-19

## 2022-10-20 ENCOUNTER — TRANSCRIBE ORDERS (OUTPATIENT)
Dept: ADMINISTRATIVE | Facility: HOSPITAL | Age: 62
End: 2022-10-20

## 2022-10-20 ENCOUNTER — OFFICE VISIT (OUTPATIENT)
Dept: INTERNAL MEDICINE | Age: 62
End: 2022-10-20
Payer: COMMERCIAL

## 2022-10-20 ENCOUNTER — HOSPITAL ENCOUNTER (OUTPATIENT)
Dept: MAMMOGRAPHY | Facility: HOSPITAL | Age: 62
Discharge: HOME | End: 2022-10-20
Admitting: INTERNAL MEDICINE

## 2022-10-20 VITALS
OXYGEN SATURATION: 100 % | SYSTOLIC BLOOD PRESSURE: 124 MMHG | WEIGHT: 102 LBS | HEART RATE: 94 BPM | BODY MASS INDEX: 19.92 KG/M2 | DIASTOLIC BLOOD PRESSURE: 70 MMHG

## 2022-10-20 DIAGNOSIS — Z23 NEEDS FLU SHOT: ICD-10-CM

## 2022-10-20 DIAGNOSIS — F10.11 HISTORY OF ALCOHOL ABUSE: ICD-10-CM

## 2022-10-20 DIAGNOSIS — Z12.31 SCREENING MAMMOGRAM FOR BREAST CANCER: ICD-10-CM

## 2022-10-20 DIAGNOSIS — E55.9 VITAMIN D DEFICIENCY: ICD-10-CM

## 2022-10-20 DIAGNOSIS — D64.9 ANEMIA, UNSPECIFIED TYPE: ICD-10-CM

## 2022-10-20 DIAGNOSIS — E10.9 TYPE 1 DIABETES MELLITUS WITHOUT COMPLICATION (HCC): Primary | ICD-10-CM

## 2022-10-20 DIAGNOSIS — Z12.31 SCREENING MAMMOGRAM FOR BREAST CANCER: Primary | ICD-10-CM

## 2022-10-20 DIAGNOSIS — D50.9 IRON DEFICIENCY ANEMIA, UNSPECIFIED IRON DEFICIENCY ANEMIA TYPE: ICD-10-CM

## 2022-10-20 PROCEDURE — 90471 IMMUNIZATION ADMIN: CPT | Performed by: INTERNAL MEDICINE

## 2022-10-20 PROCEDURE — 90674 CCIIV4 VAC NO PRSV 0.5 ML IM: CPT | Performed by: INTERNAL MEDICINE

## 2022-10-20 PROCEDURE — 77067 SCR MAMMO BI INCL CAD: CPT

## 2022-10-20 PROCEDURE — 99214 OFFICE O/P EST MOD 30 MIN: CPT | Performed by: INTERNAL MEDICINE

## 2022-10-20 PROCEDURE — 3051F HG A1C>EQUAL 7.0%<8.0%: CPT | Performed by: INTERNAL MEDICINE

## 2022-10-20 PROCEDURE — 77063 BREAST TOMOSYNTHESIS BI: CPT

## 2022-10-20 RX ORDER — LANCETS 30 GAUGE
1 EACH MISCELLANEOUS
Qty: 300 EACH | Refills: 1 | Status: SHIPPED | OUTPATIENT
Start: 2022-10-20

## 2022-10-20 SDOH — ECONOMIC STABILITY: FOOD INSECURITY: WITHIN THE PAST 12 MONTHS, THE FOOD YOU BOUGHT JUST DIDN'T LAST AND YOU DIDN'T HAVE MONEY TO GET MORE.: NEVER TRUE

## 2022-10-20 SDOH — ECONOMIC STABILITY: FOOD INSECURITY: WITHIN THE PAST 12 MONTHS, YOU WORRIED THAT YOUR FOOD WOULD RUN OUT BEFORE YOU GOT MONEY TO BUY MORE.: NEVER TRUE

## 2022-10-20 ASSESSMENT — SOCIAL DETERMINANTS OF HEALTH (SDOH): HOW HARD IS IT FOR YOU TO PAY FOR THE VERY BASICS LIKE FOOD, HOUSING, MEDICAL CARE, AND HEATING?: NOT HARD AT ALL

## 2022-10-20 NOTE — PROGRESS NOTES
Chief Complaint   Patient presents with    3 Month Follow-Up       HPI: Patient is here today for 3-month follow-up of diabetes hypertension hyperlipidemia alcohol abuse. She has been sober for more than a year when last checked we had her diabetes much better control but now she says her Dexcom is malfunctioning. She seems not feel quite as well today. Stressors fatigue.   Some symptoms of UTI today    Past Medical History:   Diagnosis Date    Alcohol abuse 2/6/2019    Allergic rhinitis     Diabetes mellitus (HCC)     GERD (gastroesophageal reflux disease)     Hyperlipidemia     Hypertension     Postmenopausal 1/3/2019    Sleep apnea        Past Surgical History:   Procedure Laterality Date    CHOLECYSTECTOMY      KNEE ARTHROPLASTY      LIANA AND BSO (CERVIX REMOVED)         Family History   Problem Relation Age of Onset    Cancer Mother         ovarian    Cancer Maternal Grandmother         ovarian    Cancer Paternal Grandmother         ovarian       Social History     Socioeconomic History    Marital status: Single     Spouse name: Not on file    Number of children: Not on file    Years of education: Not on file    Highest education level: Not on file   Occupational History    Not on file   Tobacco Use    Smoking status: Never    Smokeless tobacco: Never   Substance and Sexual Activity    Alcohol use: Not on file    Drug use: Not on file    Sexual activity: Not on file   Other Topics Concern    Not on file   Social History Narrative    Not on file     Social Determinants of Health     Financial Resource Strain: Low Risk     Difficulty of Paying Living Expenses: Not hard at all   Food Insecurity: No Food Insecurity    Worried About Running Out of Food in the Last Year: Never true    Ran Out of Food in the Last Year: Never true   Transportation Needs: Not on file   Physical Activity: Not on file   Stress: Not on file   Social Connections: Not on file   Intimate Partner Violence: Not on file   Housing Stability: Not on file       Allergies   Allergen Reactions    Gabapentin Other (See Comments)     Passed out    Amoxicillin-Pot Clavulanate Nausea And Vomiting       Current Outpatient Medications   Medication Sig Dispense Refill    Lancets MISC 1 each by Does not apply route 5 times daily 300 each 1    levothyroxine (SYNTHROID) 50 MCG tablet TAKE 1 TABLET BY MOUTH DAILY 30 tablet 5    sulfamethoxazole-trimethoprim (BACTRIM DS;SEPTRA DS) 800-160 MG per tablet Take 1 tablet by mouth 2 times daily for 7 days 14 tablet 0    Continuous Blood Gluc Sensor (DEXCOM G6 SENSOR) MISC USE AS DIRECTED. 3 each 2    blood glucose test strips (TRUE METRIX BLOOD GLUCOSE TEST) strip 5 times a day and prn .  100 each 3    Continuous Blood Gluc Transmit (DEXCOM G5 MOBILE TRANSMITTER) MISC Use as directed 1 each 3    albuterol sulfate HFA (VENTOLIN HFA) 108 (90 Base) MCG/ACT inhaler Inhale 2 puffs into the lungs 4 times daily as needed for Wheezing 18 g 0    Insulin Glargine, 2 Unit Dial, (TOUJEO MAX SOLOSTAR) 300 UNIT/ML SOPN Inject 24 Units into the skin at bedtime (Patient taking differently: Inject 22 Units into the skin at bedtime) 3 mL 2    tiZANidine (ZANAFLEX) 4 MG tablet Take 4 mg by mouth every 6 hours as needed      Multiple Vitamins-Minerals (PRESERVISION AREDS 2) CAPS Take 2 capsules by mouth daily 180 capsule 3    Insulin Pen Needle (B-D UF III MINI PEN NEEDLES) 31G X 5 MM MISC USE 5 TIMES DAILY AND AS NEEDED. 100 each 3    insulin aspart (NOVOLOG FLEXPEN) 100 UNIT/ML injection pen 1 unit for every 50 points over 100 bs and 1 unit per 15 carbs 5 pen 3    vitamin D (ERGOCALCIFEROL) 1.25 MG (27241 UT) CAPS capsule Take 1 capsule by mouth once a week 12 capsule 1    pantoprazole (PROTONIX) 40 MG tablet Take 1 tablet by mouth every morning (before breakfast) 90 tablet 1    hydrocortisone (ANUSOL-HC) 2.5 % CREA rectal cream Use twice a day and as needed post bm 28 g 1    hydrocortisone (ANUSOL-HC) 25 MG suppository Place 1 suppository rectally every 12 hours 24 suppository 0    Multiple Vitamins-Minerals (THERAPEUTIC MULTIVITAMIN-MINERALS) tablet Take 1 tablet by mouth daily       No current facility-administered medications for this visit. Review of Systems    /70   Pulse 94   Wt 102 lb (46.3 kg)   SpO2 100%   BMI 19.92 kg/m²   BP Readings from Last 7 Encounters:   10/20/22 124/70   08/30/22 (!) 143/62   08/08/22 122/60   05/31/22 (!) 104/50   03/22/22 124/64   03/04/22 120/78   02/24/22 120/70     Wt Readings from Last 7 Encounters:   10/20/22 102 lb (46.3 kg)   08/08/22 113 lb (51.3 kg)   05/31/22 110 lb (49.9 kg)   03/22/22 110 lb (49.9 kg)   03/04/22 106 lb 9.6 oz (48.4 kg)   02/24/22 111 lb (50.3 kg)   01/11/22 102 lb (46.3 kg)     BMI Readings from Last 7 Encounters:   10/20/22 19.92 kg/m²   08/08/22 22.07 kg/m²   05/31/22 21.48 kg/m²   03/22/22 21.48 kg/m²   03/04/22 20.82 kg/m²   02/24/22 21.68 kg/m²   01/11/22 19.92 kg/m²     Resp Readings from Last 7 Encounters:   08/30/22 18   12/03/21 16   11/28/21 18       Physical Exam  Constitutional:       General: She is not in acute distress. Eyes:      General: No scleral icterus. Cardiovascular:      Heart sounds: Normal heart sounds. Pulmonary:      Breath sounds: Normal breath sounds. Musculoskeletal:      Cervical back: Neck supple. Right lower leg: Edema present. Left lower leg: Edema present. Comments: Trace edema   Lymphadenopathy:      Cervical: No cervical adenopathy. Skin:     Findings: No rash. Neurological:      Motor: Weakness present. Psychiatric:      Comments: tired       Results for orders placed or performed in visit on 03/31/22   C-Peptide   Result Value Ref Range    C-Peptide 1.9 1.1 - 4.4 ng/mL       ASSESSMENT/ PLAN:  1. Type 1 diabetes mellitus without complication (HCC)  We encouraged her to keep using her Dexcom she also is overdue her labs we wrote lab encourage her to get this today if not today very soon tomorrow.   Watch closely. If issues with the Dexcom we can get her back in with Deirdre Foster  - CBC; Future  - Comprehensive Metabolic Panel; Future  - Hemoglobin A1C; Future  - TSH; Future    2. Iron deficiency anemia, unspecified iron deficiency anemia type  Reassess her iron deficiency anemia  - Ferritin; Future    3. Anemia, unspecified type  Check B12 iron ferritin folate  - Vitamin B12; Future  - Folate; Future  - Iron; Future    4. Vitamin D deficiency  Continue current plan and recheck  - Vitamin D 25 Hydroxy; Future    5. Screening mammogram for breast cancer    - LIZ DIGITAL SCREEN W OR WO CAD BILATERAL; Future    6. Needs flu shot  - Influenza, FLUCELVAX, (age 10 mo+), IM, Preservative Free, 0.5 mL    7. History of alcohol abuse -she has been sober for more than a year she does not feel she needs counseling she feels like she can maintain with this we continue to encourage her.

## 2022-10-21 DIAGNOSIS — Z12.31 SCREENING MAMMOGRAM FOR BREAST CANCER: ICD-10-CM

## 2022-10-24 ENCOUNTER — LAB (OUTPATIENT)
Dept: LAB | Facility: HOSPITAL | Age: 62
End: 2022-10-24

## 2022-10-24 ENCOUNTER — TELEPHONE (OUTPATIENT)
Dept: INTERNAL MEDICINE | Age: 62
End: 2022-10-24

## 2022-10-24 ENCOUNTER — TRANSCRIBE ORDERS (OUTPATIENT)
Dept: ADMINISTRATIVE | Facility: HOSPITAL | Age: 62
End: 2022-10-24

## 2022-10-24 DIAGNOSIS — N39.0 URINARY TRACT INFECTION WITHOUT HEMATURIA, SITE UNSPECIFIED: Primary | ICD-10-CM

## 2022-10-24 LAB
BACTERIA UR QL AUTO: ABNORMAL /HPF
BILIRUB UR QL STRIP: NEGATIVE
CLARITY UR: ABNORMAL
COLOR UR: YELLOW
GLUCOSE UR STRIP-MCNC: ABNORMAL MG/DL
HGB UR QL STRIP.AUTO: NEGATIVE
HYALINE CASTS UR QL AUTO: ABNORMAL /LPF
KETONES UR QL STRIP: NEGATIVE
LEUKOCYTE ESTERASE UR QL STRIP.AUTO: ABNORMAL
NITRITE UR QL STRIP: POSITIVE
PH UR STRIP.AUTO: 6 [PH] (ref 5–8)
PROT UR QL STRIP: ABNORMAL
RBC # UR STRIP: ABNORMAL /HPF
REF LAB TEST METHOD: ABNORMAL
SP GR UR STRIP: 1 (ref 1–1.03)
SQUAMOUS #/AREA URNS HPF: ABNORMAL /HPF
UROBILINOGEN UR QL STRIP: ABNORMAL
WBC # UR STRIP: ABNORMAL /HPF

## 2022-10-24 PROCEDURE — 87186 SC STD MICRODIL/AGAR DIL: CPT | Performed by: INTERNAL MEDICINE

## 2022-10-24 PROCEDURE — 87086 URINE CULTURE/COLONY COUNT: CPT | Performed by: INTERNAL MEDICINE

## 2022-10-24 PROCEDURE — 87088 URINE BACTERIA CULTURE: CPT | Performed by: INTERNAL MEDICINE

## 2022-10-24 PROCEDURE — 81001 URINALYSIS AUTO W/SCOPE: CPT | Performed by: INTERNAL MEDICINE

## 2022-10-24 RX ORDER — SULFAMETHOXAZOLE AND TRIMETHOPRIM 800; 160 MG/1; MG/1
1 TABLET ORAL 2 TIMES DAILY
Qty: 14 TABLET | Refills: 0 | Status: SHIPPED | OUTPATIENT
Start: 2022-10-24 | End: 2022-10-31

## 2022-10-24 NOTE — TELEPHONE ENCOUNTER
She believes she has a uti. Cloudy urine, temp yesterday. [facility].   Ill fax order over there

## 2022-10-26 LAB — BACTERIA SPEC AEROBE CULT: ABNORMAL

## 2022-10-28 DIAGNOSIS — E03.9 ACQUIRED HYPOTHYROIDISM: ICD-10-CM

## 2022-10-28 RX ORDER — LEVOTHYROXINE SODIUM 0.05 MG/1
50 TABLET ORAL DAILY
Qty: 30 TABLET | Refills: 5 | Status: SHIPPED | OUTPATIENT
Start: 2022-10-28

## 2022-11-01 ENCOUNTER — TRANSCRIBE ORDERS (OUTPATIENT)
Dept: ADMINISTRATIVE | Facility: HOSPITAL | Age: 62
End: 2022-11-01

## 2022-11-01 DIAGNOSIS — E10.9 TYPE 1 DIABETES MELLITUS WITHOUT COMPLICATION: ICD-10-CM

## 2022-11-01 DIAGNOSIS — E55.9 VITAMIN D DEFICIENCY: ICD-10-CM

## 2022-11-01 DIAGNOSIS — D64.9 ANEMIA, UNSPECIFIED TYPE: Primary | ICD-10-CM

## 2022-11-01 DIAGNOSIS — D50.9 IRON DEFICIENCY ANEMIA, UNSPECIFIED IRON DEFICIENCY ANEMIA TYPE: ICD-10-CM

## 2022-11-01 DIAGNOSIS — M54.6 ACUTE BILATERAL THORACIC BACK PAIN: ICD-10-CM

## 2022-11-15 ENCOUNTER — HOSPITAL ENCOUNTER (OUTPATIENT)
Dept: PAIN MANAGEMENT | Age: 62
Discharge: HOME | End: 2022-11-15
Payer: COMMERCIAL

## 2022-11-15 VITALS
OXYGEN SATURATION: 98 % | RESPIRATION RATE: 18 BRPM | SYSTOLIC BLOOD PRESSURE: 145 MMHG | HEART RATE: 66 BPM | DIASTOLIC BLOOD PRESSURE: 54 MMHG | TEMPERATURE: 96.8 F

## 2022-11-15 DIAGNOSIS — R52 PAIN MANAGEMENT: ICD-10-CM

## 2022-11-15 PROCEDURE — 2580000003 HC RX 258

## 2022-11-15 PROCEDURE — A4216 STERILE WATER/SALINE, 10 ML: HCPCS

## 2022-11-15 PROCEDURE — 6360000002 HC RX W HCPCS

## 2022-11-15 PROCEDURE — 2500000003 HC RX 250 WO HCPCS

## 2022-11-15 PROCEDURE — 62323 NJX INTERLAMINAR LMBR/SAC: CPT

## 2022-11-15 RX ORDER — SODIUM CHLORIDE 9 MG/ML
5 INJECTION INTRAVENOUS ONCE
Status: DISCONTINUED | OUTPATIENT
Start: 2022-11-15 | End: 2022-11-17 | Stop reason: HOSPADM

## 2022-11-15 RX ORDER — METHYLPREDNISOLONE ACETATE 40 MG/ML
80 INJECTION, SUSPENSION INTRA-ARTICULAR; INTRALESIONAL; INTRAMUSCULAR; SOFT TISSUE ONCE
Status: DISCONTINUED | OUTPATIENT
Start: 2022-11-15 | End: 2022-11-17 | Stop reason: HOSPADM

## 2022-11-15 RX ORDER — LIDOCAINE HYDROCHLORIDE 10 MG/ML
5 INJECTION, SOLUTION EPIDURAL; INFILTRATION; INTRACAUDAL; PERINEURAL ONCE
Status: DISCONTINUED | OUTPATIENT
Start: 2022-11-15 | End: 2022-11-17 | Stop reason: HOSPADM

## 2022-11-15 ASSESSMENT — PAIN - FUNCTIONAL ASSESSMENT: PAIN_FUNCTIONAL_ASSESSMENT: 0-10

## 2022-11-15 ASSESSMENT — PAIN DESCRIPTION - ORIENTATION: ORIENTATION: LOWER

## 2022-11-15 ASSESSMENT — PAIN SCALES - GENERAL: PAINLEVEL_OUTOF10: 2

## 2022-11-15 ASSESSMENT — PAIN DESCRIPTION - LOCATION: LOCATION: BACK

## 2022-11-15 NOTE — INTERVAL H&P NOTE
Update History & Physical    The patient's History and Physical  was reviewed with the patient and I examined the patient. There was no change. The surgical site was confirmed by the patient and me. Plan: The risks, benefits, expected outcome, and alternative to the recommended procedure have been discussed with the patient. Patient understands and wants to proceed with the procedure.      Electronically signed by Merlin Hawkins MD on 11/15/2022 at 9:08 AM

## 2022-11-15 NOTE — PROGRESS NOTES
Procedure:  Level of Consciousness: []Alert []Oriented []Disoriented []Lethargic  Anxiety Level: []Calm []Anxious []Depressed []Other  Skin: []Warm []Dry []Cool []Moist []Intact []Other  Cardiovascular: []Palpitations: []Never []Occasionally []Frequently  Chest Pain: []No []Yes  Respiratory:  []Unlabored []Labored []Cough ([] Productive []Unproductive)  HCG Required: []No []Yes   Results: []Negative []Positive  Knowledge Level:        []Patient/Other verbalized understanding of pre-procedure instructions. []Assessment of post-op care needs (transportation, responsible caregiver)        []Able to discuss health care problems and how to deal with it. Factors that Affect Teaching:        Language Barrier: []No []Yes - why:        Hearing Loss:        []No []Yes            Corrective Device:  []Yes []No        Vision Loss:           []No []Yes            Corrective Device:  []Yes []No        Memory Loss:       []No []Yes            []Short Term []Long Term  Motivational Level:  []Asks Questions                  []Extremely Anxious       []Seems Interested               []Seems Uninterested                  []Denies need for Education  Risk for Injury:  []Patient oriented to person, place and time  []History of frequent falls/loss of balance  Nutritional:  []Change in appetite   []Weight Gain   []Weight Loss  Functional:  []Requires assistance with ADL's Procedure:  Level of Consciousness: [x]Alert [x]Oriented []Disoriented []Lethargic  Anxiety Level: [x]Calm []Anxious []Depressed []Other  Skin: [x]Warm [x]Dry []Cool []Moist []Intact []Other  Cardiovascular: [x]Palpitations: [x]Never []Occasionally []Frequently  Chest Pain: [x]No []Yes  Respiratory:  [x]Unlabored []Labored []Cough ([] Productive []Unproductive)  HCG Required: [x]No []Yes   Results: []Negative []Positive  Knowledge Level:        [x]Patient/Other verbalized understanding of pre-procedure instructions.         [x]Assessment of post-op care needs (transportation, responsible caregiver)        [x]Able to discuss health care problems and how to deal with it.   Factors that Affect Teaching:        Language Barrier: [x]No []Yes - why:        Hearing Loss:        [x]No []Yes            Corrective Device:  []Yes [x]No        Vision Loss:           [x]No []Yes            Corrective Device:  []Yes []No        Memory Loss:       [x]No []Yes            []Short Term []Long Term  Motivational Level:  [x]Asks Questions                  []Extremely Anxious       [x]Seems Interested               []Seems Uninterested                  []Denies need for Education  Risk for Injury:  [x]Patient oriented to person, place and time  []History of frequent falls/loss of balance  Nutritional:  []Change in appetite   []Weight Gain   []Weight Loss  Functional:  []Requires assistance with ADL's

## 2022-12-05 DIAGNOSIS — E55.9 VITAMIN D DEFICIENCY: ICD-10-CM

## 2022-12-05 RX ORDER — ERGOCALCIFEROL 1.25 MG/1
50000 CAPSULE ORAL WEEKLY
Qty: 4 CAPSULE | Refills: 3 | Status: SHIPPED | OUTPATIENT
Start: 2022-12-05

## 2023-01-03 RX ORDER — BLOOD-GLUCOSE SENSOR
EACH MISCELLANEOUS
Qty: 3 EACH | Refills: 3 | Status: SHIPPED | OUTPATIENT
Start: 2023-01-03

## 2023-01-12 ENCOUNTER — TELEPHONE (OUTPATIENT)
Dept: INTERNAL MEDICINE | Age: 63
End: 2023-01-12

## 2023-01-12 DIAGNOSIS — R30.0 DYSURIA: Primary | ICD-10-CM

## 2023-01-12 RX ORDER — ONDANSETRON 4 MG/1
4 TABLET, FILM COATED ORAL 3 TIMES DAILY PRN
Qty: 30 TABLET | Refills: 0 | Status: SHIPPED | OUTPATIENT
Start: 2023-01-12

## 2023-01-12 NOTE — TELEPHONE ENCOUNTER
She started a couple of days ago with vomiting and diarrhea and slight fever. Vomiting has stopped but fever is even higher now and still with some diarrhea (but has bowel issues)  now, she thinks that it has turned into a uti. I will have her come in to give a urine sample.

## 2023-02-13 ENCOUNTER — LAB (OUTPATIENT)
Dept: LAB | Facility: HOSPITAL | Age: 63
End: 2023-02-13
Payer: COMMERCIAL

## 2023-02-13 ENCOUNTER — TRANSCRIBE ORDERS (OUTPATIENT)
Dept: ADMINISTRATIVE | Facility: HOSPITAL | Age: 63
End: 2023-02-13
Payer: COMMERCIAL

## 2023-02-13 ENCOUNTER — TELEPHONE (OUTPATIENT)
Dept: INTERNAL MEDICINE | Age: 63
End: 2023-02-13

## 2023-02-13 DIAGNOSIS — E55.9 VITAMIN D DEFICIENCY: ICD-10-CM

## 2023-02-13 DIAGNOSIS — D50.9 IRON DEFICIENCY ANEMIA, UNSPECIFIED IRON DEFICIENCY ANEMIA TYPE: ICD-10-CM

## 2023-02-13 DIAGNOSIS — E10.9 TYPE 1 DIABETES MELLITUS WITHOUT COMPLICATION: ICD-10-CM

## 2023-02-13 DIAGNOSIS — R30.0 DYSURIA: Primary | ICD-10-CM

## 2023-02-13 DIAGNOSIS — D64.9 ANEMIA, UNSPECIFIED TYPE: ICD-10-CM

## 2023-02-13 LAB
25(OH)D3 SERPL-MCNC: 47.6 NG/ML (ref 30–100)
ALBUMIN SERPL-MCNC: 4.2 G/DL (ref 3.5–5)
ALBUMIN/GLOB SERPL: 1.2 G/DL (ref 1.1–2.5)
ALP SERPL-CCNC: 269 U/L (ref 24–120)
ALT SERPL W P-5'-P-CCNC: 22 U/L (ref 0–35)
ANION GAP SERPL CALCULATED.3IONS-SCNC: 11 MMOL/L (ref 4–13)
AST SERPL-CCNC: 25 U/L (ref 7–45)
AUTO MIXED CELLS #: 0.3 10*3/MM3 (ref 0.1–2.6)
AUTO MIXED CELLS %: 7.6 % (ref 0.1–24)
BACTERIA UR QL AUTO: ABNORMAL /HPF
BILIRUB SERPL-MCNC: 0.4 MG/DL (ref 0.1–1)
BILIRUB UR QL STRIP: NEGATIVE
BUN SERPL-MCNC: 16 MG/DL (ref 5–21)
BUN/CREAT SERPL: 17.2
CALCIUM SPEC-SCNC: 9.4 MG/DL (ref 8.4–10.4)
CHLORIDE SERPL-SCNC: 95 MMOL/L (ref 98–110)
CLARITY UR: CLEAR
CO2 SERPL-SCNC: 28 MMOL/L (ref 24–31)
COLOR UR: YELLOW
CREAT SERPL-MCNC: 0.93 MG/DL (ref 0.5–1.4)
CRP SERPL-MCNC: 0.63 MG/DL (ref 0–0.5)
EGFRCR SERPLBLD CKD-EPI 2021: 70.5 ML/MIN/1.73
ERYTHROCYTE [DISTWIDTH] IN BLOOD BY AUTOMATED COUNT: 14.7 % (ref 12.3–15.4)
ERYTHROCYTE [SEDIMENTATION RATE] IN BLOOD: 27 MM/HR (ref 0–30)
FERRITIN SERPL-MCNC: 63.6 NG/ML (ref 13–150)
FOLATE SERPL-MCNC: >20 NG/ML (ref 4.78–24.2)
GLOBULIN UR ELPH-MCNC: 3.6 GM/DL
GLUCOSE SERPL-MCNC: 475 MG/DL (ref 70–100)
GLUCOSE UR STRIP-MCNC: ABNORMAL MG/DL
HBA1C MFR BLD: >15 % (ref 4.8–5.9)
HCT VFR BLD AUTO: 36.5 % (ref 34–46.6)
HGB BLD-MCNC: 11.6 G/DL (ref 12–15.9)
HGB UR QL STRIP.AUTO: ABNORMAL
HYALINE CASTS UR QL AUTO: ABNORMAL /LPF
IRON 24H UR-MRATE: 32 MCG/DL (ref 37–145)
KETONES UR QL STRIP: NEGATIVE
LEUKOCYTE ESTERASE UR QL STRIP.AUTO: ABNORMAL
LYMPHOCYTES # BLD AUTO: 0.7 10*3/MM3 (ref 0.7–3.1)
LYMPHOCYTES NFR BLD AUTO: 17 % (ref 19.6–45.3)
MCH RBC QN AUTO: 25.3 PG (ref 26.6–33)
MCHC RBC AUTO-ENTMCNC: 31.8 G/DL (ref 31.5–35.7)
MCV RBC AUTO: 79.7 FL (ref 79–97)
NEUTROPHILS NFR BLD AUTO: 3.3 10*3/MM3 (ref 1.7–7)
NEUTROPHILS NFR BLD AUTO: 75.4 % (ref 42.7–76)
NITRITE UR QL STRIP: POSITIVE
PH UR STRIP.AUTO: 5.5 [PH] (ref 5–8)
PLATELET # BLD AUTO: 177 10*3/MM3 (ref 140–450)
PMV BLD AUTO: 8.9 FL (ref 6–12)
POTASSIUM SERPL-SCNC: 4.5 MMOL/L (ref 3.5–5.3)
PROT SERPL-MCNC: 7.8 G/DL (ref 6.3–8.7)
PROT UR QL STRIP: NEGATIVE
RBC # BLD AUTO: 4.58 10*6/MM3 (ref 3.77–5.28)
RBC # UR STRIP: ABNORMAL /HPF
REF LAB TEST METHOD: ABNORMAL
SODIUM SERPL-SCNC: 134 MMOL/L (ref 135–145)
SP GR UR STRIP: <=1.005 (ref 1–1.03)
SQUAMOUS #/AREA URNS HPF: ABNORMAL /HPF
TSH SERPL DL<=0.05 MIU/L-ACNC: 1.45 UIU/ML (ref 0.27–4.2)
UROBILINOGEN UR QL STRIP: ABNORMAL
VIT B12 BLD-MCNC: 1196 PG/ML (ref 211–946)
WBC # UR STRIP: ABNORMAL /HPF
WBC NRBC COR # BLD: 4.3 10*3/MM3 (ref 3.4–10.8)

## 2023-02-13 PROCEDURE — 82746 ASSAY OF FOLIC ACID SERUM: CPT

## 2023-02-13 PROCEDURE — 87186 SC STD MICRODIL/AGAR DIL: CPT | Performed by: INTERNAL MEDICINE

## 2023-02-13 PROCEDURE — 80050 GENERAL HEALTH PANEL: CPT

## 2023-02-13 PROCEDURE — 87086 URINE CULTURE/COLONY COUNT: CPT | Performed by: INTERNAL MEDICINE

## 2023-02-13 PROCEDURE — 87088 URINE BACTERIA CULTURE: CPT | Performed by: INTERNAL MEDICINE

## 2023-02-13 PROCEDURE — 82728 ASSAY OF FERRITIN: CPT

## 2023-02-13 PROCEDURE — 36415 COLL VENOUS BLD VENIPUNCTURE: CPT | Performed by: INTERNAL MEDICINE

## 2023-02-13 PROCEDURE — 81001 URINALYSIS AUTO W/SCOPE: CPT | Performed by: INTERNAL MEDICINE

## 2023-02-13 PROCEDURE — 86140 C-REACTIVE PROTEIN: CPT | Performed by: INTERNAL MEDICINE

## 2023-02-13 PROCEDURE — 82607 VITAMIN B-12: CPT

## 2023-02-13 PROCEDURE — 82306 VITAMIN D 25 HYDROXY: CPT

## 2023-02-13 PROCEDURE — 83540 ASSAY OF IRON: CPT

## 2023-02-13 PROCEDURE — 85652 RBC SED RATE AUTOMATED: CPT | Performed by: INTERNAL MEDICINE

## 2023-02-13 PROCEDURE — 83036 HEMOGLOBIN GLYCOSYLATED A1C: CPT | Performed by: INTERNAL MEDICINE

## 2023-02-13 RX ORDER — SULFAMETHOXAZOLE AND TRIMETHOPRIM 800; 160 MG/1; MG/1
1 TABLET ORAL 2 TIMES DAILY
Qty: 14 TABLET | Refills: 0 | Status: SHIPPED | OUTPATIENT
Start: 2023-02-13 | End: 2023-02-14 | Stop reason: SDUPTHER

## 2023-02-13 NOTE — TELEPHONE ENCOUNTER
She does have a uti---- I sent meds to her pharmacy - sent to [facility]---- also BS if very high - hga1c is reading out higher than 15 and her bs is 475 -see if she could come at 730 AM tomorrow or I could see her today at --- have her bring all of her meds and insulin with her today at [phone] - tell her we might be running a little late but could see her then or in AM -- -

## 2023-02-13 NOTE — TELEPHONE ENCOUNTER
She feels like she has another UTI. She would like an order faxed over to [facility] for a urinalysis and culture.

## 2023-02-14 ENCOUNTER — TELEPHONE (OUTPATIENT)
Dept: INTERNAL MEDICINE | Age: 63
End: 2023-02-14

## 2023-02-14 ENCOUNTER — OFFICE VISIT (OUTPATIENT)
Dept: INTERNAL MEDICINE | Age: 63
End: 2023-02-14
Payer: COMMERCIAL

## 2023-02-14 VITALS
BODY MASS INDEX: 19.92 KG/M2 | WEIGHT: 102 LBS | SYSTOLIC BLOOD PRESSURE: 112 MMHG | OXYGEN SATURATION: 98 % | DIASTOLIC BLOOD PRESSURE: 70 MMHG | HEART RATE: 100 BPM

## 2023-02-14 DIAGNOSIS — E10.9 TYPE 1 DIABETES MELLITUS WITHOUT COMPLICATION (HCC): Primary | ICD-10-CM

## 2023-02-14 DIAGNOSIS — R74.8 ELEVATED ALKALINE PHOSPHATASE LEVEL: ICD-10-CM

## 2023-02-14 DIAGNOSIS — K70.9 ALCOHOLIC LIVER DISEASE (HCC): ICD-10-CM

## 2023-02-14 DIAGNOSIS — Z65.9 OTHER SOCIAL STRESSOR: ICD-10-CM

## 2023-02-14 DIAGNOSIS — F43.21 GRIEF: ICD-10-CM

## 2023-02-14 PROCEDURE — 99214 OFFICE O/P EST MOD 30 MIN: CPT | Performed by: INTERNAL MEDICINE

## 2023-02-14 RX ORDER — SULFAMETHOXAZOLE AND TRIMETHOPRIM 800; 160 MG/1; MG/1
1 TABLET ORAL 2 TIMES DAILY
Qty: 14 TABLET | Refills: 0 | Status: SHIPPED | OUTPATIENT
Start: 2023-02-14 | End: 2023-02-21

## 2023-02-14 RX ORDER — DULOXETIN HYDROCHLORIDE 30 MG/1
30 CAPSULE, DELAYED RELEASE ORAL DAILY
Qty: 30 CAPSULE | Refills: 3 | Status: SHIPPED | OUTPATIENT
Start: 2023-02-14 | End: 2023-02-14

## 2023-02-14 RX ORDER — ESCITALOPRAM OXALATE 5 MG/1
5 TABLET ORAL DAILY
Qty: 30 TABLET | Refills: 2 | Status: SHIPPED | OUTPATIENT
Start: 2023-02-14

## 2023-02-14 ASSESSMENT — PATIENT HEALTH QUESTIONNAIRE - PHQ9
2. FEELING DOWN, DEPRESSED OR HOPELESS: 0
SUM OF ALL RESPONSES TO PHQ QUESTIONS 1-9: 0
SUM OF ALL RESPONSES TO PHQ9 QUESTIONS 1 & 2: 0
SUM OF ALL RESPONSES TO PHQ QUESTIONS 1-9: 0
SUM OF ALL RESPONSES TO PHQ QUESTIONS 1-9: 0
1. LITTLE INTEREST OR PLEASURE IN DOING THINGS: 0
SUM OF ALL RESPONSES TO PHQ QUESTIONS 1-9: 0

## 2023-02-14 ASSESSMENT — ANXIETY QUESTIONNAIRES
GAD7 TOTAL SCORE: 0
IF YOU CHECKED OFF ANY PROBLEMS ON THIS QUESTIONNAIRE, HOW DIFFICULT HAVE THESE PROBLEMS MADE IT FOR YOU TO DO YOUR WORK, TAKE CARE OF THINGS AT HOME, OR GET ALONG WITH OTHER PEOPLE: NOT DIFFICULT AT ALL
3. WORRYING TOO MUCH ABOUT DIFFERENT THINGS: 0
4. TROUBLE RELAXING: 0
1. FEELING NERVOUS, ANXIOUS, OR ON EDGE: 0
6. BECOMING EASILY ANNOYED OR IRRITABLE: 0
7. FEELING AFRAID AS IF SOMETHING AWFUL MIGHT HAPPEN: 0
5. BEING SO RESTLESS THAT IT IS HARD TO SIT STILL: 0
2. NOT BEING ABLE TO STOP OR CONTROL WORRYING: 0

## 2023-02-14 NOTE — TELEPHONE ENCOUNTER
She was able to get in touch with the Endo doctor, Virginia Knowles. They will see her again as a patient. They need a referral send with last 2 office notes and labs. She said she gave Dr. Christopher Baumann the fax number.

## 2023-02-14 NOTE — PROGRESS NOTES
Chief Complaint   Patient presents with    Follow-up       HPI: We asked patient to come in to be seen her A1c was greater than 15 yesterday. Sounds like she has basically stopped her insulin for a while over the last several months she says she has been having trouble with her vision and got very depressed she had issues with her glaucoma has had 2 procedures. Glaucoma and cataracts. Pt has had visual loss and was very depressed and not taking insulin routinely. Is improved and hopefully will improve more with the cataract surgery per her report there may be 1 blood vessel that the ophthalmologist felt was related to diabetes but most of the issues glaucoma and cataracts. Has had 2 glaucoma surgeries. Cataracts. No report per pt of DM changes. Back on toujeo and on SSI with Novolog. Some neuropathy/ radicular type symptoms. Tearful and stressed. Grief.     Past Medical History:   Diagnosis Date    Alcohol abuse 2/6/2019    Allergic rhinitis     Diabetes mellitus (HCC)     GERD (gastroesophageal reflux disease)     Hyperlipidemia     Hypertension     Postmenopausal 1/3/2019    Sleep apnea        Past Surgical History:   Procedure Laterality Date    CHOLECYSTECTOMY      KNEE ARTHROPLASTY      LIANA AND BSO (CERVIX REMOVED)         Family History   Problem Relation Age of Onset    Cancer Mother         ovarian    Cancer Maternal Grandmother         ovarian    Cancer Paternal Grandmother         ovarian       Social History     Socioeconomic History    Marital status: Single     Spouse name: Not on file    Number of children: Not on file    Years of education: Not on file    Highest education level: Not on file   Occupational History    Not on file   Tobacco Use    Smoking status: Never    Smokeless tobacco: Never   Substance and Sexual Activity    Alcohol use: Not on file    Drug use: Not on file    Sexual activity: Not on file   Other Topics Concern    Not on file   Social History Narrative    Not on file Social Determinants of Health     Financial Resource Strain: Low Risk     Difficulty of Paying Living Expenses: Not hard at all   Food Insecurity: No Food Insecurity    Worried About Running Out of Food in the Last Year: Never true    Ran Out of Food in the Last Year: Never true   Transportation Needs: Not on file   Physical Activity: Not on file   Stress: Not on file   Social Connections: Not on file   Intimate Partner Violence: Not on file   Housing Stability: Not on file       Allergies   Allergen Reactions    Gabapentin Other (See Comments)     Passed out    Zanaflex [Tizanidine] Other (See Comments)     Tachycardia, dizzy, extreme thirst    Amoxicillin-Pot Clavulanate Nausea And Vomiting       Current Outpatient Medications   Medication Sig Dispense Refill    DULoxetine (CYMBALTA) 30 MG extended release capsule Take 1 capsule by mouth daily 30 capsule 3    sulfamethoxazole-trimethoprim (BACTRIM DS;SEPTRA DS) 800-160 MG per tablet Take 1 tablet by mouth 2 times daily for 7 days 14 tablet 0    ondansetron (ZOFRAN) 4 MG tablet Take 1 tablet by mouth 3 times daily as needed for Nausea or Vomiting 30 tablet 0    Continuous Blood Gluc Sensor (DEXCOM G6 SENSOR) MISC USE AS DIRECTED. (#3 for a 30 day supply) 3 each 3    vitamin D (ERGOCALCIFEROL) 1.25 MG (21933 UT) CAPS capsule TAKE 1 CAPSULE BY MOUTH ONCE A WEEK (Patient not taking: Reported on 2/14/2023) 4 capsule 3    levothyroxine (SYNTHROID) 50 MCG tablet TAKE 1 TABLET BY MOUTH DAILY 30 tablet 5    Lancets MISC 1 each by Does not apply route 5 times daily 300 each 1    blood glucose test strips (TRUE METRIX BLOOD GLUCOSE TEST) strip 5 times a day and prn .  100 each 3    Continuous Blood Gluc Transmit (DEXCOM G5 MOBILE TRANSMITTER) MISC Use as directed 1 each 3    albuterol sulfate HFA (VENTOLIN HFA) 108 (90 Base) MCG/ACT inhaler Inhale 2 puffs into the lungs 4 times daily as needed for Wheezing 18 g 0    Insulin Glargine, 2 Unit Dial, (TOUJEO MAX SOLOSTAR) 300 UNIT/ML SOPN Inject 24 Units into the skin at bedtime (Patient taking differently: Inject 22 Units into the skin at bedtime) 3 mL 2    Multiple Vitamins-Minerals (PRESERVISION AREDS 2) CAPS Take 2 capsules by mouth daily 180 capsule 3    Insulin Pen Needle (B-D UF III MINI PEN NEEDLES) 31G X 5 MM MISC USE 5 TIMES DAILY AND AS NEEDED. 100 each 3    insulin aspart (NOVOLOG FLEXPEN) 100 UNIT/ML injection pen 1 unit for every 50 points over 100 bs and 1 unit per 15 carbs 5 pen 3    pantoprazole (PROTONIX) 40 MG tablet Take 1 tablet by mouth every morning (before breakfast) 90 tablet 1    hydrocortisone (ANUSOL-HC) 2.5 % CREA rectal cream Use twice a day and as needed post bm 28 g 1    hydrocortisone (ANUSOL-HC) 25 MG suppository Place 1 suppository rectally every 12 hours 24 suppository 0    Multiple Vitamins-Minerals (THERAPEUTIC MULTIVITAMIN-MINERALS) tablet Take 1 tablet by mouth daily       No current facility-administered medications for this visit. Review of Systems    /70   Pulse 100   Wt 102 lb (46.3 kg)   SpO2 98%   BMI 19.92 kg/m²   BP Readings from Last 7 Encounters:   02/14/23 112/70   11/15/22 (!) 145/54   10/20/22 124/70   08/30/22 (!) 143/62   08/08/22 122/60   05/31/22 (!) 104/50   03/22/22 124/64     Wt Readings from Last 7 Encounters:   02/14/23 102 lb (46.3 kg)   10/20/22 102 lb (46.3 kg)   08/08/22 113 lb (51.3 kg)   05/31/22 110 lb (49.9 kg)   03/22/22 110 lb (49.9 kg)   03/04/22 106 lb 9.6 oz (48.4 kg)   02/24/22 111 lb (50.3 kg)     BMI Readings from Last 7 Encounters:   02/14/23 19.92 kg/m²   10/20/22 19.92 kg/m²   08/08/22 22.07 kg/m²   05/31/22 21.48 kg/m²   03/22/22 21.48 kg/m²   03/04/22 20.82 kg/m²   02/24/22 21.68 kg/m²     Resp Readings from Last 7 Encounters:   11/15/22 18   08/30/22 18   12/03/21 16   11/28/21 18       Physical Exam  Constitutional:       General: She is not in acute distress. Eyes:      General: No scleral icterus.   Cardiovascular:      Heart sounds: Normal heart sounds. Pulmonary:      Breath sounds: Normal breath sounds. Musculoskeletal:      Cervical back: Neck supple. Lymphadenopathy:      Cervical: No cervical adenopathy. Skin:     Findings: No rash. Psychiatric:      Comments: grief   Visual inspection:  Deformity/amputation: absent  Skin lesions/pre-ulcerative calluses: absent  Edema: right- negative, left- negative    Sensory exam:  Monofilament sensation: abnormal - slight decrease on top of both feet   (minimum of 5 random plantar locations tested, avoiding callused areas - > 1 area with absence of sensation is + for neuropathy)    Plus at least one of the following:  Pulses: normal,   Pinprick: Impaired  Proprioception: Intact  Vibration (128 Hz): N/A     Results for orders placed or performed in visit on 03/31/22   C-Peptide   Result Value Ref Range    C-Peptide 1.9 1.1 - 4.4 ng/mL       ASSESSMENT/ PLAN:  1. Type 1 diabetes mellitus without complication (HCC)  Totally out-of-control diabetes we emphasized she has to manage treat her diabetes has to stay on insulin we need to address stressors and grief needs to have a good plan of care to help take care of herself. She is very active and busy in the community on a lot of [employer] etc.  I emphasized we really have to focus on her self-care -I am very worried re: her DM-- she knows cant stop insulin like this  . Make sure ophthalmology knows extensively about her diabetes get her in with the endocrinologist she saw in [place] in the past we had her in with endocrinology elsewhere but did not seem to be working out she would prefer to go back to her endocrinologist in [place] we will make that referral.  We have emphasized again tight management of her diabetes  - External Referral To Endocrinology    2. Grief  She has had a lot of grief and sadness.   She has done a fantastic job of stopping alcohol on her own remaining off of alcohol really emphasize how well she has done with this but counseling is helpful to anyone and she does agree today to go to counseling she also has a lot of grief and having to sort through her family's findings at home closely death of both of her parents. - lexapro 5mg daily added with depression/ grief  - External Referral To Behavioral Health    3. Elevated alkaline phosphatase level  Alcoholic liver disease remain off alcohol follow this closely    4. Other social stressor  We will add lexapro for depression / counseling    5.  Alcoholic liver disease (HCC)  Alk phos still elevated she has had ultrasound CT evaluation with GI continue to follow other liver enzymes normal today

## 2023-02-14 NOTE — TELEPHONE ENCOUNTER
Note is done- please fax labs and last CT and US and please call [facility] and tell them DO NOT fill cymbalta- cancel it (due to liver) and fill lexapro 5mg daily instead

## 2023-02-14 NOTE — TELEPHONE ENCOUNTER
I can refer her back to Dr. Camille Blunt office or to [facility] or to [facility] through [facility] -- might be best to go in person

## 2023-02-14 NOTE — TELEPHONE ENCOUNTER
She was referred back to them, but they are full and unable to see her again. She will most likely need to be puut on insulin pump to control her blood sugars.

## 2023-02-15 LAB
BACTERIA SPEC AEROBE CULT: ABNORMAL
BACTERIA SPEC AEROBE CULT: ABNORMAL

## 2023-02-16 RX ORDER — BLOOD-GLUCOSE SENSOR
EACH MISCELLANEOUS
Qty: 3 EACH | Refills: 3 | Status: SHIPPED | OUTPATIENT
Start: 2023-02-16

## 2023-02-27 RX ORDER — PEN NEEDLE, DIABETIC 31 GX5/16"
NEEDLE, DISPOSABLE MISCELLANEOUS
Qty: 100 EACH | Refills: 3 | Status: SHIPPED | OUTPATIENT
Start: 2023-02-27

## 2023-03-07 ENCOUNTER — HOSPITAL ENCOUNTER (OUTPATIENT)
Dept: PAIN MANAGEMENT | Age: 63
Discharge: HOME | End: 2023-03-07
Payer: COMMERCIAL

## 2023-03-07 VITALS
HEART RATE: 77 BPM | RESPIRATION RATE: 18 BRPM | DIASTOLIC BLOOD PRESSURE: 64 MMHG | SYSTOLIC BLOOD PRESSURE: 137 MMHG | TEMPERATURE: 96.8 F | OXYGEN SATURATION: 97 %

## 2023-03-07 DIAGNOSIS — R52 PAIN MANAGEMENT: ICD-10-CM

## 2023-03-07 PROCEDURE — 62323 NJX INTERLAMINAR LMBR/SAC: CPT

## 2023-03-07 PROCEDURE — 2580000003 HC RX 258

## 2023-03-07 PROCEDURE — A4216 STERILE WATER/SALINE, 10 ML: HCPCS

## 2023-03-07 PROCEDURE — 6360000002 HC RX W HCPCS

## 2023-03-07 PROCEDURE — 2500000003 HC RX 250 WO HCPCS

## 2023-03-07 RX ORDER — LIDOCAINE HYDROCHLORIDE 10 MG/ML
5 INJECTION, SOLUTION EPIDURAL; INFILTRATION; INTRACAUDAL; PERINEURAL ONCE
Status: DISCONTINUED | OUTPATIENT
Start: 2023-03-07 | End: 2023-03-09 | Stop reason: HOSPADM

## 2023-03-07 RX ORDER — METHYLPREDNISOLONE ACETATE 80 MG/ML
80 INJECTION, SUSPENSION INTRA-ARTICULAR; INTRALESIONAL; INTRAMUSCULAR; SOFT TISSUE ONCE
Status: DISCONTINUED | OUTPATIENT
Start: 2023-03-07 | End: 2023-03-09 | Stop reason: HOSPADM

## 2023-03-07 RX ORDER — SODIUM CHLORIDE 9 MG/ML
5 INJECTION INTRAVENOUS ONCE
Status: DISCONTINUED | OUTPATIENT
Start: 2023-03-07 | End: 2023-03-09 | Stop reason: HOSPADM

## 2023-03-07 ASSESSMENT — PAIN SCALES - GENERAL: PAINLEVEL_OUTOF10: 4

## 2023-03-07 ASSESSMENT — PAIN DESCRIPTION - LOCATION: LOCATION: BACK

## 2023-03-07 ASSESSMENT — PAIN DESCRIPTION - PAIN TYPE: TYPE: CHRONIC PAIN

## 2023-03-07 ASSESSMENT — PAIN DESCRIPTION - ORIENTATION: ORIENTATION: LOWER

## 2023-03-07 ASSESSMENT — PAIN DESCRIPTION - FREQUENCY: FREQUENCY: CONTINUOUS

## 2023-03-07 ASSESSMENT — PAIN - FUNCTIONAL ASSESSMENT
PAIN_FUNCTIONAL_ASSESSMENT: NONE - DENIES PAIN
PAIN_FUNCTIONAL_ASSESSMENT: PREVENTS OR INTERFERES SOME ACTIVE ACTIVITIES AND ADLS
PAIN_FUNCTIONAL_ASSESSMENT: PREVENTS OR INTERFERES SOME ACTIVE ACTIVITIES AND ADLS

## 2023-03-07 NOTE — INTERVAL H&P NOTE
Update History & Physical    The patient's History and Physical  was reviewed with the patient and I examined the patient. There was no change. The surgical site was confirmed by the patient and me. Plan: The risks, benefits, expected outcome, and alternative to the recommended procedure have been discussed with the patient. Patient understands and wants to proceed with the procedure.      Electronically signed by Catrina David MD on 3/7/2023 at 8:51 AM

## 2023-03-14 ENCOUNTER — OFFICE VISIT (OUTPATIENT)
Dept: INTERNAL MEDICINE | Age: 63
End: 2023-03-14
Payer: COMMERCIAL

## 2023-03-14 VITALS
HEART RATE: 84 BPM | DIASTOLIC BLOOD PRESSURE: 70 MMHG | BODY MASS INDEX: 21.48 KG/M2 | WEIGHT: 110 LBS | OXYGEN SATURATION: 100 % | SYSTOLIC BLOOD PRESSURE: 130 MMHG

## 2023-03-14 DIAGNOSIS — E10.9 TYPE 1 DIABETES MELLITUS WITHOUT COMPLICATION (HCC): Primary | ICD-10-CM

## 2023-03-14 DIAGNOSIS — M47.26 OSTEOARTHRITIS OF SPINE WITH RADICULOPATHY, LUMBAR REGION: ICD-10-CM

## 2023-03-14 DIAGNOSIS — Z65.9 OTHER SOCIAL STRESSOR: ICD-10-CM

## 2023-03-14 DIAGNOSIS — K64.4 EXTERNAL HEMORRHOIDS: ICD-10-CM

## 2023-03-14 DIAGNOSIS — K62.3 PARTIAL RECTAL PROLAPSE: ICD-10-CM

## 2023-03-14 DIAGNOSIS — F10.11 HISTORY OF ALCOHOL ABUSE: ICD-10-CM

## 2023-03-14 PROCEDURE — 99214 OFFICE O/P EST MOD 30 MIN: CPT | Performed by: INTERNAL MEDICINE

## 2023-03-14 SDOH — ECONOMIC STABILITY: INCOME INSECURITY: HOW HARD IS IT FOR YOU TO PAY FOR THE VERY BASICS LIKE FOOD, HOUSING, MEDICAL CARE, AND HEATING?: NOT HARD AT ALL

## 2023-03-14 SDOH — ECONOMIC STABILITY: FOOD INSECURITY: WITHIN THE PAST 12 MONTHS, YOU WORRIED THAT YOUR FOOD WOULD RUN OUT BEFORE YOU GOT MONEY TO BUY MORE.: NEVER TRUE

## 2023-03-14 SDOH — ECONOMIC STABILITY: HOUSING INSECURITY
IN THE LAST 12 MONTHS, WAS THERE A TIME WHEN YOU DID NOT HAVE A STEADY PLACE TO SLEEP OR SLEPT IN A SHELTER (INCLUDING NOW)?: NO

## 2023-03-14 SDOH — ECONOMIC STABILITY: FOOD INSECURITY: WITHIN THE PAST 12 MONTHS, THE FOOD YOU BOUGHT JUST DIDN'T LAST AND YOU DIDN'T HAVE MONEY TO GET MORE.: NEVER TRUE

## 2023-03-14 NOTE — PROGRESS NOTES
Chief Complaint   Patient presents with    Follow-up       HPI: Patient is here to follow-up out-of-control diabetes mellitus type 1. Stressors and other medical issues. Appointment with counselor. Continue with that she does seem to feel a little less stressed today but does not feel well hurts all over lots of issues with her lower back. Blood sugar staying very high but some hypoglycemia at night    Past Medical History:   Diagnosis Date    Alcohol abuse 2/6/2019    Allergic rhinitis     Diabetes mellitus (HCC)     GERD (gastroesophageal reflux disease)     Hyperlipidemia     Hypertension     Postmenopausal 1/3/2019    Sleep apnea        Past Surgical History:   Procedure Laterality Date    CHOLECYSTECTOMY      KNEE ARTHROPLASTY      LIANA AND BSO (CERVIX REMOVED)         Family History   Problem Relation Age of Onset    Cancer Mother         ovarian    Cancer Maternal Grandmother         ovarian    Cancer Paternal Grandmother         ovarian       Social History     Socioeconomic History    Marital status: Single     Spouse name: Not on file    Number of children: Not on file    Years of education: Not on file    Highest education level: Not on file   Occupational History    Not on file   Tobacco Use    Smoking status: Never    Smokeless tobacco: Never   Substance and Sexual Activity    Alcohol use: Not on file    Drug use: Not on file    Sexual activity: Not on file   Other Topics Concern    Not on file   Social History Narrative    Not on file     Social Determinants of Health     Financial Resource Strain: Low Risk     Difficulty of Paying Living Expenses: Not hard at all   Food Insecurity: No Food Insecurity    Worried About Running Out of Food in the Last Year: Never true    [address] in the Last Year: Never true   Transportation Needs: Unknown    Lack of Transportation (Medical): Not on file    Lack of Transportation (Non-Medical):  No   Physical Activity: Not on file   Stress: Not on file   Social Connections: Not on file   Intimate Partner Violence: Not on file   Housing Stability: Unknown    Unable to Pay for Housing in the Last Year: Not on file    Number of [place] in the Last Year: Not on file    Unstable Housing in the Last Year: No       Allergies   Allergen Reactions    Gabapentin Other (See Comments)     Passed out    Zanaflex [Tizanidine] Other (See Comments)     Tachycardia, dizzy, extreme thirst    Amoxicillin-Pot Clavulanate Nausea And Vomiting       Current Outpatient Medications   Medication Sig Dispense Refill    B-D UF III MINI PEN NEEDLES 31G X 5 MM MISC USE 5 TIMES DAILY AND AS NEEDED. 100 each 3    Continuous Blood Gluc Sensor (DEXCOM G6 SENSOR) MISC USE AS DIRECTED. (#3 for a 30 day supply) 3 each 3    escitalopram (LEXAPRO) 5 MG tablet Take 1 tablet by mouth daily 30 tablet 2    ondansetron (ZOFRAN) 4 MG tablet Take 1 tablet by mouth 3 times daily as needed for Nausea or Vomiting 30 tablet 0    vitamin D (ERGOCALCIFEROL) 1.25 MG (65649 UT) CAPS capsule TAKE 1 CAPSULE BY MOUTH ONCE A WEEK (Patient not taking: Reported on 2/14/2023) 4 capsule 3    levothyroxine (SYNTHROID) 50 MCG tablet TAKE 1 TABLET BY MOUTH DAILY 30 tablet 5    Lancets MISC 1 each by Does not apply route 5 times daily 300 each 1    blood glucose test strips (TRUE METRIX BLOOD GLUCOSE TEST) strip 5 times a day and prn .  100 each 3    Continuous Blood Gluc Transmit (DEXCOM G5 MOBILE TRANSMITTER) MISC Use as directed 1 each 3    albuterol sulfate HFA (VENTOLIN HFA) 108 (90 Base) MCG/ACT inhaler Inhale 2 puffs into the lungs 4 times daily as needed for Wheezing 18 g 0    Insulin Glargine, 2 Unit Dial, (TOUJEO MAX SOLOSTAR) 300 UNIT/ML SOPN Inject 24 Units into the skin at bedtime (Patient taking differently: Inject 22 Units into the skin at bedtime) 3 mL 2    Multiple Vitamins-Minerals (PRESERVISION AREDS 2) CAPS Take 2 capsules by mouth daily 180 capsule 3    insulin aspart (NOVOLOG FLEXPEN) 100 UNIT/ML injection pen 1 unit for every 50 points over 100 bs and 1 unit per 15 carbs 5 pen 3    pantoprazole (PROTONIX) 40 MG tablet Take 1 tablet by mouth every morning (before breakfast) 90 tablet 1    hydrocortisone (ANUSOL-HC) 2.5 % CREA rectal cream Use twice a day and as needed post bm 28 g 1    hydrocortisone (ANUSOL-HC) 25 MG suppository Place 1 suppository rectally every 12 hours 24 suppository 0    Multiple Vitamins-Minerals (THERAPEUTIC MULTIVITAMIN-MINERALS) tablet Take 1 tablet by mouth daily       No current facility-administered medications for this visit. Review of Systems    /70   Pulse 84   Wt 110 lb (49.9 kg)   SpO2 100%   BMI 21.48 kg/m²   BP Readings from Last 7 Encounters:   03/14/23 130/70   03/07/23 137/64   02/14/23 112/70   11/15/22 (!) 145/54   10/20/22 124/70   08/30/22 (!) 143/62   08/08/22 122/60     Wt Readings from Last 7 Encounters:   03/14/23 110 lb (49.9 kg)   02/14/23 102 lb (46.3 kg)   10/20/22 102 lb (46.3 kg)   08/08/22 113 lb (51.3 kg)   05/31/22 110 lb (49.9 kg)   03/22/22 110 lb (49.9 kg)   03/04/22 106 lb 9.6 oz (48.4 kg)     BMI Readings from Last 7 Encounters:   03/14/23 21.48 kg/m²   02/14/23 19.92 kg/m²   10/20/22 19.92 kg/m²   08/08/22 22.07 kg/m²   05/31/22 21.48 kg/m²   03/22/22 21.48 kg/m²   03/04/22 20.82 kg/m²     Resp Readings from Last 7 Encounters:   03/07/23 18   11/15/22 18   08/30/22 18   12/03/21 16   11/28/21 18       Physical Exam  Constitutional:       General: She is not in acute distress. Comments: Frail appearing and appears in pain. Eyes:      General: No scleral icterus. Cardiovascular:      Heart sounds: Normal heart sounds. Pulmonary:      Breath sounds: Normal breath sounds. Musculoskeletal:      Cervical back: Neck supple. Right lower leg: No edema. Left lower leg: No edema. Lymphadenopathy:      Cervical: No cervical adenopathy. Skin:     Findings: No rash. Psychiatric:      Comments:  In pain and stressed/ sad Results for orders placed or performed in visit on 03/31/22   C-Peptide   Result Value Ref Range    C-Peptide 1.9 1.1 - 4.4 ng/mL       ASSESSMENT/ PLAN:  1. Type 1 diabetes mellitus without complication  - External Referral To Endocrinology  In the meantime increase her Toujeo to 24 units and then up to 26 units and continue with her short acting insulin with meals but I would not take it at bedtime I would have the last dose be at supper. I would not take any of the short acting insulin at bedtime since she is having some early morning hypoglycemia she does have a Dexcom monitor which is greatly helpful she needs monitoring and help from endocrinology. Out-of-control diabetes- we ordered labs for May    2. Partial rectal prolapse hemorrhoids- External Referral To General Surgery      3. History of alcohol abuse  Patient is a year and a half for more without alcohol - doing much better - we encouraged her and see the counselor as planned    4. External hemorrhoids  Referral To General Surgery    5. Other social stressor- counseling and follow    6. Oseoarthritis of spine, with radiculopathy, lumbar region- follow     Chart, medications, labs, vaccines reviewed. Keep up to date with routine care and follow up. Call with any problems or complaints. Keep up to date with routine screening recomendations and vaccines.

## 2023-03-21 ENCOUNTER — DOCUMENTATION (OUTPATIENT)
Dept: ENDOCRINOLOGY | Facility: CLINIC | Age: 63
End: 2023-03-21
Payer: COMMERCIAL

## 2023-03-23 ENCOUNTER — OFFICE VISIT (OUTPATIENT)
Dept: SURGERY | Facility: CLINIC | Age: 63
End: 2023-03-23
Payer: COMMERCIAL

## 2023-03-23 VITALS
OXYGEN SATURATION: 100 % | WEIGHT: 106.04 LBS | TEMPERATURE: 98 F | HEART RATE: 78 BPM | DIASTOLIC BLOOD PRESSURE: 70 MMHG | HEIGHT: 60 IN | SYSTOLIC BLOOD PRESSURE: 130 MMHG | BODY MASS INDEX: 20.82 KG/M2

## 2023-03-23 DIAGNOSIS — K64.3 GRADE IV HEMORRHOIDS: Primary | ICD-10-CM

## 2023-03-23 PROCEDURE — 99204 OFFICE O/P NEW MOD 45 MIN: CPT | Performed by: SPECIALIST

## 2023-03-23 RX ORDER — ESCITALOPRAM OXALATE 5 MG/1
1 TABLET ORAL DAILY
COMMUNITY
Start: 2023-02-14

## 2023-03-23 RX ORDER — SULFAMETHOXAZOLE AND TRIMETHOPRIM 800; 160 MG/1; MG/1
TABLET ORAL
COMMUNITY
Start: 2023-03-04

## 2023-03-23 RX ORDER — ORPHENADRINE CITRATE 100 MG/1
1 TABLET, EXTENDED RELEASE ORAL EVERY 12 HOURS SCHEDULED
COMMUNITY
Start: 2023-02-27

## 2023-03-23 NOTE — PROGRESS NOTES
Patient: Fiordaliza Lyons    YOB: 1960    Date: 03/23/2023    Primary Care Provider: Brittany Solis MD    Chief Complaint   Patient presents with   • Hemorrhoids     Mrs. Lyons is here for a consult for her hemorrhoids.        Subjective .     History of present illness:   She has had extensive hemorrhoidal disease for several years now.  She had a period of heavy drinking that lasted for about a year which was able to abstain since October 2021.  Felt like the hemorrhoids were caused by this but they have not gotten better with her stopping.  She is followed by Dr. Reed.  She states that he did do an upper endoscopy and did not note any varices.  She has occasional bleeding from time to time its really the protuberance.  They stay out all the time she is able to reduce them but they do go back and immediately.  Otherwise her colonoscopies have been fine soft nontender nondistended no spider veins noted no    The following portions of the patient's history were reviewed and updated as appropriate: allergies, current medications, past family history, past medical history, past social history, past surgical history and problem list.      History:  Past Medical History:   Diagnosis Date   • Alcoholic hepatitis with ascites    • Allergic rhinitis    • Breast cyst    • Degeneration of lumbar or lumbosacral intervertebral disc 9/15/2022   • Diabetes mellitus (HCC)    • Disease of thyroid gland    • Elevated d-dimer    • Endometriosis    • Fatty liver    • GERD (gastroesophageal reflux disease)    • History of transfusion    • Hyperlipidemia    • Hypertension    • Ovarian cyst    • Pancreatitis    • Postmenopausal    • Rectal mass    • Rectal prolapse    • Sleep apnea           Past Surgical History:   Procedure Laterality Date   • BILATERAL SALPINGO OOPHORECTOMY  2008   • CHOLECYSTECTOMY  1996   • COLONOSCOPY  07/2017    Dr Murphy- per patient normal    • COLONOSCOPY N/A 11/9/2021    Procedure:  COLONOSCOPY WITH ANESTHESIA;  Surgeon: Ray Serna MD;  Location: [facility] ENDOSCOPY;  Service: Gastroenterology;  Laterality: N/A;  pre rectal mass  post  Dr Shields   • DILATION AND CURETTAGE, DIAGNOSTIC / THERAPEUTIC     • ENDOSCOPY N/A 11/9/2021    Procedure: ESOPHAGOGASTRODUODENOSCOPY WITH ANESTHESIA;  Surgeon: Ray Serna MD;  Location: [facility] ENDOSCOPY;  Service: Gastroenterology;  Laterality: N/A;  pre anemia  post hiatal hernia; gastric polyp  Dr. Shields   • KNEE SURGERY  1982       Family History   Problem Relation Age of Onset   • Ovarian cancer Mother    • Ovarian cancer Maternal Aunt    • Ovarian cancer Maternal Grandmother    • Ovarian cancer Paternal Grandmother    • Colon polyps Father    • Colon polyps Brother    • Breast cancer Neg Hx    • Colon cancer Neg Hx        Social History     Tobacco Use   • Smoking status: Never   • Smokeless tobacco: Never   Vaping Use   • Vaping Use: Never used   Substance Use Topics   • Alcohol use: Not Currently   • Drug use: No       Allergies:  Allergies   Allergen Reactions   • Gabapentin Other (See Comments)     Passed out   • Augmentin [Amoxicillin-Pot Clavulanate] Nausea And Vomiting       Medications:     Current Outpatient Medications:   •  albuterol sulfate  (90 Base) MCG/ACT inhaler, INHALE 2 PUFFS 4 TIMES DAILY AS NEEDED FOR WHEEZING., Disp: , Rfl:   •  B-D UF III MINI PEN NEEDLES 31G X 5 MM misc, USE 5 TIMES DAILY AND AS NEEDED., Disp: , Rfl:   •  Continuous Blood Gluc Sensor (Dexcom G6 Sensor), See Admin Instructions., Disp: , Rfl:   •  Continuous Blood Gluc Transmit (Dexcom G6 Transmitter) misc, See Admin Instructions., Disp: , Rfl:   •  escitalopram (LEXAPRO) 5 MG tablet, Take 1 tablet by mouth Daily., Disp: , Rfl:   •  glucose blood test strip, 5 times a day and prn ., Disp: , Rfl:   •  insulin aspart (novoLOG FLEXPEN) 100 UNIT/ML solution pen-injector sc pen, Inject  under the skin into the appropriate area as directed 3 (Three)  "Times a Day With Meals. 2-5 units with meals, Disp: , Rfl:   •  Insulin Disposable Pump (OmniPod Dash 5 Pack Pods) misc, 1 each Continuous. USE ONE POD EVERY THREE DAYS, Disp: 2 each, Rfl: 11  •  levothyroxine (SYNTHROID, LEVOTHROID) 50 MCG tablet, Take 1 tablet by mouth Every Morning., Disp: , Rfl:   •  melatonin 5 MG tablet tablet, Take 1 tablet by mouth At Night As Needed., Disp: , Rfl:   •  Multiple Vitamins-Iron (multivitamin with iron) tablet tablet, Take 1 tablet by mouth Daily., Disp: , Rfl:   •  NON FORMULARY, DBBCG LOTION, Disp: , Rfl:   •  pantoprazole (PROTONIX) 40 MG EC tablet, Take 1 tablet by mouth Daily., Disp: , Rfl:   •  sulfamethoxazole-trimethoprim (BACTRIM DS,SEPTRA DS) 800-160 MG per tablet, TAKE 1 TABLET BY MOUTH 2 TIMES DAILY FOR 7 DAYS, Disp: , Rfl:   •  tiZANidine (ZANAFLEX) 4 MG tablet, TAKE 14 - 1 TABLET BY MOUTH EVERY 6-8 HOURS AS NEEDED WITH FOOD., Disp: , Rfl:   •  Toujeo Max SoloStar 300 UNIT/ML solution pen-injector injection, INJECT 24 UNITS INTO THE SKIN AT BEDTIME, Disp: , Rfl:   •  vitamin D (ERGOCALCIFEROL) 1.25 MG (48497 UT) capsule capsule, Take 1 capsule by mouth 1 (One) Time Per Week., Disp: , Rfl:   •  orphenadrine (NORFLEX) 100 MG 12 hr tablet, Take 1 tablet by mouth Every 12 (Twelve) Hours., Disp: , Rfl:     Objective     Vital Signs:   Vitals:    03/23/23 1105   BP: 130/70   BP Location: Left arm   Patient Position: Sitting   Cuff Size: Adult   Pulse: 78   Temp: 98 °F (36.7 °C)   SpO2: 100%   Weight: 48.1 kg (106 lb 0.7 oz)   Height: 152.4 cm (60\")       Physical Exam:     General Appearance:    Alert, cooperative, in no acute distress   Head:    Normocephalic, without obvious abnormality, atraumatic   Eyes:            Lids and lashes normal, conjunctivae and sclerae normal, no  icterus, no pallor, corneas clear,   Ears:    Ears appear intact with no abnormalities noted   Breast:     deferred   Lungs:     Clear to auscultation,respirations regular, even and              " Unlabored    Heart:    Regular rhythm and normal rate, no murmur, no gallop.   Chest Wall:    No abnormalities observed   Abdomen    Rectal:    Hepatomegaly that I can appreciate on exam    She has grade 4 internal hemorrhoids that I am able to reduce but they immediately pop back out.  They are irritated with easily bleeding   Extremities:   Moves all extremities well, no edema, no cyanosis, no          redness   Pulses:   Pulses palpable and equal bilaterally   Skin:   No bleeding, bruising or rash   Lymph nodes:   No palpable adenopathy   Neurologic:   Cranial nerves 2 - 12 grossly intact.         Results Review:   I reviewed the patient's new clinical results.        Assessment / Plan:    Diagnoses and all orders for this visit:    1. Grade IV hemorrhoids (Primary)  -     Case Request; Standing  -     ceFAZolin (ANCEF) 2 g in sodium chloride 0.9 % 100 mL IVPB  -     Basic Metabolic Panel; Future  -     CBC (No Diff); Future  -     Case Request    Other orders  -     Follow Anesthesia Guidelines / Protocol; Future  -     Obtain Informed Consent; Future  -     Provide NPO Instructions to Patient; Future  -     Chlorhexidine Skin Prep; Future  -     Follow Anesthesia Guidelines / Protocol; Standing  -     Verify / Perform Chlorhexidine Skin Prep; Standing  -     Verify / Perform Chlorhexidine Skin Prep if Indicated (If Not Already Completed); Standing        BMI is within normal parameters. No other follow-up for BMI required.    Plan hemorrhoids this will require hemorrhoidectomy.  We did discuss the increased risk of bleeding given her extensive alcohol history and fatty liver seen on her recent scan from 2021 that I reviewed.  There is no evidence of varices on that scan.  The risks of poor healing breakdown of the suture line etc. all discussed.      Electronically signed by Catia Miranda MD  03/23/23  11:36 CDT

## 2023-03-24 ENCOUNTER — TELEPHONE (OUTPATIENT)
Dept: NEUROSURGERY | Facility: CLINIC | Age: 63
End: 2023-03-24
Payer: COMMERCIAL

## 2023-03-24 NOTE — TELEPHONE ENCOUNTER
Caller: EMILIE RUCKER    Relationship to patient: SELF    Best call back number: [phone]    Chief complaint:INCREASED PAIN     Type of visit:FOLLOW UP    Requested date: ASAP    If rescheduling, when is the original appointment:NA    Additional notes:PT CALLED AND STATES THAT SHE HAD A LUMBAR PUNCTURE THAT DID NOT GO WELL ON 03/07/23 PT STATES THAT SHE IS HAVING INCREASED PAIN-SHE WOULD LIKE TO SEE IF  WILL ORDER NEW MRI,CT OR X-RAY TO SEE IF ANY CHANGES TO HAVE A SOONER APPT. PT WOULD LIKE APPT. MOVED UP IF NECESSARY AS SHE IS IN PAIN-PLEASE CONTACT PT BACK TO ADVISE THANK YOU!

## 2023-03-24 NOTE — TELEPHONE ENCOUNTER
Placed a call to inform pt our office is needing to setup an appt for her. Pt agreed 03/29//23 08:15a ended call with pt understanding and acknowledgement.

## 2023-03-28 DIAGNOSIS — K21.9 GASTROESOPHAGEAL REFLUX DISEASE WITHOUT ESOPHAGITIS: ICD-10-CM

## 2023-03-28 DIAGNOSIS — K62.5 GASTROINTESTINAL HEMORRHAGE ASSOCIATED WITH ANORECTAL SOURCE: ICD-10-CM

## 2023-03-28 RX ORDER — PANTOPRAZOLE SODIUM 40 MG/1
TABLET, DELAYED RELEASE ORAL
Qty: 30 TABLET | Refills: 3 | Status: SHIPPED | OUTPATIENT
Start: 2023-03-28

## 2023-03-29 ENCOUNTER — OFFICE VISIT (OUTPATIENT)
Dept: NEUROSURGERY | Facility: CLINIC | Age: 63
End: 2023-03-29
Payer: COMMERCIAL

## 2023-03-29 VITALS — BODY MASS INDEX: 21.99 KG/M2 | HEIGHT: 60 IN | WEIGHT: 112 LBS

## 2023-03-29 DIAGNOSIS — M54.16 LUMBAR RADICULOPATHY: ICD-10-CM

## 2023-03-29 DIAGNOSIS — Z78.9 NON-SMOKER: ICD-10-CM

## 2023-03-29 DIAGNOSIS — M51.37 DEGENERATION OF LUMBAR OR LUMBOSACRAL INTERVERTEBRAL DISC: Primary | ICD-10-CM

## 2023-03-29 PROBLEM — K64.3 GRADE IV HEMORRHOIDS: Status: ACTIVE | Noted: 2023-03-29

## 2023-03-29 PROCEDURE — 99213 OFFICE O/P EST LOW 20 MIN: CPT | Performed by: NURSE PRACTITIONER

## 2023-03-29 NOTE — PROGRESS NOTES
Chief complaint:   Chief Complaint   Patient presents with   • Back Pain     Pt here for f/u/e. Pt states she is having lb and bilateral leg pain and weakness. Pt states she is currently in physical therapy. Pt states physical therapy has been helping with her back and she is now working strengthening her legs.       Subjective     HPI: This is a 63-year-old female patient who we have seen in the past for back pain.  The patient does have a known spondylolisthesis at L4-5 with foraminal stenosis.  When she was seen back in September 2022 it was noted that she did have weakness in her feet.  The patient has been working with pain management and getting injections as well as physical therapy.  She was sent for nerve conduction study but did not follow-up.  She comes back in today for return evaluation.  The patient says that she was doing well up until March 7 when she had another injection in her back and says that immediately after the injection she had onset of headache and worsening back and leg pain.  The last time that she was here she was not complaining of leg pain but since that injection she has had pain going down into her legs bilaterally down to her feet.  The patient has continued to complain of a persistent headache but it is better than what it was initially.  The pain in her back continues to be constant.  It is worse with ambulating and better with sitting down    Review of Systems   Constitutional: Positive for activity change.   Musculoskeletal: Positive for back pain and gait problem.   Psychiatric/Behavioral: Negative.         Past Medical History:   Diagnosis Date   • Alcoholic hepatitis with ascites    • Allergic rhinitis    • Breast cyst    • Degeneration of lumbar or lumbosacral intervertebral disc 9/15/2022   • Diabetes mellitus (HCC)    • Disease of thyroid gland    • Elevated d-dimer    • Endometriosis    • Fatty liver    • GERD (gastroesophageal reflux disease)    • History of  "transfusion    • Hyperlipidemia    • Hypertension    • Ovarian cyst    • Pancreatitis    • Postmenopausal    • Rectal mass    • Rectal prolapse    • Sleep apnea      Past Surgical History:   Procedure Laterality Date   • BILATERAL SALPINGO OOPHORECTOMY  2008   • CHOLECYSTECTOMY  1996   • COLONOSCOPY  07/2017    Dr Murphy- per patient normal    • COLONOSCOPY N/A 11/9/2021    Procedure: COLONOSCOPY WITH ANESTHESIA;  Surgeon: Ray Serna MD;  Location: [facility] ENDOSCOPY;  Service: Gastroenterology;  Laterality: N/A;  pre rectal mass  post  Dr Shields   • DILATION AND CURETTAGE, DIAGNOSTIC / THERAPEUTIC     • ENDOSCOPY N/A 11/9/2021    Procedure: ESOPHAGOGASTRODUODENOSCOPY WITH ANESTHESIA;  Surgeon: Ray Serna MD;  Location: [facility] ENDOSCOPY;  Service: Gastroenterology;  Laterality: N/A;  pre anemia  post hiatal hernia; gastric polyp  Dr. Shields   • KNEE SURGERY  1982     Family History   Problem Relation Age of Onset   • Ovarian cancer Mother    • Ovarian cancer Maternal Aunt    • Ovarian cancer Maternal Grandmother    • Ovarian cancer Paternal Grandmother    • Colon polyps Father    • Colon polyps Brother    • Breast cancer Neg Hx    • Colon cancer Neg Hx      Social History     Tobacco Use   • Smoking status: Never   • Smokeless tobacco: Never   Vaping Use   • Vaping Use: Never used   Substance Use Topics   • Alcohol use: Not Currently   • Drug use: No     (Not in a hospital admission)    Allergies:  Gabapentin and Augmentin [amoxicillin-pot clavulanate]    Objective      Vital Signs  Ht 152.4 cm (60\")   Wt 50.8 kg (112 lb)   BMI 21.87 kg/m²     Physical Exam  Constitutional:       Appearance: Normal appearance. She is well-developed.   HENT:      Head: Normocephalic.   Eyes:      General: Lids are normal.      Extraocular Movements: EOM normal.      Conjunctiva/sclera: Conjunctivae normal.      Pupils: Pupils are equal, round, and reactive to light.   Pulmonary:      Effort: Pulmonary effort is normal. "      Breath sounds: Normal breath sounds.   Musculoskeletal:         General: Normal range of motion.      Cervical back: Normal range of motion.   Skin:     General: Skin is warm.   Neurological:      Mental Status: She is alert and oriented to person, place, and time.      GCS: GCS eye subscore is 4. GCS verbal subscore is 5. GCS motor subscore is 6.      Cranial Nerves: No cranial nerve deficit.      Sensory: No sensory deficit.      Motor: Motor strength is normal.      Gait: Gait abnormal.      Deep Tendon Reflexes: Reflexes are normal and symmetric. Reflexes normal.      Comments: Walking independently with a cane   Psychiatric:         Speech: Speech normal.         Behavior: Behavior normal.         Thought Content: Thought content normal.         Neurologic Exam     Mental Status   Oriented to person, place, and time.   Attention: normal. Concentration: normal.   Speech: speech is normal   Level of consciousness: alert  Normal comprehension.     Cranial Nerves     CN II   Visual fields full to confrontation.     CN III, IV, VI   Pupils are equal, round, and reactive to light.  Extraocular motions are normal.     CN V   Facial sensation intact.     CN VII   Facial expression full, symmetric.     CN VIII   CN VIII normal.     CN IX, X   CN IX normal.   CN X normal.     CN XI   CN XI normal.     CN XII   CN XII normal.     Motor Exam   Muscle bulk: normal    Strength   Strength 5/5 throughout.     Sensory Exam   Light touch normal.     Gait, Coordination, and Reflexes     Reflexes   Reflexes 2+ except as noted.       Imaging review: Scoliosis x-rays that were done in September 2022 does not reveal any significant scoliotic deformity.  There is disc degeneration in the lumbar spine with spondylolisthesis    EMG/NCS of the bilateral lower extremities that was done in September 2022 shows generalized sensorimotor peripheral neuropathy.    Assessment/Plan: Patient is having worsening back and leg pain since her  injection.  I am going to send her for set x-rays to include standing flexion and extension.  I am also going to have her go for an MRI of the lumbar spine with and without contrast.  We will have her follow-up with Dr. Tesfaye after imaging is completed and make further recommendation at that time.  She was told to call if any further problems or concerns    Patient is a nonsmoker  The patient's Body mass index is 21.87 kg/m².. BMI is within normal parameters. No follow-up required.    Diagnoses and all orders for this visit:    1. Degeneration of lumbar or lumbosacral intervertebral disc (Primary)  -     XR Spine Lumbar Complete With Flex & Ext; Future  -     MRI Lumbar Spine With & Without Contrast; Future    2. Lumbar radiculopathy  -     XR Spine Lumbar Complete With Flex & Ext; Future  -     MRI Lumbar Spine With & Without Contrast; Future    3. Body mass index (BMI) of 21.0 to 21.9 in adult    4. Non-smoker          I discussed the patients findings and my recommendations with patient    Randy Mcbride, APRN  03/29/23  09:14 CDT

## 2023-03-30 ENCOUNTER — HOSPITAL ENCOUNTER (OUTPATIENT)
Dept: GENERAL RADIOLOGY | Facility: HOSPITAL | Age: 63
Discharge: HOME | End: 2023-03-30
Admitting: NURSE PRACTITIONER
Payer: COMMERCIAL

## 2023-03-30 DIAGNOSIS — M54.16 LUMBAR RADICULOPATHY: ICD-10-CM

## 2023-03-30 DIAGNOSIS — M51.37 DEGENERATION OF LUMBAR OR LUMBOSACRAL INTERVERTEBRAL DISC: ICD-10-CM

## 2023-03-30 PROCEDURE — 72114 X-RAY EXAM L-S SPINE BENDING: CPT

## 2023-03-31 ENCOUNTER — TRANSCRIBE ORDERS (OUTPATIENT)
Dept: ADMINISTRATIVE | Facility: HOSPITAL | Age: 63
End: 2023-03-31
Payer: COMMERCIAL

## 2023-03-31 DIAGNOSIS — R51.9 NONINTRACTABLE HEADACHE, UNSPECIFIED CHRONICITY PATTERN, UNSPECIFIED HEADACHE TYPE: Primary | ICD-10-CM

## 2023-04-06 ENCOUNTER — HOSPITAL ENCOUNTER (OUTPATIENT)
Dept: CT IMAGING | Facility: HOSPITAL | Age: 63
Discharge: HOME | End: 2023-04-06
Admitting: PHYSICAL MEDICINE & REHABILITATION
Payer: COMMERCIAL

## 2023-04-06 DIAGNOSIS — R51.9 NONINTRACTABLE HEADACHE, UNSPECIFIED CHRONICITY PATTERN, UNSPECIFIED HEADACHE TYPE: ICD-10-CM

## 2023-04-06 LAB — CREAT BLDA-MCNC: 0.8 MG/DL (ref 0.6–1.3)

## 2023-04-06 PROCEDURE — 82565 ASSAY OF CREATININE: CPT

## 2023-04-06 PROCEDURE — 70470 CT HEAD/BRAIN W/O & W/DYE: CPT

## 2023-04-06 PROCEDURE — 25510000001 IOPAMIDOL 61 % SOLUTION: Performed by: PHYSICAL MEDICINE & REHABILITATION

## 2023-04-06 RX ADMIN — IOPAMIDOL 100 ML: 612 INJECTION, SOLUTION INTRAVENOUS at 15:43

## 2023-04-10 DIAGNOSIS — M51.37 DEGENERATION OF LUMBAR OR LUMBOSACRAL INTERVERTEBRAL DISC: Primary | ICD-10-CM

## 2023-04-10 DIAGNOSIS — M54.16 LUMBAR RADICULOPATHY: ICD-10-CM

## 2023-04-11 ENCOUNTER — HOSPITAL ENCOUNTER (OUTPATIENT)
Dept: MRI IMAGING | Facility: HOSPITAL | Age: 63
Discharge: HOME | End: 2023-04-11
Admitting: NURSE PRACTITIONER
Payer: COMMERCIAL

## 2023-04-11 DIAGNOSIS — M51.37 DEGENERATION OF LUMBAR OR LUMBOSACRAL INTERVERTEBRAL DISC: ICD-10-CM

## 2023-04-11 DIAGNOSIS — M54.16 LUMBAR RADICULOPATHY: ICD-10-CM

## 2023-04-11 PROCEDURE — 72148 MRI LUMBAR SPINE W/O DYE: CPT

## 2023-04-12 ENCOUNTER — OFFICE VISIT (OUTPATIENT)
Dept: NEUROSURGERY | Facility: CLINIC | Age: 63
End: 2023-04-12
Payer: COMMERCIAL

## 2023-04-12 VITALS — HEIGHT: 60 IN | BODY MASS INDEX: 21.6 KG/M2 | WEIGHT: 110 LBS

## 2023-04-12 DIAGNOSIS — M48.062 SPINAL STENOSIS, LUMBAR REGION, WITH NEUROGENIC CLAUDICATION: Primary | ICD-10-CM

## 2023-04-12 DIAGNOSIS — Z78.9 NON-SMOKER: ICD-10-CM

## 2023-04-12 DIAGNOSIS — M51.37 DEGENERATION OF LUMBAR OR LUMBOSACRAL INTERVERTEBRAL DISC: ICD-10-CM

## 2023-04-12 DIAGNOSIS — M21.371 RIGHT FOOT DROP: ICD-10-CM

## 2023-04-12 DIAGNOSIS — M54.16 LUMBAR RADICULOPATHY: ICD-10-CM

## 2023-04-12 DIAGNOSIS — M43.10 ACQUIRED SPONDYLOLISTHESIS: ICD-10-CM

## 2023-04-12 PROCEDURE — 99214 OFFICE O/P EST MOD 30 MIN: CPT | Performed by: NEUROLOGICAL SURGERY

## 2023-04-12 RX ORDER — BLOOD-GLUCOSE METER
EACH MISCELLANEOUS SEE ADMIN INSTRUCTIONS
COMMUNITY
Start: 2023-04-05

## 2023-04-12 RX ORDER — GABAPENTIN 100 MG/1
CAPSULE ORAL
Qty: 42 CAPSULE | Refills: 0 | Status: SHIPPED | OUTPATIENT
Start: 2023-04-12 | End: 2023-04-20

## 2023-04-12 RX ORDER — LANCETS 33 GAUGE
EACH MISCELLANEOUS
COMMUNITY
Start: 2023-04-05

## 2023-04-12 RX ORDER — GABAPENTIN 300 MG/1
CAPSULE ORAL
Qty: 90 CAPSULE | Refills: 0 | Status: SHIPPED | OUTPATIENT
Start: 2023-04-12

## 2023-04-12 RX ORDER — KETOROLAC TROMETHAMINE 10 MG/1
10 TABLET, FILM COATED ORAL EVERY 6 HOURS PRN
Qty: 20 TABLET | Refills: 0 | Status: SHIPPED | OUTPATIENT
Start: 2023-04-12

## 2023-04-12 NOTE — LETTER
April 12, 2023       No Recipients    Patient: Fiordaliza Lyons   YOB: 1960   Date of Visit: 4/12/2023       Dear Brittany Solis MD    Fiordaliza Lyons was in my office today. Below is a copy of my note.    If you have questions, please do not hesitate to call me. I look forward to following Fiordaliza along with you.         Sincerely,        Nathaniel Tesfaye MD        CC:   No Recipients    No notes on file

## 2023-04-12 NOTE — TELEPHONE ENCOUNTER
Original script was not written for the correct amount    This script will complete the first one    kim

## 2023-04-12 NOTE — PROGRESS NOTES
SUBJECTIVE:  Patient ID: Fiordaliza Lyons is a 63 y.o. female is here today for follow-up.    Chief Complaint: Back pain  Chief Complaint   Patient presents with   • Results     Patient is here for discussion of MRI results.  She states her symptoms have gotten significantly worse.  She has been working with physical therapy @ [facility].       HPI  This is a 63-year-old female that we have been seen for back pain in the past.  She was being managed conservatively by pain management specialist.  However over the past 6 to 8 weeks she has developed worsening low back pain and severe bilateral lower extremity radicular pain numbness and tingling.  The right leg is worse than the left.  She is about 50% back pain and 50% leg pain.  She is in the middle of the physical therapy program and has done physical therapy last year also for this issue.  She is having increasing difficulty with ambulation due to the neurogenic claudication.    The following portions of the patient's history were reviewed and updated as appropriate: allergies, current medications, past family history, past medical history, past social history, past surgical history and problem list.    OBJECTIVE:    Review of Systems   Musculoskeletal: Positive for back pain and gait problem.   Neurological: Positive for numbness.   All other systems reviewed and are negative.         Physical Exam  Eyes:      Extraocular Movements: EOM normal.      Pupils: Pupils are equal, round, and reactive to light.   Neurological:      Mental Status: She is oriented to person, place, and time.      Coordination: Finger-Nose-Finger Test normal.      Gait: Gait is intact.      Deep Tendon Reflexes:      Reflex Scores:       Patellar reflexes are 0 on the right side and 0 on the left side.       Achilles reflexes are 0 on the right side and 0 on the left side.  Psychiatric:         Speech: Speech normal.         Neurologic Exam     Mental Status   Oriented to  person, place, and time.   Attention: normal.   Speech: speech is normal   Level of consciousness: alert  Knowledge: good.     Cranial Nerves     CN II   Visual fields full to confrontation.     CN III, IV, VI   Pupils are equal, round, and reactive to light.  Extraocular motions are normal.     CN V   Facial sensation intact.     CN VII   Facial expression full, symmetric.     CN VIII   CN VIII normal.     CN IX, X   CN IX normal.   CN X normal.     CN XI   CN XI normal.     CN XII   CN XII normal.     Motor Exam   Muscle bulk: normal  Overall muscle tone: normal  Right arm pronator drift: absent  Left arm pronator drift: absent    Strength   Strength 5/5 except as noted. Right dorsiflexion EHL 3 out of 5, plantarflexion 5 out of 5, quadriceps hamstrings and iliopsoas muscle 4 out of 5    Left lower extremity 5 out of 5 in all flexion-extension movements.       Sensory Exam   Light touch normal.   Pinprick normal.     Gait, Coordination, and Reflexes     Gait  Gait: normal    Coordination   Finger to nose coordination: normal    Tremor   Resting tremor: absent  Intention tremor: absent  Action tremor: absent    Reflexes   Reflexes 2+ except as noted.   Right patellar: 0  Left patellar: 0  Right achilles: 0  Left achilles: 0      Independent Review of Radiographic Studies:       ASSESSMENT/PLAN:  Patient with unstable spondylolisthesis at L5-S1.  This is mobile on flexion-extension x-rays.  She is been treating this conservatively for quite some time with mixed results.  At this point her back pain and bilateral lower extremity pain are getting much worse.  I would recommend a right transforaminal lumbar interbody fusion to address this issue.  The risk and benefits were discussed at length which included but were not limited to infection, bleeding, paralysis, spinal fluid leak, bowel bladder incontinence, stroke, coma, and death.  Patient knowledge understand this.  Her questions and concerns were  addressed.      1. Spinal stenosis, lumbar region, with neurogenic claudication    2. Degeneration of lumbar or lumbosacral intervertebral disc    3. Lumbar radiculopathy    4. Acquired spondylolisthesis    5. Right foot drop    6. Non-smoker    7. Body mass index (BMI) of 21.0 to 21.9 in adult        The patient's Body mass index is 21.48 kg/m².. BMI is within normal parameters. No follow-up required.    Return for POSTOPERATIVELY.      Nathaniel Tesfaye MD           Detail Level: Detailed Size Of Lesion In Cm: 0 Type Of Destruction Used: Curettage Electrodesiccation Text: The wound bed was treated with electrodesiccation after the biopsy was performed. Billing Type: Third-Party Bill Cryotherapy Text: The wound bed was treated with cryotherapy after the biopsy was performed. Dressing: bandage Destruction After The Procedure: No Hemostasis: Electrocautery Consent: Written consent was obtained and risks were reviewed including but not limited to scarring, infection, bleeding, scabbing, incomplete removal, nerve damage and allergy to anesthesia. Anesthesia Type: 1% lidocaine with epinephrine Biopsy Type: H and E Biopsy Method: Dermablade Notification Instructions: Patient will be notified of biopsy results. However, patient instructed to call the office if not contacted within 2 weeks. Silver Nitrate Text: The wound bed was treated with silver nitrate after the biopsy was performed. Wound Care: Vaseline Electrodesiccation And Curettage Text: The wound bed was treated with electrodesiccation and curettage after the biopsy was performed. Post-Care Instructions: I reviewed with the patient in detail post-care instructions. Patient is to keep the biopsy site dry overnight, and then apply bacitracin twice daily until healed. Patient may apply hydrogen peroxide soaks to remove any crusting. Anesthesia Volume In Cc: 1

## 2023-04-12 NOTE — LETTER
April 12, 2023    Fiordaliza Lyons  [address]  [facility] [place] [id number]          To Whom It May Concern:    Irlanda was seen in my clinic on 4/12/2023.   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  If you have any questions regarding this matter please contact my office.      Sincerely    Nathaniel Tesfaye MD

## 2023-04-12 NOTE — H&P (VIEW-ONLY)
SUBJECTIVE:  Patient ID: Fiordaliza Lyons is a 63 y.o. female is here today for follow-up.    Chief Complaint: Back pain  Chief Complaint   Patient presents with   • Results     Patient is here for discussion of MRI results.  She states her symptoms have gotten significantly worse.  She has been working with physical therapy @ [facility].       HPI  This is a 63-year-old female that we have been seen for back pain in the past.  She was being managed conservatively by pain management specialist.  However over the past 6 to 8 weeks she has developed worsening low back pain and severe bilateral lower extremity radicular pain numbness and tingling.  The right leg is worse than the left.  She is about 50% back pain and 50% leg pain.  She is in the middle of the physical therapy program and has done physical therapy last year also for this issue.  She is having increasing difficulty with ambulation due to the neurogenic claudication.    The following portions of the patient's history were reviewed and updated as appropriate: allergies, current medications, past family history, past medical history, past social history, past surgical history and problem list.    OBJECTIVE:    Review of Systems   Musculoskeletal: Positive for back pain and gait problem.   Neurological: Positive for numbness.   All other systems reviewed and are negative.         Physical Exam  Eyes:      Extraocular Movements: EOM normal.      Pupils: Pupils are equal, round, and reactive to light.   Neurological:      Mental Status: She is oriented to person, place, and time.      Coordination: Finger-Nose-Finger Test normal.      Gait: Gait is intact.      Deep Tendon Reflexes:      Reflex Scores:       Patellar reflexes are 0 on the right side and 0 on the left side.       Achilles reflexes are 0 on the right side and 0 on the left side.  Psychiatric:         Speech: Speech normal.         Neurologic Exam     Mental Status   Oriented to  person, place, and time.   Attention: normal.   Speech: speech is normal   Level of consciousness: alert  Knowledge: good.     Cranial Nerves     CN II   Visual fields full to confrontation.     CN III, IV, VI   Pupils are equal, round, and reactive to light.  Extraocular motions are normal.     CN V   Facial sensation intact.     CN VII   Facial expression full, symmetric.     CN VIII   CN VIII normal.     CN IX, X   CN IX normal.   CN X normal.     CN XI   CN XI normal.     CN XII   CN XII normal.     Motor Exam   Muscle bulk: normal  Overall muscle tone: normal  Right arm pronator drift: absent  Left arm pronator drift: absent    Strength   Strength 5/5 except as noted. Right dorsiflexion EHL 3 out of 5, plantarflexion 5 out of 5, quadriceps hamstrings and iliopsoas muscle 4 out of 5    Left lower extremity 5 out of 5 in all flexion-extension movements.       Sensory Exam   Light touch normal.   Pinprick normal.     Gait, Coordination, and Reflexes     Gait  Gait: normal    Coordination   Finger to nose coordination: normal    Tremor   Resting tremor: absent  Intention tremor: absent  Action tremor: absent    Reflexes   Reflexes 2+ except as noted.   Right patellar: 0  Left patellar: 0  Right achilles: 0  Left achilles: 0      Independent Review of Radiographic Studies:       ASSESSMENT/PLAN:  Patient with unstable spondylolisthesis at L5-S1.  This is mobile on flexion-extension x-rays.  She is been treating this conservatively for quite some time with mixed results.  At this point her back pain and bilateral lower extremity pain are getting much worse.  I would recommend a right transforaminal lumbar interbody fusion to address this issue.  The risk and benefits were discussed at length which included but were not limited to infection, bleeding, paralysis, spinal fluid leak, bowel bladder incontinence, stroke, coma, and death.  Patient knowledge understand this.  Her questions and concerns were  addressed.      1. Spinal stenosis, lumbar region, with neurogenic claudication    2. Degeneration of lumbar or lumbosacral intervertebral disc    3. Lumbar radiculopathy    4. Acquired spondylolisthesis    5. Right foot drop    6. Non-smoker    7. Body mass index (BMI) of 21.0 to 21.9 in adult        The patient's Body mass index is 21.48 kg/m².. BMI is within normal parameters. No follow-up required.    Return for POSTOPERATIVELY.      Nathaniel Tesfaye MD

## 2023-04-13 DIAGNOSIS — Z01.818 PREOP TESTING: Primary | ICD-10-CM

## 2023-04-13 RX ORDER — GABAPENTIN 100 MG/1
CAPSULE ORAL
Qty: 21 CAPSULE | Refills: 0 | Status: SHIPPED | OUTPATIENT
Start: 2023-04-13 | End: 2023-04-20

## 2023-04-17 ENCOUNTER — OFFICE VISIT (OUTPATIENT)
Dept: INTERNAL MEDICINE | Age: 63
End: 2023-04-17
Payer: COMMERCIAL

## 2023-04-17 VITALS
DIASTOLIC BLOOD PRESSURE: 78 MMHG | HEART RATE: 80 BPM | WEIGHT: 112 LBS | SYSTOLIC BLOOD PRESSURE: 138 MMHG | OXYGEN SATURATION: 99 % | BODY MASS INDEX: 21.87 KG/M2

## 2023-04-17 DIAGNOSIS — K21.9 GASTROESOPHAGEAL REFLUX DISEASE WITHOUT ESOPHAGITIS: ICD-10-CM

## 2023-04-17 DIAGNOSIS — E55.9 VITAMIN D DEFICIENCY: ICD-10-CM

## 2023-04-17 DIAGNOSIS — R74.8 ELEVATED ALKALINE PHOSPHATASE LEVEL: ICD-10-CM

## 2023-04-17 DIAGNOSIS — K70.9 ALCOHOLIC LIVER DISEASE (HCC): ICD-10-CM

## 2023-04-17 DIAGNOSIS — E10.40 TYPE 1 DIABETES MELLITUS WITH DIABETIC NEUROPATHY (HCC): Primary | ICD-10-CM

## 2023-04-17 PROCEDURE — 99214 OFFICE O/P EST MOD 30 MIN: CPT | Performed by: INTERNAL MEDICINE

## 2023-04-17 RX ORDER — KETOROLAC TROMETHAMINE 10 MG/1
10 TABLET, FILM COATED ORAL EVERY 6 HOURS PRN
COMMUNITY

## 2023-04-17 RX ORDER — GABAPENTIN 100 MG/1
100 CAPSULE ORAL 3 TIMES DAILY
COMMUNITY

## 2023-04-17 NOTE — PROGRESS NOTES
Hydroxy; Future    3. Alcoholic liver disease ([address]  Avoid the toradol unless absolutely needed prn - best to avoid     4. Gastroesophageal reflux disease without esophagitis  I re- reviewed last egd  - no varices - avoid nsaids as much as possible though     5. Elevated alkaline phosphatase level  She also follows with Dr. Harding Friend she is improving she has been off alcohol for an extended period of time and really doing much better. Patient is medically cleared for surgery but needs very close perioperative diabetes monitoring with very high hemoglobin A1c but episodes of hypoglycemia brittle diabetes we advised her to take a half dose of her long-acting insulin the night before and avoid short acting insulin that morning if needed she needs to be checked in an hour or so earlier to monitor blood sugar preoperatively. When preop work-up has been done we will review.   We also gave her a new set of lab orders for her follow-up with us to make

## 2023-04-20 ENCOUNTER — HOSPITAL ENCOUNTER (OUTPATIENT)
Dept: GENERAL RADIOLOGY | Facility: HOSPITAL | Age: 63
Discharge: HOME | End: 2023-04-20
Payer: COMMERCIAL

## 2023-04-20 ENCOUNTER — PRE-ADMISSION TESTING (OUTPATIENT)
Dept: PREADMISSION TESTING | Facility: HOSPITAL | Age: 63
End: 2023-04-20
Payer: COMMERCIAL

## 2023-04-20 ENCOUNTER — APPOINTMENT (OUTPATIENT)
Dept: CT IMAGING | Facility: HOSPITAL | Age: 63
End: 2023-04-20
Payer: COMMERCIAL

## 2023-04-20 VITALS
OXYGEN SATURATION: 100 % | SYSTOLIC BLOOD PRESSURE: 128 MMHG | BODY MASS INDEX: 22.07 KG/M2 | WEIGHT: 112.43 LBS | RESPIRATION RATE: 16 BRPM | HEIGHT: 60 IN | DIASTOLIC BLOOD PRESSURE: 71 MMHG | HEART RATE: 82 BPM

## 2023-04-20 DIAGNOSIS — Z01.818 PREOP TESTING: ICD-10-CM

## 2023-04-20 DIAGNOSIS — E03.9 ACQUIRED HYPOTHYROIDISM: ICD-10-CM

## 2023-04-20 DIAGNOSIS — M48.062 SPINAL STENOSIS, LUMBAR REGION, WITH NEUROGENIC CLAUDICATION: ICD-10-CM

## 2023-04-20 LAB
ALBUMIN SERPL-MCNC: 4.2 G/DL (ref 3.5–5.2)
ALBUMIN/GLOB SERPL: 1.8 G/DL
ALP SERPL-CCNC: 277 U/L (ref 39–117)
ALT SERPL W P-5'-P-CCNC: 20 U/L (ref 1–33)
ANION GAP SERPL CALCULATED.3IONS-SCNC: 13 MMOL/L (ref 5–15)
AST SERPL-CCNC: 21 U/L (ref 1–32)
BACTERIA UR QL AUTO: ABNORMAL /HPF
BILIRUB SERPL-MCNC: 0.3 MG/DL (ref 0–1.2)
BILIRUB UR QL STRIP: NEGATIVE
BUN SERPL-MCNC: 10 MG/DL (ref 8–23)
BUN/CREAT SERPL: 13.7 (ref 7–25)
CALCIUM SPEC-SCNC: 10 MG/DL (ref 8.6–10.5)
CHLORIDE SERPL-SCNC: 101 MMOL/L (ref 98–107)
CLARITY UR: ABNORMAL
CO2 SERPL-SCNC: 27 MMOL/L (ref 22–29)
COLOR UR: YELLOW
CREAT SERPL-MCNC: 0.73 MG/DL (ref 0.57–1)
DEPRECATED RDW RBC AUTO: 42.5 FL (ref 37–54)
EGFRCR SERPLBLD CKD-EPI 2021: 93.7 ML/MIN/1.73
ERYTHROCYTE [DISTWIDTH] IN BLOOD BY AUTOMATED COUNT: 13.8 % (ref 12.3–15.4)
GLOBULIN UR ELPH-MCNC: 2.4 GM/DL
GLUCOSE SERPL-MCNC: 166 MG/DL (ref 65–99)
GLUCOSE UR STRIP-MCNC: ABNORMAL MG/DL
HCT VFR BLD AUTO: 37.1 % (ref 34–46.6)
HGB BLD-MCNC: 11.6 G/DL (ref 12–15.9)
HGB UR QL STRIP.AUTO: ABNORMAL
HYALINE CASTS UR QL AUTO: ABNORMAL /LPF
KETONES UR QL STRIP: NEGATIVE
LEUKOCYTE ESTERASE UR QL STRIP.AUTO: ABNORMAL
MCH RBC QN AUTO: 26.1 PG (ref 26.6–33)
MCHC RBC AUTO-ENTMCNC: 31.3 G/DL (ref 31.5–35.7)
MCV RBC AUTO: 83.6 FL (ref 79–97)
NITRITE UR QL STRIP: POSITIVE
PH UR STRIP.AUTO: 5.5 [PH] (ref 5–8)
PLATELET # BLD AUTO: 170 10*3/MM3 (ref 140–450)
PMV BLD AUTO: 9.4 FL (ref 6–12)
POTASSIUM SERPL-SCNC: 3.5 MMOL/L (ref 3.5–5.2)
PROT SERPL-MCNC: 6.6 G/DL (ref 6–8.5)
PROT UR QL STRIP: ABNORMAL
RBC # BLD AUTO: 4.44 10*6/MM3 (ref 3.77–5.28)
RBC # UR STRIP: ABNORMAL /HPF
REF LAB TEST METHOD: ABNORMAL
SODIUM SERPL-SCNC: 141 MMOL/L (ref 136–145)
SP GR UR STRIP: 1.02 (ref 1–1.03)
SQUAMOUS #/AREA URNS HPF: ABNORMAL /HPF
UROBILINOGEN UR QL STRIP: ABNORMAL
WBC # UR STRIP: ABNORMAL /HPF
WBC CLUMPS # UR AUTO: ABNORMAL /HPF
WBC NRBC COR # BLD: 4.03 10*3/MM3 (ref 3.4–10.8)
YEAST URNS QL MICRO: ABNORMAL /HPF

## 2023-04-20 PROCEDURE — 93005 ELECTROCARDIOGRAM TRACING: CPT

## 2023-04-20 PROCEDURE — 87086 URINE CULTURE/COLONY COUNT: CPT

## 2023-04-20 PROCEDURE — 81001 URINALYSIS AUTO W/SCOPE: CPT

## 2023-04-20 PROCEDURE — 36415 COLL VENOUS BLD VENIPUNCTURE: CPT

## 2023-04-20 PROCEDURE — 87088 URINE BACTERIA CULTURE: CPT

## 2023-04-20 PROCEDURE — 93010 ELECTROCARDIOGRAM REPORT: CPT | Performed by: INTERNAL MEDICINE

## 2023-04-20 PROCEDURE — 80053 COMPREHEN METABOLIC PANEL: CPT

## 2023-04-20 PROCEDURE — 87186 SC STD MICRODIL/AGAR DIL: CPT

## 2023-04-20 PROCEDURE — 85027 COMPLETE CBC AUTOMATED: CPT

## 2023-04-20 PROCEDURE — 71046 X-RAY EXAM CHEST 2 VIEWS: CPT

## 2023-04-20 RX ORDER — LEVOTHYROXINE SODIUM 0.05 MG/1
50 TABLET ORAL DAILY
Qty: 30 TABLET | Refills: 5 | Status: SHIPPED | OUTPATIENT
Start: 2023-04-20

## 2023-04-20 NOTE — DISCHARGE INSTRUCTIONS
Before you come to the hospital        Arrival time: AS DIRECTED BY OFFICE     YOU MAY TAKE THE FOLLOWING MEDICATION(S) THE MORNING OF SURGERY WITH A SIP OF WATER: GABAPENTIN           ALL OTHER HOME MEDICATION CHECK WITH YOUR PHYSICIAN (especially if   you are taking diabetes medicines or blood thinners)    Do not take any Erectile Dysfunction medications (EX: CIALIS, VIAGRA) 24 hours prior to surgery.      If you were given and instructed to use a germ- killing soap, use as directed the night before surgery and again the morning of surgery or as directed by your surgeon. (Use one-half of the bottle with each shower.)   See attached information for How to Use Chlorhexidine for Bathing if applicable.            Eating and drinking restrictions prior to scheduled arrival time    2 Hours before arrival time STOP   Drinking Clear liquids (water, apple juice-no pulp)     6 Hours before arrival time STOP   Milk or drinks that contain milk, full liquids    6 Hours before arrival time STOP   Light meals or foods, such as toast or cereal    8 Hours before arrival time STOP   Heavy foods, such as meat, fried foods, or fatty foods    (It is extremely important that you follow these guidelines to prevent delay or cancelation of your procedure)     Clear Liquids  Water and flavored water                                                                      Clear Fruit juices, such as cranberry juice and apple juice.  Black coffee (NO cream of any kind, including powdered).  Plain tea  Clear bouillon or broth.  Flavored gelatin.  Soda.  Gatorade or Powerade.  Full liquid examples  Juices that have pulp.  Frozen ice pops that contain fruit pieces.  Coffee with creamer  Milk.  Yogurt.                MANAGING PAIN AFTER SURGERY    We know you are probably wondering what your pain will be like after surgery.  Following surgery it is unrealistic to expect you will not have pain.   Pain is how our bodies let us know that something is  wrong or cautions us to be careful.  That said, our goal is to make your pain tolerable.    Methods we may use to treat your pain include (oral or IV medications, PCAs, epidurals, nerve blocks, etc.)   While some procedures require IV pain medications for a short time after surgery, transitioning to pain medications by mouth allows for better management of pain.   Your nurse will encourage you to take oral pain medications whenever possible.  IV medications work almost immediately, but only last a short while.  Taking medications by mouth allows for a more constant level of medication in your blood stream for a longer period of time.      Once your pain is out of control it is harder to get back under control.  It is important you are aware when your next dose of pain medication is due.  If you are admitted, your nurse may write the time of your next dose on the white board in your room to help you remember.      We are interested in your pain and encourage you to inform us about aggravating factors during your visit.   Many times a simple repositioning every few hours can make a big difference.    If your physician says it is okay, do not let your pain prevent you from getting out of bed. Be sure to call your nurse for assistance prior to getting up so you do not fall.      Before surgery, please decide your tolerable pain goal.  These faces help describe the pain ratings we use on a 0-10 scale.   Be prepared to tell us your goal and whether or not you take pain or anxiety medications at home.          Preparing for Surgery  Preparing for surgery is an important part of your care. It can make things go more smoothly and help you avoid complications. The steps leading up to surgery may vary among hospitals. Follow all instructions given to you by your health care providers. Ask questions if you do not understand something. Talk about any concerns that you have.  Here are some questions to consider asking before your  surgery:  If my surgery is not an emergency (is elective), when would be the best time to have the surgery?  What arrangements do I need to make for work, home, or school?  What will my recovery be like? How long will it be before I can return to normal activities?  Will I need to prepare my home? Will I need to arrange care for me or my children?  Should I expect to have pain after surgery? What are my pain management options? Are there nonmedical options that I can try for pain?  Tell a health care provider about:  Any allergies you have.  All medicines you are taking, including vitamins, herbs, eye drops, creams, and over-the-counter medicines.  Any problems you or family members have had with anesthetic medicines.  Any blood disorders you have.  Any surgeries you have had.  Any medical conditions you have.  Whether you are pregnant or may be pregnant.  What are the risks?  The risks and complications of surgery depend on the specific procedure that you have. Discuss all the risks with your health care providers before your surgery. Ask about common surgical complications, which may include:  Infection.  Bleeding or a need for blood replacement (transfusion).  Allergic reactions to medicines.  Damage to surrounding nerves, tissues, or structures.  A blood clot.  Scarring.  Failure of the surgery to correct the problem.  Follow these instructions before the procedure:  Several days or weeks before your procedure  You may have a physical exam by your primary health care provider to make sure it is safe for you to have surgery.  You may have testing. This may include a chest X-ray, blood and urine tests, electrocardiogram (ECG), or other testing.  Ask your health care provider about:  Changing or stopping your regular medicines. This is especially important if you are taking diabetes medicines or blood thinners.  Taking medicines such as aspirin and ibuprofen. These medicines can thin your blood. Do not take these  medicines unless your health care provider tells you to take them.  Taking over-the-counter medicines, vitamins, herbs, and supplements.  Do not use any products that contain nicotine or tobacco, such as cigarettes and e-cigarettes. If you need help quitting, ask your health care provider.  Avoid alcohol.  Ask your health care provider if there are exercises you can do to prepare for surgery.  Eat a healthy diet.   Plan to have someone 18 years of age or older to take you home from the hospital. We will need to verify your ride on the morning of surgery if you are being discharged home on the same day. Tell your ride to be expecting a call from the hospital prior to your procedure.   Plan to have a responsible adult care for you for at least 24 hours after you leave the hospital or clinic. This is important.  The day before your procedure  You may be given antibiotic medicine to take by mouth to help prevent infection. Take it as told by your health care provider.  You may be asked to shower with a germ-killing soap.  Follow instructions from your health care provider about eating and drinking restrictions. This includes gum, mints and hard candy.  Pack comfortable clothes according to your procedure.   The day of your procedure  You may need to take another shower with a germ-killing soap before you leave home in the morning.  With a small sip of water, take only the medicines that you are told to take.  Remove all jewelry including rings.   Leave anything you consider valuable at home except hearing aids if needed.  You do not need to bring your home medications into the hospital.   Do not wear any makeup, nail polish, powder, deodorant, lotion, hair accessories, or anything on your skin or body except your clothes.  If you will be staying in the hospital, bring a case to hold your glasses, contacts, or dentures. You may also want to bring your robe and non-skid footwear.       (Do not use denture adhesives since  you will be asked to remove them during  surgery).   If you wear oxygen at home, bring it with you the day of surgery.  If instructed by your health care provider, bring your sleep apnea device with you on the day of your surgery (if this applies to you).  You may want to leave your suitcase and sleep apnea device in the car until after surgery.   Arrive at the hospital as scheduled.  Bring a friend or family member with you who can help to answer questions and be present while you meet with your health care provider.  At the hospital  When you arrive at the hospital:  Go to registration located at the main entrance of the hospital. You will be registered and given a beeper and a sticker sheet. Take the stickers to the Outpatient nurses desk and place in the black tray. This is to notify staff that you have arrived. Then return to the lobby to wait.   When your beeper lights up and vibrates proceed through the double doors, under the stairs, and a member of the Outpatient Surgery staff will escort you to your preoperative room.  You may have to wear compression sleeves. These help to prevent blood clots and reduce swelling in your legs.  An IV may be inserted into one of your veins.              In the operating room, you may be given one or more of the following:        A medicine to help you relax (sedative).        A medicine to numb the area (local anesthetic).        A medicine to make you fall asleep (general anesthetic).        A medicine that is injected into an area of your body to numb everything below the                      injection site (regional anesthetic).  You may be given an antibiotic through your IV to help prevent infection.  Your surgical site will be marked or identified.    Contact a health care provider if you:  Develop a fever of more than 100.4°F (38°C) or other feelings of illness during the 48 hours before your surgery.  Have symptoms that get worse.  Have questions or concerns about  your surgery.  Summary  Preparing for surgery can make the procedure go more smoothly and lower your risk of complications.  Before surgery, make a list of questions and concerns to discuss with your surgeon. Ask about the risks and possible complications.  In the days or weeks before your surgery, follow all instructions from your health care provider. You may need to stop smoking, avoid alcohol, follow eating restrictions, and change or stop your regular medicines.  Contact your surgeon if you develop a fever or other signs of illness during the few days before your surgery.  This information is not intended to replace advice given to you by your health care provider. Make sure you discuss any questions you have with your health care provider.  Document Revised: 12/21/2018 Document Reviewed: 10/23/2018  Elsevier Patient Education © 2021 [employer]

## 2023-04-21 LAB
QT INTERVAL: 376 MS
QTC INTERVAL: 431 MS

## 2023-04-22 LAB — BACTERIA SPEC AEROBE CULT: ABNORMAL

## 2023-04-25 ENCOUNTER — TELEPHONE (OUTPATIENT)
Dept: NEUROSURGERY | Facility: CLINIC | Age: 63
End: 2023-04-25
Payer: COMMERCIAL

## 2023-04-25 ENCOUNTER — HOSPITAL ENCOUNTER (OUTPATIENT)
Dept: CT IMAGING | Facility: HOSPITAL | Age: 63
Discharge: HOME | End: 2023-04-25
Admitting: NEUROLOGICAL SURGERY
Payer: COMMERCIAL

## 2023-04-25 DIAGNOSIS — M54.16 LUMBAR RADICULOPATHY: ICD-10-CM

## 2023-04-25 DIAGNOSIS — M21.371 RIGHT FOOT DROP: ICD-10-CM

## 2023-04-25 DIAGNOSIS — M48.062 SPINAL STENOSIS, LUMBAR REGION, WITH NEUROGENIC CLAUDICATION: ICD-10-CM

## 2023-04-25 DIAGNOSIS — M43.10 ACQUIRED SPONDYLOLISTHESIS: ICD-10-CM

## 2023-04-25 DIAGNOSIS — M51.37 DEGENERATION OF LUMBAR OR LUMBOSACRAL INTERVERTEBRAL DISC: ICD-10-CM

## 2023-04-25 DIAGNOSIS — N39.0 ACUTE UTI: Primary | ICD-10-CM

## 2023-04-25 DIAGNOSIS — Z78.9 NON-SMOKER: ICD-10-CM

## 2023-04-25 PROCEDURE — 72131 CT LUMBAR SPINE W/O DYE: CPT

## 2023-04-25 RX ORDER — SULFAMETHOXAZOLE AND TRIMETHOPRIM 800; 160 MG/1; MG/1
1 TABLET ORAL 2 TIMES DAILY
Qty: 14 TABLET | Refills: 0 | Status: SHIPPED | OUTPATIENT
Start: 2023-04-25 | End: 2023-05-02

## 2023-04-25 NOTE — TELEPHONE ENCOUNTER
Patient called and left a  so I called her back.  We have discussed her arrival time for surgery on Monday, her blood sugar issues and the inability to eat on the day surgery, etc.      FRANSISCA HERRMANN [facility]  PHYSICIAN LEAD  DR MARY PITTMAN  [facility]

## 2023-04-25 NOTE — PROGRESS NOTES
I left her a voice message of uti
Let her know when I was checking her preop work-up she does have a UTI I sent in Bactrim which should work for this otherwise she is medically cleared for surgery.
yes

## 2023-04-25 NOTE — TELEPHONE ENCOUNTER
Called patient to check on her since she has not read the My Chart message I sent to her yesterday and to make sure that Dr Shields treated her UTI.  Irlanda didn't answer so I LVM asking her to call me back.    FRANSISCA HERRMANN [facility]  PHYSICIAN LEAD  DR MARY PITTMAN  [facility] [place] [facility]

## 2023-04-25 NOTE — TELEPHONE ENCOUNTER
I have notified the patient that she has a UTI and that we would be sending in medication for her.  She expressed understanding.    FRANSISCA HERRMANN [facility]  PHYSICIAN LEAD  DR MARY PITTMAN  [facility] [place] [facility]

## 2023-04-26 RX ORDER — SULFAMETHOXAZOLE AND TRIMETHOPRIM 800; 160 MG/1; MG/1
1 TABLET ORAL 2 TIMES DAILY
Qty: 10 TABLET | Refills: 0 | Status: ON HOLD | OUTPATIENT
Start: 2023-04-26

## 2023-05-01 ENCOUNTER — APPOINTMENT (OUTPATIENT)
Dept: GENERAL RADIOLOGY | Facility: HOSPITAL | Age: 63
End: 2023-05-01
Payer: COMMERCIAL

## 2023-05-01 ENCOUNTER — ANESTHESIA (OUTPATIENT)
Dept: PERIOP | Facility: HOSPITAL | Age: 63
End: 2023-05-01
Payer: COMMERCIAL

## 2023-05-01 ENCOUNTER — HOSPITAL ENCOUNTER (OUTPATIENT)
Facility: HOSPITAL | Age: 63
Discharge: HOME HEALTH | End: 2023-05-02
Attending: NEUROLOGICAL SURGERY | Admitting: NEUROLOGICAL SURGERY
Payer: COMMERCIAL

## 2023-05-01 ENCOUNTER — ANESTHESIA EVENT (OUTPATIENT)
Dept: PERIOP | Facility: HOSPITAL | Age: 63
End: 2023-05-01
Payer: COMMERCIAL

## 2023-05-01 DIAGNOSIS — Z78.9 DECREASED ACTIVITIES OF DAILY LIVING (ADL): ICD-10-CM

## 2023-05-01 DIAGNOSIS — M48.062 SPINAL STENOSIS, LUMBAR REGION, WITH NEUROGENIC CLAUDICATION: ICD-10-CM

## 2023-05-01 DIAGNOSIS — Z74.09 IMPAIRED MOBILITY: ICD-10-CM

## 2023-05-01 DIAGNOSIS — M43.10 ACQUIRED SPONDYLOLISTHESIS: Primary | ICD-10-CM

## 2023-05-01 DIAGNOSIS — M51.37 DEGENERATION OF LUMBAR OR LUMBOSACRAL INTERVERTEBRAL DISC: ICD-10-CM

## 2023-05-01 LAB
ABO GROUP BLD: NORMAL
APTT PPP: 25.8 SECONDS (ref 24.1–35)
BLD GP AB SCN SERPL QL: NEGATIVE
GLUCOSE BLDC GLUCOMTR-MCNC: 242 MG/DL (ref 70–130)
GLUCOSE BLDC GLUCOMTR-MCNC: 245 MG/DL (ref 70–130)
GLUCOSE BLDC GLUCOMTR-MCNC: 254 MG/DL (ref 70–130)
GLUCOSE BLDC GLUCOMTR-MCNC: 262 MG/DL (ref 70–130)
GLUCOSE BLDC GLUCOMTR-MCNC: 296 MG/DL (ref 70–130)
GLUCOSE BLDC GLUCOMTR-MCNC: 340 MG/DL (ref 70–130)
INR PPP: 0.96 (ref 0.91–1.09)
PROTHROMBIN TIME: 12.9 SECONDS (ref 11.8–14.8)
RH BLD: POSITIVE
T&S EXPIRATION DATE: NORMAL

## 2023-05-01 PROCEDURE — 25010000002 ONDANSETRON PER 1 MG: Performed by: NURSE PRACTITIONER

## 2023-05-01 PROCEDURE — 25010000002 CEFAZOLIN PER 500 MG: Performed by: NURSE PRACTITIONER

## 2023-05-01 PROCEDURE — 25010000002 FENTANYL CITRATE (PF) 50 MCG/ML SOLUTION: Performed by: ANESTHESIOLOGY

## 2023-05-01 PROCEDURE — 63052 LAM FACETC/FRMT ARTHRD LUM 1: CPT | Performed by: NEUROLOGICAL SURGERY

## 2023-05-01 PROCEDURE — 88311 DECALCIFY TISSUE: CPT | Performed by: NEUROLOGICAL SURGERY

## 2023-05-01 PROCEDURE — C1713 ANCHOR/SCREW BN/BN,TIS/BN: HCPCS | Performed by: NEUROLOGICAL SURGERY

## 2023-05-01 PROCEDURE — 25010000002 VANCOMYCIN 1 G RECONSTITUTED SOLUTION: Performed by: NEUROLOGICAL SURGERY

## 2023-05-01 PROCEDURE — 25010000002 ONDANSETRON PER 1 MG: Performed by: NURSE ANESTHETIST, CERTIFIED REGISTERED

## 2023-05-01 PROCEDURE — 63710000001 INSULIN LISPRO (HUMAN) PER 5 UNITS: Performed by: NURSE PRACTITIONER

## 2023-05-01 PROCEDURE — 25010000002 FENTANYL CITRATE (PF) 250 MCG/5ML SOLUTION: Performed by: NURSE ANESTHETIST, CERTIFIED REGISTERED

## 2023-05-01 PROCEDURE — 72100 X-RAY EXAM L-S SPINE 2/3 VWS: CPT

## 2023-05-01 PROCEDURE — 88304 TISSUE EXAM BY PATHOLOGIST: CPT | Performed by: NEUROLOGICAL SURGERY

## 2023-05-01 PROCEDURE — 76000 FLUOROSCOPY <1 HR PHYS/QHP: CPT

## 2023-05-01 PROCEDURE — 25010000002 VANCOMYCIN 1 G RECONSTITUTED SOLUTION 1 EACH VIAL: Performed by: NEUROLOGICAL SURGERY

## 2023-05-01 PROCEDURE — 22853 INSJ BIOMECHANICAL DEVICE: CPT | Performed by: NEUROLOGICAL SURGERY

## 2023-05-01 PROCEDURE — 25010000002 DROPERIDOL PER 5 MG: Performed by: ANESTHESIOLOGY

## 2023-05-01 PROCEDURE — 61783 SCAN PROC SPINAL: CPT | Performed by: NEUROLOGICAL SURGERY

## 2023-05-01 PROCEDURE — 22630 ARTHRD PST TQ 1NTRSPC LUM: CPT | Performed by: NEUROLOGICAL SURGERY

## 2023-05-01 PROCEDURE — 86900 BLOOD TYPING SEROLOGIC ABO: CPT | Performed by: NEUROLOGICAL SURGERY

## 2023-05-01 PROCEDURE — 22840 INSERT SPINE FIXATION DEVICE: CPT | Performed by: NEUROLOGICAL SURGERY

## 2023-05-01 PROCEDURE — 85610 PROTHROMBIN TIME: CPT | Performed by: NEUROLOGICAL SURGERY

## 2023-05-01 PROCEDURE — 86850 RBC ANTIBODY SCREEN: CPT | Performed by: NEUROLOGICAL SURGERY

## 2023-05-01 PROCEDURE — 86901 BLOOD TYPING SEROLOGIC RH(D): CPT | Performed by: NEUROLOGICAL SURGERY

## 2023-05-01 PROCEDURE — 25010000002 PROPOFOL 10 MG/ML EMULSION: Performed by: NURSE ANESTHETIST, CERTIFIED REGISTERED

## 2023-05-01 PROCEDURE — 97165 OT EVAL LOW COMPLEX 30 MIN: CPT | Performed by: OCCUPATIONAL THERAPIST

## 2023-05-01 PROCEDURE — 85730 THROMBOPLASTIN TIME PARTIAL: CPT | Performed by: NEUROLOGICAL SURGERY

## 2023-05-01 PROCEDURE — 82948 REAGENT STRIP/BLOOD GLUCOSE: CPT

## 2023-05-01 DEVICE — SPACER 6068113 CATALYFT PL SHORT 11MM
Type: IMPLANTABLE DEVICE | Site: SPINE LUMBAR | Status: FUNCTIONAL
Brand: CATALYFT PL EXPANDABLE INTERBODY SYSTEM

## 2023-05-01 DEVICE — MAS 54850017535 4.75 EXT TAB CANN 7.5X35
Type: IMPLANTABLE DEVICE | Site: SPINE LUMBAR | Status: FUNCTIONAL
Brand: CD HORIZON® SOLERA® SPINAL SYSTEM

## 2023-05-01 DEVICE — ROD SPEC SPINE UNID 1TO3 LVL CUST: Type: IMPLANTABLE DEVICE | Site: SPINE LUMBAR | Status: FUNCTIONAL

## 2023-05-01 DEVICE — HEMOST ABS SURGIFOAM SZ100 8X12 10MM: Type: IMPLANTABLE DEVICE | Site: SPINE LUMBAR | Status: FUNCTIONAL

## 2023-05-01 DEVICE — SCRW ST SOLERA VOYAGER BRKOFF TI 4.75MM: Type: IMPLANTABLE DEVICE | Site: SPINE LUMBAR | Status: FUNCTIONAL

## 2023-05-01 DEVICE — DBM T43103 2.5CC GRAFTON PUTTY
Type: IMPLANTABLE DEVICE | Site: SPINE LUMBAR | Status: FUNCTIONAL
Brand: GRAFTON®AND GRAFTON PLUS®DEMINERALIZED BONE MATRIX (DBM)

## 2023-05-01 DEVICE — ALLOGRFT DBM GRAFTON DS INJ FIBR 6CC: Type: IMPLANTABLE DEVICE | Site: SPINE LUMBAR | Status: FUNCTIONAL

## 2023-05-01 DEVICE — KT HEMOST ABS SURGIFOAM PORCN 1GRAM: Type: IMPLANTABLE DEVICE | Site: SPINE LUMBAR | Status: FUNCTIONAL

## 2023-05-01 RX ORDER — SODIUM CHLORIDE 9 MG/ML
40 INJECTION, SOLUTION INTRAVENOUS AS NEEDED
Status: DISCONTINUED | OUTPATIENT
Start: 2023-05-01 | End: 2023-05-02 | Stop reason: HOSPADM

## 2023-05-01 RX ORDER — ONDANSETRON 2 MG/ML
4 INJECTION INTRAMUSCULAR; INTRAVENOUS ONCE AS NEEDED
Status: DISCONTINUED | OUTPATIENT
Start: 2023-05-01 | End: 2023-05-01 | Stop reason: HOSPADM

## 2023-05-01 RX ORDER — BISACODYL 10 MG
10 SUPPOSITORY, RECTAL RECTAL DAILY PRN
Status: DISCONTINUED | OUTPATIENT
Start: 2023-05-01 | End: 2023-05-02 | Stop reason: HOSPADM

## 2023-05-01 RX ORDER — MAGNESIUM HYDROXIDE 1200 MG/15ML
LIQUID ORAL AS NEEDED
Status: DISCONTINUED | OUTPATIENT
Start: 2023-05-01 | End: 2023-05-01 | Stop reason: HOSPADM

## 2023-05-01 RX ORDER — SODIUM CHLORIDE 0.9 % (FLUSH) 0.9 %
3 SYRINGE (ML) INJECTION EVERY 12 HOURS SCHEDULED
Status: DISCONTINUED | OUTPATIENT
Start: 2023-05-01 | End: 2023-05-02 | Stop reason: HOSPADM

## 2023-05-01 RX ORDER — FLUMAZENIL 0.1 MG/ML
0.2 INJECTION INTRAVENOUS AS NEEDED
Status: DISCONTINUED | OUTPATIENT
Start: 2023-05-01 | End: 2023-05-01 | Stop reason: HOSPADM

## 2023-05-01 RX ORDER — DROPERIDOL 2.5 MG/ML
0.62 INJECTION, SOLUTION INTRAMUSCULAR; INTRAVENOUS ONCE AS NEEDED
Status: COMPLETED | OUTPATIENT
Start: 2023-05-01 | End: 2023-05-01

## 2023-05-01 RX ORDER — EPHEDRINE SULFATE 50 MG/ML
INJECTION INTRAVENOUS AS NEEDED
Status: DISCONTINUED | OUTPATIENT
Start: 2023-05-01 | End: 2023-05-01 | Stop reason: SURG

## 2023-05-01 RX ORDER — SODIUM CHLORIDE 9 MG/ML
INJECTION, SOLUTION INTRAVENOUS AS NEEDED
Status: DISCONTINUED | OUTPATIENT
Start: 2023-05-01 | End: 2023-05-01 | Stop reason: HOSPADM

## 2023-05-01 RX ORDER — NICOTINE POLACRILEX 4 MG
15 LOZENGE BUCCAL
Status: DISCONTINUED | OUTPATIENT
Start: 2023-05-01 | End: 2023-05-02 | Stop reason: HOSPADM

## 2023-05-01 RX ORDER — FENTANYL CITRATE 50 UG/ML
25 INJECTION, SOLUTION INTRAMUSCULAR; INTRAVENOUS
Status: DISCONTINUED | OUTPATIENT
Start: 2023-05-01 | End: 2023-05-01 | Stop reason: HOSPADM

## 2023-05-01 RX ORDER — NALOXONE HCL 0.4 MG/ML
0.4 VIAL (ML) INJECTION AS NEEDED
Status: DISCONTINUED | OUTPATIENT
Start: 2023-05-01 | End: 2023-05-01 | Stop reason: HOSPADM

## 2023-05-01 RX ORDER — SODIUM CHLORIDE 0.9 % (FLUSH) 0.9 %
3 SYRINGE (ML) INJECTION EVERY 12 HOURS SCHEDULED
Status: DISCONTINUED | OUTPATIENT
Start: 2023-05-01 | End: 2023-05-01 | Stop reason: HOSPADM

## 2023-05-01 RX ORDER — SODIUM CHLORIDE, SODIUM LACTATE, POTASSIUM CHLORIDE, CALCIUM CHLORIDE 600; 310; 30; 20 MG/100ML; MG/100ML; MG/100ML; MG/100ML
100 INJECTION, SOLUTION INTRAVENOUS CONTINUOUS
Status: DISCONTINUED | OUTPATIENT
Start: 2023-05-01 | End: 2023-05-01

## 2023-05-01 RX ORDER — SODIUM CHLORIDE 9 MG/ML
40 INJECTION, SOLUTION INTRAVENOUS AS NEEDED
Status: DISCONTINUED | OUTPATIENT
Start: 2023-05-01 | End: 2023-05-01 | Stop reason: HOSPADM

## 2023-05-01 RX ORDER — OXYCODONE AND ACETAMINOPHEN 7.5; 325 MG/1; MG/1
2 TABLET ORAL EVERY 4 HOURS PRN
Status: DISCONTINUED | OUTPATIENT
Start: 2023-05-01 | End: 2023-05-01 | Stop reason: HOSPADM

## 2023-05-01 RX ORDER — IBUPROFEN 600 MG/1
600 TABLET ORAL ONCE AS NEEDED
Status: DISCONTINUED | OUTPATIENT
Start: 2023-05-01 | End: 2023-05-01 | Stop reason: HOSPADM

## 2023-05-01 RX ORDER — DOCUSATE SODIUM 100 MG/1
100 CAPSULE, LIQUID FILLED ORAL 2 TIMES DAILY
Status: DISCONTINUED | OUTPATIENT
Start: 2023-05-01 | End: 2023-05-02 | Stop reason: HOSPADM

## 2023-05-01 RX ORDER — KETAMINE HCL IN NACL, ISO-OSM 100MG/10ML
SYRINGE (ML) INJECTION AS NEEDED
Status: DISCONTINUED | OUTPATIENT
Start: 2023-05-01 | End: 2023-05-01 | Stop reason: SURG

## 2023-05-01 RX ORDER — CYCLOBENZAPRINE HCL 10 MG
10 TABLET ORAL 3 TIMES DAILY PRN
Status: DISCONTINUED | OUTPATIENT
Start: 2023-05-01 | End: 2023-05-02 | Stop reason: HOSPADM

## 2023-05-01 RX ORDER — SODIUM CHLORIDE 0.9 % (FLUSH) 0.9 %
3-10 SYRINGE (ML) INJECTION AS NEEDED
Status: DISCONTINUED | OUTPATIENT
Start: 2023-05-01 | End: 2023-05-01 | Stop reason: HOSPADM

## 2023-05-01 RX ORDER — ONDANSETRON 2 MG/ML
4 INJECTION INTRAMUSCULAR; INTRAVENOUS EVERY 6 HOURS PRN
Status: DISCONTINUED | OUTPATIENT
Start: 2023-05-01 | End: 2023-05-02 | Stop reason: HOSPADM

## 2023-05-01 RX ORDER — SODIUM CHLORIDE 0.9 % (FLUSH) 0.9 %
3 SYRINGE (ML) INJECTION AS NEEDED
Status: DISCONTINUED | OUTPATIENT
Start: 2023-05-01 | End: 2023-05-01 | Stop reason: HOSPADM

## 2023-05-01 RX ORDER — PANTOPRAZOLE SODIUM 40 MG/1
40 TABLET, DELAYED RELEASE ORAL DAILY
Status: DISCONTINUED | OUTPATIENT
Start: 2023-05-01 | End: 2023-05-02 | Stop reason: HOSPADM

## 2023-05-01 RX ORDER — MIDAZOLAM HYDROCHLORIDE 1 MG/ML
1 INJECTION INTRAMUSCULAR; INTRAVENOUS
Status: DISCONTINUED | OUTPATIENT
Start: 2023-05-01 | End: 2023-05-01 | Stop reason: HOSPADM

## 2023-05-01 RX ORDER — NALOXONE HCL 0.4 MG/ML
0.4 VIAL (ML) INJECTION
Status: DISCONTINUED | OUTPATIENT
Start: 2023-05-01 | End: 2023-05-02 | Stop reason: HOSPADM

## 2023-05-01 RX ORDER — LEVOTHYROXINE SODIUM 0.05 MG/1
50 TABLET ORAL
Status: DISCONTINUED | OUTPATIENT
Start: 2023-05-02 | End: 2023-05-02 | Stop reason: HOSPADM

## 2023-05-01 RX ORDER — SODIUM CHLORIDE, SODIUM LACTATE, POTASSIUM CHLORIDE, CALCIUM CHLORIDE 600; 310; 30; 20 MG/100ML; MG/100ML; MG/100ML; MG/100ML
1000 INJECTION, SOLUTION INTRAVENOUS CONTINUOUS
Status: DISCONTINUED | OUTPATIENT
Start: 2023-05-01 | End: 2023-05-01

## 2023-05-01 RX ORDER — BUPIVACAINE HCL/0.9 % NACL/PF 0.125 %
PLASTIC BAG, INJECTION (ML) EPIDURAL AS NEEDED
Status: DISCONTINUED | OUTPATIENT
Start: 2023-05-01 | End: 2023-05-01 | Stop reason: SURG

## 2023-05-01 RX ORDER — GABAPENTIN 300 MG/1
300 CAPSULE ORAL 3 TIMES DAILY
Status: DISCONTINUED | OUTPATIENT
Start: 2023-05-01 | End: 2023-05-02 | Stop reason: HOSPADM

## 2023-05-01 RX ORDER — SUCCINYLCHOLINE/SOD CL,ISO/PF 200MG/10ML
SYRINGE (ML) INTRAVENOUS AS NEEDED
Status: DISCONTINUED | OUTPATIENT
Start: 2023-05-01 | End: 2023-05-01 | Stop reason: SURG

## 2023-05-01 RX ORDER — ACETAMINOPHEN 500 MG
500 TABLET ORAL ONCE
Status: COMPLETED | OUTPATIENT
Start: 2023-05-01 | End: 2023-05-01

## 2023-05-01 RX ORDER — ACETAMINOPHEN 325 MG/1
650 TABLET ORAL EVERY 4 HOURS PRN
Status: DISCONTINUED | OUTPATIENT
Start: 2023-05-01 | End: 2023-05-02 | Stop reason: HOSPADM

## 2023-05-01 RX ORDER — PROPOFOL 10 MG/ML
VIAL (ML) INTRAVENOUS AS NEEDED
Status: DISCONTINUED | OUTPATIENT
Start: 2023-05-01 | End: 2023-05-01 | Stop reason: SURG

## 2023-05-01 RX ORDER — MORPHINE SULFATE 2 MG/ML
1 INJECTION, SOLUTION INTRAMUSCULAR; INTRAVENOUS EVERY 4 HOURS PRN
Status: DISCONTINUED | OUTPATIENT
Start: 2023-05-01 | End: 2023-05-02 | Stop reason: HOSPADM

## 2023-05-01 RX ORDER — HYDROCODONE BITARTRATE AND ACETAMINOPHEN 7.5; 325 MG/1; MG/1
1 TABLET ORAL EVERY 4 HOURS PRN
Status: DISCONTINUED | OUTPATIENT
Start: 2023-05-01 | End: 2023-05-02 | Stop reason: HOSPADM

## 2023-05-01 RX ORDER — OXYCODONE AND ACETAMINOPHEN 10; 325 MG/1; MG/1
1 TABLET ORAL ONCE AS NEEDED
Status: DISCONTINUED | OUTPATIENT
Start: 2023-05-01 | End: 2023-05-01 | Stop reason: HOSPADM

## 2023-05-01 RX ORDER — SODIUM CHLORIDE 0.9 % (FLUSH) 0.9 %
10 SYRINGE (ML) INJECTION AS NEEDED
Status: DISCONTINUED | OUTPATIENT
Start: 2023-05-01 | End: 2023-05-02 | Stop reason: HOSPADM

## 2023-05-01 RX ORDER — LIDOCAINE HYDROCHLORIDE 20 MG/ML
INJECTION, SOLUTION EPIDURAL; INFILTRATION; INTRACAUDAL; PERINEURAL AS NEEDED
Status: DISCONTINUED | OUTPATIENT
Start: 2023-05-01 | End: 2023-05-01 | Stop reason: SURG

## 2023-05-01 RX ORDER — ONDANSETRON 2 MG/ML
INJECTION INTRAMUSCULAR; INTRAVENOUS AS NEEDED
Status: DISCONTINUED | OUTPATIENT
Start: 2023-05-01 | End: 2023-05-01 | Stop reason: SURG

## 2023-05-01 RX ORDER — ROCURONIUM BROMIDE 10 MG/ML
INJECTION, SOLUTION INTRAVENOUS AS NEEDED
Status: DISCONTINUED | OUTPATIENT
Start: 2023-05-01 | End: 2023-05-01 | Stop reason: SURG

## 2023-05-01 RX ORDER — INSULIN LISPRO 100 [IU]/ML
3-14 INJECTION, SOLUTION INTRAVENOUS; SUBCUTANEOUS
Status: DISCONTINUED | OUTPATIENT
Start: 2023-05-01 | End: 2023-05-02 | Stop reason: HOSPADM

## 2023-05-01 RX ORDER — LABETALOL HYDROCHLORIDE 5 MG/ML
5 INJECTION, SOLUTION INTRAVENOUS
Status: DISCONTINUED | OUTPATIENT
Start: 2023-05-01 | End: 2023-05-01 | Stop reason: HOSPADM

## 2023-05-01 RX ORDER — SODIUM CHLORIDE 9 MG/ML
75 INJECTION, SOLUTION INTRAVENOUS CONTINUOUS
Status: DISCONTINUED | OUTPATIENT
Start: 2023-05-01 | End: 2023-05-02 | Stop reason: HOSPADM

## 2023-05-01 RX ORDER — BUPIVACAINE HYDROCHLORIDE AND EPINEPHRINE 2.5; 5 MG/ML; UG/ML
INJECTION, SOLUTION INFILTRATION; PERINEURAL AS NEEDED
Status: DISCONTINUED | OUTPATIENT
Start: 2023-05-01 | End: 2023-05-01 | Stop reason: HOSPADM

## 2023-05-01 RX ORDER — FENTANYL CITRATE 50 UG/ML
INJECTION, SOLUTION INTRAMUSCULAR; INTRAVENOUS AS NEEDED
Status: DISCONTINUED | OUTPATIENT
Start: 2023-05-01 | End: 2023-05-01 | Stop reason: SURG

## 2023-05-01 RX ORDER — DEXTROSE MONOHYDRATE 25 G/50ML
25 INJECTION, SOLUTION INTRAVENOUS
Status: DISCONTINUED | OUTPATIENT
Start: 2023-05-01 | End: 2023-05-02 | Stop reason: HOSPADM

## 2023-05-01 RX ORDER — LIDOCAINE HYDROCHLORIDE 10 MG/ML
0.5 INJECTION, SOLUTION EPIDURAL; INFILTRATION; INTRACAUDAL; PERINEURAL ONCE AS NEEDED
Status: DISCONTINUED | OUTPATIENT
Start: 2023-05-01 | End: 2023-05-01 | Stop reason: HOSPADM

## 2023-05-01 RX ORDER — POLYETHYLENE GLYCOL 3350 17 G/17G
17 POWDER, FOR SOLUTION ORAL DAILY PRN
Status: DISCONTINUED | OUTPATIENT
Start: 2023-05-01 | End: 2023-05-02 | Stop reason: HOSPADM

## 2023-05-01 RX ORDER — SODIUM CHLORIDE 0.9 % (FLUSH) 0.9 %
10 SYRINGE (ML) INJECTION AS NEEDED
Status: DISCONTINUED | OUTPATIENT
Start: 2023-05-01 | End: 2023-05-01 | Stop reason: HOSPADM

## 2023-05-01 RX ADMIN — Medication 80 MG: at 09:36

## 2023-05-01 RX ADMIN — EPHEDRINE SULFATE 10 MG: 50 INJECTION INTRAVENOUS at 10:39

## 2023-05-01 RX ADMIN — Medication 25 MG: at 09:36

## 2023-05-01 RX ADMIN — INSULIN LISPRO 8 UNITS: 100 INJECTION, SOLUTION INTRAVENOUS; SUBCUTANEOUS at 17:28

## 2023-05-01 RX ADMIN — Medication 100 MCG: at 09:59

## 2023-05-01 RX ADMIN — PANTOPRAZOLE SODIUM 40 MG: 40 TABLET, DELAYED RELEASE ORAL at 14:36

## 2023-05-01 RX ADMIN — Medication 200 MCG: at 10:27

## 2023-05-01 RX ADMIN — SODIUM CHLORIDE 75 ML/HR: 9 INJECTION, SOLUTION INTRAVENOUS at 13:05

## 2023-05-01 RX ADMIN — ONDANSETRON 4 MG: 2 INJECTION INTRAMUSCULAR; INTRAVENOUS at 11:15

## 2023-05-01 RX ADMIN — Medication 200 MCG: at 10:31

## 2023-05-01 RX ADMIN — OXYCODONE HYDROCHLORIDE AND ACETAMINOPHEN 2 TABLET: 7.5; 325 TABLET ORAL at 12:09

## 2023-05-01 RX ADMIN — SODIUM CHLORIDE, POTASSIUM CHLORIDE, SODIUM LACTATE AND CALCIUM CHLORIDE 1000 ML: 600; 310; 30; 20 INJECTION, SOLUTION INTRAVENOUS at 08:55

## 2023-05-01 RX ADMIN — FENTANYL CITRATE 50 MCG: 0.05 INJECTION, SOLUTION INTRAMUSCULAR; INTRAVENOUS at 11:45

## 2023-05-01 RX ADMIN — DOCUSATE SODIUM 100 MG: 100 CAPSULE ORAL at 21:57

## 2023-05-01 RX ADMIN — Medication 3 ML: at 21:58

## 2023-05-01 RX ADMIN — Medication 100 MCG: at 09:51

## 2023-05-01 RX ADMIN — FENTANYL CITRATE 50 MCG: 0.05 INJECTION, SOLUTION INTRAMUSCULAR; INTRAVENOUS at 09:35

## 2023-05-01 RX ADMIN — Medication 100 MCG: at 09:49

## 2023-05-01 RX ADMIN — Medication 50 MCG: at 11:07

## 2023-05-01 RX ADMIN — LIDOCAINE HYDROCHLORIDE 50 MG: 20 INJECTION, SOLUTION EPIDURAL; INFILTRATION; INTRACAUDAL; PERINEURAL at 09:36

## 2023-05-01 RX ADMIN — ONDANSETRON 4 MG: 2 INJECTION INTRAMUSCULAR; INTRAVENOUS at 19:41

## 2023-05-01 RX ADMIN — DROPERIDOL 0.62 MG: 2.5 INJECTION, SOLUTION INTRAMUSCULAR; INTRAVENOUS at 12:01

## 2023-05-01 RX ADMIN — FENTANYL CITRATE 25 MCG: 50 INJECTION, SOLUTION INTRAMUSCULAR; INTRAVENOUS at 12:00

## 2023-05-01 RX ADMIN — PROPOFOL 100 MG: 10 INJECTION, EMULSION INTRAVENOUS at 09:36

## 2023-05-01 RX ADMIN — FENTANYL CITRATE 25 MCG: 50 INJECTION, SOLUTION INTRAMUSCULAR; INTRAVENOUS at 12:05

## 2023-05-01 RX ADMIN — SODIUM CHLORIDE, POTASSIUM CHLORIDE, SODIUM LACTATE AND CALCIUM CHLORIDE 1000 ML: 600; 310; 30; 20 INJECTION, SOLUTION INTRAVENOUS at 08:53

## 2023-05-01 RX ADMIN — FENTANYL CITRATE 25 MCG: 50 INJECTION, SOLUTION INTRAMUSCULAR; INTRAVENOUS at 12:16

## 2023-05-01 RX ADMIN — ROCURONIUM BROMIDE 5 MG: 10 INJECTION, SOLUTION INTRAVENOUS at 09:36

## 2023-05-01 RX ADMIN — Medication 12.5 MG: at 11:37

## 2023-05-01 RX ADMIN — Medication 100 MCG: at 11:22

## 2023-05-01 RX ADMIN — Medication 50 MCG: at 11:13

## 2023-05-01 RX ADMIN — Medication 200 MCG: at 10:58

## 2023-05-01 RX ADMIN — GABAPENTIN 300 MG: 300 CAPSULE ORAL at 16:03

## 2023-05-01 RX ADMIN — HYDROCODONE BITARTRATE AND ACETAMINOPHEN 1 TABLET: 7.5; 325 TABLET ORAL at 16:03

## 2023-05-01 RX ADMIN — Medication 150 MCG: at 10:10

## 2023-05-01 RX ADMIN — FENTANYL CITRATE 25 MCG: 50 INJECTION, SOLUTION INTRAMUSCULAR; INTRAVENOUS at 12:11

## 2023-05-01 RX ADMIN — FENTANYL CITRATE 50 MCG: 0.05 INJECTION, SOLUTION INTRAMUSCULAR; INTRAVENOUS at 10:49

## 2023-05-01 RX ADMIN — Medication 12.5 MG: at 10:34

## 2023-05-01 RX ADMIN — GABAPENTIN 300 MG: 300 CAPSULE ORAL at 21:57

## 2023-05-01 RX ADMIN — CEFAZOLIN 2 G: 2 INJECTION, POWDER, FOR SOLUTION INTRAMUSCULAR; INTRAVENOUS at 14:36

## 2023-05-01 RX ADMIN — FENTANYL CITRATE 100 MCG: 0.05 INJECTION, SOLUTION INTRAMUSCULAR; INTRAVENOUS at 09:57

## 2023-05-01 RX ADMIN — VANCOMYCIN HYDROCHLORIDE 750 MG: 1 INJECTION, POWDER, LYOPHILIZED, FOR SOLUTION INTRAVENOUS at 09:09

## 2023-05-01 RX ADMIN — ACETAMINOPHEN 500 MG: 500 TABLET, FILM COATED ORAL at 09:12

## 2023-05-01 RX ADMIN — Medication 150 MCG: at 10:01

## 2023-05-01 RX ADMIN — CEFAZOLIN 2 G: 2 INJECTION, POWDER, FOR SOLUTION INTRAMUSCULAR; INTRAVENOUS at 21:57

## 2023-05-01 NOTE — ANESTHESIA PREPROCEDURE EVALUATION
Anesthesia Evaluation     Patient summary reviewed   no history of anesthetic complications:  NPO Solid Status: > 8 hours  NPO Liquid Status: > 8 hours           Airway   Mallampati: I  TM distance: >3 FB  Neck ROM: full  No difficulty expected  Dental - normal exam     Pulmonary    (+) sleep apnea,   (-) not a smoker, no home oxygen  Cardiovascular   Exercise tolerance: poor (<4 METS)    ECG reviewed    (+) hypertension well controlled less than 2 medications, hyperlipidemia,   (-) pacemaker, past MI, cardiac stents, CABG      Neuro/Psych- negative ROS  (-) seizures, CVA  GI/Hepatic/Renal/Endo    (+)  GERD well controlled,  liver disease fatty liver disease cirrhosis, diabetes mellitus type 1 poorly controlled using insulin, thyroid problem     Musculoskeletal     (+) joint swelling,   Abdominal    Substance History - negative use     OB/GYN          Other - negative ROS                         Anesthesia Plan    ASA 3     general     (Difficult to control diabetes; hypoglycemia to 30's at night--will need glucose checks during procedure; eye doc/surgeon requests eye lube placed )  intravenous induction     Anesthetic plan, risks, benefits, and alternatives have been provided, discussed and informed consent has been obtained with: patient.

## 2023-05-01 NOTE — PLAN OF CARE
Goal Outcome Evaluation:  Plan of Care Reviewed With: patient        Progress: no change  Outcome Evaluation: Patient up from PACU in stable condition. Scant amount of drainage noted to dressing on back. Pain controlled with oral pain medications. Alert and oriented. IVF infusing. Insulin given per ss for meal coverage. Safety maintained. Will continue to monitor.

## 2023-05-01 NOTE — THERAPY EVALUATION
Patient Name: Fiordaliza Lyons  : 1960    MRN: [id number]                              Today's Date: 2023       Admit Date: 2023    Visit Dx:     ICD-10-CM ICD-9-CM   1. Spinal stenosis, lumbar region, with neurogenic claudication  M48.062 724.03   2. Decreased activities of daily living (ADL)  Z78.9 V49.89     Patient Active Problem List   Diagnosis   • Pedal edema   • Chronic heel pain, left   • Other partial intestinal obstruction   • Type 1 diabetes mellitus with hyperglycemia   • Ascites due to alcoholic cirrhosis   • Iron deficiency anemia secondary to inadequate dietary iron intake   • Colitis, infectious   • Alcoholic cirrhosis   • Iron deficiency anemia   • Partial rectal prolapse   • Anasarca   • Thoracic spine pain, abnormal CT T 6-7   • Hyponatremia   • Acute cystitis   • Rectal mass   • Abnormal digital rectal exam   • Abnormal CT of the abdomen   • Type 1 diabetes mellitus with hypoglycemia and without coma   • Acquired hypothyroidism   • Vitamin D deficiency   • Adenomatous polyp of colon   • Other hemorrhoids   • Non-smoker   • Body mass index (BMI) of 21.0 to 21.9 in adult   • Degeneration of lumbar or lumbosacral intervertebral disc   • Lumbar radiculopathy   • Grade IV hemorrhoids   • Spinal stenosis, lumbar region, with neurogenic claudication   • Right foot drop   • Acquired spondylolisthesis     Past Medical History:   Diagnosis Date   • Alcoholic hepatitis with ascites    • Allergic rhinitis    • Breast cyst    • Cancer     ovarian   • Degeneration of lumbar or lumbosacral intervertebral disc 09/15/2022   • Diabetes mellitus    • Disease of thyroid gland    • Elevated d-dimer    • Endometriosis    • Fatty liver    • GERD (gastroesophageal reflux disease)    • History of transfusion    • Hyperlipidemia    • Hypertension    • Osteoporosis    • Ovarian cyst    • Pancreatitis    • Postmenopausal    • Rectal mass    • Rectal prolapse    • Sleep apnea      Past Surgical History:    Procedure Laterality Date   • BILATERAL SALPINGO OOPHORECTOMY  2008   • CHOLECYSTECTOMY  1996   • COLONOSCOPY  07/2017    Dr Murphy- per patient normal    • COLONOSCOPY N/A 11/09/2021    Procedure: COLONOSCOPY WITH ANESTHESIA;  Surgeon: Ray Serna MD;  Location: [facility] ENDOSCOPY;  Service: Gastroenterology;  Laterality: N/A;  pre rectal mass  post  Dr Shields   • DILATION AND CURETTAGE, DIAGNOSTIC / THERAPEUTIC     • ENDOSCOPY N/A 11/09/2021    Procedure: ESOPHAGOGASTRODUODENOSCOPY WITH ANESTHESIA;  Surgeon: Ray Serna MD;  Location: [facility] ENDOSCOPY;  Service: Gastroenterology;  Laterality: N/A;  pre anemia  post hiatal hernia; gastric polyp  Dr. Shields   • KNEE SURGERY  1982      General Information     Row Name 05/01/23 1328          OT Time and Intention    Document Type evaluation  lumbar discectomy, cierra-laminectomy, TLIF, posterior pedicle scew with instrumentation L5-S1.  -     Mode of Treatment occupational therapy  -     Row Name 05/01/23 1328          General Information    Patient Profile Reviewed yes  -JJ     Prior Level of Function independent:;all household mobility;transfer;ADL's;bed mobility;community mobility;driving;shopping  -     Existing Precautions/Restrictions fall;brace worn when out of bed;spinal;LSO  -JJ     Barriers to Rehab medically complex  -     Row Name 05/01/23 1328          Living Environment    People in Home alone  -     Row Name 05/01/23 1328          Home Main Entrance    Number of Stairs, Main Entrance none  -JJ     Stair Railings, Main Entrance none  -     Row Name 05/01/23 1328          Stairs Within Home, Primary    Number of Stairs, Within Home, Primary none  -JJ     Stair Railings, Within Home, Primary none  -J     Row Name 05/01/23 1328          Cognition    Orientation Status (Cognition) oriented x 4  -     Row Name 05/01/23 1328          Safety Issues, Functional Mobility    Impairments Affecting Function (Mobility)  pain;balance;strength;endurance/activity tolerance;cognition  -           User Key  (r) = Recorded By, (t) = Taken By, (c) = Cosigned By    Initials Name Provider Type    Sheryl Dillon OTR/L, ENRIQUE Occupational Therapist                 Mobility/ADL's     Row Name 05/01/23 1328          Bed Mobility    Bed Mobility rolling left;sidelying-sit  -     Rolling Left [place] (Bed Mobility) contact guard  -     Sidelying-Sit [place] (Bed Mobility) contact guard  -     Assistive Device (Bed Mobility) head of bed elevated;bed rails  -     Row Name 05/01/23 1328          Transfers    Transfers sit-stand transfer;stand-sit transfer  -     Row Name 05/01/23 1328          Sit-Stand Transfer    Sit-Stand [place] (Transfers) minimum assist (75% patient effort)  -     Row Name 05/01/23 1328          Stand-Sit Transfer    Stand-Sit [place] (Transfers) minimum assist (75% patient effort)  -[facility] Name 05/01/23 1328          Functional Mobility    Functional Mobility- Ind. Level minimum assist (75% patient effort)  -     Functional Mobility- Device --  HHAx1  -     Functional Mobility- Safety Issues step length decreased  -     Row Name 05/01/23 132          Activities of Daily Living    BADL Assessment/Intervention lower body dressing  -     Row Name 05/01/23 132          Lower Body Dressing Assessment/Training    [place] Level (Lower Body Dressing) don;socks;maximum assist (25% patient effort)  -     Position (Lower Body Dressing) edge of bed sitting  -     Comment, (Lower Body Dressing) d/t pain  -           User Key  (r) = Recorded By, (t) = Taken By, (c) = Cosigned By    Initials Name Provider Type    Sheryl Dillon OTR/L, CSRS Occupational Therapist               Obj/Interventions     Row Name 05/01/23 1328          Sensory Assessment (Somatosensory)    Sensory Assessment (Somatosensory) UE sensation intact;LE sensation intact  -     Row Name 05/01/23  1328          Vision Assessment/Intervention    Visual Impairment/Limitations L  -     Row Name 05/01/23 1328          Range of Motion Comprehensive    General Range of Motion bilateral upper extremity ROM [facility]     Row Name 05/01/23 1328          Strength Comprehensive (MMT)    Comment, General Manual Muscle Testing (MMT) Assessment B UE strength 5/5  -     Row Name 05/01/23 1328          Balance    Balance Assessment sitting static balance;sitting dynamic balance;standing dynamic balance;standing static balance  -JJ     Static Sitting Balance independent  -JJ     Dynamic Sitting Balance standby assist  -JJ     Position, Sitting Balance unsupported;sitting edge of bed  -JJ     Static Standing Balance contact guard  -JJ     Dynamic Standing Balance contact guard  -JJ     Position/Device Used, Standing Balance unsupported  -JJ           User Key  (r) = Recorded By, (t) = Taken By, (c) = Cosigned By    Initials Name Provider Type    Sheryl Dillon, OTR/L, CSRS Occupational Therapist               Goals/Plan     Row Name 05/01/23 1328          Dressing Goal 1 (OT)    Activity/Device (Dressing Goal 1, OT) dressing skills, all  -JJ     [place]/Cues Needed (Dressing Goal 1, OT) standby assist  -JJ     Time Frame (Dressing Goal 1, OT) long term goal (LTG);by discharge  -J     Progress/Outcome (Dressing Goal 1, OT) new [facility]  -     Row Name 05/01/23 1328          Toileting Goal 1 (OT)    Activity/Device (Toileting Goal 1, OT) toileting skills, all  -JJ     [place] Level/Cues Needed (Toileting Goal 1, OT) standby assist  -JJ     Time Frame (Toileting Goal 1, OT) long term goal (LTG);by discharge  -J     Progress/Outcome (Toileting Goal 1, OT) new [facility]  -     Row Name 05/01/23 1328          Therapy Assessment/Plan (OT)    Planned Therapy Interventions (OT) activity tolerance training;BADL retraining;functional balance retraining;orthotic fabrication/fitting/training;occupation/activity based  interventions;transfer/mobility retraining;patient/caregiver education/training  -           User Key  (r) = Recorded By, (t) = Taken By, (c) = Cosigned By    Initials Name Provider Type    Sheryl Dillon, ROCCO/L, CSRS Occupational Therapist               Clinical Impression     Row Name 05/01/23 1320          Pain Assessment    Pretreatment Pain Rating 10/10  -JJ     Posttreatment Pain Rating 10/10  -     Pain Intervention(s) Medication (See MAR);Repositioned;Ambulation/increased activity  -     Row Name 05/01/23 3042          Plan of Care Review    Plan of Care Reviewed With patient  -     Outcome Evaluation OT eval completed. Pt presents letharic but oriented x4, c/o 10/10 sx pain. She reports at baseline being independent with all adls and mobility, residing at home alone. Today she required CGA to come to sitting at EOB while demonstrating proper body mechanics. [employer] rep present to fit patient for LSO during session. She was educated on spinal precautions, LSO fitting/mgt/wear schdeule and adl modifications. She required Max A to don socks while seated at EOB d/t her pain level. Min A for sit <> stand t/f from EOB and to amb short distance in room with HHAx1. She demonstrates decreased balance, activity tolerance, strength and would benefit from skilled OT services to address these deficits. Recommend d/c home with assist and HH, pending pt progress.  -     Row Name 05/01/23 1321          Therapy Assessment/Plan (OT)    Rehab Potential (OT) good, to achieve stated therapy goals  -     Criteria for Skilled Therapeutic Interventions Met (OT) yes;skilled treatment is necessary  -     Therapy Frequency (OT) 5 times/wk  -     Predicted Duration of Therapy Intervention (OT) 10 days  -     Row Name 05/01/23 1240          Therapy Plan Review/Discharge Plan (OT)    Anticipated Discharge Disposition (OT) home with assist;home with home health  -     Row Name 05/01/23 9961          Positioning  and Restraints    Pre-Treatment Position in bed  -JJ     Post Treatment Position bed  -JJ     In Bed notified nsg;fowlers;call light within reach;encouraged to call for assist;exit alarm on;side rails up x3  -JJ           User Key  (r) = Recorded By, (t) = Taken By, (c) = Cosigned By    Initials Name Provider Type    Sheryl Dillon OTR/L, ENRIQUE Occupational Therapist               Outcome Measures     Row Name 05/01/23 1328          How much help from another is currently needed...    Putting on and taking off regular lower body clothing? 2  -JJ     Bathing (including washing, rinsing, and drying) 2  -JJ     Toileting (which includes using toilet bed pan or urinal) 2  -JJ     Putting on and taking off regular upper body clothing 3  -JJ     Taking care of personal grooming (such as brushing teeth) 4  -JJ     Eating meals 4  -JJ     AM-PAC 6 Clicks Score (OT) 17  -JJ     Row Name 05/01/23 1328          Functional Assessment    Outcome Measure Options AM-PAC 6 Clicks Daily Activity (OT)  -           User Key  (r) = Recorded By, (t) = Taken By, (c) = Cosigned By    Initials Name Provider Type    Sheryl Dillon OTR/L, ENRIQUE Occupational Therapist                Occupational Therapy Education     Title: PT OT SLP Therapies (In Progress)     Topic: Occupational Therapy (In Progress)     Point: ADL training (Done)     Description:   Instruct learner(s) on proper safety adaptation and remediation techniques during self care or transfers.   Instruct in proper use of assistive devices.              Learning Progress Summary           Patient Acceptance, E, VU by ANA at 5/1/2023 1410                   Point: Home exercise program (Not Started)     Description:   Instruct learner(s) on appropriate technique for monitoring, assisting and/or progressing therapeutic exercises/activities.              Learner Progress:  Not documented in this visit.          Point: Precautions (Done)     Description:   Instruct  learner(s) on prescribed precautions during self-care and functional transfers.              Learning Progress Summary           Patient Acceptance, E, VU by ANA at 5/1/2023 1410                   Point: Body mechanics (Done)     Description:   Instruct learner(s) on proper positioning and spine alignment during self-care, functional mobility activities and/or exercises.              Learning Progress Summary           Patient Acceptance, E, VU by ANA at 5/1/2023 1410                               User Key     Initials Effective Dates Name Provider Type Discipline    ANA 11/10/21 -  Sheryl Everett, OTSANJEEV/L, CSRS Occupational Therapist OT              OT Recommendation and Plan  Planned Therapy Interventions (OT): activity tolerance training, BADL retraining, functional balance retraining, orthotic fabrication/fitting/training, occupation/activity based interventions, transfer/mobility retraining, patient/caregiver education/training  Therapy Frequency (OT): 5 times/wk  Plan of Care Review  Plan of Care Reviewed With: patient  Outcome Evaluation: OT eval completed. Pt presents letharic but oriented x4, c/o 10/10 sx pain. She reports at baseline being independent with all adls and mobility, residing at home alone. Today she required CGA to come to sitting at EOB while demonstrating proper body mechanics. [employer] rep present to fit patient for LSO during session. She was educated on spinal precautions, LSO fitting/mgt/wear schdeule and adl modifications. She required Max A to don socks while seated at EOB d/t her pain level. Min A for sit <> stand t/f from EOB and to amb short distance in room with HHAx1. She demonstrates decreased balance, activity tolerance, strength and would benefit from skilled OT services to address these deficits. Recommend d/c home with assist and HH, pending pt progress.     Time Calculation:    Time Calculation- OT     Row Name 05/01/23 1328             Time Calculation- OT    OT Start Time  1328  -      OT Stop Time 1410  -      OT Time Calculation (min) 42 min  -ANA      OT Received On 05/01/23  -XIN      OT Goal Re-Cert Due Date 05/11/23  -            User Key  (r) = Recorded By, (t) = Taken By, (c) = Cosigned By    Initials Name Provider Type    Sheryl Dillon, OTR/L, CSRS Occupational Therapist              Therapy Charges for Today     Code Description Service Date Service Provider Modifiers Qty    [id number] HC OT EVAL LOW COMPLEXITY 3 5/1/2023 Sheryl Everett OTR/L, CSRS GO 1               ROCCO Mac/L, CSRS  5/1/2023

## 2023-05-01 NOTE — ANESTHESIA PROCEDURE NOTES
Airway  Urgency: elective    Date/Time: 5/1/2023 9:41 AM  Airway not difficult    General Information and Staff    Patient location during procedure: OR  CRNA/CAA: Rocio Shaw CRNA    Indications and Patient Condition  Indications for airway management: airway protection    Preoxygenated: yes  Mask difficulty assessment: 0 - not attempted    Final Airway Details  Final airway type: endotracheal airway      Successful airway: ETT  Cuffed: yes   Successful intubation technique: direct laryngoscopy  Facilitating devices/methods: intubating stylet  Endotracheal tube insertion site: oral  Blade: [place]  Blade size: 3  ETT size (mm): 7.0  Cormack-Lehane Classification: grade I - full view of glottis  Placement verified by: chest auscultation and capnometry   Cuff volume (mL): 8  Measured from: lips  ETT/EBT  to lips (cm): 22  Number of attempts at approach: 1  Assessment: lips, teeth, and gum same as pre-op and atraumatic intubation    Additional Comments  atraumatic

## 2023-05-01 NOTE — PLAN OF CARE
Goal Outcome Evaluation:  Plan of Care Reviewed With: patient           Outcome Evaluation: OT eval completed. Pt presents letharic but oriented x4, c/o 10/10 sx pain. She reports at baseline being independent with all adls and mobility, residing at home alone. Today she required CGA to come to sitting at EOB while demonstrating proper body mechanics. [employer] rep present to fit patient for LSO during session. She was educated on spinal precautions, LSO fitting/mgt/wear schdeule and adl modifications. She required Max A to don socks while seated at EOB d/t her pain level. Min A for sit <> stand t/f from EOB and to amb short distance in room with HHAx1. She demonstrates decreased balance, activity tolerance, strength and would benefit from skilled OT services to address these deficits. Recommend d/c home with assist and HH, pending pt progress.

## 2023-05-01 NOTE — ANESTHESIA POSTPROCEDURE EVALUATION
Patient: Fiordaliza Lyons    Procedure Summary       Date: 05/01/23 Room / Location:  [facility] OR  /  [facility] OR    Anesthesia Start: 0935 Anesthesia Stop: 1148    Procedure: LUMBAR DISCECTOMY, JADON- LAMINECTOMY TRANSFORAMINAL LUMBAR INTERBODY FUSION, POSTERIOR PEDICLE SCREW INSTRUMENTATION, L5-S1 RIGHT. USE OF THE ROBOT (Right: Spine Lumbar) Diagnosis:       Spinal stenosis, lumbar region, with neurogenic claudication      (Spinal stenosis, lumbar region, with neurogenic claudication [M48.062])    Surgeons: Nathaniel Tesfaye MD Provider: Rocio Shaw CRNA    Anesthesia Type: general ASA Status: 3            Anesthesia Type: general    Vitals  Vitals Value Taken Time   /76 05/01/23 1233   Temp 97.9 °F (36.6 °C) 05/01/23 1240   Pulse 105 05/01/23 1241   Resp 24 05/01/23 1240   SpO2 100 % 05/01/23 1241   Vitals shown include unvalidated device data.        Post Anesthesia Care and Evaluation    Patient location during evaluation: PACU  Patient participation: complete - patient participated  Level of consciousness: awake and alert  Pain management: adequate    Airway patency: patent  Anesthetic complications: No anesthetic complications    Cardiovascular status: acceptable  Respiratory status: acceptable  Hydration status: acceptable    Comments: Blood pressure 135/58, pulse 97, temperature 97.8 °F (36.6 °C), temperature source Oral, resp. rate 18, weight 49.1 kg (108 lb 3.9 oz), SpO2 99 %, not currently breastfeeding.    Pt discharged from PACU based on paco score >8

## 2023-05-01 NOTE — NURSING NOTE
Received report on pt at bedside from ROSINA Lutz. Nelda states anesthesia provider aware of pt bedside blood glucose of 254 and that report has been called to nurse on 3A. Pt alert and oriented.

## 2023-05-01 NOTE — OP NOTE
Procedure Note    Pre-op Diagnosis: Spinal stenosis, lumbar region, with neurogenic claudication [M48.062]    Post-Op Diagnosis: Post-Op Diagnosis Codes:     * Spinal stenosis, lumbar region, with neurogenic claudication [M48.062]     Procedure Name: L5-S1 discectomy  L5-S1 interbody fusion with autograft and allograft  L5-S1 right hemilaminectomy facetectomy foraminotomy  L5-S1 posterior pedicle screw instrumentation  Use of image guided stereotactic navigation  Use the surgical robot    Spinal Surgery Levels Completed:1 Level    Indications:  A MRI revealed findings of Spondylolisthesis. The patient now presents for a  L5-S1 decompression and fusion after discussing therapeutic alternatives.          Surgeon: Nathaniel Tesfaye MD     Assistants: None    Anesthesia: General endotracheal anesthesia    ASA Class: 3    Procedure Details   After obtaining informed consent, having the risks and benefits of the procedure explained including but not limited to infection, bleeding, paralysis, spinal fluid leak, bowel or bladder incontinence, stroke, coma, and death, the patient was brought to the operating room.  The patient was given general anesthesia via an endotracheal tube.  The patient was flipped prone on a Jaime axis table.  The lumbar spine was then prepped and draped in a standard sterile fashion.  The posterior superior iliac spine on the right was palpated and identified.  A preplanned incision was infiltrated with Marcaine and epinephrine.  A 10 blade scalpel was used to make an incision at the dermis and epidermis.  Bovie cautery was used to extend the incision down to the level of the periosteum at the posterior superior iliac spine on the right.  A pin was then inserted using a pin .  The surgical robot was then anchored to the iliac spine pin.  The surgical robot was then registered and calibrated.  Preoperative CT scan and fluoroscopic images were then aligned and registered.  In accordance with  the predesign plan the robot arm was then sent to the left at L5 and S1.  A 20 blade scalpel was used to make an incision through the lumbosacral fascia along this trajectory.  A tissue protector and dilator were then inserted through the robot arm to the entry point at L5 and S1 .  A drill was then inserted through the robot arm along this trajectory and a channel was drilled through the pedicle at L5 and S1   on the left .  A tap was then inserted through the robot arm along its trajectory and each pedicle was then tapped.  A series of image-guided screws were then placed through the robot arm along the trajectory at L5 and S1 through the pedicle into the vertebral body on the left .  The robot arm was then sent to the right at L5 and S1 along the preplanned trajectory. A 20 blade scalpel was used to make an incision through the dermis and epidermis along this trajectory.  A tissue protector and dilator were then inserted through the robot arm to the entry point at L5 and S1 .  A drill was then inserted through the robot arm along this trajectory and a channel was drilled through the pedicle at L5 and S1 .  A tap was then inserted through the robot arm along its trajectory and each pedicle was then tapped.  A series of image-guided screws were then placed through the robot arm along the trajectory at L5 and S1 through the pedicle into the vertebral body on the right .   The retractor system was then deployed over the screw extenders on the right at L5 and S1.  An image-guided drill was then used to drill hemilaminectomy and facetectomy on the right.  The hemilaminectomy and facetectomy were completed using a series of 2 mm and 3 mm Kerrisons.  Neurophysiologic monitoring was used to identify the disc space on the right at L5-S1.  The annulus of the disc base was then incised using a bayoneted 15 blade scalpel.  The disc space was then developed and the discectomy was conducted using a series of 7 mm and 8 mm  navigated disc nisa, up-biting curettes, pituitary rongeurs.  Once the discectomy was completed a  23 mm x  11  mm PEEK expandable cage filled with autograft and allograft was tapped into place under image-guided navigation.  The interbody device was then expanded.  The remainder of the laminectomy and foraminotomies were completed using a series of Leksell rongeurs, 2 mm Kerrisons, 3 mm Kerrisons.     Custom fabricated rods were then inserted on the right and left into the heads of the screws.  The rods were then anchored in place and reduced using set screws.   Final AP and lateral fluoroscopy showed good positioning of all interbody devices and hardware.  The set screws were final tightened and broken off.  The wounds were then copiously irrigated with antibiotic solution.  They were inspected for hemostasis.   The deep tissues were closed using a series of inverted interrupted 0 Vicryl sutures.  The subcutaneous and soft tissues were closed using a series of inverted 2-0 Vicryl sutures.  And the skin was closed using a running 4-0 Monocryl.  All sponge, needle and instrument counts were correct at the end of the procedure.  The patient was extubated in stable condition and returned to the recovery room with about 80 mL of blood loss.       Findings:  Spondylolisthesis    Estimated Blood Loss:  80           Drains: None           Total IV Fluids:  mL           Specimens:   Specimens     ID Source Type Tests Collected By Collected At Frozen?    A Spine, Lumbar Disc · TISSUE PATHOLOGY EXAM   Nathaniel Tesfaye MD 5/1/23 1118 No    Description: L5-S1                 Implants:   Implant Name Type Inv. Item Serial No.  Lot No. LRB No. Used Action   HEMOST ABS SURGIFOAM  8X12 10MM - [id number] Implant HEMOST ABS SURGIFOAM  8X12 10MM  [employer]  DIV [employer] J AND J [id number] Right 1 Implanted   KT HEMOST ABS SURGIFOAM PORCN 1GRAM - [id number] Implant KT HEMOST ABS SURGIFOAM PORCN 1GRAM  [employer]  DIV OF J  AND J [id number] Right 1 Implanted   PUTTY DBM BRAD 2.5CC - [id number] Implant PUTTY DBM BRAD 2.5CC  [employer] [id number] Right 1 Implanted   ALLOGRFT DBM BRAD DS INJ FIBR 6CC - [id number] Implant ALLOGRFT DBM BRAD DS INJ FIBR 6CC  [employer] [id number] Right 1 Implanted   SPACR IB LIF CATALYFT/PL EXP TI 11MM SHT - [id number] Implant SPACR IB LIF CATALYFT/PL EXP TI 11MM SHT  [employer] [id number] Right 1 Implanted   SCRW NORM SOLERA VOYAGER MAS 6.5X40MM - [id number] Implant SCRW NORM SOLERA VOYAGER MAS 6.5X40MM  [employer]  Right 2 Implanted   SCRW NORM SOLERA VOYAGER MAS 7.5X35MM - [id number] Implant SCRW NORM SOLERA VOYAGER MAS 7.5X35MM  [employer]  Right 2 Implanted   MARIN SPEC SPINE UNID 1TO3 LVL CUST - [id number] Implant MARIN SPEC SPINE UNID 1TO3 LVL CUST  [employer]  Right 1 Implanted   SCRW ST SOLERA VOYAGER BRKOFF TI 4.75MM - [id number] Implant SCRW ST SOLERA VOYAGER BRKOFF TI 4.75MM  [employer]  Right 4 Implanted              Complications:  none           Disposition: PACU - hemodynamically stable.           Condition: stable        Nathaniel Tesfaye MD

## 2023-05-02 VITALS
BODY MASS INDEX: 20.97 KG/M2 | WEIGHT: 108.25 LBS | HEART RATE: 78 BPM | TEMPERATURE: 97.9 F | RESPIRATION RATE: 18 BRPM | SYSTOLIC BLOOD PRESSURE: 133 MMHG | DIASTOLIC BLOOD PRESSURE: 72 MMHG | OXYGEN SATURATION: 100 %

## 2023-05-02 LAB
ANION GAP SERPL CALCULATED.3IONS-SCNC: 12 MMOL/L (ref 5–15)
BASOPHILS # BLD AUTO: 0.03 10*3/MM3 (ref 0–0.2)
BASOPHILS NFR BLD AUTO: 0.5 % (ref 0–1.5)
BUN SERPL-MCNC: 9 MG/DL (ref 8–23)
BUN/CREAT SERPL: 11.3 (ref 7–25)
CALCIUM SPEC-SCNC: 9.1 MG/DL (ref 8.6–10.5)
CHLORIDE SERPL-SCNC: 99 MMOL/L (ref 98–107)
CO2 SERPL-SCNC: 24 MMOL/L (ref 22–29)
CREAT SERPL-MCNC: 0.8 MG/DL (ref 0.57–1)
DEPRECATED RDW RBC AUTO: 42.8 FL (ref 37–54)
EGFRCR SERPLBLD CKD-EPI 2021: 83.9 ML/MIN/1.73
EOSINOPHIL # BLD AUTO: 0.06 10*3/MM3 (ref 0–0.4)
EOSINOPHIL NFR BLD AUTO: 1.1 % (ref 0.3–6.2)
ERYTHROCYTE [DISTWIDTH] IN BLOOD BY AUTOMATED COUNT: 14 % (ref 12.3–15.4)
GLUCOSE BLDC GLUCOMTR-MCNC: 298 MG/DL (ref 70–130)
GLUCOSE BLDC GLUCOMTR-MCNC: 381 MG/DL (ref 70–130)
GLUCOSE SERPL-MCNC: 377 MG/DL (ref 65–99)
HCT VFR BLD AUTO: 34.3 % (ref 34–46.6)
HGB BLD-MCNC: 10.8 G/DL (ref 12–15.9)
IMM GRANULOCYTES # BLD AUTO: 0.05 10*3/MM3 (ref 0–0.05)
IMM GRANULOCYTES NFR BLD AUTO: 0.9 % (ref 0–0.5)
LAB AP CASE REPORT: NORMAL
LYMPHOCYTES # BLD AUTO: 0.66 10*3/MM3 (ref 0.7–3.1)
LYMPHOCYTES NFR BLD AUTO: 11.9 % (ref 19.6–45.3)
Lab: NORMAL
MCH RBC QN AUTO: 26.3 PG (ref 26.6–33)
MCHC RBC AUTO-ENTMCNC: 31.5 G/DL (ref 31.5–35.7)
MCV RBC AUTO: 83.7 FL (ref 79–97)
MONOCYTES # BLD AUTO: 0.39 10*3/MM3 (ref 0.1–0.9)
MONOCYTES NFR BLD AUTO: 7 % (ref 5–12)
NEUTROPHILS NFR BLD AUTO: 4.35 10*3/MM3 (ref 1.7–7)
NEUTROPHILS NFR BLD AUTO: 78.6 % (ref 42.7–76)
NRBC BLD AUTO-RTO: 0 /100 WBC (ref 0–0.2)
PATH REPORT.FINAL DX SPEC: NORMAL
PATH REPORT.GROSS SPEC: NORMAL
PLATELET # BLD AUTO: 142 10*3/MM3 (ref 140–450)
PMV BLD AUTO: 10.2 FL (ref 6–12)
POTASSIUM SERPL-SCNC: 3.9 MMOL/L (ref 3.5–5.2)
RBC # BLD AUTO: 4.1 10*6/MM3 (ref 3.77–5.28)
SODIUM SERPL-SCNC: 135 MMOL/L (ref 136–145)
WBC NRBC COR # BLD: 5.54 10*3/MM3 (ref 3.4–10.8)

## 2023-05-02 PROCEDURE — 97161 PT EVAL LOW COMPLEX 20 MIN: CPT | Performed by: PHYSICAL THERAPIST

## 2023-05-02 PROCEDURE — 80048 BASIC METABOLIC PNL TOTAL CA: CPT | Performed by: NURSE PRACTITIONER

## 2023-05-02 PROCEDURE — 63710000001 INSULIN LISPRO (HUMAN) PER 5 UNITS: Performed by: NURSE PRACTITIONER

## 2023-05-02 PROCEDURE — 85025 COMPLETE CBC W/AUTO DIFF WBC: CPT | Performed by: NURSE PRACTITIONER

## 2023-05-02 PROCEDURE — 25010000002 MORPHINE PER 10 MG: Performed by: NURSE PRACTITIONER

## 2023-05-02 PROCEDURE — 25010000002 CEFAZOLIN PER 500 MG: Performed by: NURSE PRACTITIONER

## 2023-05-02 PROCEDURE — 82948 REAGENT STRIP/BLOOD GLUCOSE: CPT

## 2023-05-02 PROCEDURE — 99024 POSTOP FOLLOW-UP VISIT: CPT | Performed by: NURSE PRACTITIONER

## 2023-05-02 RX ORDER — CYCLOBENZAPRINE HCL 10 MG
10 TABLET ORAL 3 TIMES DAILY PRN
Qty: 90 TABLET | Refills: 0 | Status: SHIPPED | OUTPATIENT
Start: 2023-05-02

## 2023-05-02 RX ORDER — HYDROCODONE BITARTRATE AND ACETAMINOPHEN 7.5; 325 MG/1; MG/1
1 TABLET ORAL EVERY 6 HOURS PRN
Qty: 28 TABLET | Refills: 0 | Status: SHIPPED | OUTPATIENT
Start: 2023-05-02 | End: 2023-05-09

## 2023-05-02 RX ADMIN — DOCUSATE SODIUM 100 MG: 100 CAPSULE ORAL at 08:55

## 2023-05-02 RX ADMIN — GABAPENTIN 300 MG: 300 CAPSULE ORAL at 08:55

## 2023-05-02 RX ADMIN — HYDROCODONE BITARTRATE AND ACETAMINOPHEN 1 TABLET: 7.5; 325 TABLET ORAL at 08:55

## 2023-05-02 RX ADMIN — MORPHINE SULFATE 1 MG: 2 INJECTION, SOLUTION INTRAMUSCULAR; INTRAVENOUS at 00:35

## 2023-05-02 RX ADMIN — HYDROCODONE BITARTRATE AND ACETAMINOPHEN 1 TABLET: 7.5; 325 TABLET ORAL at 04:53

## 2023-05-02 RX ADMIN — INSULIN LISPRO 8 UNITS: 100 INJECTION, SOLUTION INTRAVENOUS; SUBCUTANEOUS at 12:20

## 2023-05-02 RX ADMIN — INSULIN LISPRO 12 UNITS: 100 INJECTION, SOLUTION INTRAVENOUS; SUBCUTANEOUS at 08:56

## 2023-05-02 RX ADMIN — CEFAZOLIN 2 G: 2 INJECTION, POWDER, FOR SOLUTION INTRAMUSCULAR; INTRAVENOUS at 06:05

## 2023-05-02 RX ADMIN — LEVOTHYROXINE SODIUM 50 MCG: 50 TABLET ORAL at 06:05

## 2023-05-02 RX ADMIN — PANTOPRAZOLE SODIUM 40 MG: 40 TABLET, DELAYED RELEASE ORAL at 08:56

## 2023-05-02 NOTE — PLAN OF CARE
Goal Outcome Evaluation: patient is being discharged home with family, AVS reviewed with patient, questions encouraged an answered.

## 2023-05-02 NOTE — THERAPY DISCHARGE NOTE
Patient Name: Fiordaliza Lyons  : 1960    MRN: [id number]                              Today's Date: 2023       Admit Date: 2023    Visit Dx:     ICD-10-CM ICD-9-CM   1. Acquired spondylolisthesis  M43.10 738.4   2. Spinal stenosis, lumbar region, with neurogenic claudication  M48.062 724.03   3. Decreased activities of daily living (ADL)  Z78.9 V49.89   4. Degeneration of lumbar or lumbosacral intervertebral disc  M51.37 722.52   5. Impaired mobility  Z74.09 799.89     Patient Active Problem List   Diagnosis   • Pedal edema   • Chronic heel pain, left   • Other partial intestinal obstruction   • Type 1 diabetes mellitus with hyperglycemia   • Ascites due to alcoholic cirrhosis   • Iron deficiency anemia secondary to inadequate dietary iron intake   • Colitis, infectious   • Alcoholic cirrhosis   • Iron deficiency anemia   • Partial rectal prolapse   • Anasarca   • Thoracic spine pain, abnormal CT T 6-7   • Hyponatremia   • Acute cystitis   • Rectal mass   • Abnormal digital rectal exam   • Abnormal CT of the abdomen   • Type 1 diabetes mellitus with hypoglycemia and without coma   • Acquired hypothyroidism   • Vitamin D deficiency   • Adenomatous polyp of colon   • Other hemorrhoids   • Non-smoker   • Body mass index (BMI) of 21.0 to 21.9 in adult   • Degeneration of lumbar or lumbosacral intervertebral disc   • Lumbar radiculopathy   • Grade IV hemorrhoids   • Spinal stenosis, lumbar region, with neurogenic claudication   • Right foot drop   • Acquired spondylolisthesis     Past Medical History:   Diagnosis Date   • Alcoholic hepatitis with ascites    • Allergic rhinitis    • Breast cyst    • Cancer     ovarian   • Degeneration of lumbar or lumbosacral intervertebral disc 09/15/2022   • Diabetes mellitus    • Disease of thyroid gland    • Elevated d-dimer    • Endometriosis    • Fatty liver    • GERD (gastroesophageal reflux disease)    • History of transfusion    • Hyperlipidemia    •  Hypertension    • Osteoporosis    • Ovarian cyst    • Pancreatitis    • Postmenopausal    • Rectal mass    • Rectal prolapse    • Sleep apnea      Past Surgical History:   Procedure Laterality Date   • BILATERAL SALPINGO OOPHORECTOMY  2008   • CHOLECYSTECTOMY  1996   • COLONOSCOPY  07/2017    Dr Murphy- per patient normal    • COLONOSCOPY N/A 11/09/2021    Procedure: COLONOSCOPY WITH ANESTHESIA;  Surgeon: Ray Serna MD;  Location: [facility] ENDOSCOPY;  Service: Gastroenterology;  Laterality: N/A;  pre rectal mass  post  Dr Shields   • DILATION AND CURETTAGE, DIAGNOSTIC / THERAPEUTIC     • ENDOSCOPY N/A 11/09/2021    Procedure: ESOPHAGOGASTRODUODENOSCOPY WITH ANESTHESIA;  Surgeon: Ray Serna MD;  Location: [facility] ENDOSCOPY;  Service: Gastroenterology;  Laterality: N/A;  pre anemia  post hiatal hernia; gastric polyp  Dr. Shields   • KNEE SURGERY  1982   • LUMBAR LAMINECTOMY WITH FUSION Right 5/1/2023    Procedure: LUMBAR DISCECTOMY, JADON- LAMINECTOMY TRANSFORAMINAL LUMBAR INTERBODY FUSION, POSTERIOR PEDICLE SCREW INSTRUMENTATION, L5-S1 RIGHT. USE OF THE ROBOT;  Surgeon: Nathaniel Tesfaye MD;  Location: [facility] OR;  Service: Neurosurgery;  Laterality: Right;      General Information     Row Name 05/02/23 0810          Physical Therapy Time and Intention    Document Type evaluation  s/p LUMBAR DISCECTOMY, JADON- LAMINECTOMY TRANSFORAMINAL LUMBAR INTERBODY FUSION, POSTERIOR PEDICLE SCREW INSTRUMENTATION, L5-S1 RIGHT. USE OF THE ROBOT 5/1  -SB     Mode of Treatment physical therapy  -SB     Row Name 05/02/23 0810          General Information    Patient Profile Reviewed yes  -SB     Prior Level of Function independent:;all household mobility  -SB     Existing Precautions/Restrictions fall;brace worn when out of bed;spinal;LSO  -SB     Barriers to Rehab medically complex  -SB     Row Name 05/02/23 0810          Living Environment    People in Home alone  -SB     Row Name 05/02/23 0810          Home Main Entrance     Number of Stairs, Main Entrance none  -SB     Row Name 05/02/23 0810          Stairs Within Home, Primary    Number of Stairs, Within Home, Primary none  -SB     Row Name 05/02/23 0810          Cognition    Orientation Status (Cognition) oriented x 4  -SB     Row Name 05/02/23 0810          Safety Issues, Functional Mobility    Safety Issues Affecting Function (Mobility) safety precaution awareness  -SB     Impairments Affecting Function (Mobility) pain;balance;strength;sensation/sensory awareness  -SB           User Key  (r) = Recorded By, (t) = Taken By, (c) = Cosigned By    Initials Name Provider Type    SB Coral Cheung PT DPT Physical Therapist               Mobility     Row Name 05/02/23 0810          Bed Mobility    Bed Mobility rolling left;sidelying-sit  -SB     Rolling Left Edwards (Bed Mobility) standby assist  -SB     Sidelying-Sit Edwards (Bed Mobility) standby assist  -SB     Assistive Device (Bed Mobility) bed rails  -SB     Row Name 05/02/23 0810          Sit-Stand Transfer    Sit-Stand Edwards (Transfers) standby assist  -SB     Assistive Device (Sit-Stand Transfers) cane, straight  -SB     Row Name 05/02/23 0810          Gait/Stairs (Locomotion)    Edwards Level (Gait) contact guard  -SB     Assistive Device (Gait) cane, straight  -SB     Distance in Feet (Gait) 200  -SB     Deviations/Abnormal Patterns (Gait) antalgic  -SB     Right Sided Gait Deviations foot drop/toe drag  -SB     Comment, (Gait/Stairs) pt utilized step to gait pattern to improve balance and foot clearance  -SB           User Key  (r) = Recorded By, (t) = Taken By, (c) = Cosigned By    Initials Name Provider Type    SB Coral Cheung PT DPT Physical Therapist               Obj/Interventions     Row Name 05/02/23 0810          Range of Motion Comprehensive    General Range of Motion bilateral lower extremity ROM WFL  -SB     Row Name 05/02/23 0810          Strength Comprehensive (MMT)    General Manual  Muscle Testing (MMT) Assessment lower extremity strength deficits identified  -SB     Comment, General Manual Muscle Testing (MMT) Assessment L hip flex 4-/5, all others 4+/5; RLE 4-/5 except knee ext 4+/5  -SB     Row Name 05/02/23 0810          Balance    Balance Assessment sitting static balance;sitting dynamic balance;standing static balance;standing dynamic balance  -SB     Static Sitting Balance independent  -SB     Dynamic Sitting Balance independent  -SB     Position, Sitting Balance sitting edge of bed;unsupported  -SB     Static Standing Balance contact guard  -SB     Dynamic Standing Balance contact guard  -SB     Position/Device Used, Standing Balance cane, straight;supported  -SB     Row Name 05/02/23 0810          Sensory Assessment (Somatosensory)    Sensory Assessment (Somatosensory) other (see comments);left-sided sensation intact  numbness in S2 on RLE  -SB           User Key  (r) = Recorded By, (t) = Taken By, (c) = Cosigned By    Initials Name Provider Type    SB Coral Cheung, PT DPT Physical Therapist               Goals/Plan    No documentation.                Clinical Impression     Row Name 05/02/23 0810          Pain    Pretreatment Pain Rating 1/10  -SB     Posttreatment Pain Rating 8/10  -SB     Pain Location - Side/Orientation Right  -SB     Pain Location lower  -SB     Pain Location - back  -SB     Pain Intervention(s) Medication (See MAR);Repositioned;Ambulation/increased activity  -SB     Additional Documentation Pain Scale: Numbers Pre/Post-Treatment (Group)  -SB     Row Name 05/02/23 0810          Plan of Care Review    Plan of Care Reviewed With patient  -SB     Progress no change  -SB     Outcome Evaluation PT eval completed. Pt alert and oriented x4 and c/o minimal low back pain. Pt edu on spinal precautions, bracing and log rolling. Pt performs bed mob with SBA and VC and demos decreased strength in RLE as compared to LLE. Pt also reports numbness in S2 dermatome. Pt performs  sit to stand t/f and gait training with CGA, utilizing step to gait pattern with cane. Pt with minimal LOB that were self-corrected. Pt generally unsteady with ER and decreased R foot clearance. Pt edu on home safety and stair navigation to decrease fall risk. Pt reports that she has family and friends set up to come assist her at home. PT to sign off as patient plans to d/c home today. Recommend d/c home with assist and HH.  -SB     Row Name 05/02/23 0810          Therapy Assessment/Plan (PT)    Patient/Family Therapy Goals Statement (PT) go home  -SB     Criteria for Skilled Interventions Met (PT) no;does not meet criteria for skilled intervention  -SB     Therapy Frequency (PT) evaluation only  -SB     Row Name 05/02/23 0810          Positioning and Restraints    Pre-Treatment Position in bed  -SB     Post Treatment Position chair  -SB     In Chair notified nsg;sitting;call light within reach;encouraged to call for assist;with brace  -SB           User Key  (r) = Recorded By, (t) = Taken By, (c) = Cosigned By    Initials Name Provider Type    SB Coral Cheung, PT DPT Physical Therapist               Outcome Measures     Row Name 05/02/23 0810          How much help from another person do you currently need...    Turning from your back to your side while in flat bed without using bedrails? 4  -SB     Moving from lying on back to sitting on the side of a flat bed without bedrails? 4  -SB     Moving to and from a bed to a chair (including a wheelchair)? 4  -SB     Standing up from a chair using your arms (e.g., wheelchair, bedside chair)? 4  -SB     Climbing 3-5 steps with a railing? 3  -SB     To walk in hospital room? 3  -SB     AM-PAC 6 Clicks Score (PT) 22  -SB     Highest level of mobility 7 --> Walked 25 feet or more  -SB     Row Name 05/02/23 0810          Functional Assessment    Outcome Measure Options AM-PAC 6 Clicks Basic Mobility (PT)  -SB           User Key  (r) = Recorded By, (t) = Taken By, (c) =  Cosigned By    Initials Name Provider Type    SB Coral Cheung, PT DPT Physical Therapist              Physical Therapy Education     Title: PT OT SLP Therapies (In Progress)     Topic: Physical Therapy (In Progress)     Point: Mobility training (Done)     Learning Progress Summary           Patient Acceptance, E, VU by SB at 5/2/2023 0908    Comment: pt edu on spinal precautions, bracing, POC, benefits of act, d/c plans                   Point: Home exercise program (Not Started)     Learner Progress:  Not documented in this visit.          Point: Body mechanics (Done)     Learning Progress Summary           Patient Acceptance, E, VU by SB at 5/2/2023 0908    Comment: pt edu on spinal precautions, bracing, POC, benefits of act, d/c plans                   Point: Precautions (Done)     Learning Progress Summary           Patient Acceptance, E, VU by SB at 5/2/2023 0908    Comment: pt edu on spinal precautions, bracing, POC, benefits of act, d/c plans                               User Key     Initials Effective Dates Name Provider Type Discipline     06/16/21 -  Coral Cheung PT DPT Physical Therapist PT              PT Recommendation and Plan     Plan of Care Reviewed With: patient  Progress: no change  Outcome Evaluation: PT eval completed. Pt alert and oriented x4 and c/o minimal low back pain. Pt edu on spinal precautions, bracing and log rolling. Pt performs bed mob with SBA and VC and demos decreased strength in RLE as compared to LLE. Pt also reports numbness in S2 dermatome. Pt performs sit to stand t/f and gait training with CGA, utilizing step to gait pattern with cane. Pt with minimal LOB that were self-corrected. Pt generally unsteady with ER and decreased R foot clearance. Pt edu on home safety and stair navigation to decrease fall risk. Pt reports that she has family and friends set up to come assist her at home. PT to sign off as patient plans to d/c home today. Recommend d/c home with assist and  HH.     Time Calculation:    PT Charges     Row Name 05/02/23 0909             Time Calculation    Start Time 0810  20 min from 5/1 session for a total of 54 min  -SB      Stop Time 0844  -SB      Time Calculation (min) 34 min  -SB      PT Received On 05/02/23  -SB            User Key  (r) = Recorded By, (t) = Taken By, (c) = Cosigned By    Initials Name Provider Type    SB Coral Cheung, PT DPT Physical Therapist              Therapy Charges for Today     Code Description Service Date Service Provider Modifiers Qty    [id number] HC PT EVAL LOW COMPLEXITY 4 5/2/2023 Coral Cheung PT DPT GP 1          PT G-Codes  Outcome Measure Options: AM-PAC 6 Clicks Basic Mobility (PT)  AM-PAC 6 Clicks Score (PT): 22  AM-PAC 6 Clicks Score (OT): 17    PT Discharge Summary  Anticipated Discharge Disposition (PT): home with assist, home with home health    Coral Cheung PT DPT  5/2/2023

## 2023-05-02 NOTE — PLAN OF CARE
Goal Outcome Evaluation:  Plan of Care Reviewed With: patient        Progress: no change  Outcome Evaluation: PT eval completed. Pt alert and oriented x4 and c/o minimal low back pain. Pt edu on spinal precautions, bracing and log rolling. Pt performs bed mob with SBA and VC and demos decreased strength in RLE as compared to LLE. Pt also reports numbness in S2 dermatome. Pt performs sit to stand t/f and gait training with CGA, utilizing step to gait pattern with cane. Pt with minimal LOB that were self-corrected. Pt generally unsteady with ER and decreased R foot clearance. Pt edu on home safety and stair navigation to decrease fall risk. Pt reports that she has family and friends set up to come assist her at home. PT to sign off as patient plans to d/c home today. Recommend d/c home with assist and HH.

## 2023-05-02 NOTE — DISCHARGE SUMMARY
Date of Discharge:  5/2/2023    Discharge Diagnosis: Spinal stenosis, lumbar region, with neurogenic claudication [M48.062]    Presenting Problem/History of Present Illness  Spinal stenosis, lumbar region, with neurogenic claudication [M48.062]       Hospital Course  Patient is a 63 y.o. female presented with Spinal stenosis, lumbar region, with neurogenic claudication [M48.062].  The patient has been through all manner of conservative care without any meaningful improvement. The patient went to the operating room on May 1, 2023 for a L5-S1 hemilaminectomy and right TLIF with posterior pedicle screw instrumentation from L5-S1 via surgical robot.  The patient tolerated procedure well.  Currently the patient is ambulating.  The patient is tolerating p.o.  The patient is voiding spontaneously.  It is felt that at this time the patient is stable and suitable to be discharged home.  See orders for discharge instructions and restrictions.  The patient can take the dressing off in 72 hours and can get the wound wet at that time.  The patient is to clean the wound daily with soap and water.  They are to call the office with any drainage from the incision or worsening pain.  She is not to take any NSAIDs.  She is to wear the brace whenever she is out of bed.  I will follow-up with her in the office in 3-week's    Procedures Performed  Procedure(s):  LUMBAR DISCECTOMY, JADON- LAMINECTOMY TRANSFORAMINAL LUMBAR INTERBODY FUSION, POSTERIOR PEDICLE SCREW INSTRUMENTATION, L5-S1 RIGHT. USE OF THE ROBOT       Consults:   Consults     No orders found for last 30 day(s).            Condition on Discharge: Stable    Vital Signs  Temp:  [97.2 °F (36.2 °C)-99.2 °F (37.3 °C)] 98.4 °F (36.9 °C)  Heart Rate:  [] 95  Resp:  [14-24] 20  BP: (124-166)/(51-82) 148/67    Physical Exam:   Physical Exam  Constitutional:       Appearance: She is well-developed.   HENT:      Head: Normocephalic.   Eyes:      Extraocular Movements: EOM normal.       Pupils: Pupils are equal, round, and reactive to light.   Pulmonary:      Effort: Pulmonary effort is normal.   Musculoskeletal:         General: Normal range of motion.      Cervical back: Normal range of motion.   Skin:     General: Skin is warm.      Comments: Incision clean dry and intact   Neurological:      Mental Status: She is alert and oriented to person, place, and time.      GCS: GCS eye subscore is 4. GCS verbal subscore is 5. GCS motor subscore is 6.      Cranial Nerves: No cranial nerve deficit.      Sensory: No sensory deficit.      Motor: Motor strength is normal.      Gait: Gait is intact. Gait normal.      Deep Tendon Reflexes: Reflexes are normal and symmetric.   Psychiatric:         Speech: Speech normal.         Behavior: Behavior normal.         Thought Content: Thought content normal.          Neurologic Exam     Mental Status   Oriented to person, place, and time.   Attention: normal. Concentration: normal.   Speech: speech is normal   Level of consciousness: alert  Normal comprehension.     Cranial Nerves     CN II   Visual fields full to confrontation.     CN III, IV, VI   Pupils are equal, round, and reactive to light.  Extraocular motions are normal.     CN V   Facial sensation intact.     CN VII   Facial expression full, symmetric.     CN VIII   CN VIII normal.     CN IX, X   CN IX normal.   CN X normal.     CN XI   CN XI normal.     CN XII   CN XII normal.     Motor Exam   Muscle bulk: normal    Strength   Strength 5/5 throughout.     Sensory Exam   Light touch normal.     Gait, Coordination, and Reflexes     Gait  Gait: normal    Reflexes   Reflexes 2+ except as noted.          Discharge Disposition  Home or Self Care    Discharge Medications     Discharge Medications      New Medications      Instructions Start Date   cyclobenzaprine 10 MG tablet  Commonly known as: FLEXERIL   10 mg, Oral, 3 Times Daily PRN      HYDROcodone-acetaminophen 7.5-325 MG per tablet  Commonly known as:  NORCO   1 tablet, Oral, Every 6 Hours PRN         Changes to Medications      Instructions Start Date   gabapentin 300 MG capsule  Commonly known as: NEURONTIN  What changed:   · how much to take  · how to take this  · when to take this   Script #2: take 1 po TID         Continue These Medications      Instructions Start Date   B-D UF III MINI PEN NEEDLES 31G X 5 MM misc  Generic drug: Insulin Pen Needle   USE 5 TIMES DAILY AND AS NEEDED.      Dexcom G6 Sensor   See Admin Instructions      Dexcom G6 Transmitter misc   See Admin Instructions      glucose blood test strip   5 times a day and prn .      insulin aspart 100 UNIT/ML solution pen-injector sc pen  Commonly known as: novoLOG FLEXPEN   Subcutaneous, 3 Times Daily With Meals, 2-5 units with meals      JUICE PLUS FIBRE PO   Oral, Daily      levothyroxine 50 MCG tablet  Commonly known as: SYNTHROID, LEVOTHROID   50 mcg, Oral, Every Early Morning      multivitamin with iron tablet tablet   1 tablet, Oral, Daily      Omnipod DASH Pods (Gen 4) misc   1 each, Does not apply, Continuous, USE ONE POD EVERY THREE DAYS      ONE TOUCH ULTRA 2 w/Device kit   See Admin Instructions      OneTouch Delica Plus [id number] misc   TEST 5 TIMES DAILY AND AS NEEDED      pantoprazole 40 MG EC tablet  Commonly known as: PROTONIX   40 mg, Oral, Daily      sulfamethoxazole-trimethoprim 800-160 MG per tablet  Commonly known as: Bactrim DS   1 tablet, Oral, 2 Times Daily      Toujeo Max SoloStar 300 UNIT/ML solution pen-injector injection  Generic drug: Insulin Glargine (2 Unit Dial)   Inject 24 Units under the skin into the appropriate area as directed Every Night.      vitamin D 1.25 MG (61270 UT) capsule capsule  Commonly known as: ERGOCALCIFEROL   50,000 Units, Oral, Weekly             Discharge Diet:   Diet Instructions     Diet: Regular/House Diet; Regular Texture (IDDSI 7); Thin (IDDSI 0)      Discharge Diet: Regular/House Diet    Texture: Regular Texture (IDDSI 7)    Fluid  Consistency: Thin (IDDSI 0)          Activity at Discharge:   Activity Instructions     Other Instructions (Specify)      Activity Instructions: no strenuous activity. Wear brace when out of bed or sitting up.  No NSAIDs          Follow-up Appointments  Future Appointments   Date Time Provider Department Center   5/23/2023  2:15 PM Ray Serna MD [facility] GE PAD PAD   5/24/2023  9:00 AM Hubert Brewster APRN [facility] NS PAD PAD   6/16/2023  9:30 AM [facility] PAD PAT 30 RM 1 BH PAD PAT PAD   7/3/2023 11:00 AM Damion Moran APRN [facility] MAD MAD   7/11/2023  8:00 AM Nathaniel Tesfaye MD [facility] P[facility] PAD     Additional Instructions for the Follow-ups that You Need to Schedule     Call MD With Problems / Concerns   As directed      Call with worsening back or leg pain, drainage from wound, fever, or difficulty walking    Order Comments: Call with worsening back or leg pain, drainage from wound, fever, or difficulty walking          Discharge Follow-up with Specialty: hubert brewster np; 3 Weeks   As directed      Specialty: hubert brewster np    Follow Up: 3 Weeks               Test Results Pending at Discharge  Pending Labs     Order Current Status    Tissue Pathology Exam In process           RAYMON Desir  05/02/23  07:46 CDT    Time: Discharge 20 min

## 2023-05-02 NOTE — CASE MANAGEMENT/SOCIAL WORK
Continued Stay Note   Pattie     Patient Name: Fiordaliza Lyons  MRN: [id number]  Today's Date: 5/2/2023    Admit Date: 5/1/2023    Plan: Home with [facility]   Discharge Plan     Row Name 05/02/23 0918       Plan    Plan Home with [facility]    Patient/Family in Agreement with Plan yes    Provided Post Acute Provider List? Refused    Provided Post Acute Provider Quality & Resource List? Refused    Plan Comments Patient requested  services through [facility].  Referral provided to Andie Kumar with [facility].    Final Discharge Disposition Code 06 - home with home health care    Final Note Home with [facility]               Discharge Codes    No documentation.               Expected Discharge Date and Time     Expected Discharge Date Expected Discharge Time    May 2, 2023             RYNE Reynolds

## 2023-05-02 NOTE — PLAN OF CARE
Goal Outcome Evaluation:  Plan of Care Reviewed With: patient        Progress: no change  Outcome Evaluation: VSS. No change in neuro status this shift. Drsg c/d/i, with scant dried drainage present. warren DICKEY n/t. Continues to c/o intermit pain, prn given. Up to bsc w/brace. SCD in place. Safety maintained.

## 2023-05-03 ENCOUNTER — TELEPHONE (OUTPATIENT)
Dept: NEUROSURGERY | Facility: CLINIC | Age: 63
End: 2023-05-03
Payer: COMMERCIAL

## 2023-05-03 NOTE — THERAPY DISCHARGE NOTE
Acute Care - Occupational Therapy Discharge Summary  [facility]     Patient Name: Fiordaliza Lyons  : 1960  MRN: [id number]    Today's Date: 5/3/2023                 Admit Date: 2023        OT Recommendation and Plan    Visit Dx:    ICD-10-CM ICD-9-CM   1. Acquired spondylolisthesis  M43.10 738.4   2. Spinal stenosis, lumbar region, with neurogenic claudication  M48.062 724.03   3. Decreased activities of daily living (ADL)  Z78.9 V49.89   4. Degeneration of lumbar or lumbosacral intervertebral disc  M51.37 722.52   5. Impaired mobility  Z74.09 799.89                OT Rehab Goals     Row Name 23 1300             Dressing Goal 1 (OT)    Activity/Device (Dressing Goal 1, OT) dressing skills, all  -CS      [place]/Cues Needed (Dressing Goal 1, OT) standby assist  -CS      Time Frame (Dressing Goal 1, OT) long term goal (LTG);by discharge  -CS      Progress/Outcome (Dressing Goal 1, OT) goal not met  -CS         Toileting Goal 1 (OT)    Activity/Device (Toileting Goal 1, OT) toileting skills, all  -CS      [place] Level/Cues Needed (Toileting Goal 1, OT) standby assist  -CS      Time Frame (Toileting Goal 1, OT) long term goal (LTG);by discharge  -CS      Progress/Outcome (Toileting Goal 1, OT) goal not met  -CS            User Key  (r) = Recorded By, (t) = Taken By, (c) = Cosigned By    Initials Name Provider Type Discipline    CS Romana Ruggiero OTR/L, CNT Occupational Therapist OT                            OT Discharge Summary  Anticipated Discharge Disposition (OT): home with assist, home with home health  Reason for Discharge: Discharge from facility  Outcomes Achieved: Refer to plan of care for updates on goals achieved  Discharge Destination: Home with assist, Home with home health      ROCCO Harris/L, FRANCES  5/3/2023

## 2023-05-03 NOTE — TELEPHONE ENCOUNTER
Yue called to let us know they have admitted the patient to [facility].  She is asking for verbal approval on 2 orders:    1) needs to know if they can order  to come to see the patient due to patient requesting a DNR status.  This can only be changed with a  visit.    2) due to the patient's diabetes they are needing a standing order for A1C to be drawn (I would think this needs to come from patient's PCP)      FRANSISCA HERRMANN [facility]  PHYSICIAN LEAD  DR MARY PITTMAN  [facility] [place] [facility]

## 2023-05-03 NOTE — TELEPHONE ENCOUNTER
I have notified Yue that Randy has given approval for those orders; however, I did tell her that since Dr Shields is her PCP if something comes back abnormal on her A1C we will have to consult with her.  Yue said she is going to get medical/lab orders from her office if that is okay with us.     FRANSISCA HERRMANN [facility]  PHYSICIAN LEAD  DR MARY PITTMAN  [facility] [place] [facility] [place] [facility]

## 2023-05-04 ENCOUNTER — TELEPHONE (OUTPATIENT)
Dept: NEUROSURGERY | Facility: CLINIC | Age: 63
End: 2023-05-04
Payer: COMMERCIAL

## 2023-05-04 LAB — HBA1C MFR BLD: 11.5 % (ref 4–6)

## 2023-05-04 NOTE — TELEPHONE ENCOUNTER
"Home Health nurse calling to alert us that she has bilateral \"knots\" on the top of her incision site.  She states the patient states these are painful to touch.  They are not red, not tender, not draining, no sign of infection.      They are going to recheck the incision next week but PT/OT will see her tomorrow and they can check it and report back.    I am going to forward this to Randy for review.      FRANSISCA HERRMANN [facility]  PHYSICIAN LEAD  DR MARY PITTMAN  [facility] [place] [facility]    "

## 2023-05-08 ENCOUNTER — TELEPHONE (OUTPATIENT)
Dept: NEUROSURGERY | Facility: CLINIC | Age: 63
End: 2023-05-08
Payer: COMMERCIAL

## 2023-05-08 NOTE — TELEPHONE ENCOUNTER
Celine called and left a VM on Friday @ 12:44 pm stating physical therapy has evaluated and they will see her 2-3 x/wk for several weeks to help patient gain her strength back after surgery.  She did state the patient was doing well.    FRANSISCA HERRMANN [facility]  PHYSICIAN LEAD  DR MARY PITTMAN  [facility] [place] [place] [facility]

## 2023-05-09 ENCOUNTER — TELEPHONE (OUTPATIENT)
Dept: NEUROSURGERY | Facility: CLINIC | Age: 63
End: 2023-05-09
Payer: COMMERCIAL

## 2023-05-09 NOTE — TELEPHONE ENCOUNTER
Patient called and left a  stating she is having trouble with her lower extremity.  Would like a call back.    I called her back and she says her RLE is hurting at bedtime and needs to direction on medication.  She wasn't taking the Gabapentin at all so I have advised her to start back on that (she was taking it prior to surgery and just never re-started it).  She is going to start back on this today and will give it through until next week.  If not better she will call me back.    FRANSISCA HERRMANN [facility]  PHYSICIAN LEAD  DR MARY PITTMAN  [facility] [place] [facility]

## 2023-05-10 ENCOUNTER — TELEPHONE (OUTPATIENT)
Dept: NEUROSURGERY | Facility: CLINIC | Age: 63
End: 2023-05-10
Payer: COMMERCIAL

## 2023-05-10 NOTE — TELEPHONE ENCOUNTER
Patient called and questioned whether or not she can drive.    I called her back and told her that she can drive as long as she is comfortable with her reaction time & I discussed narcotic use in the event she were in a wreck or got pulled over.  She expressed understanding.      FRANSISCA HERRMANN [facility]  PHYSICIAN LEAD  DR MARY PITTMAN  [facility] [place] [facility] [place] [facility]

## 2023-05-11 ENCOUNTER — TELEPHONE (OUTPATIENT)
Dept: NEUROSURGERY | Facility: CLINIC | Age: 63
End: 2023-05-11
Payer: COMMERCIAL

## 2023-05-11 ENCOUNTER — OFFICE VISIT (OUTPATIENT)
Dept: NEUROSURGERY | Facility: CLINIC | Age: 63
End: 2023-05-11
Payer: COMMERCIAL

## 2023-05-11 ENCOUNTER — HOSPITAL ENCOUNTER (OUTPATIENT)
Dept: GENERAL RADIOLOGY | Facility: HOSPITAL | Age: 63
Discharge: HOME | End: 2023-05-11
Admitting: NURSE PRACTITIONER
Payer: COMMERCIAL

## 2023-05-11 VITALS — WEIGHT: 115 LBS | BODY MASS INDEX: 22.58 KG/M2 | HEIGHT: 60 IN

## 2023-05-11 DIAGNOSIS — M21.371 RIGHT FOOT DROP: ICD-10-CM

## 2023-05-11 DIAGNOSIS — M54.16 LUMBAR RADICULOPATHY: ICD-10-CM

## 2023-05-11 DIAGNOSIS — M43.10 ACQUIRED SPONDYLOLISTHESIS: ICD-10-CM

## 2023-05-11 DIAGNOSIS — M48.062 SPINAL STENOSIS, LUMBAR REGION, WITH NEUROGENIC CLAUDICATION: ICD-10-CM

## 2023-05-11 DIAGNOSIS — Z78.9 NON-SMOKER: ICD-10-CM

## 2023-05-11 DIAGNOSIS — M51.37 DEGENERATION OF LUMBAR OR LUMBOSACRAL INTERVERTEBRAL DISC: Primary | ICD-10-CM

## 2023-05-11 PROCEDURE — 72110 X-RAY EXAM L-2 SPINE 4/>VWS: CPT

## 2023-05-11 RX ORDER — ROPINIROLE 0.25 MG/1
0.25 TABLET, FILM COATED ORAL NIGHTLY
Qty: 30 TABLET | Refills: 0 | Status: SHIPPED | OUTPATIENT
Start: 2023-05-11

## 2023-05-11 NOTE — TELEPHONE ENCOUNTER
Placed a call to inform pt of imaging results and to inform pt to continue with plan for f/u and medication. Ended call with pt understanding and acknowledgment.

## 2023-05-11 NOTE — TELEPHONE ENCOUNTER
----- Message from RAYMON Garcia sent at 5/11/2023  3:57 PM CDT -----  Can you call the patient and let her know that her x-rays look good.  Continue with plan for follow-up and medication at this time  ----- Message -----  From: [facility] [place] [facility]  Sent: 5/11/2023   3:47 PM CDT  To: RAYMON Garcia

## 2023-05-11 NOTE — PROGRESS NOTES
"  Chief complaint:   Chief Complaint   Patient presents with   • Leg Pain     Pt here for p/o f/u. Pt states since surgery her bilateral legs have been in constant pain.          Subjective     HPI: This is a 63 y.o. female patient who went to the operating room on 5/1/2023 for a L5-S1 right hemilaminectomy and TLIF with posterior pedicle screw instrumentation from L5-S1 via surgical robot.  The patient is here in follow up today for postoperative visit.  Prior to surgery the patient was complaining of back pain and bilateral lower extremity pain with the right being worse than the left along with numbness and tingling.  She felt her pain issues were 50-50.  She comes in today and says that she is doing better overall from the surgery.  Her back and leg pain is better.  She said that she is able to get up and move better than what she was before surgery.  Her biggest complaint is that it feels like she is constantly having to move her legs.  She says it does bother her during the day and is getting worse at nighttime.  She has taken muscle relaxers and gabapentin.        Review of Systems   Musculoskeletal: Positive for arthralgias, back pain and myalgias.   Neurological: Positive for weakness.         Objective      Vital Signs  Ht 153 cm (60.24\")   Wt 52.2 kg (115 lb)   BMI 22.28 kg/m²     Physical Exam  Constitutional:       Appearance: She is well-developed.   HENT:      Head: Normocephalic.   Eyes:      Extraocular Movements: EOM normal.      Pupils: Pupils are equal, round, and reactive to light.   Pulmonary:      Effort: Pulmonary effort is normal.   Musculoskeletal:         General: Normal range of motion.      Cervical back: Normal range of motion.   Skin:     General: Skin is warm.   Neurological:      Mental Status: She is alert and oriented to person, place, and time.      GCS: GCS eye subscore is 4. GCS verbal subscore is 5. GCS motor subscore is 6.      Cranial Nerves: No cranial nerve deficit.      " Sensory: No sensory deficit.      Motor: Motor strength is normal.      Gait: Gait is intact. Gait normal.      Deep Tendon Reflexes: Reflexes are normal and symmetric.   Psychiatric:         Speech: Speech normal.         Behavior: Behavior normal.         Thought Content: Thought content normal.       Incisions clean dry and intact    Neurologic Exam     Mental Status   Oriented to person, place, and time.   Attention: normal. Concentration: normal.   Speech: speech is normal   Level of consciousness: alert  Normal comprehension.     Cranial Nerves     CN II   Visual fields full to confrontation.     CN III, IV, VI   Pupils are equal, round, and reactive to light.  Extraocular motions are normal.     CN V   Facial sensation intact.     CN VII   Facial expression full, symmetric.     CN VIII   CN VIII normal.     CN IX, X   CN IX normal.   CN X normal.     CN XI   CN XI normal.     CN XII   CN XII normal.     Motor Exam   Muscle bulk: normal    Strength   Strength 5/5 throughout.   Right iliopsoas: 3/5  Left iliopsoas: 3/5  Right anterior tibial: 3/5  Right EHL 3/5     Sensory Exam   Light touch normal.     Gait, Coordination, and Reflexes     Gait  Gait: normal    Reflexes   Reflexes 2+ except as noted.       Imaging review: No new imaging          Assessment/Plan: The patient is complaining of what sounds like restless leg syndrome.  I am going to start her on Requip and see this will help calm her restless legs down.  I will also send her for set x-rays of the lumbar spine to make sure that there is no issues with the hardware.  We will plan to see her back in the office in a couple weeks for reevaluation.  She was told to call us if she any further problems or concerns    Patient is a nonsmoker  The patient's Body mass index is 22.28 kg/m².. BMI is within normal parameters. No follow-up required.    Diagnoses and all orders for this visit:    1. Degeneration of lumbar or lumbosacral intervertebral disc  (Primary)  -     XR Spine Lumbar Complete 4+VW    2. Spinal stenosis, lumbar region, with neurogenic claudication  -     XR Spine Lumbar Complete 4+VW    3. Lumbar radiculopathy  -     XR Spine Lumbar Complete 4+VW    4. Acquired spondylolisthesis  -     XR Spine Lumbar Complete 4+VW    5. Right foot drop  -     XR Spine Lumbar Complete 4+VW    6. Non-smoker    7. Body mass index (BMI) of 22.0 to 22.9 in adult        I discussed the patients findings and my recommendations with patient  Randy Mcbride, APRN  05/11/23  14:37 CDT  Answers for HPI/ROS submitted by the patient on 5/11/2023  Please describe your symptoms.: pt states she is having constant bilateral leg pain  Have you had these symptoms before?: No  How long have you been having these symptoms?: 1-4 days  What is the primary reason for your visit?: Other

## 2023-05-11 NOTE — PATIENT INSTRUCTIONS
Advance Care Planning and Advance Directives     You make decisions on a daily basis - decisions about where you want to live, your career, your home, your life. Perhaps one of the most important decisions you face is your choice for future medical care. Take time to talk with your family and your healthcare team and start planning today.  Advance Care Planning is a process that can help you:  Understand possible future healthcare decisions in light of your own experiences  Reflect on those decision in light of your goals and values  Discuss your decisions with those closest to you and the healthcare professionals that care for you  Make a plan by creating a document that reflects your wishes    Surrogate Decision Maker  In the event of a medical emergency, which has left you unable to communicate or to make your own decisions, you would need someone to make decisions for you.  It is important to discuss your preferences for medical treatment with this person while you are in good health.     Qualities of a surrogate decision maker:  Willing to take on this role and responsibility  Knows what you want for future medical care  Willing to follow your wishes even if they don't agree with them  Able to make difficult medical decisions under stressful circumstances    Advance Directives  These are legal documents you can create that will guide your healthcare team and decision maker(s) when needed. These documents can be stored in the electronic medical record.    Living Will - a legal document to guide your care if you have a terminal condition or a serious illness and are unable to communicate. States vary by statute in document names/types, but most forms may include one or more of the following:        -  Directions regarding life-prolonging treatments        -  Directions regarding artificially provided nutrition/hydration        -  Choosing a healthcare decision maker        -  Direction regarding organ/tissue  donation    Durable Power of  for Healthcare - this document names an -in-fact to make medical decisions for you, but it may also allow this person to make personal and financial decisions for you. Please seek the advice of an  if you need this type of document.    **Advance Directives are not required and no one may discriminate against you if you do not sign one.    Medical Orders  Many states allow specific forms/orders signed by your physician to record your wishes for medical treatment in your current state of health. This form, signed in personal communication with your physician, addresses resuscitation and other medical interventions that you may or may not want.      For more information or to schedule a time with a [facility] Advance Care Planning Facilitator contact: [email or web address] or call [phone] and someone will contact you directly.

## 2023-05-18 ENCOUNTER — OFFICE VISIT (OUTPATIENT)
Dept: INTERNAL MEDICINE | Age: 63
End: 2023-05-18
Payer: COMMERCIAL

## 2023-05-18 VITALS — DIASTOLIC BLOOD PRESSURE: 64 MMHG | SYSTOLIC BLOOD PRESSURE: 120 MMHG | HEART RATE: 96 BPM | OXYGEN SATURATION: 99 %

## 2023-05-18 DIAGNOSIS — M47.26 OSTEOARTHRITIS OF SPINE WITH RADICULOPATHY, LUMBAR REGION: ICD-10-CM

## 2023-05-18 DIAGNOSIS — F10.11 HISTORY OF ALCOHOL ABUSE: ICD-10-CM

## 2023-05-18 DIAGNOSIS — E10.319 TYPE 1 DIABETES MELLITUS WITH RETINOPATHY OF BOTH EYES, MACULAR EDEMA PRESENCE UNSPECIFIED, UNSPECIFIED RETINOPATHY SEVERITY (HCC): Primary | ICD-10-CM

## 2023-05-18 DIAGNOSIS — D50.0 IRON DEFICIENCY ANEMIA DUE TO CHRONIC BLOOD LOSS: ICD-10-CM

## 2023-05-18 DIAGNOSIS — K64.4 EXTERNAL HEMORRHOIDS: ICD-10-CM

## 2023-05-18 DIAGNOSIS — E55.9 VITAMIN D DEFICIENCY: ICD-10-CM

## 2023-05-18 PROCEDURE — 99214 OFFICE O/P EST MOD 30 MIN: CPT | Performed by: INTERNAL MEDICINE

## 2023-05-18 PROCEDURE — 3046F HEMOGLOBIN A1C LEVEL >9.0%: CPT | Performed by: INTERNAL MEDICINE

## 2023-05-18 RX ORDER — CYCLOBENZAPRINE HCL 10 MG
10 TABLET ORAL 3 TIMES DAILY PRN
COMMUNITY

## 2023-05-18 NOTE — PROGRESS NOTES
Chief Complaint   Patient presents with    Diabetes       HPI: Patient is here today to follow-up diabetes and other medical issues. She recently had back surgery she is doing well but has some restless legs and leg discomfort since the surgery the back pain itself is gone the leg pain is different from before she is getting physical therapy she seems to be improving and advancing. Her diabetes is out of control she is not wearing her continuous glucose monitor she says it has to keep coming off for procedures. Past Medical History:   Diagnosis Date    Alcohol abuse 2/6/2019    Allergic rhinitis     Diabetes mellitus (HCC)     GERD (gastroesophageal reflux disease)     Hyperlipidemia     Hypertension     Postmenopausal 1/3/2019    Sleep apnea        Past Surgical History:   Procedure Laterality Date    CHOLECYSTECTOMY      KNEE ARTHROPLASTY      LIANA AND BSO (CERVIX REMOVED)         Family History   Problem Relation Age of Onset    Cancer Mother         ovarian    Cancer Maternal Grandmother         ovarian    Cancer Paternal Grandmother         ovarian       Social History     Socioeconomic History    Marital status: Single     Spouse name: Not on file    Number of children: Not on file    Years of education: Not on file    Highest education level: Not on file   Occupational History    Not on file   Tobacco Use    Smoking status: Never    Smokeless tobacco: Never   Substance and Sexual Activity    Alcohol use: Not on file    Drug use: Not on file    Sexual activity: Not on file   Other Topics Concern    Not on file   Social History Narrative    Not on file     Social Determinants of Health     Financial Resource Strain: Low Risk     Difficulty of Paying Living Expenses: Not hard at all   Food Insecurity: No Food Insecurity    Worried About Running Out of Food in the Last Year: Never true    [address] in the Last Year: Never true   Transportation Needs: Unknown    Lack of Transportation (Medical):  Not on

## 2023-05-19 ENCOUNTER — TELEPHONE (OUTPATIENT)
Dept: INTERNAL MEDICINE | Age: 63
End: 2023-05-19

## 2023-05-19 NOTE — TELEPHONE ENCOUNTER
Pt called to let you know that she will be having an apt with Dr Hipolito Mooney in [place] on June 19th.

## 2023-05-19 NOTE — TELEPHONE ENCOUNTER
Good - in meantime where her continuous glucose monitor and keep a clos eye on blood sugar - take those records to Dr. Imani De Guzman office

## 2023-05-23 ENCOUNTER — LAB (OUTPATIENT)
Dept: LAB | Facility: HOSPITAL | Age: 63
End: 2023-05-23
Payer: COMMERCIAL

## 2023-05-23 ENCOUNTER — OFFICE VISIT (OUTPATIENT)
Dept: GASTROENTEROLOGY | Facility: CLINIC | Age: 63
End: 2023-05-23
Payer: COMMERCIAL

## 2023-05-23 VITALS
OXYGEN SATURATION: 100 % | HEIGHT: 60 IN | BODY MASS INDEX: 21.79 KG/M2 | WEIGHT: 111 LBS | HEART RATE: 96 BPM | DIASTOLIC BLOOD PRESSURE: 88 MMHG | SYSTOLIC BLOOD PRESSURE: 148 MMHG | TEMPERATURE: 96.6 F

## 2023-05-23 DIAGNOSIS — K70.30 ALCOHOLIC CIRRHOSIS OF LIVER WITHOUT ASCITES: Primary | ICD-10-CM

## 2023-05-23 DIAGNOSIS — D12.6 ADENOMATOUS POLYP OF COLON, UNSPECIFIED PART OF COLON: ICD-10-CM

## 2023-05-23 DIAGNOSIS — K64.3 GRADE IV HEMORRHOIDS: ICD-10-CM

## 2023-05-23 DIAGNOSIS — K70.30 ALCOHOLIC CIRRHOSIS OF LIVER WITHOUT ASCITES: ICD-10-CM

## 2023-05-23 PROCEDURE — 36415 COLL VENOUS BLD VENIPUNCTURE: CPT

## 2023-05-23 PROCEDURE — 84080 ASSAY ALKALINE PHOSPHATASES: CPT

## 2023-05-23 PROCEDURE — 86381 MITOCHONDRIAL ANTIBODY EACH: CPT

## 2023-05-23 PROCEDURE — 84075 ASSAY ALKALINE PHOSPHATASE: CPT

## 2023-05-23 NOTE — PROGRESS NOTES
[facility]    Chief Complaint   Patient presents with    Follow-up    Hemorrhoids       Subjective     HPI    Fiordaliza Lyons is a 63 y.o. female who presents with a chief complaint of cirrhosis and hemorrhoids    She comes in for 1 year checkup.  She tells me she is doing well.  She still has trouble with her hemorrhoids.  She states they have enlarged.  She is seeing Dr. Miranda and has surgery scheduled in later June.  She previously called them her testicles but now they are much bigger she states.    At her health issues she is that she had back surgery for collapsed L5 at the beginning of this month.  She is wearing her brace.  She notes that she started to have a little bit of numbness and tingling down her right leg and she is going back to see her surgeon for follow-up tomorrow.  Otherwise she is doing well with this.  She is happy with the results.    From a GI standpoint she does not have any other complaints.  She remains alcohol free since October 2021.  She is trying to eat well.  Denies any abdominal pain.  She did not get an ultrasound since last May.  No other imaging of her liver since last May.  She states she has been busy with other medical issues.    =========== copy of May 9, 2022 HPI==================    HPI     Fiordaliza Lyons is a 62 y.o. female who presents with a chief complaint of hemorrhoids and liver disease.     She comes in for 6-month checkup on her liver.  She is done quite well.  She states she has not had any alcohol consumption since October 4, 2021.  She denies any fluid retention.  She is not on any diuretics.  She is working hard to get her diabetes under control.  She is got her hemoglobin A1c down to 7.5.  Her only complaint today is that she still battles with her hemorrhoids.  She states she is still has them protrude at times.  She underwent colonoscopy this past fall noting internal hemorrhoids.  She notes a little bit of clear discharge from  "the hemorrhoids and after excessive wiping she may see a little bit of bright red blood.  She does use hydrocortisone cream prescribed by Dr. Shields.  She does not use it every night.  She did not deliver babies.  She is not having any incontinence of stool        ================= copy of November 2021 HPI==============================  HISTORY OF PRESENT ILLNESS:      Fiordaliza Lyons is a 61 y.o. female presents with rectal mass. This was noted by Dr. Shields 10-11-21.  She has had problems having bm's. Dr. Shields also noted rectal prolapse and the patient reports having to \" push it back in to have a bm\". She has also noted some red , small amount, on the tissue with bm. Has had some llq pain. No fever. No weight loss. No abdominal pain. No n/v. No hematemesis. No black stool.            She has alcoholic liver disease. Last drink of etoh 10-4-21. She did drink 3 glasses of vodka/sprite.  Currently Dr. Shields is managing her diuretics  (see med list).        She was hospitalized here at [facility] 10-29-21 to 11-1-21.   Discharge diagnoses acute cystitis, alcoholic cirrhosis, SARAH, anasarca, thoracic spine pain ( abnormal CT T6-7), hyponatremia, and ascites.   Last labs 11-1-21 plt normal, hgb 7.0, mcv 71, wbc 8.8, bun 7, creat 0.51, na 131.  CMP 11-1-21 with  albumin 2.8, alt 14, ast 34, alk phos 180, bili normal. Last inr normal on 10-29-21.  She did have a ultrasound paracentesis with 1500 ml removed ( no infection).  She was on IV diuretic drip.       She did receive blood transfusion 11-3-21 for hgb 7.0.         CT abdomen/pelvis with contrast done 10-5-21 noted gastric wall thickening, thickening of ascending colon.      She had appointment with Dr. Shields 11-2-11 with f/u appointment scheduled for 11-11-21.         Past Medical History:   Diagnosis Date    Alcoholic hepatitis with ascites     Allergic rhinitis     Breast cyst     Cancer     ovarian    Degeneration of lumbar or lumbosacral intervertebral disc " 09/15/2022    Diabetes mellitus     Disease of thyroid gland     Elevated d-dimer     Endometriosis     Fatty liver     GERD (gastroesophageal reflux disease)     History of transfusion     Hyperlipidemia     Hypertension     Osteoporosis     Ovarian cyst     Pancreatitis     Postmenopausal     Rectal mass     Rectal prolapse     Sleep apnea        Past Surgical History:   Procedure Laterality Date    BILATERAL SALPINGO OOPHORECTOMY  2008    CHOLECYSTECTOMY  1996    COLONOSCOPY  07/2017    Dr Murphy- per patient normal     COLONOSCOPY N/A 11/09/2021    Procedure: COLONOSCOPY WITH ANESTHESIA;  Surgeon: Ray Serna MD;  Location: [facility];  Service: Gastroenterology;  Laterality: N/A;  pre rectal mass  post  Dr Shields    DILATION AND CURETTAGE, DIAGNOSTIC / THERAPEUTIC      ENDOSCOPY N/A 11/09/2021    Procedure: ESOPHAGOGASTRODUODENOSCOPY WITH ANESTHESIA;  Surgeon: Ray Serna MD;  Location: [facility] END[facility]COPY;  Service: Gastroenterology;  Laterality: N/A;  pre anemia  post hiatal hernia; gastric polyp  Dr. Shields    KNEE SURGERY  1982    LUMBAR LAMINECTOMY WITH FUSION Right 5/1/2023    Procedure: LUMBAR DISCECTOMY, JADON- LAMINECTOMY TRANSFORAMINAL LUMBAR INTERBODY FUSION, POSTERIOR PEDICLE SCREW INSTRUMENTATION, L5-S1 RIGHT. USE OF THE ROBOT;  Surgeon: Nathaniel Tesfaye MD;  Location: [facility] OR;  Service: Neurosurgery;  Laterality: Right;         Current Outpatient Medications:     B-D UF III MINI PEN NEEDLES 31G X 5 MM misc, USE 5 TIMES DAILY AND AS NEEDED., Disp: , Rfl:     Blood Glucose Monitoring Suppl (ONE TOUCH ULTRA 2) w/Device kit, See Admin Instructions., Disp: , Rfl:     Continuous Blood Gluc Sensor (Dexcom G6 Sensor), See Admin Instructions., Disp: , Rfl:     Continuous Blood Gluc Transmit (Dexcom G6 Transmitter) misc, See Admin Instructions., Disp: , Rfl:     glucose blood test strip, 5 times a day and prn ., Disp: , Rfl:     insulin aspart (novoLOG FLEXPEN) 100 UNIT/ML solution  "pen-injector sc pen, Inject  under the skin into the appropriate area as directed 3 (Three) Times a Day With Meals. 2-5 units with meals, Disp: , Rfl:     Insulin Disposable Pump (OmniPod Dash 5 Pack Pods) misc, 1 each Continuous. USE ONE POD EVERY THREE DAYS, Disp: 2 each, Rfl: 11    Lancets (OneTouch Delica Plus [id number]) misc, TEST 5 TIMES DAILY AND AS NEEDED, Disp: , Rfl:     levothyroxine (SYNTHROID, LEVOTHROID) 50 MCG tablet, Take 1 tablet by mouth Every Morning., Disp: , Rfl:     Multiple Vitamins-Iron (multivitamin with iron) tablet tablet, Take 1 tablet by mouth Daily., Disp: , Rfl:     Nutritional Supplements (JUICE PLUS FIBRE PO), Take  by mouth Daily., Disp: , Rfl:     pantoprazole (PROTONIX) 40 MG EC tablet, Take 1 tablet by mouth Daily., Disp: , Rfl:     Toujeo Max SoloStar 300 UNIT/ML solution pen-injector injection, Inject 24 Units under the skin into the appropriate area as directed Every Night., Disp: , Rfl:     vitamin D (ERGOCALCIFEROL) 1.25 MG (61204 UT) capsule capsule, Take 1 capsule by mouth 1 (One) Time Per Week., Disp: , Rfl:     cyclobenzaprine (FLEXERIL) 10 MG tablet, Take 1 tablet by mouth 3 (Three) Times a Day As Needed for Muscle Spasms., Disp: 90 tablet, Rfl: 0    gabapentin (NEURONTIN) 300 MG capsule, Script #2: take 1 po TID (Patient taking differently: Take 1 capsule by mouth 3 (Three) Times a Day. Script #2: take 1 po TID), Disp: 90 capsule, Rfl: 0    rOPINIRole (REQUIP) 0.25 MG tablet, Take 1 tablet by mouth Every Night. Take 1 hour before bedtime., Disp: 30 tablet, Rfl: 0    sulfamethoxazole-trimethoprim (Bactrim DS) 800-160 MG per tablet, Take 1 tablet by mouth 2 (Two) Times a Day., Disp: 10 tablet, Rfl: 0    Allergies   Allergen Reactions    Tizanidine Hcl Dizziness     Pt stated \"horrible dry mouth, dizziness, couldn't see, it was awful\"    Augmentin [Amoxicillin-Pot Clavulanate] Nausea And Vomiting       Social History     Socioeconomic History    Marital status: Single "   Tobacco Use    Smoking status: Never    Smokeless tobacco: Never   Vaping Use    Vaping Use: Never used   Substance and Sexual Activity    Alcohol use: Not Currently    Drug use: No    Sexual activity: Defer       Family History   Problem Relation Age of Onset    Ovarian cancer Mother     Ovarian cancer Maternal Aunt     Ovarian cancer Maternal Grandmother     Ovarian cancer Paternal Grandmother     Colon polyps Father     Colon polyps Brother     Breast cancer Neg Hx     Colon cancer Neg Hx        Review of Systems  Appetite is good    Objective     Vitals:    05/23/23 1339   BP: 148/88   Pulse: 96   Temp: 96.6 °F (35.9 °C)   SpO2: 100%       Physical Exam  Vitals reviewed.   Constitutional:       Appearance: Normal appearance. She is not ill-appearing or diaphoretic.   Cardiovascular:      Rate and Rhythm: Normal rate and regular rhythm.   Pulmonary:      Effort: Pulmonary effort is normal. No respiratory distress.      Breath sounds: Normal breath sounds.   Abdominal:      General: Abdomen is flat.      Palpations: Abdomen is soft.      Tenderness: There is no abdominal tenderness.   Neurological:      Mental Status: She is alert.   Psychiatric:         Thought Content: Thought content normal.         Judgment: Judgment normal.             Assessment & Plan   Problem List Items Addressed This Visit          Gastrointestinal Abdominal     Alcoholic cirrhosis - Primary    Overview     Alcohol free since October 2021           Relevant Orders    Alkaline Phosphatase, Isoenzymes    Mitochondrial Antibodies, M2    US Liver    Adenomatous polyp of colon    Overview     History of adenomatous polyps.  Surveillance colonoscopy recommended again approximately November 2024              Clinically she is doing well.  I did review her chemistry panel that she had in April which noted normal transaminases.  Her alkaline phosphatase was elevated still as well as her glucose..  Otherwise CMP was unremarkable and her CBC was  unremarkable with normal platelets.  This indicates that she has stable functioning liver disease.  I do recommend serial ultrasound monitoring for hepatocellular carcinoma.  I suggested every 6 months.  She agrees to get this done.  We will order 1 now and if it is unremarkable then suggest a follow-up in 6 months.    Also, because she has an elevated alkaline phosphatase, I suspect this is related to fatty liver but I do recommend that we check an AMA.  I also suggest we check alkaline phosphatase isoenzymes as it may be a bone source especially with her history of collapsed L5.    We will see her back in the office in a year.    Continue ongoing management by primary care provider and other specialists.     Body mass index is 21.68 kg/m².  Stable BMI      EMR Dragon/transcription disclaimer:  Much of this encounter note is electronic transcription/translation of spoken language to printed text.  The electronic translation of spoken language may be erroneous, or at times, nonsensical words or phrases may be inadvertently transcribed.  Although I have reviewed the note for such errors, some may still exist.    Ray Serna MD  16:12 CDT  05/23/23

## 2023-05-24 ENCOUNTER — OFFICE VISIT (OUTPATIENT)
Dept: NEUROSURGERY | Facility: CLINIC | Age: 63
End: 2023-05-24
Payer: COMMERCIAL

## 2023-05-24 VITALS — BODY MASS INDEX: 21.79 KG/M2 | HEIGHT: 60 IN | WEIGHT: 111 LBS

## 2023-05-24 DIAGNOSIS — M54.16 LUMBAR RADICULOPATHY: ICD-10-CM

## 2023-05-24 DIAGNOSIS — M51.37 DEGENERATION OF LUMBAR OR LUMBOSACRAL INTERVERTEBRAL DISC: Primary | ICD-10-CM

## 2023-05-24 DIAGNOSIS — M48.062 SPINAL STENOSIS, LUMBAR REGION, WITH NEUROGENIC CLAUDICATION: ICD-10-CM

## 2023-05-24 DIAGNOSIS — M21.371 RIGHT FOOT DROP: ICD-10-CM

## 2023-05-24 DIAGNOSIS — M79.89 RIGHT LEG SWELLING: ICD-10-CM

## 2023-05-24 DIAGNOSIS — Z78.9 NON-SMOKER: ICD-10-CM

## 2023-05-24 DIAGNOSIS — M79.661 RIGHT CALF PAIN: ICD-10-CM

## 2023-05-24 DIAGNOSIS — M43.10 ACQUIRED SPONDYLOLISTHESIS: ICD-10-CM

## 2023-05-24 LAB
ALP BONE CFR SERPL: 67 % (ref 14–68)
ALP INTEST CFR SERPL: 10 % (ref 0–18)
ALP LIVER CFR SERPL: 24 % (ref 18–85)
ALP SERPL-CCNC: 450 IU/L (ref 44–121)
MITOCHONDRIA M2 IGG SER-ACNC: <20 UNITS (ref 0–20)

## 2023-05-24 PROCEDURE — 99024 POSTOP FOLLOW-UP VISIT: CPT | Performed by: NURSE PRACTITIONER

## 2023-05-24 NOTE — PATIENT INSTRUCTIONS
Advance Care Planning and Advance Directives     You make decisions on a daily basis - decisions about where you want to live, your career, your home, your life. Perhaps one of the most important decisions you face is your choice for future medical care. Take time to talk with your family and your healthcare team and start planning today.  Advance Care Planning is a process that can help you:  Understand possible future healthcare decisions in light of your own experiences  Reflect on those decision in light of your goals and values  Discuss your decisions with those closest to you and the healthcare professionals that care for you  Make a plan by creating a document that reflects your wishes    Surrogate Decision Maker  In the event of a medical emergency, which has left you unable to communicate or to make your own decisions, you would need someone to make decisions for you.  It is important to discuss your preferences for medical treatment with this person while you are in good health.     Qualities of a surrogate decision maker:  Willing to take on this role and responsibility  Knows what you want for future medical care  Willing to follow your wishes even if they don't agree with them  Able to make difficult medical decisions under stressful circumstances    Advance Directives  These are legal documents you can create that will guide your healthcare team and decision maker(s) when needed. These documents can be stored in the electronic medical record.    Living Will - a legal document to guide your care if you have a terminal condition or a serious illness and are unable to communicate. States vary by statute in document names/types, but most forms may include one or more of the following:        -  Directions regarding life-prolonging treatments        -  Directions regarding artificially provided nutrition/hydration        -  Choosing a healthcare decision maker        -  Direction regarding organ/tissue  donation    Durable Power of  for Healthcare - this document names an -in-fact to make medical decisions for you, but it may also allow this person to make personal and financial decisions for you. Please seek the advice of an  if you need this type of document.    **Advance Directives are not required and no one may discriminate against you if you do not sign one.    Medical Orders  Many states allow specific forms/orders signed by your physician to record your wishes for medical treatment in your current state of health. This form, signed in personal communication with your physician, addresses resuscitation and other medical interventions that you may or may not want.      For more information or to schedule a time with a [facility] Advance Care Planning Facilitator contact: [email or web address] or call [phone] and someone will contact you directly.

## 2023-05-24 NOTE — PROGRESS NOTES
"  Chief complaint:   Chief Complaint   Patient presents with    Back Pain     Pt here for 3wk p/o f/u. Pt states since surgery she has been having pain x3day in her LB na R leg. Pt has back brace on and walking with a cane today.        Subjective     HPI: This is a 63 y.o. female patient who went to the operating room on 5/1/2023 for a L5-S1 right hemilaminectomy and TLIF with posterior pedicle screw instrumentation from L5-S1 via surgical robot.  The patient is here in follow up today for postoperative visit.  Prior to surgery the patient was complaining of back pain and bilateral lower extremity pain with the right being worse than the left along with numbness and tingling.  She felt her pain issues were 50-50.  At her last office appointment she was complaining of restless legs.  She did feel like her back and leg pain was better after the surgery.  We did start her on ropinirole.  We also sent her for x-rays and she is here in follow-up today.  The patient says that she has been doing well.  She felt like her restless legs has resolved.  She was not having any pain issues until about 4 days ago when she says she started having an onset of right paraspinal back pain and pain in her right calf going down into her ankle.  She says that her foot and leg does seem to be bothering her more at this time.  In general she does feel like she still doing better than what she was before surgery.        Review of Systems   Cardiovascular:  Positive for leg swelling.   Musculoskeletal:  Positive for arthralgias, back pain and myalgias.   Neurological:  Positive for weakness.   Psychiatric/Behavioral: Negative.         Objective      Vital Signs  Ht 152.4 cm (60\")   Wt 50.3 kg (111 lb)   BMI 21.68 kg/m²     Physical Exam  Constitutional:       Appearance: She is well-developed.   HENT:      Head: Normocephalic.   Eyes:      Extraocular Movements: EOM normal.      Pupils: Pupils are equal, round, and reactive to light. "   Cardiovascular:      Pulses:           Dorsalis pedis pulses are 2+ on the right side and 2+ on the left side.   Pulmonary:      Effort: Pulmonary effort is normal.   Musculoskeletal:         General: Normal range of motion.      Cervical back: Normal range of motion.   Skin:     General: Skin is warm.   Neurological:      Mental Status: She is alert and oriented to person, place, and time.      GCS: GCS eye subscore is 4. GCS verbal subscore is 5. GCS motor subscore is 6.      Cranial Nerves: No cranial nerve deficit.      Sensory: No sensory deficit.      Motor: Motor strength is normal. Weakness present.      Gait: Gait is intact. Gait abnormal.      Deep Tendon Reflexes: Reflexes are normal and symmetric.      Comments: Walking with a cane independently   Psychiatric:         Speech: Speech normal.         Behavior: Behavior normal.         Thought Content: Thought content normal.     Incisions clean dry and intact    Neurologic Exam     Mental Status   Oriented to person, place, and time.   Attention: normal. Concentration: normal.   Speech: speech is normal   Level of consciousness: alert  Normal comprehension.     Cranial Nerves     CN II   Visual fields full to confrontation.     CN III, IV, VI   Pupils are equal, round, and reactive to light.  Extraocular motions are normal.     CN V   Facial sensation intact.     CN VII   Facial expression full, symmetric.     CN VIII   CN VIII normal.     CN IX, X   CN IX normal.   CN X normal.     CN XI   CN XI normal.     CN XII   CN XII normal.     Motor Exam   Muscle bulk: normal    Strength   Strength 5/5 throughout.   Right iliopsoas: 3/5  Left iliopsoas: 3/5  Right anterior tibial: 3/5  Right EHL 3/5     Sensory Exam   Light touch normal.     Gait, Coordination, and Reflexes     Gait  Gait: normal    Reflexes   Reflexes 2+ except as noted.     Imaging review: No new imaging          Assessment/Plan: The patient is stating that she is having back pain and right  lower extremity pain.  The pain in her leg is prominent in the calf down to her ankle.  She also does describe some swelling in her leg.  I Janiya set her up to do a venous Doppler ultrasound to make sure that there is not a DVT in her leg.  In regards to her back we will send her for another set of x-rays and she was told that if that her back pain did continue to persist to give us a call back.  We will also start her into a dedicated course of physical therapy.  She will keep her appoint with Dr. Tesfaye in July.  Her questions and concerns were addressed    Patient is a nonsmoker  The patient's Body mass index is 21.68 kg/m².. BMI is within normal parameters. No follow-up required.    Diagnoses and all orders for this visit:    1. Degeneration of lumbar or lumbosacral intervertebral disc (Primary)  -     XR Spine Scoliosis 2 or 3 Views; Future  -     Ambulatory Referral to Physical Therapy Evaluate and treat, Neuro; Strengthening, ROM, Stretching; Full weight bearing    2. Spinal stenosis, lumbar region, with neurogenic claudication  -     XR Spine Scoliosis 2 or 3 Views; Future  -     Ambulatory Referral to Physical Therapy Evaluate and treat, Neuro; Strengthening, ROM, Stretching; Full weight bearing    3. Lumbar radiculopathy  -     XR Spine Scoliosis 2 or 3 Views; Future  -     Ambulatory Referral to Physical Therapy Evaluate and treat, Neuro; Strengthening, ROM, Stretching; Full weight bearing    4. Acquired spondylolisthesis  -     XR Spine Scoliosis 2 or 3 Views; Future  -     Ambulatory Referral to Physical Therapy Evaluate and treat, Neuro; Strengthening, ROM, Stretching; Full weight bearing    5. Right foot drop  -     XR Spine Scoliosis 2 or 3 Views; Future  -     Ambulatory Referral to Physical Therapy Evaluate and treat, Neuro; Strengthening, ROM, Stretching; Full weight bearing    6. Non-smoker    7. Body mass index (BMI) of 22.0 to 22.9 in adult    8. Right leg swelling  -     US Venous Doppler  Lower Extremity Right (duplex); Future    9. Right calf pain  -     US Venous Doppler Lower Extremity Right (duplex); Future        I discussed the patients findings and my recommendations with patient  Randy Mcbride, RAYMON  05/24/23  09:30 CDTAnswers submitted by the patient for this visit:  Other (Submitted on 5/24/2023)  Please describe your symptoms.: Post Op Visit  Have you had these symptoms before?: No  How long have you been having these symptoms?: Greater than 2 weeks  Primary Reason for Visit (Submitted on 5/24/2023)  What is the primary reason for your visit?: Other

## 2023-05-25 ENCOUNTER — TELEPHONE (OUTPATIENT)
Dept: NEUROSURGERY | Facility: CLINIC | Age: 63
End: 2023-05-25
Payer: COMMERCIAL

## 2023-05-25 ENCOUNTER — HOSPITAL ENCOUNTER (OUTPATIENT)
Dept: ULTRASOUND IMAGING | Facility: HOSPITAL | Age: 63
Discharge: HOME | End: 2023-05-25
Admitting: NURSE PRACTITIONER
Payer: COMMERCIAL

## 2023-05-25 DIAGNOSIS — M79.661 RIGHT CALF PAIN: ICD-10-CM

## 2023-05-25 DIAGNOSIS — M79.89 RIGHT LEG SWELLING: ICD-10-CM

## 2023-05-25 PROCEDURE — 93971 EXTREMITY STUDY: CPT

## 2023-05-25 NOTE — TELEPHONE ENCOUNTER
----- Message from RAYMON Garcia sent at 5/25/2023 11:19 AM CDT -----  Please let the patient know that she does not have a DVT in her leg.  We will await x-rays  ----- Message -----  From: [facility] [place] [facility]  Sent: 5/25/2023   9:51 AM CDT  To: RAYMON Garcia

## 2023-05-30 ENCOUNTER — TELEPHONE (OUTPATIENT)
Dept: NEUROSURGERY | Facility: CLINIC | Age: 63
End: 2023-05-30

## 2023-05-30 ENCOUNTER — HOSPITAL ENCOUNTER (OUTPATIENT)
Dept: GENERAL RADIOLOGY | Facility: HOSPITAL | Age: 63
Discharge: HOME | End: 2023-05-30
Admitting: NURSE PRACTITIONER

## 2023-05-30 DIAGNOSIS — M21.371 RIGHT FOOT DROP: ICD-10-CM

## 2023-05-30 DIAGNOSIS — M43.10 ACQUIRED SPONDYLOLISTHESIS: ICD-10-CM

## 2023-05-30 DIAGNOSIS — M54.16 LUMBAR RADICULOPATHY: ICD-10-CM

## 2023-05-30 DIAGNOSIS — M48.062 SPINAL STENOSIS, LUMBAR REGION, WITH NEUROGENIC CLAUDICATION: ICD-10-CM

## 2023-05-30 DIAGNOSIS — M51.37 DEGENERATION OF LUMBAR OR LUMBOSACRAL INTERVERTEBRAL DISC: ICD-10-CM

## 2023-05-30 PROCEDURE — 72082 X-RAY EXAM ENTIRE SPI 2/3 VW: CPT

## 2023-05-30 NOTE — TELEPHONE ENCOUNTER
Saira with [facility] called to let the office know this patient was discharged as of today.

## 2023-06-01 ENCOUNTER — APPOINTMENT (OUTPATIENT)
Dept: GENERAL RADIOLOGY | Facility: HOSPITAL | Age: 63
End: 2023-06-01
Payer: COMMERCIAL

## 2023-06-01 ENCOUNTER — HOSPITAL ENCOUNTER (EMERGENCY)
Facility: HOSPITAL | Age: 63
Discharge: HOME | End: 2023-06-01
Payer: COMMERCIAL

## 2023-06-01 ENCOUNTER — APPOINTMENT (OUTPATIENT)
Dept: CT IMAGING | Facility: HOSPITAL | Age: 63
End: 2023-06-01
Payer: COMMERCIAL

## 2023-06-01 VITALS
RESPIRATION RATE: 16 BRPM | TEMPERATURE: 98.6 F | SYSTOLIC BLOOD PRESSURE: 138 MMHG | HEIGHT: 60 IN | WEIGHT: 102.4 LBS | BODY MASS INDEX: 20.1 KG/M2 | DIASTOLIC BLOOD PRESSURE: 82 MMHG | HEART RATE: 102 BPM | OXYGEN SATURATION: 100 %
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
## 2023-06-01 DIAGNOSIS — M25.521 RIGHT ELBOW PAIN: ICD-10-CM

## 2023-06-01 DIAGNOSIS — M25.552 BILATERAL HIP PAIN: ICD-10-CM

## 2023-06-01 DIAGNOSIS — M25.551 BILATERAL HIP PAIN: ICD-10-CM

## 2023-06-01 DIAGNOSIS — Z98.890 S/P LUMBAR LAMINECTOMY: ICD-10-CM

## 2023-06-01 DIAGNOSIS — M54.9 ACUTE MIDLINE BACK PAIN, UNSPECIFIED BACK LOCATION: ICD-10-CM

## 2023-06-01 DIAGNOSIS — G62.9 NEUROPATHY: ICD-10-CM

## 2023-06-01 DIAGNOSIS — S09.90XA INJURY OF HEAD, INITIAL ENCOUNTER: ICD-10-CM

## 2023-06-01 DIAGNOSIS — E83.42 HYPOMAGNESEMIA: ICD-10-CM

## 2023-06-01 LAB
ANION GAP SERPL CALCULATED.3IONS-SCNC: 11 MMOL/L (ref 5–15)
ANION GAP SERPL CALCULATED.3IONS-SCNC: 9 MMOL/L (ref 5–15)
BASOPHILS # BLD AUTO: 0.01 10*3/MM3 (ref 0–0.2)
BASOPHILS NFR BLD AUTO: 0.3 % (ref 0–1.5)
BUN SERPL-MCNC: 14 MG/DL (ref 8–23)
BUN SERPL-MCNC: 14 MG/DL (ref 8–23)
BUN/CREAT SERPL: 20.6 (ref 7–25)
BUN/CREAT SERPL: 21.9 (ref 7–25)
CALCIUM SPEC-SCNC: 8 MG/DL (ref 8.6–10.5)
CALCIUM SPEC-SCNC: 9 MG/DL (ref 8.6–10.5)
CHLORIDE SERPL-SCNC: 103 MMOL/L (ref 98–107)
CHLORIDE SERPL-SCNC: 104 MMOL/L (ref 98–107)
CO2 SERPL-SCNC: 22 MMOL/L (ref 22–29)
CO2 SERPL-SCNC: 24 MMOL/L (ref 22–29)
CREAT SERPL-MCNC: 0.64 MG/DL (ref 0.57–1)
CREAT SERPL-MCNC: 0.68 MG/DL (ref 0.57–1)
DEPRECATED RDW RBC AUTO: 44.1 FL (ref 37–54)
EGFRCR SERPLBLD CKD-EPI 2021: 100.7 ML/MIN/1.73
EGFRCR SERPLBLD CKD-EPI 2021: 99.2 ML/MIN/1.73
EOSINOPHIL # BLD AUTO: 0.03 10*3/MM3 (ref 0–0.4)
EOSINOPHIL NFR BLD AUTO: 0.9 % (ref 0.3–6.2)
ERYTHROCYTE [DISTWIDTH] IN BLOOD BY AUTOMATED COUNT: 14.5 % (ref 12.3–15.4)
ETHANOL UR QL: <0.01 %
GLUCOSE BLDC GLUCOMTR-MCNC: 218 MG/DL (ref 70–130)
GLUCOSE BLDC GLUCOMTR-MCNC: 371 MG/DL (ref 70–130)
GLUCOSE BLDC GLUCOMTR-MCNC: 488 MG/DL (ref 70–130)
GLUCOSE SERPL-MCNC: 260 MG/DL (ref 65–99)
GLUCOSE SERPL-MCNC: 470 MG/DL (ref 65–99)
HCT VFR BLD AUTO: 29.9 % (ref 34–46.6)
HGB BLD-MCNC: 9.1 G/DL (ref 12–15.9)
IMM GRANULOCYTES # BLD AUTO: 0.01 10*3/MM3 (ref 0–0.05)
IMM GRANULOCYTES NFR BLD AUTO: 0.3 % (ref 0–0.5)
LYMPHOCYTES # BLD AUTO: 0.27 10*3/MM3 (ref 0.7–3.1)
LYMPHOCYTES NFR BLD AUTO: 8.5 % (ref 19.6–45.3)
MAGNESIUM SERPL-MCNC: 1.4 MG/DL (ref 1.6–2.4)
MCH RBC QN AUTO: 25.5 PG (ref 26.6–33)
MCHC RBC AUTO-ENTMCNC: 30.4 G/DL (ref 31.5–35.7)
MCV RBC AUTO: 83.8 FL (ref 79–97)
MONOCYTES # BLD AUTO: 0.34 10*3/MM3 (ref 0.1–0.9)
MONOCYTES NFR BLD AUTO: 10.7 % (ref 5–12)
NEUTROPHILS NFR BLD AUTO: 2.51 10*3/MM3 (ref 1.7–7)
NEUTROPHILS NFR BLD AUTO: 79.3 % (ref 42.7–76)
NRBC BLD AUTO-RTO: 0 /100 WBC (ref 0–0.2)
PLATELET # BLD AUTO: 101 10*3/MM3 (ref 140–450)
PMV BLD AUTO: 10.6 FL (ref 6–12)
POTASSIUM SERPL-SCNC: 3 MMOL/L (ref 3.5–5.2)
POTASSIUM SERPL-SCNC: 3.1 MMOL/L (ref 3.5–5.2)
RBC # BLD AUTO: 3.57 10*6/MM3 (ref 3.77–5.28)
SODIUM SERPL-SCNC: 135 MMOL/L (ref 136–145)
SODIUM SERPL-SCNC: 138 MMOL/L (ref 136–145)
WBC NRBC COR # BLD: 3.17 10*3/MM3 (ref 3.4–10.8)

## 2023-06-01 PROCEDURE — 96375 TX/PRO/DX INJ NEW DRUG ADDON: CPT

## 2023-06-01 PROCEDURE — 82948 REAGENT STRIP/BLOOD GLUCOSE: CPT

## 2023-06-01 PROCEDURE — 72128 CT CHEST SPINE W/O DYE: CPT

## 2023-06-01 PROCEDURE — 70450 CT HEAD/BRAIN W/O DYE: CPT

## 2023-06-01 PROCEDURE — 72131 CT LUMBAR SPINE W/O DYE: CPT

## 2023-06-01 PROCEDURE — 25010000002 MAGNESIUM SULFATE 2 GM/50ML SOLUTION: Performed by: PHYSICIAN ASSISTANT

## 2023-06-01 PROCEDURE — 99284 EMERGENCY DEPT VISIT MOD MDM: CPT

## 2023-06-01 PROCEDURE — 73523 X-RAY EXAM HIPS BI 5/> VIEWS: CPT

## 2023-06-01 PROCEDURE — 80048 BASIC METABOLIC PNL TOTAL CA: CPT | Performed by: PHYSICIAN ASSISTANT

## 2023-06-01 PROCEDURE — 25010000002 ORPHENADRINE CITRATE PER 60 MG: Performed by: PHYSICIAN ASSISTANT

## 2023-06-01 PROCEDURE — 85025 COMPLETE CBC W/AUTO DIFF WBC: CPT | Performed by: PHYSICIAN ASSISTANT

## 2023-06-01 PROCEDURE — 36415 COLL VENOUS BLD VENIPUNCTURE: CPT

## 2023-06-01 PROCEDURE — 83735 ASSAY OF MAGNESIUM: CPT | Performed by: PHYSICIAN ASSISTANT

## 2023-06-01 PROCEDURE — 96365 THER/PROPH/DIAG IV INF INIT: CPT

## 2023-06-01 PROCEDURE — 72125 CT NECK SPINE W/O DYE: CPT

## 2023-06-01 PROCEDURE — 82077 ASSAY SPEC XCP UR&BREATH IA: CPT | Performed by: PHYSICIAN ASSISTANT

## 2023-06-01 PROCEDURE — 63710000001 INSULIN REGULAR HUMAN PER 5 UNITS: Performed by: PHYSICIAN ASSISTANT

## 2023-06-01 PROCEDURE — 73080 X-RAY EXAM OF ELBOW: CPT

## 2023-06-01 RX ORDER — GABAPENTIN 300 MG/1
300 CAPSULE ORAL ONCE
Status: COMPLETED | OUTPATIENT
Start: 2023-06-01 | End: 2023-06-01

## 2023-06-01 RX ORDER — POTASSIUM CHLORIDE 750 MG/1
40 CAPSULE, EXTENDED RELEASE ORAL ONCE
Status: COMPLETED | OUTPATIENT
Start: 2023-06-01 | End: 2023-06-01

## 2023-06-01 RX ORDER — MAGNESIUM 30 MG
30 TABLET ORAL DAILY
Qty: 7 TABLET | Refills: 0 | Status: ON HOLD | OUTPATIENT
Start: 2023-06-01 | End: 2023-06-09

## 2023-06-01 RX ORDER — GABAPENTIN 300 MG/1
300 CAPSULE ORAL DAILY
Qty: 7 CAPSULE | Refills: 0 | Status: SHIPPED | OUTPATIENT
Start: 2023-06-01 | End: 2023-06-09

## 2023-06-01 RX ORDER — MAGNESIUM SULFATE HEPTAHYDRATE 40 MG/ML
2 INJECTION, SOLUTION INTRAVENOUS ONCE
Status: COMPLETED | OUTPATIENT
Start: 2023-06-01 | End: 2023-06-01

## 2023-06-01 RX ORDER — POTASSIUM CHLORIDE 750 MG/1
10 TABLET, FILM COATED, EXTENDED RELEASE ORAL 2 TIMES DAILY
Qty: 14 TABLET | Refills: 0 | Status: ON HOLD | OUTPATIENT
Start: 2023-06-01 | End: 2023-06-09

## 2023-06-01 RX ORDER — ORPHENADRINE CITRATE 30 MG/ML
60 INJECTION INTRAMUSCULAR; INTRAVENOUS ONCE
Status: COMPLETED | OUTPATIENT
Start: 2023-06-01 | End: 2023-06-01

## 2023-06-01 RX ORDER — TIZANIDINE 4 MG/1
4 TABLET ORAL EVERY 6 HOURS PRN
Qty: 12 TABLET | Refills: 0 | Status: SHIPPED | OUTPATIENT
Start: 2023-06-01

## 2023-06-01 RX ORDER — SODIUM CHLORIDE 0.9 % (FLUSH) 0.9 %
10 SYRINGE (ML) INJECTION AS NEEDED
Status: DISCONTINUED | OUTPATIENT
Start: 2023-06-01 | End: 2023-06-02 | Stop reason: HOSPADM

## 2023-06-01 RX ADMIN — INSULIN HUMAN 4.6 UNITS: 100 INJECTION, SOLUTION PARENTERAL at 19:00

## 2023-06-01 RX ADMIN — MAGNESIUM SULFATE HEPTAHYDRATE 2 G: 2 INJECTION, SOLUTION INTRAVENOUS at 20:53

## 2023-06-01 RX ADMIN — SODIUM CHLORIDE 1000 ML: 9 INJECTION, SOLUTION INTRAVENOUS at 19:04

## 2023-06-01 RX ADMIN — POTASSIUM CHLORIDE 40 MEQ: 10 CAPSULE, COATED, EXTENDED RELEASE ORAL at 20:53

## 2023-06-01 RX ADMIN — GABAPENTIN 300 MG: 300 CAPSULE ORAL at 21:13

## 2023-06-01 RX ADMIN — ORPHENADRINE CITRATE 60 MG: 60 INJECTION INTRAMUSCULAR; INTRAVENOUS at 20:46

## 2023-06-01 NOTE — ED PROVIDER NOTES
"Subjective   History of Present Illness    Patient is a 63-year-old female presenting to ED via EMS with back pain after fall.  PMH significant for history alcohol hepatitis with ascites, history ovarian cancer, degenerative lumbar disc disease status post lumbar laminectomy with fusion, hypertension, pancreatitis, GERD, hypertension.  Patient states on 5/1/2023 she had a lumbar laminectomy performed by Dr. Tesfaye for which she has been healing well however does have residual numbness and nerve damage in her right lower extremity which causes her to \"sometimes have my leg fall out.\"  Patient states that she had been going all day and had forgot to eat at which time she had a piece of \"cold pizza and I was going to hurry to get some insulin.\"  Patient states that she was with her dog and made an abrupt turn when her right leg gave out as per normal however she then fell onto the hardwood floor initially hitting her right hip, right head, and subsequently her left lower back.  Patient states that she has swelling and discomfort to the right side of her head as well as increased pain at her laminectomy site.  Patient denies any changes to her chronic numbness in the right lower extremity with no new focal/localized numbness to any of the other extremities.  Patient denies loss of consciousness, visual changes, hearing changes, nausea or vomiting.  Patient reiterated that the fall was mechanical in nature denies any preceding symptoms.  Patient states 30 minutes prior to the fall she had taken Tylenol for routine management of her back pain and denies any other medication use since the event.  Patient did note that over the past week \"my blood sugars have been just a little bit high in the 300s.\"  With EMS patient was noted to have 1 blood sugar in the mid 400s.    Records reviewed show patient most recently seen outpatient at the neurosurgery office on 5/24/2023 for management of lumbar degenerative disc disease, " "lumbar spinal stenosis, lumbar radiculopathy, right foot drop, right leg swelling, right calf pain.    Patient was admitted to the hospital 5/1/2023 for management of acquired spondylolisthesis, spinal stenosis lumbar region, degenerative lumbar disease for which she had a lumbar discectomy hemilaminectomy and transforaminal lumbar interbody fusion.    Review of Systems   Constitutional: Negative.    HENT: Negative.  Negative for tinnitus.    Eyes: Negative.  Negative for photophobia and visual disturbance.   Respiratory: Negative.  Negative for shortness of breath.    Cardiovascular: Negative.  Negative for chest pain.   Gastrointestinal: Negative.  Negative for abdominal pain, nausea and vomiting.   Genitourinary: Negative.    Musculoskeletal:  Positive for arthralgias (bilateral hips, worse on left side), back pain and neck pain. Negative for gait problem and joint swelling.   Skin: Negative.  Negative for rash and wound.   Neurological:  Positive for headaches (right sided). Negative for dizziness, syncope, weakness and numbness (no change in chronic RLE).        Reports + head injury  Denies LOC   Psychiatric/Behavioral: Negative.     All other systems reviewed and are negative.    Past Medical History:   Diagnosis Date    Alcoholic hepatitis with ascites     Allergic rhinitis     Breast cyst     Cancer     ovarian    Degeneration of lumbar or lumbosacral intervertebral disc 09/15/2022    Diabetes mellitus     Disease of thyroid gland     Elevated d-dimer     Endometriosis     Fatty liver     GERD (gastroesophageal reflux disease)     History of transfusion     Hyperlipidemia     Hypertension     Osteoporosis     Ovarian cyst     Pancreatitis     Postmenopausal     Rectal mass     Rectal prolapse     Sleep apnea        Allergies   Allergen Reactions    Tizanidine Hcl Dizziness     Pt stated \"horrible dry mouth, dizziness, couldn't see, it was awful\"    Augmentin [Amoxicillin-Pot Clavulanate] Nausea And Vomiting "       Past Surgical History:   Procedure Laterality Date    BILATERAL SALPINGO OOPHORECTOMY  2008    CHOLECYSTECTOMY  1996    COLONOSCOPY  07/2017    Dr Mruphy- per patient normal     COLONOSCOPY N/A 11/09/2021    Procedure: COLONOSCOPY WITH ANESTHESIA;  Surgeon: Ray Senra MD;  Location: [facility] ENDOSCOPY;  Service: Gastroenterology;  Laterality: N/A;  pre rectal mass  post  Dr Shields    DILATION AND CURETTAGE, DIAGNOSTIC / THERAPEUTIC      ENDOSCOPY N/A 11/09/2021    Procedure: ESOPHAGOGASTRODUODENOSCOPY WITH ANESTHESIA;  Surgeon: Ray Serna MD;  Location: [facility] ENDOSCOPY;  Service: Gastroenterology;  Laterality: N/A;  pre anemia  post hiatal hernia; gastric polyp  Dr. Shields    KNEE SURGERY  1982    LUMBAR LAMINECTOMY WITH FUSION Right 5/1/2023    Procedure: LUMBAR DISCECTOMY, JADON- LAMINECTOMY TRANSFORAMINAL LUMBAR INTERBODY FUSION, POSTERIOR PEDICLE SCREW INSTRUMENTATION, L5-S1 RIGHT. USE OF THE ROBOT;  Surgeon: Nathaniel Tesfaye MD;  Location: [facility] OR;  Service: Neurosurgery;  Laterality: Right;       Family History   Problem Relation Age of Onset    Ovarian cancer Mother     Ovarian cancer Maternal Aunt     Ovarian cancer Maternal Grandmother     Ovarian cancer Paternal Grandmother     Colon polyps Father     Colon polyps Brother     Breast cancer Neg Hx     Colon cancer Neg Hx        Social History     Socioeconomic History    Marital status: Single   Tobacco Use    Smoking status: Never    Smokeless tobacco: Never   Vaping Use    Vaping Use: Never used   Substance and Sexual Activity    Alcohol use: Not Currently    Drug use: No    Sexual activity: Defer           Objective   Physical Exam  Vitals and nursing note reviewed.   Constitutional:       Appearance: Normal appearance. She is well-developed and well-groomed.      Interventions: Cervical collar in place.   HENT:      Head: Normocephalic.      Comments: Hematoma noted along the right parietal scalp with no overlying bruising,  abrasions, laceration.  Remainder of face and scalp are atraumatic with no further acute injuries.     Nose: No signs of injury or nasal tenderness.      Mouth/Throat:      Mouth: Mucous membranes are moist.      Pharynx: Oropharynx is clear.      Comments: No intraoral, dental, tongue injuries  Eyes:      Conjunctiva/sclera: Conjunctivae normal.      Pupils: Pupils are equal, round, and reactive to light.   Cardiovascular:      Rate and Rhythm: Regular rhythm. Tachycardia present.   Pulmonary:      Effort: Pulmonary effort is normal.      Breath sounds: Normal breath sounds.   Chest:      Chest wall: No tenderness.   Abdominal:      General: Bowel sounds are normal.      Palpations: Abdomen is soft.      Tenderness: There is no abdominal tenderness.   Musculoskeletal:         General: Tenderness present. No swelling, deformity or signs of injury. Normal range of motion.      Cervical back: Muscular tenderness (Bilateral paraspinal muscular) present. No spinous process tenderness.      Right lower leg: No edema.      Left lower leg: No edema.      Comments: LSO brace in place with tenderness along the lumbar midline spine and bilateral paraspinal muscular lumbar spasms.  Bilateral lateral hip tenderness to palpitation.  Remainder of extremities with no further bony tenderness, no joint swelling.  All 4 extremities are neurovascular intact distally.   Skin:     General: Skin is warm and dry.      Findings: No abrasion, bruising, signs of injury, laceration, rash or wound.   Neurological:      General: No focal deficit present.      Mental Status: She is alert and oriented to person, place, and time.      GCS: GCS eye subscore is 4. GCS verbal subscore is 5. GCS motor subscore is 6.   Psychiatric:         Mood and Affect: Mood normal.         Behavior: Behavior normal. Behavior is cooperative.       Procedures           ED Course                                           Medical Decision Making  Problems  Addressed:  Acute midline back pain, unspecified back location: complicated acute illness or injury  Bilateral hip pain: complicated acute illness or injury  Fall, initial encounter: complicated acute illness or injury  Hypomagnesemia: complicated acute illness or injury  Injury of head, initial encounter: complicated acute illness or injury  Neuropathy: complicated acute illness or injury  Right elbow pain: complicated acute illness or injury  S/P lumbar laminectomy: complicated acute illness or injury    Amount and/or Complexity of Data Reviewed  External Data Reviewed: labs, radiology and notes.  Labs: ordered. Decision-making details documented in ED Course.  Radiology: ordered. Decision-making details documented in ED Course.  ECG/medicine tests: ordered. Decision-making details documented in ED Course.    Risk  OTC drugs.  Prescription drug management.      Patient is a 63-year-old female presenting to ED via EMS with back pain after fall.  PMH significant for history alcohol hepatitis with ascites, history ovarian cancer, degenerative lumbar disc disease status post lumbar laminectomy with fusion, hypertension, pancreatitis, GERD, hypertension. Right elbow x-ray showed: Unremarkable radiographs.  Bilateral hip x-ray showed: Normal exam of both hips and pelvis.  Head CT showed: Mild cerebral and cerebellar atrophy with chronic microvascular disease but no evidence of acute intracranial process.  Cervical spine CT showed: No evidence of acute osseous injury or malalignment, bony neuroforaminal encroachment at several levels related to facet arthropathy and uncinate spurring.  Thoracic spine CT showed: No acute fractures or listhesis, prominent spondylosis with mid thoracic spine with accentuated thoracic kyphosis, no evidence of central or foraminal stenosis.  Lumbar spine CT showed: No evidence acute osseous injury, mild grade 1 anterior listhesis of L5 on S1 stable and slightly improved status post fusion of  the L5-S1 level with no evidence of hardware failure, endplate intrusions noted at several levels, mild bulging of disc in the mid and lower spine with no central or foraminal stenosis. Lab work revealed leukopenia of 3.17, H&H decreased likely from patient's baseline at 9.1/29.9 with thrombocytopenia 101.  No further CBC abnormalities.  Upon arrival to ED patient with POC BGL of 488 which on CMP had improved only to 470.  Patient was given a fluid bolus as well as dose of insulin and her blood sugar improved to 218. Hypokalemia of 3 for which patient was given oral replacement with no further electrolyte disturbances on BMP.  Alcohol negative.  Patient did have hypomagnesemia of 1.4 for which she received IV replacement.  Patient c-collar was cleared per [facility] criteria and removed.  Patient was given Norflex as well as Neurontin and reported improvement in her symptoms.  Discussed that she will need to continue to follow-up outpatient with her surgeon as previously scheduled as well as with her primary care provider within the next 48 hours for close reevaluation and to discuss initiation of physical therapy.  Discussed importance of following up with her primary care provider as well for further evaluation and discussion of hyperglycemia treatment as this could potentially be worsening her neuropathy.  Advised strict return precautions and need for immediate return to ED should she develop any new or worsening symptoms.  Patient is able to ambulate at her baseline without difficulties, continues to have stable vital signs.  Patient is appreciative with no further questions, concerns, needs at this time and is stable for discharge.    Final diagnoses:   Fall, initial encounter   Acute midline back pain, unspecified back location   Injury of head, initial encounter   Bilateral hip pain   S/P lumbar laminectomy   Hypomagnesemia   Right elbow pain   Neuropathy       ED Disposition  ED Disposition       ED Disposition    Discharge    Condition   Stable    Comment   --               Brittnay Solis MD  [address] [place] [address] [address]  [facility] [address]  [phone]    Schedule an appointment as soon as possible for a visit in 2 days      [facility] Emergency Department  [address] [place] [id number] [phone]    As needed         Medication List        New Prescriptions      magnesium 30 MG tablet  Take 1 tablet by mouth Daily.     potassium chloride 10 MEQ CR tablet  Take 1 tablet by mouth 2 (Two) Times a Day for 7 days.     tiZANidine 4 MG tablet  Commonly known as: Zanaflex  Take 1 tablet by mouth Every 6 (Six) Hours As Needed for Muscle Spasms.            Changed      gabapentin 300 MG capsule  Commonly known as: NEURONTIN  Take 1 capsule by mouth Daily for 7 days.  What changed:   how much to take  how to take this  when to take this  additional instructions               Where to Get Your Medications        These medications were sent to [facility] - [place], [place] - [address] - [phone]  - [phone] FX  [address], [facility] [address]      Phone: [phone]   gabapentin 300 MG capsule  magnesium 30 MG tablet  potassium chloride 10 MEQ CR tablet  tiZANidine 4 MG tablet            Damion Bray PA-C  06/03/23 [id number]

## 2023-06-01 NOTE — ED NOTES
Pt refusing c-collar, says she is getting claustrophobic. Informed of risks of further injury. Pt verbalized understanding but states she is going to rip it off it is making her so claustrophobic.  C-collar removed, pt aware of risks and agrees to keep neck still.

## 2023-06-02 NOTE — DISCHARGE INSTRUCTIONS
As we discussed it is likely to be more sore tomorrow from a muscular standpoint due to the mechanism of your injury.  Please apply ice to your joints and apply heat to your neck and back followed by gentle range of motion stretching as you are allowed per your neurosurgeon.  Please continue to follow-up with your neurosurgeon for further outpatient evaluation of your neuropathy.  In the meantime please walk with your walker as we discussed, use the gabapentin, anti-inflammatories, and muscle relaxers as needed.  Topically you may use lidocaine patches or Biofreeze.  Today your potassium and magnesium were low for which she will need to take the supplements and follow-up with your primary care provider for repeat evaluation.  Please follow-up with your primary care provider within the next 48 hours however should you develop any new or worsening symptoms please return to the ER for further evaluation.

## 2023-06-05 ENCOUNTER — TELEPHONE (OUTPATIENT)
Dept: NEUROSURGERY | Facility: CLINIC | Age: 63
End: 2023-06-05
Payer: COMMERCIAL

## 2023-06-05 ENCOUNTER — OFFICE VISIT (OUTPATIENT)
Dept: INTERNAL MEDICINE | Age: 63
End: 2023-06-05
Payer: COMMERCIAL

## 2023-06-05 VITALS
WEIGHT: 112 LBS | BODY MASS INDEX: 21.99 KG/M2 | DIASTOLIC BLOOD PRESSURE: 80 MMHG | OXYGEN SATURATION: 98 % | SYSTOLIC BLOOD PRESSURE: 130 MMHG | HEART RATE: 113 BPM | HEIGHT: 60 IN

## 2023-06-05 DIAGNOSIS — F10.11 HISTORY OF ALCOHOL ABUSE: ICD-10-CM

## 2023-06-05 DIAGNOSIS — E10.40 TYPE 1 DIABETES MELLITUS WITH DIABETIC NEUROPATHY (HCC): ICD-10-CM

## 2023-06-05 DIAGNOSIS — M48.062 SPINAL STENOSIS, LUMBAR REGION, WITH NEUROGENIC CLAUDICATION: ICD-10-CM

## 2023-06-05 DIAGNOSIS — M54.16 LUMBAR RADICULOPATHY: ICD-10-CM
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
## 2023-06-05 DIAGNOSIS — M47.26 OSTEOARTHRITIS OF SPINE WITH RADICULOPATHY, LUMBAR REGION: ICD-10-CM

## 2023-06-05 DIAGNOSIS — Z78.0 POSTMENOPAUSAL: ICD-10-CM

## 2023-06-05 DIAGNOSIS — M51.37 DEGENERATION OF LUMBAR OR LUMBOSACRAL INTERVERTEBRAL DISC: Primary | ICD-10-CM

## 2023-06-05 DIAGNOSIS — K64.4 EXTERNAL HEMORRHOIDS: ICD-10-CM

## 2023-06-05 DIAGNOSIS — E10.42 DIABETIC PERIPHERAL NEUROPATHY ASSOCIATED WITH TYPE 1 DIABETES MELLITUS (HCC): ICD-10-CM

## 2023-06-05 DIAGNOSIS — M21.371 RIGHT FOOT DROP: ICD-10-CM

## 2023-06-05 PROCEDURE — 3046F HEMOGLOBIN A1C LEVEL >9.0%: CPT | Performed by: INTERNAL MEDICINE

## 2023-06-05 PROCEDURE — 99214 OFFICE O/P EST MOD 30 MIN: CPT | Performed by: INTERNAL MEDICINE

## 2023-06-05 RX ORDER — POTASSIUM CHLORIDE 750 MG/1
10 TABLET, EXTENDED RELEASE ORAL DAILY
Qty: 90 TABLET | Refills: 1 | Status: SHIPPED | OUTPATIENT
Start: 2023-06-05

## 2023-06-05 RX ORDER — GABAPENTIN 300 MG/1
300 CAPSULE ORAL NIGHTLY
Qty: 90 CAPSULE | Refills: 0 | Status: SHIPPED | OUTPATIENT
Start: 2023-06-05 | End: 2023-09-03

## 2023-06-05 RX ORDER — LANOLIN ALCOHOL/MO/W.PET/CERES
400 CREAM (GRAM) TOPICAL DAILY
Qty: 90 TABLET | Refills: 0 | Status: SHIPPED | OUTPATIENT
Start: 2023-06-05

## 2023-06-05 RX ORDER — CYCLOBENZAPRINE HCL 10 MG
10 TABLET ORAL 2 TIMES DAILY PRN
Qty: 60 TABLET | Refills: 0
Start: 2023-06-05

## 2023-06-05 NOTE — PROGRESS NOTES
follow-up with endocrine concerned that she has more diabetic neuropathy then evident we are going to request the copy of that EMG nerve conduction went through her meds she had 3 muscle relaxers and gabapentin we got rid of 2 muscle relaxers stay on the gabapentin nightly as she feels like it is helping  - gabapentin (NEURONTIN) 300 MG capsule; Take 1 capsule by mouth nightly for 90 days. Max Daily Amount: 300 mg  Dispense: 90 capsule; Refill: 0  - DEXA BONE DENSITY 2 SITES; Future    3. Postmenopausal  Due fortunately has not fractured with this fall needs close follow-up  - DEXA BONE DENSITY 2 SITES; Future    4. Osteoarthritis of spine with radiculopathy, lumbar region  She also takes Flexeril as needed stick with that for now as she feels like it helps the most explained to her that Flexeril and gabapentin were sedating. Do not take the Zanaflex or Norflex she has a prescription for all 3 muscle relaxers from different offices    5. Diabetic peripheral neuropathy associated with type 1 diabetes mellitus ([facility] [address]  Control of her diabetes is the most important thing she has to follow-up with Dr. Fatimah Ambrosio at [facility] she says this is scheduled in 2 weeks on May 19    6. External hemorrhoids  To cancel the upcoming surgery for external hemorrhoids they are manageable right now I agree with counseling I would worry about her strength etc. for getting through surgery right now    7. History of alcohol abuse  Of alcohol abuse she has been sober for more than a year watch closely this of course is contributed also to the neuropathy and poor diabetic control over the years but she has had an excellent job of remaining sober for the last year or more.

## 2023-06-05 NOTE — TELEPHONE ENCOUNTER
Patient had called on 6/2/23 and left a VM letting me know she had fallen and was seen in the ER.  All the imaging of her back was okay.  She is wanting to know if she needs to be seen sooner or if she needs to do outpatient therapy since her home health is completed.    Dr Pittman reviewed ER imaging and said all is okay but happy to see if symptoms continue.  He and Randy were fine with outpatient therapy.    I notified the patient who is in the office with Dr Brittany Shields and she would like the order sent to [facility].    Order sent to Randy to sign.    FRANSISCA HERRMANN [facility]  PHYSICIAN LEAD  DR MARY PITTMAN  [facility]

## 2023-06-06 ENCOUNTER — TRANSCRIBE ORDERS (OUTPATIENT)
Dept: ADMINISTRATIVE | Facility: HOSPITAL | Age: 63
End: 2023-06-06
Payer: COMMERCIAL

## 2023-06-06 DIAGNOSIS — Z78.0 POSTMENOPAUSAL: Primary | ICD-10-CM

## 2023-06-08 ENCOUNTER — APPOINTMENT (OUTPATIENT)
Dept: CT IMAGING | Facility: HOSPITAL | Age: 63
End: 2023-06-08
Payer: COMMERCIAL

## 2023-06-08 ENCOUNTER — APPOINTMENT (OUTPATIENT)
Dept: GENERAL RADIOLOGY | Facility: HOSPITAL | Age: 63
End: 2023-06-08
Payer: COMMERCIAL

## 2023-06-08 ENCOUNTER — HOSPITAL ENCOUNTER (OUTPATIENT)
Facility: HOSPITAL | Age: 63
Setting detail: OBSERVATION
LOS: 1 days | Discharge: HOME | End: 2023-06-09
Attending: EMERGENCY MEDICINE | Admitting: INTERNAL MEDICINE
Payer: COMMERCIAL

## 2023-06-08 DIAGNOSIS — N39.0 URINARY TRACT INFECTION WITHOUT HEMATURIA, SITE UNSPECIFIED: ICD-10-CM

## 2023-06-08 DIAGNOSIS — R07.89 ATYPICAL CHEST PAIN: Primary | ICD-10-CM

## 2023-06-08 DIAGNOSIS — R73.9 HYPERGLYCEMIA: ICD-10-CM

## 2023-06-08 PROBLEM — R74.8 ELEVATED ALKALINE PHOSPHATASE LEVEL: Status: ACTIVE | Noted: 2023-06-08

## 2023-06-08 LAB
ALBUMIN SERPL-MCNC: 4.3 G/DL (ref 3.5–5.2)
ALBUMIN/GLOB SERPL: 1.5 G/DL
ALP SERPL-CCNC: 362 U/L (ref 39–117)
ALT SERPL W P-5'-P-CCNC: 17 U/L (ref 1–33)
ANION GAP SERPL CALCULATED.3IONS-SCNC: 12 MMOL/L (ref 5–15)
AST SERPL-CCNC: 19 U/L (ref 1–32)
BACTERIA UR QL AUTO: ABNORMAL /HPF
BASOPHILS # BLD AUTO: 0.02 10*3/MM3 (ref 0–0.2)
BASOPHILS NFR BLD AUTO: 0.3 % (ref 0–1.5)
BILIRUB SERPL-MCNC: 0.4 MG/DL (ref 0–1.2)
BILIRUB UR QL STRIP: NEGATIVE
BUN SERPL-MCNC: 13 MG/DL (ref 8–23)
BUN/CREAT SERPL: 16.7 (ref 7–25)
CALCIUM SPEC-SCNC: 10.5 MG/DL (ref 8.6–10.5)
CHLORIDE SERPL-SCNC: 94 MMOL/L (ref 98–107)
CLARITY UR: ABNORMAL
CO2 SERPL-SCNC: 27 MMOL/L (ref 22–29)
COLOR UR: YELLOW
CREAT SERPL-MCNC: 0.78 MG/DL (ref 0.57–1)
D DIMER PPP FEU-MCNC: 0.35 MCGFEU/ML (ref 0–0.61)
DEPRECATED RDW RBC AUTO: 41.2 FL (ref 37–54)
EGFRCR SERPLBLD CKD-EPI 2021: 86.5 ML/MIN/1.73
EOSINOPHIL # BLD AUTO: 0.13 10*3/MM3 (ref 0–0.4)
EOSINOPHIL NFR BLD AUTO: 1.6 % (ref 0.3–6.2)
ERYTHROCYTE [DISTWIDTH] IN BLOOD BY AUTOMATED COUNT: 14.1 % (ref 12.3–15.4)
GEN 5 2HR TROPONIN T REFLEX: 14 NG/L
GLOBULIN UR ELPH-MCNC: 2.9 GM/DL
GLUCOSE BLDC GLUCOMTR-MCNC: 157 MG/DL (ref 70–130)
GLUCOSE SERPL-MCNC: 552 MG/DL (ref 65–99)
GLUCOSE UR STRIP-MCNC: ABNORMAL MG/DL
HBA1C MFR BLD: 10.5 % (ref 4.8–5.6)
HCT VFR BLD AUTO: 34.5 % (ref 34–46.6)
HGB BLD-MCNC: 10.7 G/DL (ref 12–15.9)
HGB UR QL STRIP.AUTO: NEGATIVE
HOLD SPECIMEN: NORMAL
HYALINE CASTS UR QL AUTO: ABNORMAL /LPF
IMM GRANULOCYTES # BLD AUTO: 0.02 10*3/MM3 (ref 0–0.05)
IMM GRANULOCYTES NFR BLD AUTO: 0.3 % (ref 0–0.5)
KETONES UR QL STRIP: NEGATIVE
LEUKOCYTE ESTERASE UR QL STRIP.AUTO: ABNORMAL
LYMPHOCYTES # BLD AUTO: 0.56 10*3/MM3 (ref 0.7–3.1)
LYMPHOCYTES NFR BLD AUTO: 7 % (ref 19.6–45.3)
MAGNESIUM SERPL-MCNC: 1.7 MG/DL (ref 1.6–2.4)
MCH RBC QN AUTO: 25.4 PG (ref 26.6–33)
MCHC RBC AUTO-ENTMCNC: 31 G/DL (ref 31.5–35.7)
MCV RBC AUTO: 81.9 FL (ref 79–97)
MONOCYTES # BLD AUTO: 0.48 10*3/MM3 (ref 0.1–0.9)
MONOCYTES NFR BLD AUTO: 6 % (ref 5–12)
NEUTROPHILS NFR BLD AUTO: 6.76 10*3/MM3 (ref 1.7–7)
NEUTROPHILS NFR BLD AUTO: 84.8 % (ref 42.7–76)
NITRITE UR QL STRIP: NEGATIVE
NRBC BLD AUTO-RTO: 0 /100 WBC (ref 0–0.2)
NT-PROBNP SERPL-MCNC: 81.9 PG/ML (ref 0–900)
PH UR STRIP.AUTO: 6.5 [PH] (ref 5–8)
PLATELET # BLD AUTO: 196 10*3/MM3 (ref 140–450)
PMV BLD AUTO: 9.5 FL (ref 6–12)
POTASSIUM SERPL-SCNC: 4.2 MMOL/L (ref 3.5–5.2)
PROT SERPL-MCNC: 7.2 G/DL (ref 6–8.5)
PROT UR QL STRIP: NEGATIVE
RBC # BLD AUTO: 4.21 10*6/MM3 (ref 3.77–5.28)
RBC # UR STRIP: ABNORMAL /HPF
REF LAB TEST METHOD: ABNORMAL
SARS-COV-2 RNA RESP QL NAA+PROBE: NOT DETECTED
SODIUM SERPL-SCNC: 133 MMOL/L (ref 136–145)
SP GR UR STRIP: 1.02 (ref 1–1.03)
SQUAMOUS #/AREA URNS HPF: ABNORMAL /HPF
TROPONIN T DELTA: 0 NG/L
TROPONIN T SERPL HS-MCNC: 14 NG/L
UROBILINOGEN UR QL STRIP: ABNORMAL
WBC # UR STRIP: ABNORMAL /HPF
WBC NRBC COR # BLD: 7.97 10*3/MM3 (ref 3.4–10.8)
WHOLE BLOOD HOLD COAG: NORMAL
WHOLE BLOOD HOLD SPECIMEN: NORMAL

## 2023-06-08 PROCEDURE — 96360 HYDRATION IV INFUSION INIT: CPT

## 2023-06-08 PROCEDURE — 71275 CT ANGIOGRAPHY CHEST: CPT

## 2023-06-08 PROCEDURE — 83036 HEMOGLOBIN GLYCOSYLATED A1C: CPT | Performed by: INTERNAL MEDICINE

## 2023-06-08 PROCEDURE — 82948 REAGENT STRIP/BLOOD GLUCOSE: CPT

## 2023-06-08 PROCEDURE — 87086 URINE CULTURE/COLONY COUNT: CPT

## 2023-06-08 PROCEDURE — 83880 ASSAY OF NATRIURETIC PEPTIDE: CPT

## 2023-06-08 PROCEDURE — 87088 URINE BACTERIA CULTURE: CPT

## 2023-06-08 PROCEDURE — 81001 URINALYSIS AUTO W/SCOPE: CPT

## 2023-06-08 PROCEDURE — G0378 HOSPITAL OBSERVATION PER HR: HCPCS

## 2023-06-08 PROCEDURE — 85025 COMPLETE CBC W/AUTO DIFF WBC: CPT

## 2023-06-08 PROCEDURE — 83735 ASSAY OF MAGNESIUM: CPT

## 2023-06-08 PROCEDURE — 99285 EMERGENCY DEPT VISIT HI MDM: CPT

## 2023-06-08 PROCEDURE — 85379 FIBRIN DEGRADATION QUANT: CPT

## 2023-06-08 PROCEDURE — 93010 ELECTROCARDIOGRAM REPORT: CPT | Performed by: INTERNAL MEDICINE

## 2023-06-08 PROCEDURE — 36415 COLL VENOUS BLD VENIPUNCTURE: CPT

## 2023-06-08 PROCEDURE — 93005 ELECTROCARDIOGRAM TRACING: CPT

## 2023-06-08 PROCEDURE — 80053 COMPREHEN METABOLIC PANEL: CPT

## 2023-06-08 PROCEDURE — 84484 ASSAY OF TROPONIN QUANT: CPT

## 2023-06-08 PROCEDURE — 63710000001 INSULIN LISPRO (HUMAN) PER 5 UNITS: Performed by: INTERNAL MEDICINE

## 2023-06-08 PROCEDURE — 70450 CT HEAD/BRAIN W/O DYE: CPT

## 2023-06-08 PROCEDURE — 71045 X-RAY EXAM CHEST 1 VIEW: CPT

## 2023-06-08 PROCEDURE — 87186 SC STD MICRODIL/AGAR DIL: CPT

## 2023-06-08 PROCEDURE — 96361 HYDRATE IV INFUSION ADD-ON: CPT

## 2023-06-08 PROCEDURE — 63710000001 INSULIN DETEMIR PER 5 UNITS: Performed by: INTERNAL MEDICINE

## 2023-06-08 PROCEDURE — 25010000002 ENOXAPARIN PER 10 MG

## 2023-06-08 PROCEDURE — 25510000001 IOPAMIDOL PER 1 ML: Performed by: EMERGENCY MEDICINE

## 2023-06-08 PROCEDURE — 87635 SARS-COV-2 COVID-19 AMP PRB: CPT

## 2023-06-08 PROCEDURE — 25010000002 CEFTRIAXONE PER 250 MG: Performed by: INTERNAL MEDICINE

## 2023-06-08 PROCEDURE — 63710000001 INSULIN REGULAR HUMAN PER 5 UNITS

## 2023-06-08 PROCEDURE — 96372 THER/PROPH/DIAG INJ SC/IM: CPT

## 2023-06-08 RX ORDER — NITROGLYCERIN 20 MG/100ML
5-200 INJECTION INTRAVENOUS
Status: DISCONTINUED | OUTPATIENT
Start: 2023-06-08 | End: 2023-06-08

## 2023-06-08 RX ORDER — ROPINIROLE 0.25 MG/1
0.25 TABLET, FILM COATED ORAL NIGHTLY
Status: DISCONTINUED | OUTPATIENT
Start: 2023-06-08 | End: 2023-06-09 | Stop reason: HOSPADM

## 2023-06-08 RX ORDER — ONDANSETRON 2 MG/ML
4 INJECTION INTRAMUSCULAR; INTRAVENOUS EVERY 6 HOURS PRN
Status: DISCONTINUED | OUTPATIENT
Start: 2023-06-08 | End: 2023-06-09 | Stop reason: HOSPADM

## 2023-06-08 RX ORDER — TIZANIDINE 4 MG/1
4 TABLET ORAL EVERY 6 HOURS PRN
Status: DISCONTINUED | OUTPATIENT
Start: 2023-06-08 | End: 2023-06-09 | Stop reason: HOSPADM

## 2023-06-08 RX ORDER — ENOXAPARIN SODIUM 100 MG/ML
40 INJECTION SUBCUTANEOUS EVERY 24 HOURS
Status: DISCONTINUED | OUTPATIENT
Start: 2023-06-09 | End: 2023-06-09 | Stop reason: HOSPADM

## 2023-06-08 RX ORDER — TIZANIDINE 4 MG/1
4 TABLET ORAL EVERY 6 HOURS PRN
Status: DISCONTINUED | OUTPATIENT
Start: 2023-06-08 | End: 2023-06-08

## 2023-06-08 RX ORDER — SODIUM CHLORIDE 9 MG/ML
125 INJECTION, SOLUTION INTRAVENOUS CONTINUOUS
Status: DISCONTINUED | OUTPATIENT
Start: 2023-06-08 | End: 2023-06-09 | Stop reason: HOSPADM

## 2023-06-08 RX ORDER — INSULIN LISPRO 100 [IU]/ML
3-14 INJECTION, SOLUTION INTRAVENOUS; SUBCUTANEOUS
Status: DISCONTINUED | OUTPATIENT
Start: 2023-06-08 | End: 2023-06-09 | Stop reason: HOSPADM

## 2023-06-08 RX ORDER — ACETAMINOPHEN 325 MG/1
650 TABLET ORAL EVERY 4 HOURS PRN
Status: DISCONTINUED | OUTPATIENT
Start: 2023-06-08 | End: 2023-06-09 | Stop reason: HOSPADM

## 2023-06-08 RX ORDER — GABAPENTIN 300 MG/1
300 CAPSULE ORAL DAILY
Status: DISCONTINUED | OUTPATIENT
Start: 2023-06-08 | End: 2023-06-09 | Stop reason: HOSPADM

## 2023-06-08 RX ORDER — LEVOTHYROXINE SODIUM 0.05 MG/1
50 TABLET ORAL
Status: DISCONTINUED | OUTPATIENT
Start: 2023-06-09 | End: 2023-06-09 | Stop reason: HOSPADM

## 2023-06-08 RX ORDER — SODIUM CHLORIDE 0.9 % (FLUSH) 0.9 %
10 SYRINGE (ML) INJECTION AS NEEDED
Status: DISCONTINUED | OUTPATIENT
Start: 2023-06-08 | End: 2023-06-09 | Stop reason: HOSPADM

## 2023-06-08 RX ORDER — ASPIRIN 81 MG/1
81 TABLET ORAL DAILY
Status: DISCONTINUED | OUTPATIENT
Start: 2023-06-08 | End: 2023-06-09 | Stop reason: HOSPADM

## 2023-06-08 RX ORDER — SODIUM CHLORIDE 9 MG/ML
40 INJECTION, SOLUTION INTRAVENOUS AS NEEDED
Status: DISCONTINUED | OUTPATIENT
Start: 2023-06-08 | End: 2023-06-09 | Stop reason: HOSPADM

## 2023-06-08 RX ORDER — BISACODYL 5 MG/1
5 TABLET, DELAYED RELEASE ORAL DAILY PRN
Status: DISCONTINUED | OUTPATIENT
Start: 2023-06-08 | End: 2023-06-09 | Stop reason: HOSPADM

## 2023-06-08 RX ORDER — ACETAMINOPHEN 650 MG/1
650 SUPPOSITORY RECTAL EVERY 4 HOURS PRN
Status: DISCONTINUED | OUTPATIENT
Start: 2023-06-08 | End: 2023-06-09 | Stop reason: HOSPADM

## 2023-06-08 RX ORDER — SODIUM CHLORIDE 0.9 % (FLUSH) 0.9 %
10 SYRINGE (ML) INJECTION EVERY 12 HOURS SCHEDULED
Status: DISCONTINUED | OUTPATIENT
Start: 2023-06-08 | End: 2023-06-09 | Stop reason: HOSPADM

## 2023-06-08 RX ORDER — POLYETHYLENE GLYCOL 3350 17 G/17G
17 POWDER, FOR SOLUTION ORAL DAILY PRN
Status: DISCONTINUED | OUTPATIENT
Start: 2023-06-08 | End: 2023-06-09 | Stop reason: HOSPADM

## 2023-06-08 RX ORDER — ACETAMINOPHEN 160 MG/5ML
650 SOLUTION ORAL EVERY 4 HOURS PRN
Status: DISCONTINUED | OUTPATIENT
Start: 2023-06-08 | End: 2023-06-09 | Stop reason: HOSPADM

## 2023-06-08 RX ORDER — NICOTINE POLACRILEX 4 MG
15 LOZENGE BUCCAL
Status: DISCONTINUED | OUTPATIENT
Start: 2023-06-08 | End: 2023-06-09 | Stop reason: HOSPADM

## 2023-06-08 RX ORDER — DEXTROSE MONOHYDRATE 25 G/50ML
25 INJECTION, SOLUTION INTRAVENOUS
Status: DISCONTINUED | OUTPATIENT
Start: 2023-06-08 | End: 2023-06-09 | Stop reason: HOSPADM

## 2023-06-08 RX ORDER — AMOXICILLIN 250 MG
2 CAPSULE ORAL 2 TIMES DAILY
Status: DISCONTINUED | OUTPATIENT
Start: 2023-06-08 | End: 2023-06-09 | Stop reason: HOSPADM

## 2023-06-08 RX ORDER — ENOXAPARIN SODIUM 100 MG/ML
1 INJECTION SUBCUTANEOUS ONCE
Status: COMPLETED | OUTPATIENT
Start: 2023-06-08 | End: 2023-06-08

## 2023-06-08 RX ORDER — PANTOPRAZOLE SODIUM 40 MG/1
40 TABLET, DELAYED RELEASE ORAL DAILY
Status: DISCONTINUED | OUTPATIENT
Start: 2023-06-08 | End: 2023-06-09 | Stop reason: HOSPADM

## 2023-06-08 RX ORDER — BISACODYL 10 MG
10 SUPPOSITORY, RECTAL RECTAL DAILY PRN
Status: DISCONTINUED | OUTPATIENT
Start: 2023-06-08 | End: 2023-06-09 | Stop reason: HOSPADM

## 2023-06-08 RX ADMIN — INSULIN LISPRO 3 UNITS: 100 INJECTION, SOLUTION INTRAVENOUS; SUBCUTANEOUS at 20:44

## 2023-06-08 RX ADMIN — ENOXAPARIN SODIUM 50 MG: 100 INJECTION SUBCUTANEOUS at 17:08

## 2023-06-08 RX ADMIN — INSULIN HUMAN 8 UNITS: 100 INJECTION, SOLUTION PARENTERAL at 17:08

## 2023-06-08 RX ADMIN — SODIUM CHLORIDE 125 ML/HR: 9 INJECTION, SOLUTION INTRAVENOUS at 20:52

## 2023-06-08 RX ADMIN — GABAPENTIN 300 MG: 300 CAPSULE ORAL at 20:51

## 2023-06-08 RX ADMIN — ROPINIROLE HYDROCHLORIDE 0.25 MG: 0.25 TABLET, FILM COATED ORAL at 20:42

## 2023-06-08 RX ADMIN — NITROGLYCERIN 10 MCG/MIN: 20 INJECTION INTRAVENOUS at 17:49

## 2023-06-08 RX ADMIN — SODIUM CHLORIDE 500 ML: 9 INJECTION, SOLUTION INTRAVENOUS at 17:08

## 2023-06-08 RX ADMIN — IOPAMIDOL 70 ML: 755 INJECTION, SOLUTION INTRAVENOUS at 17:36

## 2023-06-08 RX ADMIN — Medication 10 ML: at 20:44

## 2023-06-08 RX ADMIN — METOPROLOL TARTRATE 12.5 MG: 25 TABLET, FILM COATED ORAL at 20:42

## 2023-06-08 RX ADMIN — INSULIN DETEMIR 24 UNITS: 100 INJECTION, SOLUTION SUBCUTANEOUS at 20:44

## 2023-06-08 RX ADMIN — CEFTRIAXONE 1 G: 1 INJECTION, POWDER, FOR SOLUTION INTRAMUSCULAR; INTRAVENOUS at 20:43

## 2023-06-08 NOTE — Clinical Note
Level of Care: Telemetry [5]   Diagnosis: Atypical chest pain [588618]   Admitting Physician: PILO CABRAL [[id number]]   Attending Physician: PILO CABRAL [[id number]]   Certification: I Certify That Inpatient Hospital Services Are Medically Necessary For Greater Than 2 Midnights

## 2023-06-08 NOTE — H&P
"    [facility]  HISTORY AND PHYSICAL    Date of Admission: 6/8/2023  Primary Care Physician: Brittany Solis MD    Subjective   Primary Historian: Patient    Chief Complaint: Chest pain    History of Present Illness  Patient is a 63-year-old  female with past medical history significant for cirrhosis (followed by Dr. Serna of gastroenterology), insulin-dependent diabetes, hypertension, the presented to our hospital with a chief complaint of chest pain.  Patient states that she fell about a week ago, and since that time is having periods where she feels like that her right lower extremity \"gives out.\"  She reports that she has been going to physical therapy.  With exertion she has started to notice some occasional symptoms of chest discomfort in the center of her chest.  She reports that the symptoms began in the center of her chest and radiate out towards her left and right shoulders.  She does report having some shortness of breath symptoms when this occurs.  She denies any diaphoresis or nausea or vomiting.  She denies any previous history of cardiac disease.  She reports that she has never had a stress test before.    The vascular access team is getting ready to evaluate patient in the emergency room as there has been significant difficulty getting a peripheral IV placed.  Patient does report that she feels dehydrated.  She reports that she has not taken her insulin yet today, and on arrival was found to have a blood sugar greater than 500.  She has a chronically elevated alkaline phosphatase and history of cirrhosis, followed by Dr. Serna on an outpatient basis.    Review of Systems   Otherwise complete ROS reviewed and negative except as mentioned in the HPI.    Past Medical History:   Past Medical History:   Diagnosis Date    Alcoholic hepatitis with ascites     Allergic rhinitis     Breast cyst     Cancer     ovarian    Degeneration of lumbar or " "lumbosacral intervertebral disc 09/15/2022    Diabetes mellitus     Disease of thyroid gland     Elevated d-dimer     Endometriosis     Fatty liver     GERD (gastroesophageal reflux disease)     History of transfusion     Hyperlipidemia     Hypertension     Osteoporosis     Ovarian cyst     Pancreatitis     Postmenopausal     Rectal mass     Rectal prolapse     Sleep apnea      Past Surgical History:  Past Surgical History:   Procedure Laterality Date    BILATERAL SALPINGO OOPHORECTOMY  2008    CHOLECYSTECTOMY  1996    COLONOSCOPY  07/2017    Dr Murphy- per patient normal     COLONOSCOPY N/A 11/09/2021    Procedure: COLONOSCOPY WITH ANESTHESIA;  Surgeon: Ray Serna MD;  Location: [facility] ENDOSCOPY;  Service: Gastroenterology;  Laterality: N/A;  pre rectal mass  post  Dr Shields    DILATION AND CURETTAGE, DIAGNOSTIC / THERAPEUTIC      ENDOSCOPY N/A 11/09/2021    Procedure: ESOPHAGOGASTRODUODENOSCOPY WITH ANESTHESIA;  Surgeon: Ray Serna MD;  Location: [facility] ENDOSCOPY;  Service: Gastroenterology;  Laterality: N/A;  pre anemia  post hiatal hernia; gastric polyp  Dr. Shields    KNEE SURGERY  1982    LUMBAR LAMINECTOMY WITH FUSION Right 5/1/2023    Procedure: LUMBAR DISCECTOMY, JADON- LAMINECTOMY TRANSFORAMINAL LUMBAR INTERBODY FUSION, POSTERIOR PEDICLE SCREW INSTRUMENTATION, L5-S1 RIGHT. USE OF THE ROBOT;  Surgeon: Nathaniel Tesfaye MD;  Location: [facility] OR;  Service: Neurosurgery;  Laterality: Right;     Social History:  reports that she has never smoked. She has never used smokeless tobacco. She reports that she does not currently use alcohol. She reports that she does not use drugs.    Family History: family history includes Colon polyps in her brother and father; Ovarian cancer in her maternal aunt, maternal grandmother, mother, and paternal grandmother.       Allergies:  Allergies   Allergen Reactions    Tizanidine Hcl Dizziness     Pt stated \"horrible dry mouth, dizziness, couldn't see, it was awful\"    " Augmentin [Amoxicillin-Pot Clavulanate] Nausea And Vomiting       Medications:  Prior to Admission medications    Medication Sig Start Date End Date Taking? Authorizing Provider   B-D UF III MINI PEN NEEDLES 31G X 5 MM misc USE 5 TIMES DAILY AND AS NEEDED. 8/8/22   Samantha Chaney MD   Blood Glucose Monitoring Suppl (ONE TOUCH ULTRA 2) w/Device kit See Admin Instructions. 4/5/23   Samantha Chaney MD   Continuous Blood Gluc Sensor (Dexcom G6 Sensor) See Admin Instructions. 9/9/22   Samantha Chaney MD   Continuous Blood Gluc Transmit (Dexcom G6 Transmitter) misc See Admin Instructions. 9/9/22   Samantha Chaney MD   cyclobenzaprine (FLEXERIL) 10 MG tablet Take 1 tablet by mouth 3 (Three) Times a Day As Needed for Muscle Spasms. 5/2/23   Randy Mcbride APRN   gabapentin (NEURONTIN) 300 MG capsule Take 1 capsule by mouth Daily for 7 days. 6/1/23 6/8/23  Negro Vaughan MD   glucose blood test strip 5 times a day and prn . 1/11/22   Samantha Chaney MD   insulin aspart (novoLOG FLEXPEN) 100 UNIT/ML solution pen-injector sc pen Inject  under the skin into the appropriate area as directed 3 (Three) Times a Day With Meals. 2-5 units with meals    Samantha Chaney MD   Insulin Disposable Pump (OmniPod Dash 5 Pack Pods) misc 1 each Continuous. USE ONE POD EVERY THREE DAYS 4/22/22   Damion Moran APRN   Lancets (OneTouch Delica Plus [id number]) misc TEST 5 TIMES DAILY AND AS NEEDED 4/5/23   Samantha Chaney MD   levothyroxine (SYNTHROID, LEVOTHROID) 50 MCG tablet Take 1 tablet by mouth Every Morning.    Samantha Chaney MD   magnesium 30 MG tablet Take 1 tablet by mouth Daily. 6/1/23   Damion Bray PA-C   Multiple Vitamins-Iron (multivitamin with iron) tablet tablet Take 1 tablet by mouth Daily.    Samantha Chaney MD   Nutritional Supplements (JUICE PLUS FIBRE PO) Take  by mouth Daily.    Samantha Chaney MD   pantoprazole (PROTONIX) 40 MG EC tablet Take 1  "tablet by mouth Daily.    ProviderSamantha MD   potassium chloride 10 MEQ CR tablet Take 1 tablet by mouth 2 (Two) Times a Day for 7 days. 6/1/23 6/8/23  Damion Bray PA-C   rOPINIRole (REQUIP) 0.25 MG tablet Take 1 tablet by mouth Every Night. Take 1 hour before bedtime. 5/11/23   Randy Mcbride APRN   sulfamethoxazole-trimethoprim (Bactrim DS) 800-160 MG per tablet Take 1 tablet by mouth 2 (Two) Times a Day. 4/26/23   Nathaniel Tesfaye MD   tiZANidine (Zanaflex) 4 MG tablet Take 1 tablet by mouth Every 6 (Six) Hours As Needed for Muscle Spasms. 6/1/23   Damion Bray PA-C   Toujeo Payam SoloStar 300 UNIT/ML solution pen-injector injection Inject 24 Units under the skin into the appropriate area as directed Every Night. 8/8/22   Samantha Chaney MD   vitamin D (ERGOCALCIFEROL) 1.25 MG (28821 UT) capsule capsule Take 1 capsule by mouth 1 (One) Time Per Week.    ProviderSamantha MD     I have utilized all available immediate resources to obtain, update, or review the patient's current medications (including all prescriptions, over-the-counter products, herbals, cannabis/cannabidiol products, and vitamin/mineral/dietary (nutritional) supplements).    Objective     Vital Signs: /84 (BP Location: Left arm, Patient Position: Lying)   Pulse 105   Temp 98.1 °F (36.7 °C) (Oral)   Resp 18   Ht 152.4 cm (60\")   Wt 50.8 kg (112 lb)   SpO2 100%   BMI 21.87 kg/m²   Physical Exam  Vitals reviewed.   Constitutional:       General: She is not in acute distress.  HENT:      Head: Normocephalic.      Mouth/Throat:      Mouth: Mucous membranes are dry.   Eyes:      Pupils: Pupils are equal, round, and reactive to light.   Cardiovascular:      Rate and Rhythm: Normal rate.   Pulmonary:      Effort: Pulmonary effort is normal. No respiratory distress.   Musculoskeletal:         General: No swelling.      Comments: Walking cane noted in her bed/stretcher   Skin:     General: Skin is warm. "   Neurological:      General: No focal deficit present.      Mental Status: She is alert.   Psychiatric:         Mood and Affect: Mood normal.        Results Reviewed:  Lab Results (last 24 hours)       Procedure Component Value Units Date/Time    Urinalysis, Microscopic Only - Urine, Clean Catch [[id number]]  (Abnormal) Collected: 06/08/23 1623    Specimen: Urine, Clean Catch Updated: 06/08/23 1644     RBC, UA 0-2 /HPF      WBC, UA Too Numerous to Count /HPF      Bacteria, UA 4+ /HPF      Squamous Epithelial Cells, UA None Seen /HPF      Hyaline Casts, UA 0-2 /LPF      Methodology Automated Microscopy    Urinalysis With Culture If Indicated - Urine, Clean Catch [[id number]]  (Abnormal) Collected: 06/08/23 1623    Specimen: Urine, Clean Catch Updated: 06/08/23 1644     Color, UA Yellow     Appearance, UA Cloudy     pH, UA 6.5     Specific Gravity, UA 1.016     Glucose, UA >=1000 mg/dL (3+)     Ketones, UA Negative     Bilirubin, UA Negative     Blood, UA Negative     Protein, UA Negative     Leuk Esterase, UA Large (3+)     Nitrite, UA Negative     Urobilinogen, UA 0.2 E.U./dL    Narrative:      In absence of clinical symptoms, the presence of pyuria, bacteria, and/or nitrites on the urinalysis result does not correlate with infection.    Urine Culture - Urine, Urine, Clean Catch [[id number]] Collected: 06/08/23 1623    Specimen: Urine, Clean Catch Updated: 06/08/23 1644    COVID PRE-OP / PRE-PROCEDURE SCREENING ORDER (NO ISOLATION) - Swab, Nasal Cavity [[id number]]  (Normal) Collected: 06/08/23 1611    Specimen: Swab from Nasal Cavity Updated: 06/08/23 1638    Narrative:      The following orders were created for panel order COVID PRE-OP / PRE-PROCEDURE SCREENING ORDER (NO ISOLATION) - Swab, Nasal Cavity.  Procedure                               Abnormality         Status                     ---------                               -----------         ------                     COVID-19,CEPHEID/NETTIE,CO...[[id number]]   Normal              Final result                 Please view results for these tests on the individual orders.    COVID-19,CEPHEID/NETTIE,COR/ALTAF/PAD/MIKKI/MAD IN-HOUSE(OR EMERGENT/ADD-ON),NP SWAB IN TRANSPORT MEDIA 3-4 HR TAT, RT-PCR - Swab, Nasopharynx [[id number]]  (Normal) Collected: 06/08/23 1611    Specimen: Swab from Nasopharynx Updated: 06/08/23 1638     COVID19 Not Detected    Narrative:      Fact sheet for providers: https://www.fda.gov/media/572733/download     Fact sheet for patients: https://www.fda.gov/media/529222/download  Fact sheet for providers: https://www.fda.gov/media/269397/download    Fact sheet for patients: https://www.fda.gov/media/257473/download    Test performed by PCR.    High Sensitivity Troponin T 2Hr [[id number]]  (Abnormal) Collected: 06/08/23 1611    Specimen: Blood Updated: 06/08/23 1633     HS Troponin T 14 ng/L      Troponin T Delta 0 ng/L     Narrative:      High Sensitive Troponin T Reference Range:  <10.0 ng/L- Negative Female for AMI  <15.0 ng/L- Negative Male for AMI  >=10 - Abnormal Female indicating possible myocardial injury.  >=15 - Abnormal Male indicating possible myocardial injury.   Clinicians would have to utilize clinical acumen, EKG, Troponin, and serial changes to determine if it is an Acute Myocardial Infarction or myocardial injury due to an underlying chronic condition.         Comprehensive Metabolic Panel [[id number]]  (Abnormal) Collected: 06/08/23 1256    Specimen: Blood Updated: 06/08/23 1508     Glucose 552 mg/dL      BUN 13 mg/dL      Creatinine 0.78 mg/dL      Sodium 133 mmol/L      Potassium 4.2 mmol/L      Chloride 94 mmol/L      CO2 27.0 mmol/L      Calcium 10.5 mg/dL      Total Protein 7.2 g/dL      Albumin 4.3 g/dL      ALT (SGPT) 17 U/L      AST (SGOT) 19 U/L      Alkaline Phosphatase 362 U/L      Total Bilirubin 0.4 mg/dL      Globulin 2.9 gm/dL      A/G Ratio 1.5 g/dL      BUN/Creatinine Ratio 16.7     Anion Gap 12.0 mmol/L      eGFR 86.5  mL/min/1.73     Narrative:      GFR Normal >60  Chronic Kidney Disease <60  Kidney Failure <15      BNP [[id number]]  (Normal) Collected: 06/08/23 1256    Specimen: Blood Updated: 06/08/23 1451     proBNP 81.9 pg/mL     Narrative:      Among patients with dyspnea, NT-proBNP is highly sensitive for the detection of acute congestive heart failure. In addition NT-proBNP of <300 pg/ml effectively rules out acute congestive heart failure with 99% negative predictive value.    Results may be falsely decreased if patient taking Biotin.      High Sensitivity Troponin T [[id number]]  (Abnormal) Collected: 06/08/23 1256    Specimen: Blood Updated: 06/08/23 1448     HS Troponin T 14 ng/L     Narrative:      High Sensitive Troponin T Reference Range:  <10.0 ng/L- Negative Female for AMI  <15.0 ng/L- Negative Male for AMI  >=10 - Abnormal Female indicating possible myocardial injury.  >=15 - Abnormal Male indicating possible myocardial injury.   Clinicians would have to utilize clinical acumen, EKG, Troponin, and serial changes to determine if it is an Acute Myocardial Infarction or myocardial injury due to an underlying chronic condition.         Magnesium [[id number]]  (Normal) Collected: 06/08/23 1256    Specimen: Blood Updated: 06/08/23 1448     Magnesium 1.7 mg/dL     D-dimer, Quantitative [[id number]]  (Normal) Collected: 06/08/23 1256    Specimen: Blood Updated: 06/08/23 1445     D-Dimer, Quantitative 0.35 MCGFEU/mL     Narrative:      According to the assay 's published package insert, a normal (<0.50 MCGFEU/mL) D-dimer result in conjunction with a non-high clinical probability assessment, excludes deep vein thrombosis (DVT) and pulmonary embolism (PE) with high sensitivity.    D-dimer values increase with age and this can make VTE exclusion of an older population difficult. To address this, the [employer], based on best available evidence and recent guidelines, recommends that clinicians use  "age-adjusted D-dimer thresholds in patients greater than 50 years of age with: a) a low probability of PE who do not meet all Pulmonary Embolism Rule Out Criteria, or b) in those with intermediate probability of PE.   The formula for an age-adjusted D-dimer cut-off is \"age/100\".  For example, a 60 year old patient would have an age-adjusted cut-off of 0.60 MCGFEU/mL and an 80 year old 0.80 MCGFEU/mL.    CBC & Differential [[id number]]  (Abnormal) Collected: 06/08/23 1256    Specimen: Blood Updated: 06/08/23 1437    Narrative:      The following orders were created for panel order CBC & Differential.  Procedure                               Abnormality         Status                     ---------                               -----------         ------                     CBC Auto Differential[[id number]]        Abnormal            Final result                 Please view results for these tests on the individual orders.    CBC Auto Differential [[id number]]  (Abnormal) Collected: 06/08/23 1256    Specimen: Blood Updated: 06/08/23 1437     WBC 7.97 10*3/mm3      RBC 4.21 10*6/mm3      Hemoglobin 10.7 g/dL      Hematocrit 34.5 %      MCV 81.9 fL      MCH 25.4 pg      MCHC 31.0 g/dL      RDW 14.1 %      RDW-SD 41.2 fl      MPV 9.5 fL      Platelets 196 10*3/mm3      Neutrophil % 84.8 %      Lymphocyte % 7.0 %      Monocyte % 6.0 %      Eosinophil % 1.6 %      Basophil % 0.3 %      Immature Grans % 0.3 %      Neutrophils, Absolute 6.76 10*3/mm3      Lymphocytes, Absolute 0.56 10*3/mm3      Monocytes, Absolute 0.48 10*3/mm3      Eosinophils, Absolute 0.13 10*3/mm3      Basophils, Absolute 0.02 10*3/mm3      Immature Grans, Absolute 0.02 10*3/mm3      nRBC 0.0 /100 WBC     [place] Draw [[id number]] Collected: 06/08/23 1256    Specimen: Blood Updated: 06/08/23 1400    Narrative:      The following orders were created for panel order [place] Draw.  Procedure                               Abnormality         Status               "       ---------                               -----------         ------                     Green Top (Gel)[[id number]]                                  Final result               Lavender Top[[id number]]                                     Final result               Red Top[[id number]]                                          Final result               Yañez Top[[id number]]                                         In process                 Light Blue Top[[id number]]                                   Final result                 Please view results for these tests on the individual orders.    Green Top (Gel) [[id number]] Collected: 06/08/23 1256    Specimen: Blood Updated: 06/08/23 1400     Extra Tube Hold for add-ons.     Comment: Auto resulted.       Lavender Top [[id number]] Collected: 06/08/23 1256    Specimen: Blood Updated: 06/08/23 1400     Extra Tube hold for add-on     Comment: Auto resulted       Red Top [[id number]] Collected: 06/08/23 1256    Specimen: Blood Updated: 06/08/23 1400     Extra Tube Hold for add-ons.     Comment: Auto resulted.       Light Blue Top [[id number]] Collected: 06/08/23 1256    Specimen: Blood Updated: 06/08/23 1400     Extra Tube Hold for add-ons.     Comment: Auto resulted       Gray Top [[id number]] Collected: 06/08/23 1256    Specimen: Blood Updated: 06/08/23 1305          Imaging Results (Last 24 Hours)       Procedure Component Value Units Date/Time    CT Head Without Contrast [[id number]] Collected: 06/08/23 1613     Updated: 06/08/23 1622    Narrative:      EXAMINATION: CT HEAD WO CONTRAST-      6/8/2023 3:40 PM CDT     HISTORY: Dizziness     In order to have a CT radiation dose as low as reasonably achievable  Automated Exposure Control was utilized for adjustment of the mA and/or  KV according to patient size.     DLP in mGycm= 570.     Comparison is made with June 1, 2023.     Axial, sagittal, and coronal noncontrast CT imaging of the head.     The visualized paranasal sinuses are  clear.     The brain and ventricles have an age appropriate appearance.   Mild atrophy and small vessel disease.  There is no hemorrhage or mass-effect.   No acute infarction is seen.     No calvarial abnormality.       Impression:      1. No acute intracranial abnormality is seen.                                         This report was finalized on 06/08/2023 16:19 by Dr. Khanh Esteban MD.    CT Angiogram Chest [[id number]] Resulted: 06/08/23 1536     Updated: 06/08/23 1536    XR Chest 1 View [[id number]] Collected: 06/08/23 1505     Updated: 06/08/23 1509    Narrative:      EXAM: XR CHEST 1 VW-      DATE: 6/8/2023 2:46 PM CDT     HISTORY: Chest pain, dyspnea on exertion       COMPARISON: 04/20/2023.      TECHNIQUE:  Single view. Frontal view of the chest. 1 images.       FINDINGS:    Low lung volumes. No pneumothorax, pleural effusion or focal  consolidation. Cardiac mediastinal silhouette appear within normal  limits. Calcified aortic atherosclerosis. Cholecystectomy clips. No  acute bony finding.          Impression:      1. No acute cardiopulmonary findings.  This report was finalized on 06/08/2023 15:06 by Dr Paula Simeon MD.          I have personally reviewed and interpreted the radiology studies and ECG obtained at time of admission.     Assessment / Plan   Assessment:   Active Hospital Problems    Diagnosis     **Atypical chest pain     Elevated alkaline phosphatase level     Right foot drop     Degeneration of lumbar or lumbosacral intervertebral disc     Acute cystitis     Alcoholic cirrhosis     Type 1 diabetes mellitus with hyperglycemia        Treatment Plan  High-sensitivity troponin returned at 14, and repeat level was also flat at 14.  No acute ST-T changes on EKG  Plan for stress echocardiogram in the morning tomorrow  CT angiogram of the chest ordered to the emergency department-pending  IV insulin has also been ordered x1; vascular access team currently working on placement of a peripheral  IV  Patient reports that she takes Toujeo 24 units nightly in addition to NovoLog sliding scale insulin.  Plan to order insulin Levemir in addition to sliding scale insulin coverage  Check A1c.  Check lipids  Intravenous fluids  Patient administered a dose of Lovenox in the emergency department.  We will also add aspirin in addition to low-dose beta-blocker  Telemetry monitoring  Patient reports that Dr. Serna is monitoring her chronically elevated alk phos in the outpatient setting.  IV Rocephin.  Check urine culture    Medical Decision Making  Number and Complexity of problems: High complexity; chest pain and elevated high-sensitivity troponin but with flat trend.  Multiple chronic comorbidities including alcoholic cirrhosis and type 1 diabetes complicated by hyperglycemia with blood sugar greater than 500 on arrival.  Also with suspected acute UTI      Conditions and Status        Condition is unchanged.     [facility] Data  External documents reviewed: outpatient clinic visit with Dr. Serna reviewed  Cardiac tracing (EKG, telemetry) interpretation: sinus tachycardia but not acute ST-T Changes  Radiology interpretation: Chest x-ray and CT of the head with no acute findings  Labs reviewed: As above  Any tests that were considered but not ordered: None at this time     Decision rules/scores evaluated (example [id number], Wells, etc): None at this time     Discussed with: Patient     Care Planning  Shared decision making: Discussed with patient in ED room #9 with agreement to proceed with treatment plan as outlined  Code status and discussions: Full code    Disposition  Social Determinants of Health that impact treatment or disposition: None apparent at this time  Estimated length of stay is 1-2 days    I confirmed that the patient's advanced care plan is present, code status is documented, and a surrogate decision maker is listed in the patient's medical record.     The patient's surrogate decision maker is: to be  determined (will need to discuss further with patient)        Electronically signed by Joe Alvarez MD, 06/08/23, 16:56 CDT.

## 2023-06-08 NOTE — ED PROVIDER NOTES
"Subjective   History of Present Illness  Patient is a 63-year-old female who presents to the ED today complaining of chest pain that began around 1115 today while she was at physical therapy.  Patient has history of a laminectomy with fusion by Dr. Tesfaye about a month ago and had her first day of physical therapy today.  She reports earlier in the week she noticed that she had been feeling a little nauseated at times as well as short of breath on exertion with some occasional dizziness.  She states her blood pressure has been more elevated than normal this week.  She denies any sick symptoms and is chest pain-free at this time.  Reports she has been experiencing some dry mouth with this as well.  Reports when the chest pain came on she was active and doing physical therapy exercises which required her to stand and sit.  She states when the chest pain came on she would describe it as a tightness that was midsternal in nature and radiated to her left jaw and through to her back.  She does not describe this as a ripping or tearing sensation.  She states she felt as though her \"mind stopped\".  Reports over the last week she feels that she has been losing her train of thought easily and has had to be redirected frequently for tasks.  She denies taking any blood thinners.  She does report she urinated while she was waiting to come back and feels she may have a urinary tract infection as she noticed her urine was dark and malodorous.  Denies any burning with urination, fever, or flank pain.  She is slightly tachycardic initially at 105 bpm, all other vital signs are reassuring.  She is not dizzy at present, chest pain is resolved, however she does feel short of breath at rest during the interview and feels that she is having to catch her breath after speaking.  She denies any chronic lung disease.  No history of blood clots known.  On exam the patient is in no obvious distress, lung sounds are clear throughout " bilaterally, there is no increased work of breathing or wheezing noted.  PMH is significant for alcoholic hepatitis with ascites, ovarian cancer, diabetes, thyroid disease, elevated D-dimer, endometriosis, fatty liver, GERD, hypertension, hyperlipidemia, osteoporosis, pancreatitis, rectal prolapse, degeneration of lumbar spine, and history of blood transfusion.  Differential diagnoses: ACS, aortic dissection, PE, COVID, pneumonia, UTI, CHF, pulmonary edema, and other.    History provided by:  Patient   used: No      Review of Systems   Constitutional:  Negative for chills, diaphoresis, fatigue and fever.   HENT: Negative.     Eyes:  Negative for photophobia and visual disturbance.   Respiratory:  Positive for shortness of breath. Negative for cough, wheezing and stridor.    Cardiovascular:  Positive for chest pain. Negative for palpitations and leg swelling.   Gastrointestinal:  Positive for nausea. Negative for abdominal pain and vomiting.   Genitourinary:  Positive for dysuria. Negative for flank pain, pelvic pain and vaginal bleeding.   Musculoskeletal:  Positive for back pain. Negative for neck pain.   Skin: Negative.    Neurological:  Positive for dizziness and light-headedness. Negative for syncope, weakness, numbness and headaches.   Psychiatric/Behavioral: Negative.       Past Medical History:   Diagnosis Date    Alcoholic hepatitis with ascites     Allergic rhinitis     Breast cyst     Cancer     ovarian    Degeneration of lumbar or lumbosacral intervertebral disc 09/15/2022    Diabetes mellitus     Disease of thyroid gland     Elevated d-dimer     Endometriosis     Fatty liver     GERD (gastroesophageal reflux disease)     History of transfusion     Hyperlipidemia     Hypertension     Osteoporosis     Ovarian cyst     Pancreatitis     Postmenopausal     Rectal mass     Rectal prolapse     Sleep apnea        Allergies   Allergen Reactions    Tizanidine Hcl Dizziness     Pt stated  "\"horrible dry mouth, dizziness, couldn't see, it was awful\"    Augmentin [Amoxicillin-Pot Clavulanate] Nausea And Vomiting       Past Surgical History:   Procedure Laterality Date    BILATERAL SALPINGO OOPHORECTOMY  2008    CHOLECYSTECTOMY  1996    COLONOSCOPY  07/2017    Dr Murphy- per patient normal     COLONOSCOPY N/A 11/09/2021    Procedure: COLONOSCOPY WITH ANESTHESIA;  Surgeon: Ray Serna MD;  Location: [facility] ENDOSCOPY;  Service: Gastroenterology;  Laterality: N/A;  pre rectal mass  post  Dr Shields    DILATION AND CURETTAGE, DIAGNOSTIC / THERAPEUTIC      ENDOSCOPY N/A 11/09/2021    Procedure: ESOPHAGOGASTRODUODENOSCOPY WITH ANESTHESIA;  Surgeon: Ray Serna MD;  Location: [facility] ENDOSCOPY;  Service: Gastroenterology;  Laterality: N/A;  pre anemia  post hiatal hernia; gastric polyp  Dr. Shields    KNEE SURGERY  1982    LUMBAR LAMINECTOMY WITH FUSION Right 5/1/2023    Procedure: LUMBAR DISCECTOMY, JADON- LAMINECTOMY TRANSFORAMINAL LUMBAR INTERBODY FUSION, POSTERIOR PEDICLE SCREW INSTRUMENTATION, L5-S1 RIGHT. USE OF THE ROBOT;  Surgeon: Nathaniel Tesfaye MD;  Location: [facility] OR;  Service: Neurosurgery;  Laterality: Right;       Family History   Problem Relation Age of Onset    Ovarian cancer Mother     Ovarian cancer Maternal Aunt     Ovarian cancer Maternal Grandmother     Ovarian cancer Paternal Grandmother     Colon polyps Father     Colon polyps Brother     Breast cancer Neg Hx     Colon cancer Neg Hx        Social History     Socioeconomic History    Marital status: Single   Tobacco Use    Smoking status: Never    Smokeless tobacco: Never   Vaping Use    Vaping Use: Never used   Substance and Sexual Activity    Alcohol use: Not Currently    Drug use: No    Sexual activity: Defer       Objective   Physical Exam  Vitals and nursing note reviewed.   Constitutional:       General: She is not in acute distress.     Appearance: Normal appearance. She is not ill-appearing, toxic-appearing or " diaphoretic.   HENT:      Head: Normocephalic and atraumatic.      Right Ear: External ear normal.      Left Ear: External ear normal.      Nose: Nose normal.   Eyes:      General: No visual field deficit.     Extraocular Movements: Extraocular movements intact.      Conjunctiva/sclera: Conjunctivae normal.      Pupils: Pupils are equal, round, and reactive to light.   Cardiovascular:      Rate and Rhythm: Regular rhythm. Tachycardia present.      Pulses: Normal pulses.           Radial pulses are 2+ on the right side and 2+ on the left side.      Heart sounds: Normal heart sounds.      Comments: No pulse deficit noted.  Pulmonary:      Effort: Pulmonary effort is normal. No respiratory distress.      Breath sounds: Normal breath sounds. No stridor. No wheezing, rhonchi or rales.      Comments: Lung sounds clear throughout bilaterally  Chest:      Chest wall: No tenderness.   Abdominal:      General: There is no distension.      Palpations: Abdomen is soft.      Tenderness: There is no abdominal tenderness.   Musculoskeletal:      Cervical back: Normal range of motion.      Right lower leg: No edema.      Left lower leg: No edema.   Skin:     General: Skin is warm and dry.      Capillary Refill: Capillary refill takes less than 2 seconds.   Neurological:      General: No focal deficit present.      Mental Status: She is alert and oriented to person, place, and time. Mental status is at baseline.      GCS: GCS eye subscore is 4. GCS verbal subscore is 5. GCS motor subscore is 6.      Cranial Nerves: No dysarthria or facial asymmetry.      Sensory: Sensation is intact. No sensory deficit.      Motor: Motor function is intact. No weakness.      Coordination: Coordination is intact. Coordination normal.      Comments: NIH is 0.   Psychiatric:         Mood and Affect: Mood normal.         Behavior: Behavior normal.         Thought Content: Thought content normal.         Judgment: Judgment normal.     Labs Reviewed    URINE CULTURE - Abnormal; Notable for the following components:       Result Value    Urine Culture >100,000 CFU/mL Escherichia coli (*)     All other components within normal limits    Narrative:     Colonization of the urinary tract without infection is common. Treatment is discouraged unless the patient is symptomatic, pregnant, or undergoing an invasive urologic procedure.   COMPREHENSIVE METABOLIC PANEL - Abnormal; Notable for the following components:    Glucose 552 (*)     Sodium 133 (*)     Chloride 94 (*)     Alkaline Phosphatase 362 (*)     All other components within normal limits    Narrative:     GFR Normal >60  Chronic Kidney Disease <60  Kidney Failure <15     URINALYSIS W/ CULTURE IF INDICATED - Abnormal; Notable for the following components:    Appearance, UA Cloudy (*)     Glucose, UA >=1000 mg/dL (3+) (*)     Leuk Esterase, UA Large (3+) (*)     All other components within normal limits    Narrative:     In absence of clinical symptoms, the presence of pyuria, bacteria, and/or nitrites on the urinalysis result does not correlate with infection.   TROPONIN - Abnormal; Notable for the following components:    HS Troponin T 14 (*)     All other components within normal limits    Narrative:     High Sensitive Troponin T Reference Range:  <10.0 ng/L- Negative Female for AMI  <15.0 ng/L- Negative Male for AMI  >=10 - Abnormal Female indicating possible myocardial injury.  >=15 - Abnormal Male indicating possible myocardial injury.   Clinicians would have to utilize clinical acumen, EKG, Troponin, and serial changes to determine if it is an Acute Myocardial Infarction or myocardial injury due to an underlying chronic condition.        CBC WITH AUTO DIFFERENTIAL - Abnormal; Notable for the following components:    Hemoglobin 10.7 (*)     MCH 25.4 (*)     MCHC 31.0 (*)     Neutrophil % 84.8 (*)     Lymphocyte % 7.0 (*)     Lymphocytes, Absolute 0.56 (*)     All other components within normal limits    HIGH SENSITIVITIY TROPONIN T 2HR - Abnormal; Notable for the following components:    HS Troponin T 14 (*)     All other components within normal limits    Narrative:     High Sensitive Troponin T Reference Range:  <10.0 ng/L- Negative Female for AMI  <15.0 ng/L- Negative Male for AMI  >=10 - Abnormal Female indicating possible myocardial injury.  >=15 - Abnormal Male indicating possible myocardial injury.   Clinicians would have to utilize clinical acumen, EKG, Troponin, and serial changes to determine if it is an Acute Myocardial Infarction or myocardial injury due to an underlying chronic condition.        URINALYSIS, MICROSCOPIC ONLY - Abnormal; Notable for the following components:    RBC, UA 0-2 (*)     WBC, UA Too Numerous to Count (*)     Bacteria, UA 4+ (*)     All other components within normal limits   HEMOGLOBIN A1C - Abnormal; Notable for the following components:    Hemoglobin A1C 10.50 (*)     All other components within normal limits    Narrative:     Hemoglobin A1C Ranges:    Increased Risk for Diabetes  5.7% to 6.4%  Diabetes                     >= 6.5%  Diabetic Goal                < 7.0%   LIPID PANEL - Abnormal; Notable for the following components:    Triglycerides 199 (*)     All other components within normal limits    Narrative:     Cholesterol Reference Ranges  (U.S. Department of Health and Human Services ATP III Classifications)    Desirable          <200 mg/dL  Borderline High    200-239 mg/dL  High Risk          >240 mg/dL      Triglyceride Reference Ranges  (U.S. Department of Health and Human Services ATP III Classifications)    Normal           <150 mg/dL  Borderline High  150-199 mg/dL  High             200-499 mg/dL  Very High        >500 mg/dL    HDL Reference Ranges  (U.S. Department of Health and Human Services ATP III Classifications)    Low     <40 mg/dl (major risk factor for CHD)  High    >60 mg/dl ('negative' risk factor for CHD)        LDL Reference Ranges  (U.S.  Department of Health and Human Services ATP III Classifications)    Optimal          <100 mg/dL  Near Optimal     100-129 mg/dL  Borderline High  130-159 mg/dL  High             160-189 mg/dL  Very High        >189 mg/dL   POCT GLUCOSE FINGERSTICK - Abnormal; Notable for the following components:    Glucose 157 (*)     All other components within normal limits   COVID-19,CEPHEID/NETTIE,COR/ALTAF/PAD/MIKKI/MAD IN-HOUSE,NP SWAB IN TRANSPORT MEDIA 3-4 HR TAT, RT-PCR - Normal    Narrative:     Fact sheet for providers: https://www.fda.gov/media/004242/download     Fact sheet for patients: https://www.fda.gov/media/906133/download  Fact sheet for providers: https://www.fda.gov/media/830119/download    Fact sheet for patients: https://www.[employer].gov/media/342594/download    Test performed by PCR.   BNP (IN-HOUSE) - Normal    Narrative:     Among patients with dyspnea, NT-proBNP is highly sensitive for the detection of acute congestive heart failure. In addition NT-proBNP of <300 pg/ml effectively rules out acute congestive heart failure with 99% negative predictive value.    Results may be falsely decreased if patient taking Biotin.     D-DIMER, QUANTITATIVE - Normal    Narrative:     According to the assay 's published package insert, a normal (<0.50 MCGFEU/mL) D-dimer result in conjunction with a non-high clinical probability assessment, excludes deep vein thrombosis (DVT) and pulmonary embolism (PE) with high sensitivity.    D-dimer values increase with age and this can make VTE exclusion of an older population difficult. To address this, the American College [employer], based on best available evidence and recent guidelines, recommends that clinicians use age-adjusted D-dimer thresholds in patients greater than 50 years of age with: a) a low probability of PE who do not meet all Pulmonary Embolism Rule Out Criteria, or b) in those with intermediate probability of PE.   The formula for an age-adjusted D-dimer  "cut-off is \"age/100\".  For example, a 60 year old patient would have an age-adjusted cut-off of 0.60 MCGFEU/mL and an 80 year old 0.80 MCGFEU/mL.   MAGNESIUM - Normal   POCT GLUCOSE FINGERSTICK - Normal   POCT GLUCOSE FINGERSTICK - Normal   POCT GLUCOSE FINGERSTICK - Normal   COVID PRE-OP / PRE-PROCEDURE SCREENING ORDER (NO ISOLATION)    Narrative:     The following orders were created for panel order COVID PRE-OP / PRE-PROCEDURE SCREENING ORDER (NO ISOLATION) - Swab, Nasal Cavity.  Procedure                               Abnormality         Status                     ---------                               -----------         ------                     COVID-19,CEPHEID/NETTIE,CO...[[id number]]  Normal              Final result                 Please view results for these tests on the individual orders.   RAINBOW DRAW    Narrative:     The following orders were created for panel order [facility] Draw.  Procedure                               Abnormality         Status                     ---------                               -----------         ------                     Green Top (Gel)[[id number]]                                  Final result               Lavender Top[[id number]]                                     Final result               Red Top[[id number]]                                          Final result               Yañez Top[[id number]]                                         Final result               Light Blue Top[[id number]]                                   Final result                 Please view results for these tests on the individual orders.   POCT GLUCOSE FINGERSTICK   POCT GLUCOSE FINGERSTICK   POCT GLUCOSE FINGERSTICK   POCT GLUCOSE FINGERSTICK   GREEN TOP   LAVENDER TOP   RED TOP   GRAY TOP   LIGHT BLUE TOP   CBC AND DIFFERENTIAL    Narrative:     The following orders were created for panel order CBC & Differential.  Procedure                               Abnormality         Status               "       ---------                               -----------         ------                     CBC Auto Differential[[id number]]        Abnormal            Final result                 Please view results for these tests on the individual orders.     CT Angiogram Chest   Final Result   1. Borderline dilated ascending thoracic aorta measuring 3.9 cm. No   evidence of resting aortic aneurysm or dissection. No visible pulmonary   embolus. There is cardiomegaly.   2. Mild dependent atelectasis. No dense consolidation or effusion.   3. Small hiatal hernia. Thickening of the gastric wall is probably an   artifact of underdistention. Gastritis and other etiologies are   considered.   4. The pancreas appears to be either severely atrophied or surgically   absent. Correlate clinically. Prior cholecystectomy. Spleen size appears   somewhat prominent.           The full report of this exam was immediately signed and available to the   emergency room. The patient is currently in the emergency room.     This report was finalized on 06/08/2023 17:54 by Dr. Tanner Waldrop MD.      CT Head Without Contrast   Final Result   1. No acute intracranial abnormality is seen.                                                       This report was finalized on 06/08/2023 16:19 by Dr. Khanh Esteban MD.      XR Chest 1 View   Final Result   1. No acute cardiopulmonary findings.   This report was finalized on 06/08/2023 15:06 by Dr Paula Simeon MD.        Medications   sodium chloride 0.9 % bolus 500 mL (0 mL Intravenous Stopped 6/8/23 1745)   Enoxaparin Sodium (LOVENOX) syringe 50 mg (50 mg Subcutaneous Given 6/8/23 1708)   insulin regular (humuLIN R,novoLIN R) injection 8 Units (8 Units Intravenous Given 6/8/23 1708)   iopamidol (ISOVUE-370) 76 % injection 100 mL (70 mL Intravenous Given 6/8/23 1736)   perflutren (DEFINITY) lipid microspheres injection 8.476 mg (8.476 mg Intravenous Given 6/9/23 1144)   atropine sulfate injection 2 mg (0.3 mg  Intravenous Given 6/9/23 1221)     PERC Rule for Pulmonary Embolism - MDCalc  Calculated on Jun 08 2023 3:55 PM  3 criteria -> If any criteria are positive, the PERC rule cannot be used to rule out PE in this patient.    Wells' Criteria for Pulmonary Embolism - MDCalc  Calculated on Jun 08 2023 3:56 PM  6.0 points -> Moderate risk group: 16.2% chance of PE in an ED population. Another study assigned scores > 4 as “PE Likely” and had a 28% incidence of PE.    HEART Score for Major Cardiac Events - MDCalc  Calculated on Jun 08 2023 3:57 PM  4 points -> Moderate Score (4-6 points) Risk of MACE of 12-16.6%.    Procedures           ED Course  ED Course as of 06/10/23 1154   Thu Jun 08, 2023   1512 Nursing staff has approached me and reports that the patient is asking if she can take a Tums as she is now having chest pain. [HE]   1516 Years Algorithm for Pulmonary Embolus  To be used in hemodynamically stable patient >18 years old    No signs of DVT are present, there is no hemoptysis, PE is not the most likely diagnosis and the D-dimer is not greater or equal to 1000 ng/mL. Therefore PE is excluded . The Years algorithm rules out PE (0.43 % with symptomatic VTE during 3-month follow-up) [TS]   1517 He has high risk for having a PE therefore CT chest will be performed the patient meanwhile got a pleural nitroglycerin and Lovenox cardiology has been consulted. [TS]   1518 Case has been discussed patient has been examined the patient is to be admitted to hospital further care and evaluation. [TS]   1551 Discussed the patient's case with cardiology on-call, Luca ENNIS.  She does recommend admission to the hospitalist service. [HE]   1558 Case has been discussed with hospitalist on-call, Dr. Aden who is agreeable to admission.  Patient agreeable as well. [HE]      ED Course User Index  [HE] Mel Claire APRN  [TS] Abiodun Hanson MD                                           Medical Decision Making  Patient is a  "63-year-old female who presents to the ED today complaining of chest pain that began around 1115 today while she was at physical therapy.  Patient has history of a laminectomy with fusion by Dr. Tesfaye about a month ago and had her first day of physical therapy today.  She reports earlier in the week she noticed that she had been feeling a little nauseated at times as well as short of breath on exertion with some occasional dizziness.  She states her blood pressure has been more elevated than normal this week.  She denies any sick symptoms and is chest pain-free at this time.  Reports she has been experiencing some dry mouth with this as well.  Reports when the chest pain came on she was active and doing physical therapy exercises which required her to stand and sit.  She states when the chest pain came on she would describe it as a tightness that was midsternal in nature and radiated to her left jaw and through to her back.  She does not describe this as a ripping or tearing sensation.  She states she felt as though her \"mind stopped\".  Reports over the last week she feels that she has been losing her train of thought easily and has had to be redirected frequently for tasks.  She denies taking any blood thinners.  She does report she urinated while she was waiting to come back and feels she may have a urinary tract infection as she noticed her urine was dark and malodorous.  Denies any burning with urination, fever, or flank pain.  She is slightly tachycardic initially at 105 bpm, all other vital signs are reassuring.  She is not dizzy at present, chest pain is resolved, however she does feel short of breath at rest during the interview and feels that she is having to catch her breath after speaking.  She denies any chronic lung disease.  No history of blood clots known.  On exam the patient is in no obvious distress, lung sounds are clear throughout bilaterally, there is no increased work of breathing or wheezing " noted.  PMH is significant for alcoholic hepatitis with ascites, ovarian cancer, diabetes, thyroid disease, elevated D-dimer, endometriosis, fatty liver, GERD, hypertension, hyperlipidemia, osteoporosis, pancreatitis, rectal prolapse, degeneration of lumbar spine, and history of blood transfusion.  Differential diagnoses: ACS, aortic dissection, PE, COVID, pneumonia, UTI, CHF, pulmonary edema, and other.    Troponin is elevated at 14, repeat is 14 delta is 0, heart score is 4.  BNP, magnesium, and D-dimer unremarkable.  CTA chest was ordered and completed as the patient is high risk for PE.  PERC is 3, Wells score is 6.  CBC reveals a normal white count, H&H at baseline for the patient.  CMP reveals elevated blood glucose at 552, there is no anion gap, likely not in DKA.  She is a known insulin-dependent diabetic.  Will administer 8 units of IV insulin at this time. Renal and hepatic function normal.  Sodium is very slightly decreased at 133 with an alk phos elevated at 362.    EKG reveals sinus tachycardia 106 bpm, is nonischemic per Dr. Hanson.  CXR reveals no acute findings.  COVID test is negative.    Patient has had recurrence of chest pain. Discussed with Dr. Hanson.  We are going to start the patient on a nitroglycerin drip and administer subcutaneous Lovenox prophylactically.  She did receive a 500 mL normal saline bolus here in the ED.    CT head reveals no acute findings.    UA reveals 4+ bacteria, white blood cells, leukocytes, and glucose.  Will treat with 1 g IV Rocephin here in the ED.    CTA chest reveals borderline dilated ascending thoracic aorta measuring 3.9 cm. No  evidence of resting aortic aneurysm or dissection. No visible pulmonary  embolus. There is cardiomegaly.  2. Mild dependent atelectasis. No dense consolidation or effusion.  3. Small hiatal hernia. Thickening of the gastric wall is probably an  artifact of underdistention. Gastritis and other etiologies are  considered.  4. The  pancreas appears to be either severely atrophied or surgically  absent. Correlate clinically. Prior cholecystectomy. Spleen size appears  somewhat prominent.    Discussed th patient's case with cardiology on-call, Luca ENNIS.  She does recommend admission to the hospitalist service.  Case has been discussed with hospitalist on-call, Dr. Aden who is agreeable to admission.  Patient agreeable as well.  Patient is hemodynamically stable; she is not requiring any supplemental O2 and she is not hypoxic.        Problems Addressed:  Atypical chest pain: complicated acute illness or injury  Hyperglycemia: complicated acute illness or injury  Urinary tract infection without hematuria, site unspecified: complicated acute illness or injury    Amount and/or Complexity of Data Reviewed  Labs: ordered.  Radiology: ordered.    Risk  OTC drugs.  Prescription drug management.  Decision regarding hospitalization.        Final diagnoses:   Atypical chest pain   Hyperglycemia   Urinary tract infection without hematuria, site unspecified       ED Disposition  ED Disposition       ED Disposition   Decision to Admit    Condition   --    Comment   Level of Care: Telemetry [5]   Diagnosis: Atypical chest pain [148926]                 Brittany Solis MD  [address] [place] [address] [address]  [facility] [id number] [phone]    Schedule an appointment as soon as possible for a visit  1 week         Medication List        New Prescriptions      cefdinir 300 MG capsule  Commonly known as: OMNICEF  Take 1 capsule by mouth 2 (Two) Times a Day for 2 days.     lisinopril 10 MG tablet  Commonly known as: PRINIVIL,ZESTRIL  Take 1 tablet by mouth Daily.            Stop      potassium chloride 10 MEQ CR tablet               Where to Get Your Medications        These medications were sent to [facility] - [place], [place] - [address] [facility] - [phone]  - [phone] FX  [address] [facility] [address]      Phone: [phone]   cefdinir 300 MG  capsule  lisinopril 10 MG tablet            Mel Claire, APRN  06/10/23 1156

## 2023-06-09 ENCOUNTER — APPOINTMENT (OUTPATIENT)
Dept: CARDIOLOGY | Facility: HOSPITAL | Age: 63
End: 2023-06-09
Payer: COMMERCIAL

## 2023-06-09 ENCOUNTER — READMISSION MANAGEMENT (OUTPATIENT)
Dept: CALL CENTER | Facility: HOSPITAL | Age: 63
End: 2023-06-09
Payer: COMMERCIAL

## 2023-06-09 VITALS
HEART RATE: 84 BPM | WEIGHT: 106.6 LBS | DIASTOLIC BLOOD PRESSURE: 63 MMHG | BODY MASS INDEX: 20.93 KG/M2 | HEIGHT: 60 IN | RESPIRATION RATE: 18 BRPM | OXYGEN SATURATION: 98 % | TEMPERATURE: 98.8 F | SYSTOLIC BLOOD PRESSURE: 159 MMHG

## 2023-06-09 PROBLEM — I10 ESSENTIAL HYPERTENSION: Status: ACTIVE | Noted: 2023-06-09

## 2023-06-09 LAB
BH CV STRESS BP STAGE 1: NORMAL
BH CV STRESS BP STAGE 2: NORMAL
BH CV STRESS BP STAGE 3: NORMAL
BH CV STRESS DOB - ATROPINE STAGE 3: 0.3
BH CV STRESS DOSE DOBUTAMINE STAGE 1: 10
BH CV STRESS DOSE DOBUTAMINE STAGE 2: 20
BH CV STRESS DOSE DOBUTAMINE STAGE 3: 30
BH CV STRESS DURATION MIN STAGE 1: 3
BH CV STRESS DURATION MIN STAGE 2: 3
BH CV STRESS DURATION MIN STAGE 3: 2
BH CV STRESS DURATION SEC STAGE 1: 0
BH CV STRESS DURATION SEC STAGE 2: 0
BH CV STRESS DURATION SEC STAGE 3: 33
BH CV STRESS HR STAGE 1: 83
BH CV STRESS HR STAGE 2: 95
BH CV STRESS HR STAGE 3: 134
BH CV STRESS PROTOCOL 1: NORMAL
BH CV STRESS RECOVERY BP: NORMAL MMHG
BH CV STRESS RECOVERY HR: 103 BPM
BH CV STRESS STAGE 1: 1
BH CV STRESS STAGE 2: 2
BH CV STRESS STAGE 3: 3
CHOLEST SERPL-MCNC: 149 MG/DL (ref 0–200)
GLUCOSE BLDC GLUCOMTR-MCNC: 114 MG/DL (ref 70–130)
GLUCOSE BLDC GLUCOMTR-MCNC: 74 MG/DL (ref 70–130)
GLUCOSE BLDC GLUCOMTR-MCNC: 84 MG/DL (ref 70–130)
HDLC SERPL-MCNC: 44 MG/DL (ref 40–60)
LDLC SERPL CALC-MCNC: 72 MG/DL (ref 0–100)
LDLC/HDLC SERPL: 1.48 {RATIO}
MAXIMAL PREDICTED HEART RATE: 159 BPM
PERCENT MAX PREDICTED HR: 84.28 %
QT INTERVAL: 342 MS
QTC INTERVAL: 454 MS
STRESS BASELINE BP: NORMAL MMHG
STRESS BASELINE HR: 85 BPM
STRESS PERCENT HR: 99 %
STRESS POST EXERCISE DUR MIN: 8 MIN
STRESS POST EXERCISE DUR SEC: 33 SEC
STRESS POST PEAK BP: NORMAL MMHG
STRESS POST PEAK HR: 134 BPM
STRESS TARGET HR: 135 BPM
TRIGL SERPL-MCNC: 199 MG/DL (ref 0–150)
VLDLC SERPL-MCNC: 33 MG/DL (ref 5–40)

## 2023-06-09 PROCEDURE — 96361 HYDRATE IV INFUSION ADD-ON: CPT

## 2023-06-09 PROCEDURE — 93017 CV STRESS TEST TRACING ONLY: CPT

## 2023-06-09 PROCEDURE — 93018 CV STRESS TEST I&R ONLY: CPT | Performed by: INTERNAL MEDICINE

## 2023-06-09 PROCEDURE — 82948 REAGENT STRIP/BLOOD GLUCOSE: CPT

## 2023-06-09 PROCEDURE — G0378 HOSPITAL OBSERVATION PER HR: HCPCS

## 2023-06-09 PROCEDURE — 93352 ADMIN ECG CONTRAST AGENT: CPT | Performed by: INTERNAL MEDICINE

## 2023-06-09 PROCEDURE — 93350 STRESS TTE ONLY: CPT

## 2023-06-09 PROCEDURE — 25010000002 DOBUTAMINE 250 MG/20ML SOLUTION: Performed by: INTERNAL MEDICINE

## 2023-06-09 PROCEDURE — 25510000001 PERFLUTREN 6.52 MG/ML SUSPENSION: Performed by: INTERNAL MEDICINE

## 2023-06-09 PROCEDURE — 80061 LIPID PANEL: CPT | Performed by: INTERNAL MEDICINE

## 2023-06-09 PROCEDURE — 93350 STRESS TTE ONLY: CPT | Performed by: INTERNAL MEDICINE

## 2023-06-09 PROCEDURE — 25010000002 ATROPINE SULFATE: Performed by: INTERNAL MEDICINE

## 2023-06-09 RX ORDER — LISINOPRIL 10 MG/1
10 TABLET ORAL DAILY
Qty: 30 TABLET | Refills: 2 | Status: SHIPPED | OUTPATIENT
Start: 2023-06-09

## 2023-06-09 RX ORDER — MAGNESIUM OXIDE 400 MG/1
400 TABLET ORAL DAILY
COMMUNITY

## 2023-06-09 RX ORDER — METOPROLOL TARTRATE 5 MG/5ML
5 INJECTION INTRAVENOUS ONCE
Status: DISCONTINUED | OUTPATIENT
Start: 2023-06-09 | End: 2023-06-09 | Stop reason: HOSPADM

## 2023-06-09 RX ORDER — POTASSIUM CHLORIDE 750 MG/1
10 TABLET, FILM COATED, EXTENDED RELEASE ORAL 2 TIMES DAILY
COMMUNITY
End: 2023-06-09 | Stop reason: HOSPADM

## 2023-06-09 RX ORDER — DOBUTAMINE HYDROCHLORIDE 100 MG/100ML
10 INJECTION INTRAVENOUS CONTINUOUS
Status: DISCONTINUED | OUTPATIENT
Start: 2023-06-09 | End: 2023-06-09 | Stop reason: HOSPADM

## 2023-06-09 RX ORDER — CEFDINIR 300 MG/1
300 CAPSULE ORAL 2 TIMES DAILY
Qty: 4 CAPSULE | Refills: 0 | Status: SHIPPED | OUTPATIENT
Start: 2023-06-09 | End: 2023-06-11

## 2023-06-09 RX ADMIN — LEVOTHYROXINE SODIUM 50 MCG: 50 TABLET ORAL at 05:34

## 2023-06-09 RX ADMIN — ASPIRIN 81 MG: 81 TABLET, COATED ORAL at 08:17

## 2023-06-09 RX ADMIN — ATROPINE SULFATE 0.3 MG: 0.1 INJECTION INTRAVENOUS at 12:21

## 2023-06-09 RX ADMIN — GABAPENTIN 300 MG: 300 CAPSULE ORAL at 08:20

## 2023-06-09 RX ADMIN — PANTOPRAZOLE SODIUM 40 MG: 40 TABLET, DELAYED RELEASE ORAL at 08:20

## 2023-06-09 RX ADMIN — ACETAMINOPHEN 650 MG: 325 TABLET ORAL at 13:02

## 2023-06-09 RX ADMIN — SODIUM CHLORIDE 125 ML/HR: 9 INJECTION, SOLUTION INTRAVENOUS at 05:37

## 2023-06-09 RX ADMIN — DOBUTAMINE 10 MCG/KG/MIN: 12.5 INJECTION, SOLUTION, CONCENTRATE INTRAVENOUS at 11:44

## 2023-06-09 RX ADMIN — PERFLUTREN 8.48 MG: 6.52 INJECTION, SUSPENSION INTRAVENOUS at 11:44

## 2023-06-09 NOTE — DISCHARGE SUMMARY
[facility]  DISCHARGE SUMMARY       Date of Admission: 6/8/2023  Date of Discharge:  6/9/2023  Primary Care Physician: Brittany Solis MD    Presenting Problem/History of Present Illness:  Chest pain    Final Discharge Diagnoses:  Active Hospital Problems    Diagnosis     **Atypical chest pain     Essential hypertension     Elevated alkaline phosphatase level     Right foot drop     Degeneration of lumbar or lumbosacral intervertebral disc     Acute cystitis     Alcoholic cirrhosis     Type 1 diabetes mellitus with hyperglycemia        Pertinent Test Results:   Results for orders placed during the hospital encounter of 06/08/23    Adult Stress Echo W/ Cont or Stress Agent if Necessary Per Protocol    Interpretation Summary  Dobutamine stress echocardiogram is low risk for ischemia.      Imaging Results (All)       Procedure Component Value Units Date/Time    CT Angiogram Chest [[id number]] Collected: 06/08/23 1748     Updated: 06/08/23 1757    Narrative:      EXAMINATION:  CT ANGIOGRAM CHEST-  6/8/2023 5:15 PM CDT     HISTORY: Chest pain, shortness of breath, recent surgery, tachycardia;  rule out PE and dissection; R07.89-Other chest pain;  R73.9-Hyperglycemia, unspecified; N39.0-Urinary tract infection, site  not specified. D-dimer is normal.     COMPARISON : No comparison study.     DLP: 140 mGy-cm. Automated dosage reduction technique was utilized to  decrease patient dosage.     TECHNIQUE: CT angio was performed of the chest with IV contrast.  Coronal, sagittal and 3-D reconstruction were performed.     INDEPENDENT 3-D WORKSTATION UTILIZED FOR RECONSTRUCTION: Yes. A  radiologist was not present in the department.     MEDIASTINUM, HEART AND VASCULAR STRUCTURES: There is atheromatous  calcification of the thoracic aorta. The ascending thoracic aorta is  borderline dilated at 3.9 cm. There is no evidence of aortic dissection.  The main pulmonary artery segment  measures 2.6 cm. There is no visible  pulmonary embolus. There is cardiomegaly.     LUNGS: There is very mild dependent atelectasis. There is no dense  consolidation or effusion.     UPPER ABDOMEN: There is a small hiatal hernia. There is thickening of  the gastric wall. There is underdistention of the stomach. There has  been prior cholecystectomy. There appears to be either severe pancreatic  atrophy or prior surgical resection. Correlate clinically. Spleen size  appears to be somewhat prominent. The spleen is not fully imaged.     BONES: There are degenerative changes of the spine. No definite acute  bony abnormality is seen.          Impression:      1. Borderline dilated ascending thoracic aorta measuring 3.9 cm. No  evidence of resting aortic aneurysm or dissection. No visible pulmonary  embolus. There is cardiomegaly.  2. Mild dependent atelectasis. No dense consolidation or effusion.  3. Small hiatal hernia. Thickening of the gastric wall is probably an  artifact of underdistention. Gastritis and other etiologies are  considered.  4. The pancreas appears to be either severely atrophied or surgically  absent. Correlate clinically. Prior cholecystectomy. Spleen size appears  somewhat prominent.        The full report of this exam was immediately signed and available to the  emergency room. The patient is currently in the emergency room.    This report was finalized on 06/08/2023 17:54 by Dr. Tanner Waldrop MD.    CT Head Without Contrast [[id number]] Collected: 06/08/23 1613     Updated: 06/08/23 1622    Narrative:      EXAMINATION: CT HEAD WO CONTRAST-      6/8/2023 3:40 PM CDT     HISTORY: Dizziness     In order to have a CT radiation dose as low as reasonably achievable  Automated Exposure Control was utilized for adjustment of the mA and/or  KV according to patient size.     DLP in mGycm= 570.     Comparison is made with June 1, 2023.     Axial, sagittal, and coronal noncontrast CT imaging of the head.      The visualized paranasal sinuses are clear.     The brain and ventricles have an age appropriate appearance.   Mild atrophy and small vessel disease.  There is no hemorrhage or mass-effect.   No acute infarction is seen.     No calvarial abnormality.       Impression:      1. No acute intracranial abnormality is seen.                                         This report was finalized on 06/08/2023 16:19 by Dr. Khanh Esteban MD.    XR Chest 1 View [[id number]] Collected: 06/08/23 1505     Updated: 06/08/23 1509    Narrative:      EXAM: XR CHEST 1 VW-      DATE: 6/8/2023 2:46 PM CDT     HISTORY: Chest pain, dyspnea on exertion       COMPARISON: 04/20/2023.      TECHNIQUE:  Single view. Frontal view of the chest. 1 images.       FINDINGS:    Low lung volumes. No pneumothorax, pleural effusion or focal  consolidation. Cardiac mediastinal silhouette appear within normal  limits. Calcified aortic atherosclerosis. Cholecystectomy clips. No  acute bony finding.          Impression:      1. No acute cardiopulmonary findings.  This report was finalized on 06/08/2023 15:06 by Dr Paula Simeon MD.          LAB RESULTS:      Lab 06/08/23  1256   WBC 7.97   HEMOGLOBIN 10.7*   HEMATOCRIT 34.5   PLATELETS 196   NEUTROS ABS 6.76   IMMATURE GRANS (ABS) 0.02   LYMPHS ABS 0.56*   MONOS ABS 0.48   EOS ABS 0.13   MCV 81.9   D DIMER QUANT 0.35         Lab 06/08/23  1913 06/08/23  1256   SODIUM  --  133*   POTASSIUM  --  4.2   CHLORIDE  --  94*   CO2  --  27.0   ANION GAP  --  12.0   BUN  --  13   CREATININE  --  0.78   EGFR  --  86.5   GLUCOSE  --  552*   CALCIUM  --  10.5   MAGNESIUM  --  1.7   HEMOGLOBIN A1C 10.50*  --          Lab 06/08/23  1256   TOTAL PROTEIN 7.2   ALBUMIN 4.3   GLOBULIN 2.9   ALT (SGPT) 17   AST (SGOT) 19   BILIRUBIN 0.4   ALK PHOS 362*         Lab 06/08/23  1611 06/08/23  1256   PROBNP  --  81.9   HSTROP T 14* 14*         Lab 06/09/23  0527   CHOLESTEROL 149   LDL CHOL 72   HDL CHOL 44   TRIGLYCERIDES 199*              Brief Urine Lab Results  (Last result in the past 365 days)        Color   Clarity   Blood   Leuk Est   Nitrite   Protein   CREAT   Urine HCG        06/08/23 1623 Yellow   Cloudy   Negative   Large (3+)   Negative   Negative                 Microbiology Results (last 10 days)       Procedure Component Value - Date/Time    Urine Culture - Urine, Urine, Clean Catch [[id number]]  (Abnormal) Collected: 06/08/23 1623    Lab Status: Preliminary result Specimen: Urine, Clean Catch Updated: 06/09/23 1116     Urine Culture >100,000 CFU/mL Escherichia coli    Narrative:      Colonization of the urinary tract without infection is common. Treatment is discouraged unless the patient is symptomatic, pregnant, or undergoing an invasive urologic procedure.    COVID PRE-OP / PRE-PROCEDURE SCREENING ORDER (NO ISOLATION) - Swab, Nasal Cavity [[id number]]  (Normal) Collected: 06/08/23 1611    Lab Status: Final result Specimen: Swab from Nasal Cavity Updated: 06/08/23 1638    Narrative:      The following orders were created for panel order COVID PRE-OP / PRE-PROCEDURE SCREENING ORDER (NO ISOLATION) - Swab, Nasal Cavity.  Procedure                               Abnormality         Status                     ---------                               -----------         ------                     COVID-19,CEPHEID/NETTIE,CO...[[id number]]  Normal              Final result                 Please view results for these tests on the individual orders.    COVID-19,CEPHEID/NETTIE,COR/ALTAF/PAD/MIKKI/MAD IN-HOUSE(OR EMERGENT/ADD-ON),NP SWAB IN TRANSPORT MEDIA 3-4 HR TAT, RT-PCR - Swab, Nasopharynx [[id number]]  (Normal) Collected: 06/08/23 1611    Lab Status: Final result Specimen: Swab from Nasopharynx Updated: 06/08/23 1638     COVID19 Not Detected    Narrative:      Fact sheet for providers: https://www.fda.gov/media/731189/download     Fact sheet for patients: https://www.fda.gov/media/606093/download  Fact sheet for providers:  "https://www.fda.gov/media/247537/download    Fact sheet for patients: https://www.fda.gov/media/900718/download    Test performed by PCR.            Hospital Course:   Patient is a 63-year-old  female with past medical history significant for cirrhosis (followed by Dr. Serna of gastroenterology), type 1 diabetes mellitus, and status post recent L5-S1 right hemilaminectomy and TLIF by Dr. Tesfaye of neurosurgery, the presented to our hospital secondary to chest pain.  Patient reports that she fell about a week ago and since that time has been having periods where she feels like her right lower extremity \"gives out.\"  She reports that she went to physical therapy and was not feeling quite right, they checked her blood pressures, and per report her blood pressures were noted to be very elevated.  She was having some chest discomfort at that time as well.  She was diverted to the emergency department for further work-up and assessment.    An EKG revealed evidence of sinus tachycardia but no acute ST changes.  A high-sensitivity troponin level was checked and was found to be mildly elevated at 14.  A repeat high-sensitivity troponin level a few hours later revealed a flat trend at 14.  Patient was placed in observation on the hospitalist service.  A cardiac stress test was performed today in the heart center and interpretation was consistent with a low risk study.    Blood pressure trend reviewed.  I am going to start her on a low-dose of lisinopril to take on an outpatient basis.    Patient did have an abnormal urinalysis but it sounds like he has had very minimal symptoms as it pertains to a possible urinary tract infection.  I did start her on Rocephin, and will plan to give her 2 additional days of Omnicef for 3 total days of treatment.  Final results of urine culture are currently pending at this time.    Patient will continue her outpatient physical therapy regarding symptoms she is experiencing in her " "right lower extremity.    Patient reports that she has outpatient follow-up with an endocrinologist in [place], [place] in approximately 10 days and she will keep this appointment.    She does have chronic elevation of her alkaline phosphatase which has been monitored by Dr. Serna of gastroenterology on an outpatient basis.    1 week follow-up with her primary care provider, Dr. Brittany Shields      Physical Exam on Discharge:  /63 (BP Location: Left arm, Patient Position: Lying)   Pulse 84   Temp 98.8 °F (37.1 °C) (Oral)   Resp 18   Ht 152.4 cm (60\")   Wt 48.4 kg (106 lb 9.6 oz)   SpO2 98%   BMI 20.82 kg/m²   Physical Exam  Vitals reviewed.   HENT:      Head: Normocephalic.      Mouth/Throat:      Mouth: Mucous membranes are moist.   Pulmonary:      Effort: Pulmonary effort is normal. No respiratory distress.   Neurological:      Mental Status: She is alert.   Psychiatric:         Mood and Affect: Mood normal.       Condition on Discharge: medically stable    Discharge Disposition:  Home or Self Care    Discharge Medications:     Discharge Medications        New Medications        Instructions Start Date   cefdinir 300 MG capsule  Commonly known as: OMNICEF   300 mg, Oral, 2 Times Daily      lisinopril 10 MG tablet  Commonly known as: PRINIVIL,ZESTRIL   10 mg, Oral, Daily             Continue These Medications        Instructions Start Date   B-D UF III MINI PEN NEEDLES 31G X 5 MM misc  Generic drug: Insulin Pen Needle   USE 5 TIMES DAILY AND AS NEEDED.      cyclobenzaprine 10 MG tablet  Commonly known as: FLEXERIL   10 mg, Oral, 3 Times Daily PRN      Dexcom G6 Sensor   See Admin Instructions      Dexcom G6 Transmitter misc   See Admin Instructions      gabapentin 300 MG capsule  Commonly known as: NEURONTIN   300 mg, Oral, Daily      glucose blood test strip   5 times a day and prn .      insulin aspart 100 UNIT/ML solution pen-injector sc pen  Commonly known as: novoLOG FLEXPEN   2-5 Units, " Subcutaneous, 3 Times Daily With Meals, 2-5 units with meals      JUICE PLUS FIBRE PO   Oral, Daily      levothyroxine 50 MCG tablet  Commonly known as: SYNTHROID, LEVOTHROID   50 mcg, Oral, Every Early Morning      magnesium oxide 400 MG tablet  Commonly known as: MAG-OX   400 mg, Oral, Daily      multivitamin with iron tablet tablet   1 tablet, Oral, Daily      ONE TOUCH ULTRA 2 w/Device kit   See Admin Instructions      OneTouch Delica Plus Hyxtpq50D misc   TEST 5 TIMES DAILY AND AS NEEDED      pantoprazole 40 MG EC tablet  Commonly known as: PROTONIX   40 mg, Oral, Daily      tiZANidine 4 MG tablet  Commonly known as: Zanaflex   4 mg, Oral, Every 6 Hours PRN      Toujeo Max SoloStar 300 UNIT/ML solution pen-injector injection  Generic drug: Insulin Glargine (2 Unit Dial)   Inject 24 Units under the skin into the appropriate area as directed Every Night.      vitamin D 1.25 MG (578[address] UT) capsule capsule  Commonly known as: ERGOCALCIFEROL   50,000 Units, Oral, Weekly, sunday             Stop These Medications      potassium chloride 10 MEQ CR tablet                Discharge Diet: diabetic diet    Activity at Discharge: as tolerated    Follow-up Appointments:   Future Appointments   Date Time Provider Department Center   6/23/2023 12:40 PM PAD BIC DEXA 1 BH PAD DX BI PAD   7/3/2023 11:00 AM Damion Moran APRN [facility] END MAD MAD   7/11/2023  8:00 AM Nathaniel Tesfaye MD [facility] NS PAD PAD   1 week follow-up with primary care provider Dr. Shields.  Patient also reports that she has follow-up coming up in approximately 10 days with her endocrinologist in [place], [place].    Test Results Pending at Discharge: final results of urine culture    Electronically signed by Joe Alvarez MD, 06/09/23, 15:34 CDT.    Time: 25 minutes.

## 2023-06-09 NOTE — PLAN OF CARE
Goal Outcome Evaluation:  Plan of Care Reviewed With: patient        Progress: improving  Outcome Evaluation: Patient has had no complaints of chest pain or shortness of breath. Ambulated to restroom with standby assist. Sinus/ Sinus Tach  on telemetry. Accuchecks QID. Blood sugar levels have improved.

## 2023-06-10 LAB — BACTERIA SPEC AEROBE CULT: ABNORMAL

## 2023-06-10 NOTE — OUTREACH NOTE
Prep Survey    Flowsheet Row Responses   [facility] facility patient discharged from? [place]   Is LACE score < 7 ? No   Eligibility Readm Mgmt   Discharge diagnosis Atypical chest pain   Does the patient have one of the following disease processes/diagnoses(primary or secondary)? Other   Does the patient have Home health ordered? Yes   What is the Home health agency?  [facility]   Is there a DME ordered? No   Prep survey completed? Yes          Steff HUMPHRIES - Registered Nurse

## 2023-06-13 ENCOUNTER — NURSE TRIAGE (OUTPATIENT)
Dept: CALL CENTER | Facility: HOSPITAL | Age: 63
End: 2023-06-13
Payer: COMMERCIAL

## 2023-06-13 DIAGNOSIS — R29.898 WEAKNESS OF BOTH LOWER EXTREMITIES: ICD-10-CM

## 2023-06-13 DIAGNOSIS — M54.6 THORACIC SPINE PAIN: Primary | ICD-10-CM

## 2023-06-13 NOTE — TELEPHONE ENCOUNTER
"Patient needs referral for physical therapy s/p recent hospitalization. Reviewed patient's chart. Advised patient to contact PCP for referral. Patient verbalizes agreement with plan.    Reason for Disposition   [1] Caller requesting NON-URGENT health information AND [2] PCP's office is the best resource    Additional Information   Negative: [1] Caller is not with the adult (patient) AND [2] reporting urgent symptoms   Negative: Lab result questions   Negative: Medication questions   Negative: Caller can't be reached by phone   Negative: Caller has already spoken to PCP or another triager   Negative: RN needs further essential information from caller in order to complete triage   Negative: Requesting regular office appointment    Answer Assessment - Initial Assessment Questions  1. REASON FOR CALL or QUESTION: \"What is your reason for calling today?\" or \"How can I best help you?\" or \"What question do you have that I can help answer?\"      Patient needs letter of referral for physical therapy.    Protocols used: Information Only Call - No Triage-ADULT-    " 08/23/2017-smartset and orders   Received: Today   Message Contents   Fransisca Tomas MD             Surgery scheduled for 08/23/2017 at [place] for L ADA and abductor repair.  Please do smartset and orders.     Thanks,     Fransisca

## 2023-06-14 ENCOUNTER — READMISSION MANAGEMENT (OUTPATIENT)
Dept: CALL CENTER | Facility: HOSPITAL | Age: 63
End: 2023-06-14
Payer: COMMERCIAL

## 2023-06-14 NOTE — OUTREACH NOTE
Medical Week 1 Survey      Flowsheet Row Responses   [facility] patient discharged from? [place]   Does the patient have one of the following disease processes/diagnoses(primary or secondary)? Other   Week 1 attempt successful? No   Unsuccessful attempts Attempt 1            CAROL HENNING - Registered Nurse

## 2023-06-16 ENCOUNTER — OFFICE VISIT (OUTPATIENT)
Dept: INTERNAL MEDICINE | Age: 63
End: 2023-06-16

## 2023-06-16 VITALS
WEIGHT: 105 LBS | DIASTOLIC BLOOD PRESSURE: 70 MMHG | BODY MASS INDEX: 20.62 KG/M2 | OXYGEN SATURATION: 99 % | HEIGHT: 60 IN | HEART RATE: 106 BPM | SYSTOLIC BLOOD PRESSURE: 138 MMHG

## 2023-06-16 DIAGNOSIS — R07.89 OTHER CHEST PAIN: Primary | ICD-10-CM

## 2023-06-16 DIAGNOSIS — Z09 HOSPITAL DISCHARGE FOLLOW-UP: ICD-10-CM

## 2023-06-16 DIAGNOSIS — E10.42 DIABETIC PERIPHERAL NEUROPATHY ASSOCIATED WITH TYPE 1 DIABETES MELLITUS (HCC): ICD-10-CM

## 2023-06-16 DIAGNOSIS — R00.0 TACHYCARDIA: ICD-10-CM

## 2023-06-16 RX ORDER — LISINOPRIL 10 MG/1
10 TABLET ORAL DAILY
COMMUNITY
Start: 2023-06-09

## 2023-06-19 ENCOUNTER — READMISSION MANAGEMENT (OUTPATIENT)
Dept: CALL CENTER | Facility: HOSPITAL | Age: 63
End: 2023-06-19
Payer: COMMERCIAL

## 2023-06-19 ENCOUNTER — TRANSCRIBE ORDERS (OUTPATIENT)
Dept: ADMINISTRATIVE | Facility: HOSPITAL | Age: 63
End: 2023-06-19
Payer: COMMERCIAL

## 2023-06-19 DIAGNOSIS — R00.0 TACHYCARDIA, UNSPECIFIED: Primary | ICD-10-CM

## 2023-06-19 NOTE — OUTREACH NOTE
Medical Week 1 Survey      Flowsheet Row Responses   [facility] [place] facility patient discharged from? [place]   Does the patient have one of the following disease processes/diagnoses(primary or secondary)? Other   Week 1 attempt successful? No   Unsuccessful attempts Attempt 2            Cris Mejia Registered Nurse

## 2023-06-29 PROBLEM — R29.6 MULTIPLE FALLS: Status: ACTIVE | Noted: 2023-06-29

## 2023-06-29 PROBLEM — M54.50 ACUTE RIGHT-SIDED LOW BACK PAIN WITHOUT SCIATICA: Status: ACTIVE | Noted: 2023-06-29

## 2023-07-05 DIAGNOSIS — R30.0 DYSURIA: Primary | ICD-10-CM

## 2023-07-10 RX ORDER — NITROFURANTOIN 25; 75 MG/1; MG/1
100 CAPSULE ORAL 2 TIMES DAILY
Qty: 14 CAPSULE | Refills: 0 | Status: SHIPPED | OUTPATIENT
Start: 2023-07-10 | End: 2023-07-17

## 2023-07-18 ENCOUNTER — TELEPHONE (OUTPATIENT)
Dept: INTERNAL MEDICINE | Age: 63
End: 2023-07-18

## 2023-07-18 DIAGNOSIS — E10.40 TYPE 1 DIABETES MELLITUS WITH DIABETIC NEUROPATHY (HCC): ICD-10-CM

## 2023-07-18 NOTE — TELEPHONE ENCOUNTER
Since her surgery, she has been in a lot of pain and nothing is helping with the pain. Wanting to know what else she can take for this?

## 2023-07-19 NOTE — TELEPHONE ENCOUNTER
She has been out of Gabapentin for over a month. She needs more sent in. Also she said that she has been taking 4-6 tylenol and advil daily and it is not helping at all.

## 2023-07-19 NOTE — TELEPHONE ENCOUNTER
Tell her I am sorry I meant to call her yesterday-- increase her gabapetin to 2 tablets at night.   Find out if she is taking toradol--- I dont want her taking that long term but she could take bid until we see her (Dr. Tracey Fragoso gave to her on6/29 and it is cousin advil/ aleve -I told her to not take it so she may have thown away----  if she does not have it she could do one aleve bid instead just dont do both)

## 2023-07-20 RX ORDER — GABAPENTIN 300 MG/1
300 CAPSULE ORAL NIGHTLY
Qty: 90 CAPSULE | Refills: 0 | Status: SHIPPED | OUTPATIENT
Start: 2023-07-20 | End: 2023-10-18

## 2023-07-26 DIAGNOSIS — K21.9 GASTROESOPHAGEAL REFLUX DISEASE WITHOUT ESOPHAGITIS: ICD-10-CM

## 2023-07-26 DIAGNOSIS — K62.5 GASTROINTESTINAL HEMORRHAGE ASSOCIATED WITH ANORECTAL SOURCE: ICD-10-CM

## 2023-07-26 RX ORDER — PANTOPRAZOLE SODIUM 40 MG/1
TABLET, DELAYED RELEASE ORAL
Qty: 30 TABLET | Refills: 3 | Status: SHIPPED | OUTPATIENT
Start: 2023-07-26

## 2023-08-17 ENCOUNTER — OFFICE VISIT (OUTPATIENT)
Dept: NEUROSURGERY | Facility: CLINIC | Age: 63
End: 2023-08-17
Payer: COMMERCIAL

## 2023-08-17 VITALS — HEIGHT: 60 IN | WEIGHT: 108 LBS | BODY MASS INDEX: 21.2 KG/M2

## 2023-08-17 DIAGNOSIS — R29.6 MULTIPLE FALLS: ICD-10-CM

## 2023-08-17 DIAGNOSIS — M21.371 RIGHT FOOT DROP: ICD-10-CM

## 2023-08-17 DIAGNOSIS — M43.10 ACQUIRED SPONDYLOLISTHESIS: ICD-10-CM

## 2023-08-17 DIAGNOSIS — M54.16 LUMBAR RADICULOPATHY: ICD-10-CM

## 2023-08-17 DIAGNOSIS — M48.062 SPINAL STENOSIS, LUMBAR REGION, WITH NEUROGENIC CLAUDICATION: ICD-10-CM

## 2023-08-17 DIAGNOSIS — M54.50 ACUTE RIGHT-SIDED LOW BACK PAIN WITHOUT SCIATICA: Primary | ICD-10-CM

## 2023-08-17 DIAGNOSIS — Z78.9 NON-SMOKER: ICD-10-CM

## 2023-08-17 NOTE — PROGRESS NOTES
"    Chief complaint:   Chief Complaint   Patient presents with    Back Pain     Pt here for 6wk f/u. Pt states since her last office visit her symptoms have improved.        Subjective     HPI: This is a 63-year-old female patient who went to the operating room on May 1, 2023 for an L5-S1 TLIF.  At her last office appointment the patient was doing well with her back pain and right leg pain.  She had several falls since her surgery.  At her last office appointment she was complaining of left paraspinal back pain but no lower extremity pain.  She was going through physical therapy.  Postoperative imaging has shown good position of her hardware.  She was treated with a Medrol Dosepak and Toradol to get her pain under better control.  The patient says overall she is doing well.  She is not really having any back pain.  She is not have any pain radiating into her leg.  Her biggest complaint is the weakness in her right foot.  She says this does affect her walking.  She is working with physical and occupational therapy and does feel it is making progress    Review of Systems   Musculoskeletal:  Positive for arthralgias and myalgias.   Neurological:  Positive for weakness.       Objective      Vital Signs  Ht 152.4 cm (60\")   Wt 49 kg (108 lb)   BMI 21.09 kg/mý     Physical Exam  Constitutional:       Appearance: She is well-developed.   HENT:      Head: Normocephalic.   Eyes:      Extraocular Movements: EOM normal.      Pupils: Pupils are equal, round, and reactive to light.   Pulmonary:      Effort: Pulmonary effort is normal.   Musculoskeletal:         General: Normal range of motion.      Cervical back: Normal range of motion.   Skin:     General: Skin is warm.   Neurological:      Mental Status: She is alert and oriented to person, place, and time.      GCS: GCS eye subscore is 4. GCS verbal subscore is 5. GCS motor subscore is 6.      Cranial Nerves: No cranial nerve deficit.      Sensory: No sensory deficit.      " Motor: Weakness present.      Gait: Gait is intact. Gait normal.      Deep Tendon Reflexes: Reflexes are normal and symmetric.   Psychiatric:         Speech: Speech normal.         Behavior: Behavior normal.         Thought Content: Thought content normal.       Neurologic Exam     Mental Status   Oriented to person, place, and time.   Attention: normal. Concentration: normal.   Speech: speech is normal   Level of consciousness: alert  Normal comprehension.     Cranial Nerves     CN II   Visual fields full to confrontation.     CN III, IV, VI   Pupils are equal, round, and reactive to light.  Extraocular motions are normal.     CN V   Facial sensation intact.     CN VII   Facial expression full, symmetric.     CN VIII   CN VIII normal.     CN IX, X   CN IX normal.   CN X normal.     CN XI   CN XI normal.     CN XII   CN XII normal.     Motor Exam   Muscle bulk: normal    Strength   Strength 5/5 except as noted.   Right anterior tibial: 4/5  Right EHL 4/5     Sensory Exam   Light touch normal.     Gait, Coordination, and Reflexes     Gait  Gait: normal    Reflexes   Reflexes 2+ except as noted.     Imaging review: No new imaging        Assessment/Plan: The patient's lower extremities do seem to be improving.  She does still have some weakness in the dorsiflexion and EHL but the rest of her legs are back to full strength.  She can continue work with physical therapy.  I will plan to follow-up again with her in 3 months.  She was told to call us if she any further problems or concerns    Patient is a nonsmoker  The patient's Body mass index is 21.09 kg/mý.. BMI is within normal parameters. No follow-up required.    Diagnoses and all orders for this visit:    1. Acute right-sided low back pain without sciatica (Primary)    2. Spinal stenosis, lumbar region, with neurogenic claudication    3. Lumbar radiculopathy    4. Right foot drop    5. Acquired spondylolisthesis    6. Multiple falls    7. Non-smoker    8. BMI  21.0-21.9, adult        I discussed the patients findings and my recommendations with patient  Randy Mcbride, APRN  08/17/23  08:43 CDT

## 2023-08-28 NOTE — PROGRESS NOTES
[facility] - PODIATRY    Today's Date: 09/05/2023     Patient Name: Fiordaliza Lyons  MRN: [id number]  CSN: [id number]  PCP: Brittany Solis MD  Referring Provider: No ref. provider found    SUBJECTIVE     Chief Complaint   Patient presents with    Follow-up     Brittany Solis MD 02/13/2023 FOLLOW UP- NEUROPATHY. -pt states she is here today for a yearly diabetic foot exam/having trouble with rt foot and ankle. Had surgery with dr bush in may and her ankle and foot has gotten worse-pt reports pain level at 3/10 normal days but can get to 10/10     Diabetes     315 mg/dl BG has taken more insulin     HPI: Fiordaliza Lyons, a 63 y.o.female, comes to clinic as a(n) established patient presenting for diabetic foot exam and complaining of toenail/callus issues. Patient has h/o alcoholic hepatitis, breast cysts, DM I, endometreosis, GERD, HLD, HTN, pancreatitis, sleep apnea . Patient is IDDM with last stated BG level of 315mg/dl.  Relates issues with hyperglycemia.  Since last visit, she has established with Dr. Bush and ultimately underwent spine surgery in May.  She has been undergoing physical therapy which she recently completed.  Notes overall improvement but does have some weakness to her right lower leg causing some mild foot drop.  Otherwise, note some mild numbness and tingling in her feet.  Denies open wounds or sores.  Continues to have mild issues with her toenails. Admits pain at 3/10 level and described as aching, nagging, dull, numbness and tingling. Denies previous treatment. Denies any constitutional symptoms. No other pedal complaints at this time.    Past Medical History:   Diagnosis Date    Alcoholic hepatitis with ascites     Allergic rhinitis     Breast cyst     Cancer     ovarian    Degeneration of lumbar or lumbosacral intervertebral disc 09/15/2022    Diabetes mellitus     Disease of thyroid gland     Elevated d-dimer     Endometriosis     Fatty liver     GERD  (gastroesophageal reflux disease)     History of transfusion     Hyperlipidemia     Hypertension     Low back pain     Osteoporosis     Ovarian cyst     Pancreatitis     Postmenopausal     Rectal mass     Rectal prolapse     Sleep apnea      Past Surgical History:   Procedure Laterality Date    BILATERAL SALPINGO OOPHORECTOMY  2008    CHOLECYSTECTOMY  1996    COLONOSCOPY  07/2017    Dr Murphy- per patient normal     COLONOSCOPY N/A 11/09/2021    Procedure: COLONOSCOPY WITH ANESTHESIA;  Surgeon: Ray Serna MD;  Location: [facility] ENDOSCOPY;  Service: Gastroenterology;  Laterality: N/A;  pre rectal mass  post  Dr Shields    DILATION AND CURETTAGE, DIAGNOSTIC / THERAPEUTIC      ENDOSCOPY N/A 11/09/2021    Procedure: ESOPHAGOGASTRODUODENOSCOPY WITH ANESTHESIA;  Surgeon: Ray Serna MD;  Location: [facility] ENDOSCOPY;  Service: Gastroenterology;  Laterality: N/A;  pre anemia  post hiatal hernia; gastric polyp  Dr. Shields    KNEE SURGERY  1982    LUMBAR LAMINECTOMY WITH FUSION Right 5/1/2023    Procedure: LUMBAR DISCECTOMY, JADON- LAMINECTOMY TRANSFORAMINAL LUMBAR INTERBODY FUSION, POSTERIOR PEDICLE SCREW INSTRUMENTATION, L5-S1 RIGHT. USE OF THE ROBOT;  Surgeon: Nathaniel Tesfaye MD;  Location: [facility] OR;  Service: Neurosurgery;  Laterality: Right;     Family History   Problem Relation Age of Onset    Ovarian cancer Mother     Ovarian cancer Maternal Aunt     Ovarian cancer Maternal Grandmother     Ovarian cancer Paternal Grandmother     Colon polyps Father     Colon polyps Brother     Breast cancer Neg Hx     Colon cancer Neg Hx      Social History     Socioeconomic History    Marital status: Single   Tobacco Use    Smoking status: Never     Passive exposure: Never    Smokeless tobacco: Never   Vaping Use    Vaping Use: Never used   Substance and Sexual Activity    Alcohol use: Not Currently    Drug use: No    Sexual activity: Defer     Allergies   Allergen Reactions    Tizanidine Hcl Dizziness     Pt stated  "\"horrible dry mouth, dizziness, couldn't see, it was awful\" drops blood sugar down     Augmentin [Amoxicillin-Pot Clavulanate] Nausea And Vomiting     Current Outpatient Medications   Medication Sig Dispense Refill    BD Pen Needle Tatyana U/F 32G X 4 MM misc 4 (Four) Times a Day. use as directed      Blood Glucose Monitoring Suppl (ONE TOUCH ULTRA 2) w/Device kit See Admin Instructions.      Continuous Blood Gluc Sensor (Dexcom G6 Sensor) See Admin Instructions.      Continuous Blood Gluc Transmit (Dexcom G6 Transmitter) misc See Admin Instructions.      cyclobenzaprine (FLEXERIL) 10 MG tablet Take 1 tablet by mouth 3 (Three) Times a Day As Needed for Muscle Spasms. 90 tablet 0    glucose blood test strip 5 times a day and prn .      Gvoke HypoPen 2-Pack 1 MG/0.2ML solution auto-injector INJECT 1 MG UNDER THE SKIN ONCE FOR 1 DOSE. USE AS INSTRUCTED AS NEEDED FOR SEVERE HYPOGLYCEMIA.      insulin aspart (novoLOG FLEXPEN) 100 UNIT/ML solution pen-injector sc pen Inject 2-5 Units under the skin into the appropriate area as directed 3 (Three) Times a Day With Meals. 2-5 units with meals      insulin lispro (humaLOG) 100 UNIT/ML injection Inject  under the skin into the appropriate area as directed.      ketorolac (TORADOL) 10 MG tablet Take 1 tablet by mouth Every 6 (Six) Hours As Needed for Moderate Pain. 30 tablet 0    Lancets (OneTouch Delica Plus [id number]) misc TEST 5 TIMES DAILY AND AS NEEDED      levothyroxine (SYNTHROID, LEVOTHROID) 50 MCG tablet Take 1 tablet by mouth Every Morning.      lisinopril (PRINIVIL,ZESTRIL) 10 MG tablet Take 1 tablet by mouth Daily. 30 tablet 2    magnesium oxide (MAG-OX) 400 MG tablet Take 1 tablet by mouth Daily.      Multiple Vitamins-Iron (multivitamin with iron) tablet tablet Take 1 tablet by mouth Daily.      Nutritional Supplements (JUICE PLUS FIBRE PO) Take  by mouth Daily.      pantoprazole (PROTONIX) 40 MG EC tablet Take 1 tablet by mouth Daily.      potassium chloride " (K-DUR,KLOR-CON) 10 MEQ CR tablet       tiZANidine (Zanaflex) 4 MG tablet Take 1 tablet by mouth Every 6 (Six) Hours As Needed for Muscle Spasms. 12 tablet 0    Toujeo Max SoloStar 300 UNIT/ML solution pen-injector injection Inject 24 Units under the skin into the appropriate area as directed Every Night.      vitamin D (ERGOCALCIFEROL) 1.25 MG (14972 UT) capsule capsule Take 1 capsule by mouth 1 (One) Time Per Week. sunday      gabapentin (NEURONTIN) 300 MG capsule Take 1 capsule by mouth Daily for 7 days. 7 capsule 0     No current facility-administered medications for this visit.     Review of Systems   Constitutional:  Negative for chills and fever.   HENT:  Negative for congestion.    Respiratory:  Negative for shortness of breath.    Cardiovascular:  Positive for leg swelling. Negative for chest pain.   Gastrointestinal:  Negative for constipation, diarrhea, nausea and vomiting.   Musculoskeletal:  Positive for arthralgias and back pain.        Foot pain   Skin:  Negative for wound.   Neurological:  Positive for numbness.     OBJECTIVE     Vitals:    09/05/23 1350   BP: 120/60   Pulse: 61   SpO2: 99%       PHYSICAL EXAM  GEN:   Accompanied by none.     Foot/Ankle Exam    GENERAL  Diabetic foot exam performed    Appearance:  appears stated age  Orientation:  AAOx3  Affect:  appropriate  Gait:  unimpaired  Assistance:  independent  Right shoe gear: casual shoe  Left shoe gear: casual shoe    VASCULAR     Right Foot Vascularity   Dorsalis pedis:  2+  Posterior tibial:  1+  Skin temperature:  warm  Edema grading:  Trace  CFT:  3  Pedal hair growth:  Present  Varicosities:  mild varicosities     Left Foot Vascularity   Dorsalis pedis:  2+  Posterior tibial:  1+  Skin temperature:  warm  Edema grading:  Trace  CFT:  3  Pedal hair growth:  Present  Varicosities:  mild varicosities     NEUROLOGIC     Right Foot Neurologic   Light touch sensation: diminished  Vibratory sensation: diminished  Hot/Cold sensation:  diminished  Protective Sensation using [place] Monofilament:   Sites intact: 5  Sites tested: 10     Left Foot Neurologic   Light touch sensation: diminished  Vibratory sensation: diminished  Hot/Cold sensation:  diminished  Protective Sensation using [place] Monofilament:   Sites intact: 5  Sites tested: 10    MUSCULOSKELETAL     Right Foot Musculoskeletal   Ecchymosis:  none  Tenderness:  toenail problem    Arch:  Normal  Hallux valgus: No    Hallux limitus: Yes (Dorsal spurring)       Left Foot Musculoskeletal   Ecchymosis:  none  Tenderness:  toenail problem  Arch:  Normal  Hallux valgus: No      MUSCLE STRENGTH     Right Foot Muscle Strength   Foot dorsiflexion:  4  Foot plantar flexion:  5  Foot inversion:  4+  Foot eversion:  5     Left Foot Muscle Strength   Foot dorsiflexion:  5  Foot plantar flexion:  5  Foot inversion:  5  Foot eversion:  5    RANGE OF MOTION     Right Foot Range of Motion   Foot and ankle ROM within normal limits       Left Foot Range of Motion   Foot and ankle ROM within normal limits      DERMATOLOGIC      Right Foot Dermatologic   Skin  Right foot skin is intact.   Nails  1.  Positive for elongated, onychomycosis, abnormal thickness and subungual debris.  2.  Positive for elongated, onychomycosis, abnormal thickness and subungual debris.  3.  Positive for elongated, onychomycosis, abnormal thickness and subungual debris.  4.  Positive for elongated, onychomycosis, abnormal thickness and subungual debris.  5.  Positive for elongated, onychomycosis, abnormal thickness and subungual debris.  Nails comment:  1-5     Left Foot Dermatologic   Skin  Left foot skin is intact.   Nails comment:  1-5  Nails  1.  Positive for elongated, onychomycosis, abnormal thickness and subungual debris.  2.  Positive for elongated, onychomycosis, abnormal thickness and subungual debris.  3.  Positive for elongated, onychomycosis, abnormal thickness and subungual debris.  4.  Positive for  elongated, onychomycosis, abnormally thick and subungual debris.  5.  Positive for elongated, onychomycosis, abnormally thick and subungual debris.    RADIOLOGY/NUCLEAR:  No results found.      LABORATORY/CULTURE RESULTS:      PATHOLOGY RESULTS:       ASSESSMENT/PLAN     Diagnoses and all orders for this visit:    1. Onychomycosis (Primary)    2. Type 1 diabetes mellitus with diabetic neuropathy    3. Encounter for diabetic foot exam    4. Degenerative disc disease, lumbar    5. Hallux limitus of right foot    6. Foot drop, right      Comprehensive lower extremity examination and evaluation was performed.  Discussed findings and treatment plan including risks, benefits, and treatment options with patient in detail. Patient agreed with treatment plan.  DFE performed.  After verbal consent obtained, nail(s) x10 debrided of length and thickness with nail nipper without incidence  Patient may maintain nails and calluses at home utilizing emery board or pumice stone between visits as needed  Reviewed at home diabetic foot care including daily foot checks   Continue follow-up with neurosurgery.  Discussed right foot foot drop.  Patient should continue strengthening exercises to improve the condition.  If her functionality does not improve, she may ultimately benefit from AFO.  Discussed treatment of hallux limitus including conservative measures with hard soled shoes vs. Surgical correction with arthrodesis. Will remain conservative at this time.  An After Visit Summary was printed and given to the patient at discharge, including (if requested) any available informative/educational handouts regarding diagnosis, treatment, or medications. All questions were answered to patient/family satisfaction. Should symptoms fail to improve or worsen they agree to call or return to clinic or to go to the Emergency Department. Discussed the importance of following up with any needed screening tests/labs/specialist appointments and any  requested follow-up recommended by me today. Importance of maintaining follow-up discussed and patient accepts that missed appointments can delay diagnosis and potentially lead to worsening of conditions.  Return in about 3 months (around 12/5/2023) for Follow-up with Podiatry Provider, Schedule Foot [facility] Clinic., or sooner if acute issues arise.    Lab Frequency Next Occurrence   US Paracentesis Once 10/31/2021   Follow Anesthesia Guidelines / Protocol Once 11/05/2021   Obtain Informed Consent Once 11/10/2021   Diet: Once 11/09/2021   Comprehensive Metabolic Panel Once 03/31/2022       This document has been electronically signed by Morro Tony DPM on September 5, 2023 17:45 CDT

## 2023-09-01 ENCOUNTER — TRANSCRIBE ORDERS (OUTPATIENT)
Dept: ADMINISTRATIVE | Facility: HOSPITAL | Age: 63
End: 2023-09-01
Payer: COMMERCIAL

## 2023-09-01 ENCOUNTER — TELEPHONE (OUTPATIENT)
Dept: PODIATRY | Facility: CLINIC | Age: 63
End: 2023-09-01
Payer: COMMERCIAL

## 2023-09-01 ENCOUNTER — OFFICE VISIT (OUTPATIENT)
Dept: INTERNAL MEDICINE | Age: 63
End: 2023-09-01
Payer: COMMERCIAL

## 2023-09-01 ENCOUNTER — LAB (OUTPATIENT)
Dept: LAB | Facility: HOSPITAL | Age: 63
End: 2023-09-01
Payer: COMMERCIAL

## 2023-09-01 ENCOUNTER — TELEPHONE (OUTPATIENT)
Dept: INTERNAL MEDICINE | Age: 63
End: 2023-09-01

## 2023-09-01 VITALS
SYSTOLIC BLOOD PRESSURE: 146 MMHG | HEART RATE: 86 BPM | WEIGHT: 110 LBS | BODY MASS INDEX: 21.6 KG/M2 | DIASTOLIC BLOOD PRESSURE: 70 MMHG | HEIGHT: 60 IN | OXYGEN SATURATION: 97 %

## 2023-09-01 DIAGNOSIS — M47.26 OSTEOARTHRITIS OF SPINE WITH RADICULOPATHY, LUMBAR REGION: ICD-10-CM

## 2023-09-01 DIAGNOSIS — E10.40 TYPE 1 DIABETES MELLITUS WITH DIABETIC NEUROPATHY: ICD-10-CM

## 2023-09-01 DIAGNOSIS — E78.2 MIXED HYPERLIPIDEMIA: ICD-10-CM

## 2023-09-01 DIAGNOSIS — D50.0 IRON DEFICIENCY ANEMIA DUE TO CHRONIC BLOOD LOSS: ICD-10-CM

## 2023-09-01 DIAGNOSIS — N30.00 ACUTE CYSTITIS WITHOUT HEMATURIA: ICD-10-CM

## 2023-09-01 DIAGNOSIS — E55.9 VITAMIN D DEFICIENCY: ICD-10-CM

## 2023-09-01 DIAGNOSIS — E10.40 TYPE 1 DIABETES MELLITUS WITH DIABETIC NEUROPATHY: Primary | ICD-10-CM

## 2023-09-01 DIAGNOSIS — I47.1 SVT (SUPRAVENTRICULAR TACHYCARDIA) (HCC): ICD-10-CM

## 2023-09-01 DIAGNOSIS — D50.0 IRON DEFICIENCY ANEMIA DUE TO CHRONIC BLOOD LOSS: Primary | ICD-10-CM

## 2023-09-01 DIAGNOSIS — F10.11 HISTORY OF ALCOHOL ABUSE: ICD-10-CM

## 2023-09-01 DIAGNOSIS — E10.40 TYPE 1 DIABETES MELLITUS WITH DIABETIC NEUROPATHY (HCC): Primary | ICD-10-CM

## 2023-09-01 LAB
25(OH)D3 SERPL-MCNC: 33.1 NG/ML (ref 30–100)
ALBUMIN SERPL-MCNC: 4.6 G/DL (ref 3.5–5)
ALBUMIN UR-MCNC: <1.2 MG/DL
ALBUMIN/GLOB SERPL: 1.4 G/DL (ref 1.1–2.5)
ALP SERPL-CCNC: 261 U/L (ref 24–120)
ALT SERPL W P-5'-P-CCNC: 20 U/L (ref 0–35)
ANION GAP SERPL CALCULATED.3IONS-SCNC: 8 MMOL/L (ref 4–13)
APPEARANCE FLUID: ABNORMAL
AST SERPL-CCNC: 30 U/L (ref 7–45)
AUTO MIXED CELLS #: 0.5 10*3/MM3 (ref 0.1–2.6)
AUTO MIXED CELLS %: 12.8 % (ref 0.1–24)
BILIRUB SERPL-MCNC: 0.4 MG/DL (ref 0.1–1)
BILIRUBIN, POC: ABNORMAL
BLOOD URINE, POC: ABNORMAL
BUN SERPL-MCNC: 12 MG/DL (ref 5–21)
BUN/CREAT SERPL: 16
CALCIUM SPEC-SCNC: 10.3 MG/DL (ref 8.4–10.4)
CHLORIDE SERPL-SCNC: 100 MMOL/L (ref 98–110)
CHOLEST SERPL-MCNC: 159 MG/DL (ref 130–200)
CLARITY, POC: ABNORMAL
CO2 SERPL-SCNC: 31 MMOL/L (ref 24–31)
COLOR, POC: ABNORMAL
CREAT SERPL-MCNC: 0.75 MG/DL (ref 0.5–1.4)
CREAT UR-MCNC: 24.5 MG/DL
EGFRCR SERPLBLD CKD-EPI 2021: 90.7 ML/MIN/1.73
ERYTHROCYTE [DISTWIDTH] IN BLOOD BY AUTOMATED COUNT: 14.2 % (ref 12.3–15.4)
GLOBULIN UR ELPH-MCNC: 3.4 GM/DL
GLUCOSE SERPL-MCNC: 37 MG/DL (ref 70–100)
GLUCOSE URINE, POC: ABNORMAL
HBA1C MFR BLD: 6.8 % (ref 4.8–5.9)
HCT VFR BLD AUTO: 32.7 % (ref 34–46.6)
HDLC SERPL-MCNC: 44 MG/DL
HGB BLD-MCNC: 9.9 G/DL (ref 12–15.9)
KETONES, POC: ABNORMAL
LDLC SERPL CALC-MCNC: 86 MG/DL (ref 0–99)
LDLC/HDLC SERPL: 1.85 {RATIO}
LEUKOCYTE EST, POC: ABNORMAL
LYMPHOCYTES # BLD AUTO: 0.5 10*3/MM3 (ref 0.7–3.1)
LYMPHOCYTES NFR BLD AUTO: 13.9 % (ref 19.6–45.3)
MCH RBC QN AUTO: 24 PG (ref 26.6–33)
MCHC RBC AUTO-ENTMCNC: 30.3 G/DL (ref 31.5–35.7)
MCV RBC AUTO: 79.4 FL (ref 79–97)
MICROALBUMIN/CREAT UR: NORMAL MG/G{CREAT}
NEUTROPHILS NFR BLD AUTO: 2.8 10*3/MM3 (ref 1.7–7)
NEUTROPHILS NFR BLD AUTO: 73.3 % (ref 42.7–76)
NITRITE, POC: ABNORMAL
PH, POC: 5
PLATELET # BLD AUTO: 250 10*3/MM3 (ref 140–450)
PMV BLD AUTO: 8.6 FL (ref 6–12)
POTASSIUM SERPL-SCNC: 4.3 MMOL/L (ref 3.5–5.3)
PROT SERPL-MCNC: 8 G/DL (ref 6.3–8.7)
PROTEIN, POC: ABNORMAL
RBC # BLD AUTO: 4.12 10*6/MM3 (ref 3.77–5.28)
SODIUM SERPL-SCNC: 139 MMOL/L (ref 135–145)
SPECIFIC GRAVITY, POC: 1.02
TRIGL SERPL-MCNC: 168 MG/DL (ref 0–149)
TSH SERPL DL<=0.05 MIU/L-ACNC: 1.08 UIU/ML (ref 0.27–4.2)
UROBILINOGEN, POC: 0.2
VLDLC SERPL-MCNC: 29 MG/DL (ref 5–40)
WBC NRBC COR # BLD: 3.8 10*3/MM3 (ref 3.4–10.8)

## 2023-09-01 PROCEDURE — 82746 ASSAY OF FOLIC ACID SERUM: CPT

## 2023-09-01 PROCEDURE — 82607 VITAMIN B-12: CPT

## 2023-09-01 PROCEDURE — 99214 OFFICE O/P EST MOD 30 MIN: CPT | Performed by: INTERNAL MEDICINE

## 2023-09-01 PROCEDURE — 80061 LIPID PANEL: CPT

## 2023-09-01 PROCEDURE — 81002 URINALYSIS NONAUTO W/O SCOPE: CPT | Performed by: INTERNAL MEDICINE

## 2023-09-01 PROCEDURE — 3046F HEMOGLOBIN A1C LEVEL >9.0%: CPT | Performed by: INTERNAL MEDICINE

## 2023-09-01 PROCEDURE — 82043 UR ALBUMIN QUANTITATIVE: CPT

## 2023-09-01 PROCEDURE — 83036 HEMOGLOBIN GLYCOSYLATED A1C: CPT | Performed by: INTERNAL MEDICINE

## 2023-09-01 PROCEDURE — 82306 VITAMIN D 25 HYDROXY: CPT

## 2023-09-01 PROCEDURE — 83540 ASSAY OF IRON: CPT

## 2023-09-01 PROCEDURE — 82728 ASSAY OF FERRITIN: CPT

## 2023-09-01 PROCEDURE — 82570 ASSAY OF URINE CREATININE: CPT

## 2023-09-01 PROCEDURE — 80050 GENERAL HEALTH PANEL: CPT

## 2023-09-01 PROCEDURE — 36415 COLL VENOUS BLD VENIPUNCTURE: CPT

## 2023-09-01 RX ORDER — NITROFURANTOIN 25; 75 MG/1; MG/1
100 CAPSULE ORAL 2 TIMES DAILY
Qty: 14 CAPSULE | Refills: 0 | Status: SHIPPED | OUTPATIENT
Start: 2023-09-01 | End: 2023-09-08

## 2023-09-01 NOTE — TELEPHONE ENCOUNTER
I spoke with her and blood sugar is ok now and I faxed the lab orders to [facility] lab to add on.

## 2023-09-01 NOTE — TELEPHONE ENCOUNTER
Charley and [facility] sure she is ok-her blood sugar was 37- make sure she is ok check bs now and keep close watch - she has dexcom or freestyle encourage her to wear it -- -her hga1c was [facility] better 6.8--- also she si still anemic - please fax an order for iron, ferritin, b12, folate to [facility] and let her know we will check those and see what need to do

## 2023-09-01 NOTE — PROGRESS NOTES
Chief Complaint   Patient presents with    3 Month Follow-Up    Diabetes       HPI: Patient is here today to follow-up diabetes and hypertension and recent back surgery. Had quite a bit of weakness and some ongoing leg pain postsurgery but she seems to be improving still quite weak and gets tachycardia at times. Also recent UTI. Past Medical History:   Diagnosis Date    Alcohol abuse 2/6/2019    Allergic rhinitis     Diabetes mellitus (HCC)     GERD (gastroesophageal reflux disease)     Hyperlipidemia     Hypertension     Postmenopausal 1/3/2019    Sleep apnea        Past Surgical History:   Procedure Laterality Date    CHOLECYSTECTOMY      KNEE ARTHROPLASTY      LIANA AND BSO (CERVIX REMOVED)         Family History   Problem Relation Age of Onset    Cancer Mother         ovarian    Cancer Maternal Grandmother         ovarian    Cancer Paternal Grandmother         ovarian       Social History     Socioeconomic History    Marital status: Single     Spouse name: Not on file    Number of children: Not on file    Years of education: Not on file    Highest education level: Not on file   Occupational History    Not on file   Tobacco Use    Smoking status: Never    Smokeless tobacco: Never   Substance and Sexual Activity    Alcohol use: Not on file    Drug use: Not on file    Sexual activity: Not on file   Other Topics Concern    Not on file   Social History Narrative    Not on file     Social Determinants of Health     Financial Resource Strain: Low Risk  (3/14/2023)    Overall Financial Resource Strain (CARDIA)     Difficulty of Paying Living Expenses: Not hard at all   Food Insecurity: No Food Insecurity (3/14/2023)    Hunger Vital Sign     Worried About Running Out of Food in the Last Year: Never true     [address] in the Last Year: Never true   Transportation Needs: Unknown (3/14/2023)    PRAPARE - Transportation     Lack of Transportation (Medical): Not on file     Lack of Transportation (Non-Medical):  No

## 2023-09-01 NOTE — TELEPHONE ENCOUNTER
Called patient regarding appt on 09/05/2023. Left message for patient to return call if any questions or concerns arise.

## 2023-09-02 LAB
FERRITIN SERPL-MCNC: 11.3 NG/ML (ref 13–150)
FOLATE SERPL-MCNC: >20 NG/ML (ref 4.78–24.2)
IRON 24H UR-MRATE: 158 MCG/DL (ref 37–145)
VIT B12 BLD-MCNC: 820 PG/ML (ref 211–946)

## 2023-09-03 LAB
BACTERIA UR CULT: ABNORMAL
BACTERIA UR CULT: ABNORMAL
ORGANISM: ABNORMAL

## 2023-09-05 ENCOUNTER — OFFICE VISIT (OUTPATIENT)
Dept: PODIATRY | Facility: CLINIC | Age: 63
End: 2023-09-05
Payer: COMMERCIAL

## 2023-09-05 VITALS
OXYGEN SATURATION: 99 % | HEIGHT: 60 IN | BODY MASS INDEX: 21.2 KG/M2 | SYSTOLIC BLOOD PRESSURE: 120 MMHG | WEIGHT: 108 LBS | HEART RATE: 61 BPM | DIASTOLIC BLOOD PRESSURE: 60 MMHG

## 2023-09-05 DIAGNOSIS — E10.40 TYPE 1 DIABETES MELLITUS WITH DIABETIC NEUROPATHY: ICD-10-CM

## 2023-09-05 DIAGNOSIS — M51.36 DEGENERATIVE DISC DISEASE, LUMBAR: ICD-10-CM

## 2023-09-05 DIAGNOSIS — B35.1 ONYCHOMYCOSIS: Primary | ICD-10-CM

## 2023-09-05 DIAGNOSIS — M21.371 FOOT DROP, RIGHT: ICD-10-CM

## 2023-09-05 DIAGNOSIS — E11.9 ENCOUNTER FOR DIABETIC FOOT EXAM: ICD-10-CM

## 2023-09-05 DIAGNOSIS — M20.5X1 HALLUX LIMITUS OF RIGHT FOOT: ICD-10-CM

## 2023-09-05 PROCEDURE — 11721 DEBRIDE NAIL 6 OR MORE: CPT | Performed by: PODIATRIST

## 2023-09-05 PROCEDURE — 99213 OFFICE O/P EST LOW 20 MIN: CPT | Performed by: PODIATRIST

## 2023-09-05 NOTE — PATIENT INSTRUCTIONS
Diabetes Mellitus and Foot Care  Foot care is an important part of your health, especially when you have diabetes. Diabetes may cause you to have problems because of poor blood flow (circulation) to your feet and legs, which can cause your skin to:  Become thinner and drier.  Break more easily.  Heal more slowly.  Peel and crack.  You may also have nerve damage (neuropathy) in your legs and feet, causing decreased feeling in them. This means that you may not notice minor injuries to your feet that could lead to more serious problems. Noticing and addressing any potential problems early is the best way to prevent future foot problems.  How to care for your feet  Foot hygiene    Wash your feet daily with warm water and mild soap. Do not use hot water. Then, pat your feet and the areas between your toes until they are completely dry. Do not soak your feet as this can dry your skin.  Trim your toenails straight across. Do not dig under them or around the cuticle. File the edges of your nails with an emery board or nail file.  Apply a moisturizing lotion or petroleum jelly to the skin on your feet and to dry, brittle toenails. Use lotion that does not contain alcohol and is unscented. Do not apply lotion between your toes.  Shoes and socks  Wear clean socks or stockings every day. Make sure they are not too tight. Do not wear knee-high stockings since they may decrease blood flow to your legs.  Wear shoes that fit properly and have enough cushioning. Always look in your shoes before you put them on to be sure there are no objects inside.  To break in new shoes, wear them for just a few hours a day. This prevents injuries on your feet.  Wounds, scrapes, corns, and calluses    Check your feet daily for blisters, cuts, bruises, sores, and redness. If you cannot see the bottom of your feet, use a mirror or ask someone for help.  Do not cut corns or calluses or try to remove them with medicine.  If you find a minor scrape,  cut, or break in the skin on your feet, keep it and the skin around it clean and dry. You may clean these areas with mild soap and water. Do not clean the area with peroxide, alcohol, or iodine.  If you have a wound, scrape, corn, or callus on your foot, look at it several times a day to make sure it is healing and not infected. Check for:  Redness, swelling, or pain.  Fluid or blood.  Warmth.  Pus or a bad smell.  General tips  Do not cross your legs. This may decrease blood flow to your feet.  Do not use heating pads or hot water bottles on your feet. They may burn your skin. If you have lost feeling in your feet or legs, you may not know this is happening until it is too late.  Protect your feet from hot and cold by wearing shoes, such as at the beach or on hot pavement.  Schedule a complete foot exam at least once a year (annually) or more often if you have foot problems. Report any cuts, sores, or bruises to your health care provider immediately.  Where to find more information  American Diabetes Association: www.diabetes.org  Association of Diabetes Care & Education Specialists: www.diabeteseducator.org  Contact a health care provider if:  You have a medical condition that increases your risk of infection and you have any cuts, sores, or bruises on your feet.  You have an injury that is not healing.  You have redness on your legs or feet.  You feel burning or tingling in your legs or feet.  You have pain or cramps in your legs and feet.  Your legs or feet are numb.  Your feet always feel cold.  You have pain around any toenails.  Get help right away if:  You have a wound, scrape, corn, or callus on your foot and:  You have pain, swelling, or redness that gets worse.  You have fluid or blood coming from the wound, scrape, corn, or callus.  Your wound, scrape, corn, or callus feels warm to the touch.  You have pus or a bad smell coming from the wound, scrape, corn, or callus.  You have a fever.  You have a red  line going up your leg.  Summary  Check your feet every day for blisters, cuts, bruises, sores, and redness.  Apply a moisturizing lotion or petroleum jelly to the skin on your feet and to dry, brittle toenails.  Wear shoes that fit properly and have enough cushioning.  If you have foot problems, report any cuts, sores, or bruises to your health care provider immediately.  Schedule a complete foot exam at least once a year (annually) or more often if you have foot problems.  This information is not intended to replace advice given to you by your health care provider. Make sure you discuss any questions you have with your health care provider.  Document Revised: 07/08/2021 Document Reviewed: 07/08/2021  Elsevier Patient Education © 2023 [employer]

## 2023-09-06 ENCOUNTER — TELEPHONE (OUTPATIENT)
Dept: INTERNAL MEDICINE | Age: 63
End: 2023-09-06

## 2023-09-06 DIAGNOSIS — D64.9 ANEMIA, UNSPECIFIED TYPE: Primary | ICD-10-CM

## 2023-09-06 NOTE — TELEPHONE ENCOUNTER
Recommend we refer her to hematology. She might benefit from their assessment. Her B12 and folate are normal we will recheck them keep taking them her iron levels little high but her ferritin  (which has to with iron) is a little low.   I think the best thing is to refer to heme to assess and in the meantime stay on current meds and vitamins--she is okay with Dr. Valdez Rolling office in building or if she prefers to go to [facility].

## 2023-09-06 NOTE — TELEPHONE ENCOUNTER
Pt called said been feeling abdullahi strange lately like fuzzy headed and tired all the time and sometimes short of breath she is wondering if this has to do with her being anemic and if so would like to get something done for this

## 2023-09-19 ENCOUNTER — OFFICE VISIT (OUTPATIENT)
Dept: CARDIOLOGY | Facility: CLINIC | Age: 63
End: 2023-09-19
Payer: COMMERCIAL

## 2023-09-19 VITALS
BODY MASS INDEX: 21.79 KG/M2 | DIASTOLIC BLOOD PRESSURE: 75 MMHG | HEIGHT: 60 IN | HEART RATE: 73 BPM | SYSTOLIC BLOOD PRESSURE: 149 MMHG | WEIGHT: 111 LBS

## 2023-09-19 DIAGNOSIS — K70.30 ALCOHOLIC CIRRHOSIS OF LIVER WITHOUT ASCITES: ICD-10-CM

## 2023-09-19 DIAGNOSIS — I47.1 PAROXYSMAL SVT (SUPRAVENTRICULAR TACHYCARDIA): ICD-10-CM

## 2023-09-19 DIAGNOSIS — R00.2 PALPITATIONS: Primary | ICD-10-CM

## 2023-09-19 DIAGNOSIS — E10.65 TYPE 1 DIABETES MELLITUS WITH HYPERGLYCEMIA: ICD-10-CM

## 2023-09-19 DIAGNOSIS — Z78.9 NON-SMOKER: ICD-10-CM

## 2023-09-19 DIAGNOSIS — R74.8 ELEVATED ALKALINE PHOSPHATASE LEVEL: ICD-10-CM

## 2023-09-19 DIAGNOSIS — I10 ESSENTIAL HYPERTENSION: ICD-10-CM

## 2023-09-19 PROBLEM — I47.10 PAROXYSMAL SVT (SUPRAVENTRICULAR TACHYCARDIA): Status: ACTIVE | Noted: 2023-09-19

## 2023-09-19 PROCEDURE — 99204 OFFICE O/P NEW MOD 45 MIN: CPT | Performed by: INTERNAL MEDICINE

## 2023-09-19 RX ORDER — LOSARTAN POTASSIUM 25 MG/1
25 TABLET ORAL DAILY
Qty: 90 TABLET | Refills: 3 | Status: SHIPPED | OUTPATIENT
Start: 2023-09-19

## 2023-09-19 RX ORDER — ATENOLOL 25 MG/1
25 TABLET ORAL DAILY
Qty: 90 TABLET | Refills: 3 | Status: SHIPPED | OUTPATIENT
Start: 2023-09-19

## 2023-09-19 NOTE — PROGRESS NOTES
Fiordaliza Lyons  [id number]  1960  63 y.o.  female    Referring Provider: Brittany Solis MD    Reason for  Visit:  Initial visit         Subjective    Mild chronic exertional shortness of breath on exertion relieved with rest  No significant cough or wheezing    Intermittent palpitations, once every several days to several weeks lasting for less than 2 minutes  Associated symptoms of dizziness and weakness,   No associated chest pain or  shortness of breath      No associated chest pain  No significant pedal edema    No fever or chills  No significant expectoration    No hemoptysis  No presyncope or syncope    Tolerating current medications well with no untoward side effects   Compliant with prescribed medication regimen. Tries to adhere to cardiac diet.     Recent 05/1/2023 Dr Tesfaye did L4-L5 fusion and now better         History of present illness:  Fiordaliza Lyons is a 63 y.o. yo female with Chronic low back pain  who presents today for   Chief Complaint   Patient presents with    Rapid Heart Rate     Establish Care - recent stress echo & holter   .    History  Past Medical History:   Diagnosis Date    Alcoholic hepatitis with ascites     Allergic rhinitis     Breast cyst     Cancer     ovarian    Degeneration of lumbar or lumbosacral intervertebral disc 09/15/2022    Diabetes mellitus     Disease of thyroid gland     Elevated d-dimer     Endometriosis     Fatty liver     GERD (gastroesophageal reflux disease)     History of transfusion     Hyperlipidemia     Hypertension     Low back pain     Osteoporosis     Ovarian cyst     Pancreatitis     Postmenopausal     Rectal mass     Rectal prolapse     Sleep apnea    ,   Past Surgical History:   Procedure Laterality Date    BILATERAL SALPINGO OOPHORECTOMY  2008    CHOLECYSTECTOMY  1996    COLONOSCOPY  07/2017    Dr Murphy- per patient normal     COLONOSCOPY N/A 11/09/2021    Procedure: COLONOSCOPY WITH ANESTHESIA;  Surgeon: aRy Serna MD;  Location:   [facility];  Service: Gastroenterology;  Laterality: N/A;  pre rectal mass  post  Dr Shields    DILATION AND CURETTAGE, DIAGNOSTIC / THERAPEUTIC      ENDOSCOPY N/A 11/09/2021    Procedure: ESOPHAGOGASTRODUODENOSCOPY WITH ANESTHESIA;  Surgeon: Ray eSrna MD;  Location: [facility];  Service: Gastroenterology;  Laterality: N/A;  pre anemia  post hiatal hernia; gastric polyp  Dr. Shields    KNEE SURGERY  1982    LUMBAR LAMINECTOMY WITH FUSION Right 05/01/2023    Procedure: LUMBAR DISCECTOMY, JADON- LAMINECTOMY TRANSFORAMINAL LUMBAR INTERBODY FUSION, POSTERIOR PEDICLE SCREW INSTRUMENTATION, L5-S1 RIGHT. USE OF THE ROBOT;  Surgeon: Nathaniel Tesfaye MD;  Location: [facility] OR;  Service: Neurosurgery;  Laterality: Right;   ,   Family History   Problem Relation Age of Onset    Ovarian cancer Mother     Ovarian cancer Maternal Aunt     Ovarian cancer Maternal Grandmother     Ovarian cancer Paternal Grandmother     Colon polyps Father     Colon polyps Brother     Breast cancer Neg Hx     Colon cancer Neg Hx    ,   Social History     Tobacco Use    Smoking status: Never     Passive exposure: Never    Smokeless tobacco: Never   Vaping Use    Vaping Use: Never used   Substance Use Topics    Alcohol use: Not Currently    Drug use: No   ,     Medications  Current Outpatient Medications   Medication Sig Dispense Refill    BD Pen Needle Tatyana U/F 32G X 4 MM misc 4 (Four) Times a Day. use as directed      Blood Glucose Monitoring Suppl (ONE TOUCH ULTRA 2) w/Device kit See Admin Instructions.      Continuous Blood Gluc Sensor (Dexcom G6 Sensor) See Admin Instructions.      Continuous Blood Gluc Transmit (Dexcom G6 Transmitter) misc See Admin Instructions.      glucose blood test strip 5 times a day and prn .      Gvoke HypoPen 2-Pack 1 MG/0.2ML solution auto-injector INJECT 1 MG UNDER THE SKIN ONCE FOR 1 DOSE. USE AS INSTRUCTED AS NEEDED FOR SEVERE HYPOGLYCEMIA.      insulin aspart (novoLOG FLEXPEN) 100 UNIT/ML solution  pen-injector sc pen Inject 2-5 Units under the skin into the appropriate area as directed 3 (Three) Times a Day With Meals. 2-5 units with meals      Lancets (OneTouch Delica Plus [id number]) misc TEST 5 TIMES DAILY AND AS NEEDED      levothyroxine (SYNTHROID, LEVOTHROID) 50 MCG tablet Take 1 tablet by mouth Every Morning.      magnesium oxide (MAG-OX) 400 MG tablet Take 1 tablet by mouth Daily.      Multiple Vitamins-Iron (multivitamin with iron) tablet tablet Take 1 tablet by mouth Daily.      Nutritional Supplements (JUICE PLUS FIBRE PO) Take  by mouth Daily.      pantoprazole (PROTONIX) 40 MG EC tablet Take 1 tablet by mouth Daily.      Toujeo Max SoloStar 300 UNIT/ML solution pen-injector injection Inject 24 Units under the skin into the appropriate area as directed Every Night.      vitamin D (ERGOCALCIFEROL) 1.25 MG (34134 UT) capsule capsule Take 1 capsule by mouth 1 (One) Time Per Week. sunday      atenolol (TENORMIN) 25 MG tablet Take 1 tablet by mouth Daily. 90 tablet 3    cyclobenzaprine (FLEXERIL) 10 MG tablet Take 1 tablet by mouth 3 (Three) Times a Day As Needed for Muscle Spasms. (Patient not taking: Reported on 9/19/2023) 90 tablet 0    gabapentin (NEURONTIN) 300 MG capsule Take 1 capsule by mouth Daily for 7 days. 7 capsule 0    insulin lispro (humaLOG) 100 UNIT/ML injection Inject  under the skin into the appropriate area as directed. (Patient not taking: Reported on 9/19/2023)      ketorolac (TORADOL) 10 MG tablet Take 1 tablet by mouth Every 6 (Six) Hours As Needed for Moderate Pain. (Patient not taking: Reported on 9/19/2023) 30 tablet 0    losartan (Cozaar) 25 MG tablet Take 1 tablet by mouth Daily. 90 tablet 3    potassium chloride (K-DUR,KLOR-CON) 10 MEQ CR tablet  (Patient not taking: Reported on 9/19/2023)      tiZANidine (Zanaflex) 4 MG tablet Take 1 tablet by mouth Every 6 (Six) Hours As Needed for Muscle Spasms. (Patient not taking: Reported on 9/19/2023) 12 tablet 0     No current  "facility-administered medications for this visit.       Allergies:  Lisinopril, Tizanidine hcl, and Augmentin [amoxicillin-pot clavulanate]    Review of Systems  Review of Systems   Constitutional: Positive for malaise/fatigue.   HENT: Negative.     Eyes: Negative.    Cardiovascular:  Positive for dyspnea on exertion and palpitations. Negative for chest pain, claudication, cyanosis, irregular heartbeat, leg swelling, near-syncope, orthopnea, paroxysmal nocturnal dyspnea and syncope.   Respiratory: Negative.     Endocrine: Negative.    Hematologic/Lymphatic: Negative.    Skin: Negative.    Musculoskeletal:  Positive for arthritis and back pain.   Gastrointestinal:  Negative for anorexia.   Genitourinary: Negative.    Neurological: Negative.    Psychiatric/Behavioral: Negative.       Objective     Physical Exam:  /75   Pulse 73   Ht 152.4 cm (60\")   Wt 50.3 kg (111 lb)   BMI 21.68 kg/m²     Physical Exam  Constitutional:       Appearance: She is well-developed.   HENT:      Head: Normocephalic.   Neck:      Vascular: Normal carotid pulses. No carotid bruit or JVD.      Trachea: No tracheal tenderness or tracheal deviation.   Cardiovascular:      Rate and Rhythm: Regular rhythm.      Pulses: Normal pulses.      Heart sounds: Normal heart sounds.   Pulmonary:      Effort: Pulmonary effort is normal.      Breath sounds: No stridor.   Abdominal:      Palpations: Abdomen is soft.   Musculoskeletal:      Cervical back: No edema.   Skin:     General: Skin is warm.   Neurological:      Mental Status: She is alert.      Cranial Nerves: No cranial nerve deficit.      Sensory: No sensory deficit.   Psychiatric:         Speech: Speech normal.         Behavior: Behavior normal.       Results Review:    Interpretation Summary         Average HR: 96. Min HR: 49. Max HR: 235.     Entire report was reviewed.   The predominant rhythm noted during the testing period was sinus with rates as above while in sinus rhythm.     Rare " supraventricular ectopics with an APC burden of: < 1%    Rare premature ventricular contractions with a PVC burden of: < 1%     No correlated arrhythmia  No significant pauses                  Conclusion:      Baseline rhythm was sinus.  Slowest rate of 49 bpm consisted of marked sinus bradycardia at 3:23 AM presumably during sleep  No significant ectopy   35 runs of supraventricular tachycardia longest 13 beats at an average rate of 142 bpm  Most rapid SVT episode was 7 beats at a maximum rate of 235 bpm and average rate of 208 bpm at 11:52 AM    Results for orders placed during the hospital encounter of 06/08/23    Adult Stress Echo W/ Cont or Stress Agent if Necessary Per Protocol    Interpretation Summary  Dobutamine stress echocardiogram is low risk for ischemia.        ____________________________________________________________________________________________________________________________________________  Health maintenance and recommendations    Low salt/ HTN/ Heart healthy carbohydrate restricted cardiac diet   The patient is advised to reduce or avoid caffeine or other cardiac stimulants.   Minimize or avoid  NSAID-type medications      Monitor for any signs of bleeding including red or dark stools. Fall precautions.   Advised staying uptodate with immunizations per established standard guidelines.    Offered to give patient  a copy of my notes     Questions were encouraged, asked and answered to the patient's  understanding and satisfaction. Questions if any regarding current medications and side effects, need for refills and importance of compliance to medications stressed.    Reviewed available prior notes, consults, prior visits, laboratory findings, radiology and cardiology relevant reports. Updated chart as applicable. I have reviewed the patient's medical history in detail and updated the computerized patient record as relevant.      Updated patient regarding any new or relevant abnormalities on  review of records or any new findings on physical exam. Mentioned to patient about purpose of visit and desirable health short and long term goals and objectives.    Primary to monitor CBC CMP Lipid panel and TSH as applicable    ___________________________________________________________________________________________________________________________________________   Procedures    Assessment & Plan   Diagnoses and all orders for this visit:    1. Palpitations (Primary)  -     Adult Transthoracic Echo Complete w/ Color, Spectral and Contrast if necessary per protocol; Future    2. Essential hypertension    3. BMI 21.0-21.9, adult    4. Type 1 diabetes mellitus with hyperglycemia    5. Elevated alkaline phosphatase level    6. Alcoholic cirrhosis of liver without ascites    7. Paroxysmal SVT (supraventricular tachycardia)    8. Non-smoker    Other orders  -     losartan (Cozaar) 25 MG tablet; Take 1 tablet by mouth Daily.  Dispense: 90 tablet; Refill: 3  -     atenolol (TENORMIN) 25 MG tablet; Take 1 tablet by mouth Daily.  Dispense: 90 tablet; Refill: 3          Plan    Patient expressed understanding  Encouraged and answered all questions   Discussed with the patient and all questioned fully answered. She will call me if any problems arise.   Discussed results of prior testing with patient : stress echo and outpatient cardiac telemetry   as well electrocardiogram from 6/8/2023    [facility]     Amount and/or Complexity of Data Reviewed  Clinical lab tests: reviewed  Tests in the medicine section of CPT®: reviewed  Review and summarize past medical records: yes  Independent visualization of images, tracings, or specimens: yes    Risk of Complications, Morbidity, and/or Mortality  Presenting problems: moderate  Diagnostic procedures: moderate  Management options: moderate     Requested Prescriptions     Signed Prescriptions Disp Refills    losartan (Cozaar) 25 MG tablet 90 tablet 3     Sig: Take 1 tablet by mouth Daily.     atenolol (TENORMIN) 25 MG tablet 90 tablet 3     Sig: Take 1 tablet by mouth Daily.        Due to severe coughing from Lisinopril will stop and start Losarta   Add low dose beta blocker therapy     Check BP and heart rates twice daily initially till blood pressures and heart rates under good control and then at least 3x / week,   If blood pressures continue to be well-controlled then can check week a month  at home and bring a recording for review next visit  If BP >130/85 or < 100/60 persistently over 3 reading 30 mins apart or if heart rates persistently above 100 bpm or less than 55 bpm call sooner for evaluation and advise             Return in about 3 months (around 12/19/2023).

## 2023-09-27 RX ORDER — LANOLIN ALCOHOL/MO/W.PET/CERES
400 CREAM (GRAM) TOPICAL DAILY
Qty: 30 TABLET | Refills: 2 | Status: SHIPPED | OUTPATIENT
Start: 2023-09-27

## 2023-10-05 ENCOUNTER — TELEPHONE (OUTPATIENT)
Dept: INTERNAL MEDICINE | Age: 63
End: 2023-10-05

## 2023-10-05 ENCOUNTER — TRANSCRIBE ORDERS (OUTPATIENT)
Dept: ADMINISTRATIVE | Facility: HOSPITAL | Age: 63
End: 2023-10-05
Payer: COMMERCIAL

## 2023-10-05 DIAGNOSIS — N30.00 ACUTE CYSTITIS WITHOUT HEMATURIA: Primary | ICD-10-CM

## 2023-10-05 RX ORDER — NITROFURANTOIN 25; 75 MG/1; MG/1
100 CAPSULE ORAL 2 TIMES DAILY
Qty: 20 CAPSULE | Refills: 0 | Status: SHIPPED | OUTPATIENT
Start: 2023-10-05 | End: 2023-10-15

## 2023-10-05 NOTE — TELEPHONE ENCOUNTER
I sent in macrobid but if she could also submit a sample of urine here or at [facility] that would be very helpful.   I did send in the antibiotic

## 2023-10-06 ENCOUNTER — LAB (OUTPATIENT)
Dept: LAB | Facility: HOSPITAL | Age: 63
End: 2023-10-06
Payer: COMMERCIAL

## 2023-10-06 DIAGNOSIS — N30.00 ACUTE CYSTITIS WITHOUT HEMATURIA: ICD-10-CM

## 2023-10-06 LAB
BACTERIA UR QL AUTO: ABNORMAL /HPF
BILIRUB UR QL STRIP: NEGATIVE
CLARITY UR: CLEAR
COLOR UR: YELLOW
GLUCOSE UR STRIP-MCNC: NEGATIVE MG/DL
HGB UR QL STRIP.AUTO: ABNORMAL
HYALINE CASTS UR QL AUTO: ABNORMAL /LPF
KETONES UR QL STRIP: ABNORMAL
LEUKOCYTE ESTERASE UR QL STRIP.AUTO: ABNORMAL
NITRITE UR QL STRIP: NEGATIVE
PH UR STRIP.AUTO: 6 [PH] (ref 5–8)
PROT UR QL STRIP: NEGATIVE
RBC # UR STRIP: ABNORMAL /HPF
REF LAB TEST METHOD: ABNORMAL
SP GR UR STRIP: <=1.005 (ref 1–1.03)
SQUAMOUS #/AREA URNS HPF: ABNORMAL /HPF
UROBILINOGEN UR QL STRIP: ABNORMAL
WBC # UR STRIP: ABNORMAL /HPF

## 2023-10-06 PROCEDURE — 81001 URINALYSIS AUTO W/SCOPE: CPT

## 2023-10-06 PROCEDURE — 87086 URINE CULTURE/COLONY COUNT: CPT

## 2023-10-07 LAB — BACTERIA SPEC AEROBE CULT: NO GROWTH

## 2023-10-11 ENCOUNTER — HOSPITAL ENCOUNTER (OUTPATIENT)
Dept: CARDIOLOGY | Facility: HOSPITAL | Age: 63
Discharge: HOME | End: 2023-10-11
Payer: COMMERCIAL

## 2023-10-23 ENCOUNTER — TELEPHONE (OUTPATIENT)
Dept: HEMATOLOGY | Age: 63
End: 2023-10-23

## 2023-10-23 ENCOUNTER — TRANSCRIBE ORDERS (OUTPATIENT)
Dept: ADMINISTRATIVE | Facility: HOSPITAL | Age: 63
End: 2023-10-23
Payer: COMMERCIAL

## 2023-10-23 DIAGNOSIS — E10.40 TYPE 1 DIABETES MELLITUS WITH DIABETIC NEUROPATHY (HCC): ICD-10-CM

## 2023-10-23 DIAGNOSIS — Z12.31 ENCOUNTER FOR SCREENING MAMMOGRAM FOR MALIGNANT NEOPLASM OF BREAST: Primary | ICD-10-CM

## 2023-10-23 RX ORDER — GABAPENTIN 300 MG/1
300 CAPSULE ORAL NIGHTLY
Qty: 30 CAPSULE | Refills: 2 | Status: SHIPPED | OUTPATIENT
Start: 2023-10-23 | End: 2024-01-21

## 2023-10-23 NOTE — TELEPHONE ENCOUNTER
Ari Magaña called patient to remind them of their appointment on 10/24/2023. Detailed voicemail was left with appointment date and time.

## 2023-10-24 DIAGNOSIS — D50.9 IRON DEFICIENCY ANEMIA, UNSPECIFIED IRON DEFICIENCY ANEMIA TYPE: Primary | ICD-10-CM

## 2023-11-02 ENCOUNTER — TELEPHONE (OUTPATIENT)
Dept: INTERNAL MEDICINE | Age: 63
End: 2023-11-02

## 2023-11-02 RX ORDER — SULFAMETHOXAZOLE AND TRIMETHOPRIM 800; 160 MG/1; MG/1
1 TABLET ORAL 2 TIMES DAILY
Qty: 14 TABLET | Refills: 0 | Status: SHIPPED | OUTPATIENT
Start: 2023-11-02 | End: 2023-11-09

## 2023-11-02 NOTE — TELEPHONE ENCOUNTER
She called and said that she has a UTI and needs medicine sent into [facility].

## 2023-11-03 ENCOUNTER — OFFICE VISIT (OUTPATIENT)
Dept: INTERNAL MEDICINE | Age: 63
End: 2023-11-03
Payer: COMMERCIAL

## 2023-11-03 ENCOUNTER — TELEPHONE (OUTPATIENT)
Dept: INTERNAL MEDICINE | Age: 63
End: 2023-11-03

## 2023-11-03 VITALS
BODY MASS INDEX: 21.64 KG/M2 | HEIGHT: 60 IN | OXYGEN SATURATION: 100 % | WEIGHT: 110.2 LBS | SYSTOLIC BLOOD PRESSURE: 130 MMHG | DIASTOLIC BLOOD PRESSURE: 82 MMHG | HEART RATE: 53 BPM

## 2023-11-03 DIAGNOSIS — R30.0 DYSURIA: Primary | ICD-10-CM

## 2023-11-03 DIAGNOSIS — R30.0 DYSURIA: ICD-10-CM

## 2023-11-03 DIAGNOSIS — M62.830 BACK SPASM: Primary | ICD-10-CM

## 2023-11-03 DIAGNOSIS — E10.40 TYPE 1 DIABETES MELLITUS WITH DIABETIC NEUROPATHY (HCC): ICD-10-CM

## 2023-11-03 DIAGNOSIS — M47.26 OSTEOARTHRITIS OF SPINE WITH RADICULOPATHY, LUMBAR REGION: ICD-10-CM

## 2023-11-03 DIAGNOSIS — N30.00 ACUTE CYSTITIS WITHOUT HEMATURIA: ICD-10-CM

## 2023-11-03 LAB
BACTERIA URNS QL MICRO: NEGATIVE /HPF
BILIRUB UR QL STRIP: NEGATIVE
CLARITY UR: ABNORMAL
COLOR UR: YELLOW
CRYSTALS URNS MICRO: ABNORMAL /HPF
EPI CELLS #/AREA URNS AUTO: 1 /HPF (ref 0–5)
GLUCOSE UR STRIP.AUTO-MCNC: NEGATIVE MG/DL
HGB UR STRIP.AUTO-MCNC: ABNORMAL MG/L
HYALINE CASTS #/AREA URNS AUTO: 4 /HPF (ref 0–8)
KETONES UR STRIP.AUTO-MCNC: ABNORMAL MG/DL
LEUKOCYTE ESTERASE UR QL STRIP.AUTO: ABNORMAL
NITRITE UR QL STRIP.AUTO: NEGATIVE
PH UR STRIP.AUTO: 5 [PH] (ref 5–8)
PROT UR STRIP.AUTO-MCNC: ABNORMAL MG/DL
RBC #/AREA URNS AUTO: 6 /HPF (ref 0–4)
SP GR UR STRIP.AUTO: 1.02 (ref 1–1.03)
UROBILINOGEN UR STRIP.AUTO-MCNC: 1 E.U./DL
WBC #/AREA URNS AUTO: 225 /HPF (ref 0–5)

## 2023-11-03 PROCEDURE — 3046F HEMOGLOBIN A1C LEVEL >9.0%: CPT | Performed by: INTERNAL MEDICINE

## 2023-11-03 PROCEDURE — 99214 OFFICE O/P EST MOD 30 MIN: CPT | Performed by: INTERNAL MEDICINE

## 2023-11-03 RX ORDER — PHENAZOPYRIDINE HYDROCHLORIDE 200 MG/1
200 TABLET, FILM COATED ORAL 3 TIMES DAILY PRN
Qty: 9 TABLET | Refills: 0 | Status: SHIPPED | OUTPATIENT
Start: 2023-11-03 | End: 2023-11-06

## 2023-11-03 RX ORDER — CYCLOBENZAPRINE HCL 5 MG
5 TABLET ORAL 3 TIMES DAILY PRN
Qty: 30 TABLET | Refills: 0 | Status: SHIPPED | OUTPATIENT
Start: 2023-11-03 | End: 2023-11-13

## 2023-11-03 NOTE — TELEPHONE ENCOUNTER
She may just need seen - I can add her this afternoon - or [facility] or walk in clinic - I'm glad to add her on and needs get a urine reflex to culture (started abx yesterday but no ua)

## 2023-11-03 NOTE — PROGRESS NOTES
Chief Complaint   Patient presents with    Office Visit for Anticoagulation Management     Back spasms        HPI: Patient is here today for problem visit with lower back spasms some UTI-like symptoms possible bladder spasms. Patient had surgery for lumbar degenerative disc disease and has had some ongoing pain since then also. No fever or chills. Blood sugar still not in control. Past Medical History:   Diagnosis Date    Alcohol abuse 2/6/2019    Allergic rhinitis     Diabetes mellitus (HCC)     GERD (gastroesophageal reflux disease)     Hyperlipidemia     Hypertension     Postmenopausal 1/3/2019    Sleep apnea        Past Surgical History:   Procedure Laterality Date    CHOLECYSTECTOMY      KNEE ARTHROPLASTY      LIANA AND BSO (CERVIX REMOVED)         Family History   Problem Relation Age of Onset    Cancer Mother         ovarian    Cancer Maternal Grandmother         ovarian    Cancer Paternal Grandmother         ovarian       Social History     Socioeconomic History    Marital status: Single     Spouse name: Not on file    Number of children: Not on file    Years of education: Not on file    Highest education level: Not on file   Occupational History    Not on file   Tobacco Use    Smoking status: Never    Smokeless tobacco: Never   Substance and Sexual Activity    Alcohol use: Not on file    Drug use: Not on file    Sexual activity: Not on file   Other Topics Concern    Not on file   Social History Narrative    Not on file     Social Determinants of Health     Financial Resource Strain: Low Risk  (3/14/2023)    Overall Financial Resource Strain (CARDIA)     Difficulty of Paying Living Expenses: Not hard at all   Food Insecurity: No Food Insecurity (3/14/2023)    Hunger Vital Sign     Worried About Running Out of Food in the Last Year: Never true     [address] in the Last Year: Never true   Transportation Needs: Unknown (3/14/2023)    PRAPARE - Transportation     Lack of Transportation (Medical):  Not

## 2023-11-05 LAB
BACTERIA UR CULT: ABNORMAL
BACTERIA UR CULT: ABNORMAL
ORGANISM: ABNORMAL

## 2023-11-06 NOTE — DISCHARGE SUMMARY
[facility]  DISCHARGE SUMMARY       Date of Admission: 10/29/2021  Date of Discharge:  11/1/2021  Primary Care Physician: Brittany Solis MD    Presenting Problem/History of Present Illness:  Alcoholic cirrhosis (HCC) [K70.30]     Final Discharge Diagnoses:  Active Hospital Problems    Diagnosis    • Acute cystitis    • Alcoholic cirrhosis (HCC)    • Iron deficiency anemia    • Partial rectal prolapse    • Anasarca    • Thoracic spine pain, abnormal CT T 6-7    • Hyponatremia    • Type 1 diabetes mellitus with hyperglycemia (HCC)    • Ascites due to alcoholic cirrhosis (HCC)        Consults: none    Procedures Performed:   #1 u/s paracentesis on 10/30 by Dr. Simeon    Pertinent Test Results:   Procedure Component Value Units Date/Time   MRI Thoracic Spine Without Contrast [[id number]] Jeffrey as Reviewed   Order Status: Completed Collected: 10/30/21 1152    Updated: 10/30/21 1202   Narrative:     EXAM: MRI THORACIC SPINE WO CONTRAST- - 10/30/2021 11:00 AM CDT       HISTORY: Mid-back pain         COMPARISON: No existing relevant imaging studies available       TECHNIQUE: Routine MRI performed of the thoracic spine without   intravenous contrast.       FINDINGS:   Thoracic spine has normal number when counted from the odontoid. Normal   alignment.       Vague indeterminate T1 and T2 hypointensity in the T6 vertebral body.       Mild diffuse disc height loss, severe at T6-7. Degenerative endplate   changes throughout the thoracic spine. T12 and L1 with Schmorl's nodes,   similar compared to 1/18/2019. 2017, T12-L1 and L1-2 disc bulges.       No evidence of severe spinal canal or foraminal stenosis.       Cord signal within normal limits. No evidence of intrinsic or extrinsic   lesion.       Extraspinal soft tissues are within normal limits.           Impression:     1. Indeterminant appearance of T6. This could represent a bone lesion or   marrow heterogeneity.  Recommend correlation with routine age appropriate   screening and consider nonemergent nuclear medicine bone scan for   further evaluation.   2. Multilevel degenerative changes as above. No severe spinal canal   stenosis.   This report was finalized on 10/30/2021 11:59 by Dr Paula Simeon MD.   US Paracentesis [[id number]] Jeffrey as Reviewed   Order Status: Completed Specimen: Body Fluid Collected: 10/30/21 1015    Updated: 10/30/21 1019   Narrative:     EXAM: US PARACENTESIS- - 10/30/2021 9:43 AM CDT       HISTORY: ascites         COMPARISON: 10/27/2021.       PROCEDURE:   Risks, benefits and alternatives to the procedure were discussed with   the patient. Specifically, risks of bleeding, infection, fluid   reaccumulation were discussed with the patient. Verbal and written   informed consent was obtained.       Time Out:   Immediately prior to the procedure, a time out was performed. The   patient's name, date of birth, and procedure to be performed were   verified.       Using ultrasound guidance, a site in left lower quadrant was selected   and prepped in sterile fashion. 1 percent lidocaine was used for local   anesthesia. A 5 Belgian one-step catheter was advanced into the   peritoneal cavity and 1500 mL of clear yellow colored fluid was drained.   The catheter was removed.       Patient tolerated the procedure well and no immediate complications were   observed. The fluid was sent to the lab. Patient returned to the floor   in stable condition.           Impression:     Successful ultrasound-guided diagnostic and therapeutic paracentesis was   performed with drainage of  1500 mL clear yellow ascites fluid.       This report was finalized on 10/30/2021 10:16 by Dr Paula Simeon MD.   US Abdomen Complete [[id number]] Jeffrey as Reviewed   Order Status: Completed Collected: 10/27/21 1215    Updated: 10/27/21 1226   Narrative:     Exam: Ultrasound of the abdomen, complete       Indication: Ascites due to alcoholic  cirrhosis; K70.31-Alcoholic   cirrhosis of liver with ascites       Comparison: None available       Findings:       Pancreas is obscured by bowel gas. The abdominal aorta is nonaneurysmal.   The juxtahepatic IVC is unremarkable. Patent portal vein with   appropriate directional flow.       Hepatic echogenicity is increased. Liver contour nodularity is noted. No   evidence of focal liver mass or intrahepatic ductal dilatation. The   common bile duct measures 5 mm. Prior cholecystectomy. Moderate volume   ascites.       The right kidney measures 8.9 cm longitudinally. The left kidney   measures 9.4 cm longitudinally. Renal echogenicity is normal. No   hydronephrosis or shadowing calculi.       The spleen is normal and measures 11.0 cm in greatest dimension.       Impression:         Steatotic liver with surface contour nodularity suggesting cirrhosis. No   solid liver lesion identified. Moderate volume ascites.   This report was finalized on 10/27/2021 12:22 by Dr. Carlos Erickson MD.           Chief Complaint on Day of Discharge:   F/u edema/ascites    History of Present Illness on Day of Discharge:   Patient is doing well on day of discharge.  She denies any chest pain, shortness breath, nausea.  She is had significant decrease in her overall body swelling.  She is having some lower abdominal/pelvic pain and dysuria.  Increased urinary hesitancy.  Afebrile.  Denies back pain.    Hospital Course:  The patient is a 61 y.o. female with history of liver disease/cirrhosis, GERD, hypertension who was sent in for direct admit from Dr. Brittany Solis's office for increasing lower extremity edema and ascites.  She admitted to the hospital and started on diuretics and actually increased to an IV diuretic drip.  She had a good response to that with over 5 L out.  Generalized swelling improved.  Her overall physical condition improved.  Her breathing feels fine and she feels essentially back to baseline.  Her oral Lasix was  "resumed on 10/31.  We will plan to resume her Aldactone at discharge.  She did have a paracentesis done while here which was 1.5 L of clear fluid removal.  No signs of infection on that.  Of note patient is complaining on discharge day of urinary hesitancy and dysuria.  She is complaining of suprapubic/pelvic pain.  Of note on arrival a urinalysis was performed which appeared significantly abnormal and evidence for infection.  She has not had a white count or fever was not started on antibiotics.  Unfortunately urine was not cultured.  Overall patient feels well today and wants to go home however.  We will give her a dose of ceftriaxone x1.  We will then start her on Omnicef to complete a 5 total day course of antibiotics for UTI.  Also of note patient at one point must of been complaining of thoracic pain and was sent for an MRI of her spine which had an indeterminate appearance of T6 without clear diagnosis.  Recommendation was for clinical correlation.  She is not complaining of any pain to me at this time.  Will allow for close interval follow-up with PCP.  Could potentially send for nuc med bone scan for better evaluation.  Again otherwise stable at this time and wants to go home.  Will DC home today with outpatient follow-up with PCP.  Patient tells me she is scheduled to see Dr. Solis in 2 days.  Also follows with GI as an outpatient.    Condition on Discharge: Able on room air    Physical Exam on Discharge:  /68 (BP Location: Right arm, Patient Position: Sitting)   Pulse 65   Temp 97.4 °F (36.3 °C) (Oral)   Resp 18   Ht 152.4 cm (60\")   Wt 53.1 kg (117 lb)   SpO2 98%   BMI 22.85 kg/m²   Physical Exam  GEN: Awake, alert, interactive, in NAD  HEENT: PERRLA, EOMI, Anicteric, Trachea midline  Lungs:  no wheezing/rales/rhonchi  Heart: RRR, +S1/s2, no rub  ABD: soft, nt/nd, +BS, no guarding/rebound  Extremities: atraumatic, no cyanosis, trace LE edema b/l, skin wrinkling  Skin: no rashes or " lesions  Neuro: AAOx3, no focal deficits  Psych: normal mood & affect      Discharge Disposition:  Home or Self Care    Discharge Medications:     Discharge Medications      New Medications      Instructions Start Date   cefdinir 300 MG capsule  Commonly known as: OMNICEF   300 mg, Oral, 2 Times Daily   Start Date: November 2, 2021        Continue These Medications      Instructions Start Date   furosemide 20 MG tablet  Commonly known as: LASIX   40 mg, Oral, 2 Times Daily      insulin glargine 100 UNIT/ML injection  Commonly known as: LANTUS, SEMGLEE   8 Units, Subcutaneous, Nightly      levothyroxine 50 MCG tablet  Commonly known as: SYNTHROID, LEVOTHROID   50 mcg, Oral, Every Early Morning      multivitamin with iron tablet tablet   1 tablet, Oral, Daily      Omeprazole 20 MG tablet delayed-release   20 mg, Oral, Daily      potassium chloride 10 MEQ CR tablet   10 mEq, Oral, 2 Times Daily      spironolactone 100 MG tablet  Commonly known as: ALDACTONE   100 mg, Oral, 2 Times Daily             Activity at Discharge:  Increase to baseline as tolerated     Discharge Care Plan/Instructions:   Complete antibiotics UTI.  Resume chronic liver meds as prior.  Monitor blood pressures at home.  Close interval follow-up with PCP.  Outpatient follow-up on T6 spinal lesion and possible bone scan if necessary.  This can be done through PCP.    Follow-up Appointments:   Future Appointments   Date Time Provider Department Center   11/5/2021  9:15 AM Neva Vázquez APRN [facility]       Test Results Pending at Discharge: none    Electronically signed by Efra Sutton DO, 11/01/21, 13:04 CDT.    Time: 34 minutes         soft/nondistended

## 2023-11-20 ENCOUNTER — PATIENT ROUNDING (BHMG ONLY) (OUTPATIENT)
Dept: SURGERY | Facility: CLINIC | Age: 63
End: 2023-11-20
Payer: COMMERCIAL

## 2023-11-20 ENCOUNTER — OFFICE VISIT (OUTPATIENT)
Dept: SURGERY | Facility: CLINIC | Age: 63
End: 2023-11-20
Payer: COMMERCIAL

## 2023-11-20 VITALS
SYSTOLIC BLOOD PRESSURE: 145 MMHG | OXYGEN SATURATION: 97 % | DIASTOLIC BLOOD PRESSURE: 79 MMHG | HEART RATE: 60 BPM | WEIGHT: 111 LBS | HEIGHT: 60 IN | BODY MASS INDEX: 21.79 KG/M2

## 2023-11-20 DIAGNOSIS — K64.2 GRADE III HEMORRHOIDS: Primary | ICD-10-CM

## 2023-11-20 PROCEDURE — 99214 OFFICE O/P EST MOD 30 MIN: CPT | Performed by: STUDENT IN AN ORGANIZED HEALTH CARE EDUCATION/TRAINING PROGRAM

## 2023-11-20 RX ORDER — HEPARIN SODIUM 5000 [USP'U]/ML
5000 INJECTION, SOLUTION INTRAVENOUS; SUBCUTANEOUS ONCE
OUTPATIENT
Start: 2023-11-20 | End: 2023-11-20

## 2023-11-20 RX ORDER — METRONIDAZOLE 500 MG/100ML
500 INJECTION, SOLUTION INTRAVENOUS ONCE
OUTPATIENT
Start: 2023-11-20 | End: 2023-11-20

## 2023-11-20 NOTE — PROGRESS NOTES
Office New Patient History and Physical:     Referring Provider: No ref. provider found    Chief Complaint   Patient presents with    new problem     Pt was established with Dr. Miranda       Subjective .     History of present illness:  Fiordaliza Lyons is a 63 y.o. female well known to our office for hemorrhoids (she saw Dr. Miranda for this). She was scheduled for hemorrhoidectomy on 6/23/23 but she had back surgery on 5/1/23 and needed to recover before she did another surgery. She has had hemorrhoids for years. She reports they are only reducible manually and never reduce on their own. She has intermittent pressure/pulling discomfort/ache. She has daily bleeding from her hemorrhoids; she has anemia from this. She is not on blood thinners. Non-smoker. She has never had an intervention on her hemorrhoids. She is related to Dr. Jacklyn Vázquez.     BMI is 21.     History  Past Medical History:   Diagnosis Date    Alcoholic hepatitis with ascites     Allergic rhinitis     Breast cyst     Cancer     ovarian    Degeneration of lumbar or lumbosacral intervertebral disc 09/15/2022    Diabetes mellitus     Disease of thyroid gland     Elevated d-dimer     Endometriosis     Fatty liver     GERD (gastroesophageal reflux disease)     History of transfusion     Hyperlipidemia     Hypertension     Low back pain     Osteoporosis     Ovarian cyst     Pancreatitis     Postmenopausal     Rectal mass     Rectal prolapse     Sleep apnea    ,   Past Surgical History:   Procedure Laterality Date    BILATERAL SALPINGO OOPHORECTOMY  2008    CHOLECYSTECTOMY  1996    COLONOSCOPY  07/2017    Dr Murphy- per patient normal     COLONOSCOPY N/A 11/09/2021    Procedure: COLONOSCOPY WITH ANESTHESIA;  Surgeon: Ray Serna MD;  Location: [facility] ENDOSCOPY;  Service: Gastroenterology;  Laterality: N/A;  pre rectal mass  post  Dr Shields    DILATION AND CURETTAGE, DIAGNOSTIC / THERAPEUTIC      ENDOSCOPY N/A 11/09/2021    Procedure:  ESOPHAGOGASTRODUODENOSCOPY WITH ANESTHESIA;  Surgeon: Ray Serna MD;  Location: [facility] ENDOSCOPY;  Service: Gastroenterology;  Laterality: N/A;  pre anemia  post hiatal hernia; gastric polyp  Dr. Shields    KNEE SURGERY  1982    LUMBAR LAMINECTOMY WITH FUSION Right 05/01/2023    Procedure: LUMBAR DISCECTOMY, JADON- LAMINECTOMY TRANSFORAMINAL LUMBAR INTERBODY FUSION, POSTERIOR PEDICLE SCREW INSTRUMENTATION, L5-S1 RIGHT. USE OF THE ROBOT;  Surgeon: Nathaniel Tesfaye MD;  Location: [facility] OR;  Service: Neurosurgery;  Laterality: Right;   ,   Family History   Problem Relation Age of Onset    Ovarian cancer Mother     Ovarian cancer Maternal Aunt     Ovarian cancer Maternal Grandmother     Ovarian cancer Paternal Grandmother     Colon polyps Father     Colon polyps Brother     Breast cancer Neg Hx     Colon cancer Neg Hx    ,   Social History     Tobacco Use    Smoking status: Never     Passive exposure: Never    Smokeless tobacco: Never   Vaping Use    Vaping Use: Never used   Substance Use Topics    Alcohol use: Not Currently    Drug use: No   , (Not in a hospital admission)   and Allergies:  Lisinopril, Tizanidine hcl, and Augmentin [amoxicillin-pot clavulanate]    Current Outpatient Medications:     atenolol (TENORMIN) 25 MG tablet, Take 1 tablet by mouth Daily., Disp: 90 tablet, Rfl: 3    BD Pen Needle Tatyana U/F 32G X 4 MM misc, 4 (Four) Times a Day. use as directed, Disp: , Rfl:     Blood Glucose Monitoring Suppl (ONE TOUCH ULTRA 2) w/Device kit, See Admin Instructions., Disp: , Rfl:     Continuous Blood Gluc Sensor (Dexcom G6 Sensor), See Admin Instructions., Disp: , Rfl:     Continuous Blood Gluc Transmit (Dexcom G6 Transmitter) misc, See Admin Instructions., Disp: , Rfl:     cyclobenzaprine (FLEXERIL) 10 MG tablet, Take 1 tablet by mouth 3 (Three) Times a Day As Needed for Muscle Spasms. (Patient not taking: Reported on 9/19/2023), Disp: 90 tablet, Rfl: 0    gabapentin (NEURONTIN) 300 MG capsule, Take 1  capsule by mouth Daily for 7 days., Disp: 7 capsule, Rfl: 0    glucose blood test strip, 5 times a day and prn ., Disp: , Rfl:     Gvoke HypoPen 2-Pack 1 MG/0.2ML solution auto-injector, INJECT 1 MG UNDER THE SKIN ONCE FOR 1 DOSE. USE AS INSTRUCTED AS NEEDED FOR SEVERE HYPOGLYCEMIA., Disp: , Rfl:     insulin aspart (novoLOG FLEXPEN) 100 UNIT/ML solution pen-injector sc pen, Inject 2-5 Units under the skin into the appropriate area as directed 3 (Three) Times a Day With Meals. 2-5 units with meals, Disp: , Rfl:     insulin lispro (humaLOG) 100 UNIT/ML injection, Inject  under the skin into the appropriate area as directed. (Patient not taking: Reported on 9/19/2023), Disp: , Rfl:     ketorolac (TORADOL) 10 MG tablet, Take 1 tablet by mouth Every 6 (Six) Hours As Needed for Moderate Pain. (Patient not taking: Reported on 9/19/2023), Disp: 30 tablet, Rfl: 0    Lancets (OneTouch Delica Plus [id number]) misc, TEST 5 TIMES DAILY AND AS NEEDED, Disp: , Rfl:     levothyroxine (SYNTHROID, LEVOTHROID) 50 MCG tablet, Take 1 tablet by mouth Every Morning., Disp: , Rfl:     losartan (Cozaar) 25 MG tablet, Take 1 tablet by mouth Daily., Disp: 90 tablet, Rfl: 3    magnesium oxide (MAG-OX) 400 MG tablet, Take 1 tablet by mouth Daily., Disp: , Rfl:     Multiple Vitamins-Iron (multivitamin with iron) tablet tablet, Take 1 tablet by mouth Daily., Disp: , Rfl:     Nutritional Supplements (JUICE PLUS FIBRE PO), Take  by mouth Daily., Disp: , Rfl:     pantoprazole (PROTONIX) 40 MG EC tablet, Take 1 tablet by mouth Daily., Disp: , Rfl:     potassium chloride (K-DUR,KLOR-CON) 10 MEQ CR tablet, , Disp: , Rfl:     tiZANidine (Zanaflex) 4 MG tablet, Take 1 tablet by mouth Every 6 (Six) Hours As Needed for Muscle Spasms. (Patient not taking: Reported on 9/19/2023), Disp: 12 tablet, Rfl: 0    Toujeo Max SoloStar 300 UNIT/ML solution pen-injector injection, Inject 24 Units under the skin into the appropriate area as directed Every Night., Disp: ,  "Rfl:     vitamin D (ERGOCALCIFEROL) 1.25 MG (13295 UT) capsule capsule, Take 1 capsule by mouth 1 (One) Time Per Week. sunday, Disp: , Rfl:     Objective     Vital Signs   /79 (BP Location: Left arm, Patient Position: Sitting, Cuff Size: Adult)   Pulse 60   Ht 152.4 cm (60\")   Wt 50.3 kg (111 lb)   SpO2 97%   BMI 21.68 kg/m²      Physical Exam:  General appearance - alert, well appearing, and in no distress  Mental status - alert, oriented to person, place, and time  Eyes - pupils equal and reactive, extraocular eye movements intact  Neck - supple, no significant adenopathy  Chest - no tachypnea, retractions or cyanosis  Heart - normal rate and regular rhythm  Abdomen - soft, nontender, nondistended, no masses or organomegaly  Rectal - 2 column grade III hemorrhoids   Neurological - alert, oriented, normal speech, no focal findings or movement disorder noted  Musculoskeletal - no joint tenderness, deformity or swelling    Results Review:    The following data was reviewed by: Rocio Read MD on 11/20/2023:  Progress Notes by Catia Miranda MD (03/23/2023 11:15)       Assessment & Plan       Diagnoses and all orders for this visit:    1. Grade III hemorrhoids (Primary)  -     Case Request; Standing  -     XR chest 1 vw; Future  -     ECG 12 Lead; Future  -     metroNIDAZOLE (FLAGYL) IVPB 500 mg  -     heparin (porcine) 5000 UNIT/ML injection 5,000 Units  -     CBC & Differential; Future  -     Comprehensive Metabolic Panel; Future    Other orders  -     Follow Anesthesia Guidelines / Protocol; Future  -     Follow Anesthesia Guidelines / Protocol; Standing  -     Verify / Perform Chlorhexidine Skin Prep; Standing  -     Verify / Perform Chlorhexidine Skin Prep if Indicated (If Not Already Completed); Standing  -     Obtain Informed Consent; Future  -     Provide NPO Instructions to Patient; Future  -     Chlorhexidine Skin Prep; Future  -     Notify physician (specify); Standing  -     " Instructions on coughing, deep breathing, and incentive spirometry.; Standing  -     Oxygen Therapy-; Standing         Fiordaliza Lyons is a 63 y.o. female with grade Grade III internal hemorrhoids. We discussed the options for management including continued medical management with trying different topical treatments vs. banding of the internal hemorrhoids vs. hemorrhoidectomy. She does not wish to continue medical management as she has failed this. The patient is not a candidate for banding 2/2 the grade of the hemorrhoids. The patient would like to proceed with hemorrhoidectomy. We discussed the risks of surgery including bleeding, infection, stricture, urinary retention, and hemorrhoid recurrence. the patient understands that hemorrhoids can recur and that they will need to be on a bowel regimen post operatively with stool softeners and laxatives initially. After the patient has healed, they will need to be vigilant about avoiding constipation. The patient understands all of this and would like to proceed. The patient is scheduled for hemorrhoidectomy on 1/2/23 per her request. Prework ordered and scheduled to include CBC, CMP, CXR, EKG.    This is a chronic problem (years) with progression (worsening hemorrhoids). I have reviewed the note from Dr. Miranda and ordered CBC, CMP, CXR and EKG. Increased risk of perioperative complications 2/2 her T2DM.     BMI is within normal parameters. No other follow-up for BMI required.      Rocio Read MD  11/20/23  09:35 CST

## 2023-11-20 NOTE — PATIENT INSTRUCTIONS
Surgery Scheduled 01/02/2024; Arrive 5:00 am  Pre-work: 12/26/2023 @ 9:30 am (Eat and drink like normal on this day)  NPO after midnight the night before surgery  Check in at the main registration for pre-work and surgery.

## 2023-11-20 NOTE — PROGRESS NOTES
November 20, 2023    Hello, may I speak with Fiordaliza Lyons?    My name is RALEIGH      I am  with [facility]  [facility]  [address]  [place] [id number] [phone].    Before we get started may I verify your date of birth? 1960    I am calling to officially welcome you to our practice and ask about your recent visit. Is this a good time to talk? yes    Tell me about your visit with us. What things went well?  I remember everything when this practice first started, I am so thankful that Dr Read and Viridiana are here to carry on with everything.        We're always looking for ways to make our patients' experiences even better. Do you have recommendations on ways we may improve?  no    Overall were you satisfied with your first visit to our practice? yes       I appreciate you taking the time to speak with me today. Is there anything else I can do for you? no      Thank you, and have a great day.

## 2023-11-28 ENCOUNTER — TRANSCRIBE ORDERS (OUTPATIENT)
Dept: ADMINISTRATIVE | Facility: HOSPITAL | Age: 63
End: 2023-11-28
Payer: COMMERCIAL

## 2023-11-28 DIAGNOSIS — N30.00 ACUTE CYSTITIS WITHOUT HEMATURIA: ICD-10-CM

## 2023-11-28 DIAGNOSIS — E10.40 TYPE 1 DIABETES MELLITUS WITH DIABETIC NEUROPATHY: Primary | ICD-10-CM

## 2023-11-29 ENCOUNTER — LAB (OUTPATIENT)
Dept: LAB | Facility: HOSPITAL | Age: 63
End: 2023-11-29
Payer: COMMERCIAL

## 2023-11-29 ENCOUNTER — HOSPITAL ENCOUNTER (OUTPATIENT)
Dept: MAMMOGRAPHY | Facility: HOSPITAL | Age: 63
Discharge: HOME | End: 2023-11-29
Admitting: INTERNAL MEDICINE
Payer: COMMERCIAL

## 2023-11-29 DIAGNOSIS — Z12.31 ENCOUNTER FOR SCREENING MAMMOGRAM FOR MALIGNANT NEOPLASM OF BREAST: ICD-10-CM

## 2023-11-29 DIAGNOSIS — E10.40 TYPE 1 DIABETES MELLITUS WITH DIABETIC NEUROPATHY: ICD-10-CM

## 2023-11-29 DIAGNOSIS — R92.8 ABNORMAL MAMMOGRAM: Primary | ICD-10-CM

## 2023-11-29 DIAGNOSIS — R92.30 BREAST DENSITY: ICD-10-CM

## 2023-11-29 DIAGNOSIS — N30.00 ACUTE CYSTITIS WITHOUT HEMATURIA: ICD-10-CM

## 2023-11-29 LAB
ALBUMIN SERPL-MCNC: 4.2 G/DL (ref 3.5–5)
ALBUMIN/GLOB SERPL: 1.3 G/DL (ref 1.1–2.5)
ALP SERPL-CCNC: 210 U/L (ref 24–120)
ALT SERPL W P-5'-P-CCNC: 16 U/L (ref 0–35)
ANION GAP SERPL CALCULATED.3IONS-SCNC: 8 MMOL/L (ref 4–13)
AST SERPL-CCNC: 30 U/L (ref 7–45)
AUTO MIXED CELLS #: 0.3 10*3/MM3 (ref 0.1–2.6)
AUTO MIXED CELLS %: 8.3 % (ref 0.1–24)
BACTERIA UR QL AUTO: ABNORMAL /HPF
BILIRUB SERPL-MCNC: 0.5 MG/DL (ref 0.1–1)
BILIRUB UR QL STRIP: NEGATIVE
BUN SERPL-MCNC: 17 MG/DL (ref 5–21)
BUN/CREAT SERPL: 17
CALCIUM SPEC-SCNC: 9.5 MG/DL (ref 8.4–10.4)
CHLORIDE SERPL-SCNC: 102 MMOL/L (ref 98–110)
CHOLEST SERPL-MCNC: 142 MG/DL (ref 130–200)
CLARITY UR: CLEAR
CO2 SERPL-SCNC: 27 MMOL/L (ref 24–31)
COLOR UR: YELLOW
CREAT SERPL-MCNC: 1 MG/DL (ref 0.5–1.4)
EGFRCR SERPLBLD CKD-EPI 2021: 64.2 ML/MIN/1.73
ERYTHROCYTE [DISTWIDTH] IN BLOOD BY AUTOMATED COUNT: 16.1 % (ref 12.3–15.4)
GLOBULIN UR ELPH-MCNC: 3.2 GM/DL
GLUCOSE SERPL-MCNC: 253 MG/DL (ref 70–100)
GLUCOSE UR STRIP-MCNC: NEGATIVE MG/DL
HBA1C MFR BLD: 8 % (ref 4.8–5.9)
HCT VFR BLD AUTO: 33.1 % (ref 34–46.6)
HDLC SERPL-MCNC: 54 MG/DL
HGB BLD-MCNC: 10 G/DL (ref 12–15.9)
HGB UR QL STRIP.AUTO: ABNORMAL
HYALINE CASTS UR QL AUTO: ABNORMAL /LPF
KETONES UR QL STRIP: NEGATIVE
LDLC SERPL CALC-MCNC: 65 MG/DL (ref 0–99)
LDLC/HDLC SERPL: 1.15 {RATIO}
LEUKOCYTE ESTERASE UR QL STRIP.AUTO: ABNORMAL
LYMPHOCYTES # BLD AUTO: 0.5 10*3/MM3 (ref 0.7–3.1)
LYMPHOCYTES NFR BLD AUTO: 12.8 % (ref 19.6–45.3)
MCH RBC QN AUTO: 24.6 PG (ref 26.6–33)
MCHC RBC AUTO-ENTMCNC: 30.2 G/DL (ref 31.5–35.7)
MCV RBC AUTO: 81.3 FL (ref 79–97)
NEUTROPHILS NFR BLD AUTO: 3.1 10*3/MM3 (ref 1.7–7)
NEUTROPHILS NFR BLD AUTO: 78.9 % (ref 42.7–76)
NITRITE UR QL STRIP: POSITIVE
PH UR STRIP.AUTO: 6 [PH] (ref 5–8)
PLATELET # BLD AUTO: 154 10*3/MM3 (ref 140–450)
PMV BLD AUTO: 8 FL (ref 6–12)
POTASSIUM SERPL-SCNC: 5 MMOL/L (ref 3.5–5.3)
PROT SERPL-MCNC: 7.4 G/DL (ref 6.3–8.7)
PROT UR QL STRIP: NEGATIVE
RBC # BLD AUTO: 4.07 10*6/MM3 (ref 3.77–5.28)
RBC # UR STRIP: ABNORMAL /HPF
REF LAB TEST METHOD: ABNORMAL
SODIUM SERPL-SCNC: 137 MMOL/L (ref 135–145)
SP GR UR STRIP: 1.01 (ref 1–1.03)
SQUAMOUS #/AREA URNS HPF: ABNORMAL /HPF
TRIGL SERPL-MCNC: 130 MG/DL (ref 0–149)
TSH SERPL DL<=0.05 MIU/L-ACNC: 1.38 UIU/ML (ref 0.27–4.2)
UROBILINOGEN UR QL STRIP: ABNORMAL
VLDLC SERPL-MCNC: 23 MG/DL (ref 5–40)
WBC # UR STRIP: ABNORMAL /HPF
WBC NRBC COR # BLD AUTO: 3.9 10*3/MM3 (ref 3.4–10.8)

## 2023-11-29 PROCEDURE — 87077 CULTURE AEROBIC IDENTIFY: CPT

## 2023-11-29 PROCEDURE — 87186 SC STD MICRODIL/AGAR DIL: CPT

## 2023-11-29 PROCEDURE — 87086 URINE CULTURE/COLONY COUNT: CPT

## 2023-11-29 PROCEDURE — 77063 BREAST TOMOSYNTHESIS BI: CPT

## 2023-11-29 PROCEDURE — 80061 LIPID PANEL: CPT

## 2023-11-29 PROCEDURE — 36415 COLL VENOUS BLD VENIPUNCTURE: CPT

## 2023-11-29 PROCEDURE — 80050 GENERAL HEALTH PANEL: CPT

## 2023-11-29 PROCEDURE — 81001 URINALYSIS AUTO W/SCOPE: CPT

## 2023-11-29 PROCEDURE — 77067 SCR MAMMO BI INCL CAD: CPT

## 2023-11-29 PROCEDURE — 83036 HEMOGLOBIN GLYCOSYLATED A1C: CPT

## 2023-11-30 ENCOUNTER — OFFICE VISIT (OUTPATIENT)
Dept: NEUROSURGERY | Facility: CLINIC | Age: 63
End: 2023-11-30
Payer: COMMERCIAL

## 2023-11-30 VITALS — WEIGHT: 115.6 LBS | HEIGHT: 60 IN | BODY MASS INDEX: 22.7 KG/M2

## 2023-11-30 DIAGNOSIS — Z78.9 NON-SMOKER: ICD-10-CM

## 2023-11-30 DIAGNOSIS — R29.898 RIGHT LEG WEAKNESS: ICD-10-CM

## 2023-11-30 DIAGNOSIS — M54.41 ACUTE RIGHT-SIDED LOW BACK PAIN WITH RIGHT-SIDED SCIATICA: Primary | ICD-10-CM

## 2023-11-30 DIAGNOSIS — M25.551 RIGHT HIP PAIN: ICD-10-CM

## 2023-11-30 PROCEDURE — 99213 OFFICE O/P EST LOW 20 MIN: CPT | Performed by: NURSE PRACTITIONER

## 2023-11-30 NOTE — PROGRESS NOTES
Chief complaint:   Chief Complaint   Patient presents with    Back Pain     Pt here for a follow up from surgery. Pt stated right hip and lower back are bothering her. PT complete. Pt stated taking penicillin for tooth ab       Subjective     HPI: This is a 63-year-old female patient who went to the operating room on May 1, 2023 for an L5-S1 TLIF.  At her last office appointment the patient was doing well with her back and right leg pain.  She did complain of falls.  She was complaining of left paraspinal back pain but no lower extremity pain.  She was also working with physical therapy.  Postoperative CT scan did show good position of the hardware and interbody devices.  Preoperative EMG showed S1 radiculopathies and bilateral sensorimotor polyneuropathy.  She comes back today for reevaluation.  The patient says that about 4 5 weeks ago she started noticing that she was having some recurrence of her back pain and pain going into her right leg as well as increasing weakness in her right lower extremity as well.  There is no accident or injury with this.  Currently the pain in her back is constant.  It is worse with bending and better with heat.  She has pain that radiates to her right lower extremity in a lateral radicular fashion to her knee.  This pain is constant.  It has the same modifying factors as her back.  Denies any bowel or bladder incontinence.    Review of Systems   Musculoskeletal:  Positive for back pain.   Neurological:  Positive for weakness.   Psychiatric/Behavioral: Negative.     All other systems reviewed and are negative.       Past Medical History:   Diagnosis Date    Alcoholic hepatitis with ascites     Allergic rhinitis     Breast cyst     Cancer     ovarian    Degeneration of lumbar or lumbosacral intervertebral disc 09/15/2022    Diabetes mellitus     Disease of thyroid gland     Elevated d-dimer     Endometriosis     Fatty liver     GERD (gastroesophageal reflux disease)     History of  "transfusion     Hyperlipidemia     Hypertension     Low back pain     Osteoporosis     Ovarian cyst     Pancreatitis     Postmenopausal     Rectal mass     Rectal prolapse     Sleep apnea      Past Surgical History:   Procedure Laterality Date    BILATERAL SALPINGO OOPHORECTOMY  2008    CHOLECYSTECTOMY  1996    COLONOSCOPY  07/2017    Dr Murphy- per patient normal     COLONOSCOPY N/A 11/09/2021    Procedure: COLONOSCOPY WITH ANESTHESIA;  Surgeon: Ray Serna MD;  Location: [facility] ENDOSCOPY;  Service: Gastroenterology;  Laterality: N/A;  pre rectal mass  post  Dr Shields    DILATION AND CURETTAGE, DIAGNOSTIC / THERAPEUTIC      ENDOSCOPY N/A 11/09/2021    Procedure: ESOPHAGOGASTRODUODENOSCOPY WITH ANESTHESIA;  Surgeon: Ray Serna MD;  Location: [facility] ENDOSCOPY;  Service: Gastroenterology;  Laterality: N/A;  pre anemia  post hiatal hernia; gastric polyp  Dr. Shields    KNEE SURGERY  1982    LUMBAR LAMINECTOMY WITH FUSION Right 05/01/2023    Procedure: LUMBAR DISCECTOMY, JADON- LAMINECTOMY TRANSFORAMINAL LUMBAR INTERBODY FUSION, POSTERIOR PEDICLE SCREW INSTRUMENTATION, L5-S1 RIGHT. USE OF THE ROBOT;  Surgeon: Nathaniel Tesfaye MD;  Location: [facility] OR;  Service: Neurosurgery;  Laterality: Right;     Family History   Problem Relation Age of Onset    Ovarian cancer Mother     Ovarian cancer Maternal Aunt     Ovarian cancer Maternal Grandmother     Ovarian cancer Paternal Grandmother     Colon polyps Father     Colon polyps Brother     Breast cancer Neg Hx     Colon cancer Neg Hx      Social History     Tobacco Use    Smoking status: Never     Passive exposure: Never    Smokeless tobacco: Never   Vaping Use    Vaping Use: Never used   Substance Use Topics    Alcohol use: Not Currently    Drug use: No     (Not in a hospital admission)    Allergies:  Lisinopril, Tizanidine hcl, and Augmentin [amoxicillin-pot clavulanate]    Objective      Vital Signs  Ht 152.4 cm (60\")   Wt 52.4 kg (115 lb 9.6 oz)   BMI 22.58 " kg/m²     Physical Exam  Constitutional:       Appearance: She is well-developed.   HENT:      Head: Normocephalic.   Eyes:      Extraocular Movements: EOM normal.      Pupils: Pupils are equal, round, and reactive to light.   Pulmonary:      Effort: Pulmonary effort is normal.   Musculoskeletal:         General: Normal range of motion.      Cervical back: Normal range of motion.   Skin:     General: Skin is warm.   Neurological:      Mental Status: She is alert and oriented to person, place, and time.      GCS: GCS eye subscore is 4. GCS verbal subscore is 5. GCS motor subscore is 6.      Cranial Nerves: No cranial nerve deficit.      Sensory: No sensory deficit.      Motor: Weakness present.      Gait: Gait is intact. Gait normal.      Deep Tendon Reflexes: Reflexes are normal and symmetric.   Psychiatric:         Speech: Speech normal.         Behavior: Behavior normal.         Thought Content: Thought content normal.         Neurologic Exam     Mental Status   Oriented to person, place, and time.   Attention: normal. Concentration: normal.   Speech: speech is normal   Level of consciousness: alert  Normal comprehension.     Cranial Nerves     CN II   Visual fields full to confrontation.     CN III, IV, VI   Pupils are equal, round, and reactive to light.  Extraocular motions are normal.     CN V   Facial sensation intact.     CN VII   Facial expression full, symmetric.     CN VIII   CN VIII normal.     CN IX, X   CN IX normal.   CN X normal.     CN XI   CN XI normal.     CN XII   CN XII normal.     Motor Exam   Muscle bulk: normal    Strength   Strength 5/5 except as noted.   Right iliopsoas: 4/5  Right quadriceps: 4/5  Right hamstrin/5  Right anterior tibial: 4/5  Right posterior tibial: 5/5    Sensory Exam   Light touch normal.     Gait, Coordination, and Reflexes     Gait  Gait: normal    Reflexes   Reflexes 2+ except as noted.       Imaging review: X-rays of the lumbar spine that was done in 2023  did not show any hardware malfunction or complication.  Hardware was noted at L5-S1.    X-ray of the hips did not show any acute issues in June 2023        Assessment/Plan: The patient is complaining of some back pain as well as pain radiating into her legs as well as increased weakness in her legs.  I am going to send her for an MRI of the lumbar spine as well as MRI of her right hip to see if there is any thing else from a surgery standpoint versus an orthopedic issue that would need to be addressed.  Will have her follow-up with Dr. Tesfaye after imaging is completed and make further recommendation at that time.  Her questions and concerns were addressed  Patient is a nonsmoker  The patient's Body mass index is 22.58 kg/m².. BMI is within normal parameters. No follow-up required.    Diagnoses and all orders for this visit:    1. Acute right-sided low back pain with right-sided sciatica (Primary)  -     MRI Lumbar Spine Without Contrast; Future    2. Right leg weakness  -     MRI Lumbar Spine Without Contrast; Future    3. Right hip pain  -     MRI Hip Right Without Contrast; Future    4. Body mass index (BMI) of 22.0 to 22.9 in adult    5. Non-smoker          I discussed the patients findings and my recommendations with patient    Randy Mcbride, APRN  11/30/23  16:07 CST

## 2023-12-01 ENCOUNTER — OFFICE VISIT (OUTPATIENT)
Dept: INTERNAL MEDICINE | Age: 63
End: 2023-12-01

## 2023-12-01 VITALS
HEIGHT: 60 IN | WEIGHT: 113 LBS | SYSTOLIC BLOOD PRESSURE: 128 MMHG | BODY MASS INDEX: 22.19 KG/M2 | HEART RATE: 66 BPM | DIASTOLIC BLOOD PRESSURE: 60 MMHG | OXYGEN SATURATION: 97 %

## 2023-12-01 DIAGNOSIS — K64.4 EXTERNAL HEMORRHOIDS: ICD-10-CM

## 2023-12-01 DIAGNOSIS — M47.26 OSTEOARTHRITIS OF SPINE WITH RADICULOPATHY, LUMBAR REGION: ICD-10-CM

## 2023-12-01 DIAGNOSIS — F10.11 HISTORY OF ALCOHOL ABUSE: ICD-10-CM

## 2023-12-01 DIAGNOSIS — E78.2 MIXED HYPERLIPIDEMIA: ICD-10-CM

## 2023-12-01 DIAGNOSIS — M25.551 RIGHT HIP PAIN: ICD-10-CM

## 2023-12-01 DIAGNOSIS — N30.00 ACUTE CYSTITIS WITHOUT HEMATURIA: ICD-10-CM

## 2023-12-01 DIAGNOSIS — E10.40 TYPE 1 DIABETES MELLITUS WITH DIABETIC NEUROPATHY (HCC): ICD-10-CM

## 2023-12-01 DIAGNOSIS — E10.42 DIABETIC PERIPHERAL NEUROPATHY ASSOCIATED WITH TYPE 1 DIABETES MELLITUS (HCC): ICD-10-CM

## 2023-12-01 DIAGNOSIS — D64.9 ANEMIA, UNSPECIFIED TYPE: ICD-10-CM

## 2023-12-01 DIAGNOSIS — Z23 NEED FOR PNEUMOCOCCAL VACCINATION: ICD-10-CM

## 2023-12-01 DIAGNOSIS — R92.8 ABNORMAL MAMMOGRAM: Primary | ICD-10-CM

## 2023-12-01 DIAGNOSIS — R74.8 ELEVATED ALKALINE PHOSPHATASE LEVEL: ICD-10-CM

## 2023-12-01 DIAGNOSIS — E55.9 VITAMIN D DEFICIENCY: ICD-10-CM

## 2023-12-01 LAB — BACTERIA SPEC AEROBE CULT: ABNORMAL

## 2023-12-01 RX ORDER — SULFAMETHOXAZOLE AND TRIMETHOPRIM 800; 160 MG/1; MG/1
1 TABLET ORAL 2 TIMES DAILY
Qty: 20 TABLET | Refills: 1 | Status: SHIPPED | OUTPATIENT
Start: 2023-12-01 | End: 2023-12-21

## 2023-12-01 RX ORDER — ATENOLOL 25 MG/1
25 TABLET ORAL DAILY
COMMUNITY
Start: 2023-10-30

## 2023-12-01 RX ORDER — LOSARTAN POTASSIUM 25 MG/1
25 TABLET ORAL DAILY
COMMUNITY

## 2023-12-01 NOTE — PROGRESS NOTES
Chief Complaint   Patient presents with    3 Month Follow-Up     On PCN for tooth pain    Diabetes       HPI: Patient is here today to follow-up diabetes and other medical issues. She is scheduled to have hemorrhoid surgery January 2. We reviewed that reviewed the surgeon's note and the plan. She has had recurrent UTI with a lot of trouble keeping the perineal area clean due to hemorrhoids. She is is currently on penicillin for an infected tooth has 2 more doses of amoxicillin. Blood work reviewed from yesterday and urinalysis looks like UTI resistant to penicillin. In general patient is doing okay but she still having a lot of discomfort down her right leg and right foot especially in her right hip. Some pain with range of motion of the right hip she saw neurosurgery office they have scheduled an MRI of the hip and the lumbar spine.     Past Medical History:   Diagnosis Date    Alcohol abuse 2/6/2019    Allergic rhinitis     Diabetes mellitus (HCC)     GERD (gastroesophageal reflux disease)     Hyperlipidemia     Hypertension     Postmenopausal 1/3/2019    Sleep apnea        Past Surgical History:   Procedure Laterality Date    CHOLECYSTECTOMY      KNEE ARTHROPLASTY      LIANA AND BSO (CERVIX REMOVED)         Family History   Problem Relation Age of Onset    Cancer Mother         ovarian    Cancer Maternal Grandmother         ovarian    Cancer Paternal Grandmother         ovarian       Social History     Socioeconomic History    Marital status: Single     Spouse name: Not on file    Number of children: Not on file    Years of education: Not on file    Highest education level: Not on file   Occupational History    Not on file   Tobacco Use    Smoking status: Never    Smokeless tobacco: Never   Substance and Sexual Activity    Alcohol use: Not on file    Drug use: Not on file    Sexual activity: Not on file   Other Topics Concern    Not on file   Social History Narrative    Not on file     Social Determinants of

## 2023-12-04 ENCOUNTER — TRANSCRIBE ORDERS (OUTPATIENT)
Dept: ADMINISTRATIVE | Facility: HOSPITAL | Age: 63
End: 2023-12-04
Payer: COMMERCIAL

## 2023-12-04 DIAGNOSIS — R92.8 ABNORMAL MAMMOGRAM: ICD-10-CM

## 2023-12-04 DIAGNOSIS — R92.30 BREAST DENSITY: ICD-10-CM

## 2023-12-04 DIAGNOSIS — R92.8 ABNORMAL MAMMOGRAM: Primary | ICD-10-CM

## 2023-12-22 ENCOUNTER — HOSPITAL ENCOUNTER (OUTPATIENT)
Dept: INFUSION THERAPY | Age: 63
Discharge: HOME | End: 2023-12-22
Payer: COMMERCIAL

## 2023-12-22 DIAGNOSIS — D50.0 IRON DEFICIENCY ANEMIA DUE TO CHRONIC BLOOD LOSS: ICD-10-CM

## 2023-12-22 DIAGNOSIS — D61.818 PANCYTOPENIA (HCC): ICD-10-CM

## 2023-12-22 DIAGNOSIS — D50.9 IRON DEFICIENCY ANEMIA, UNSPECIFIED IRON DEFICIENCY ANEMIA TYPE: ICD-10-CM

## 2023-12-22 LAB
BASOPHILS # BLD: 0.02 K/UL (ref 0.01–0.08)
BASOPHILS NFR BLD: 0.6 % (ref 0.1–1.2)
EOSINOPHIL # BLD: 0.13 K/UL (ref 0.04–0.54)
EOSINOPHIL NFR BLD: 3.8 % (ref 0.7–7)
ERYTHROCYTE [DISTWIDTH] IN BLOOD BY AUTOMATED COUNT: 16 % (ref 11.7–14.4)
FERRITIN SERPL-MCNC: 54.2 NG/ML (ref 13–150)
FOLATE SERPL-MCNC: 18 NG/ML (ref 4.8–37.3)
HCT VFR BLD AUTO: 32.2 % (ref 34.1–44.9)
HGB BLD-MCNC: 10.1 G/DL (ref 11.2–15.7)
IRON SATN MFR SERPL: 19 % (ref 14–50)
IRON SERPL-MCNC: 55 UG/DL (ref 37–145)
LYMPHOCYTES # BLD: 0.59 K/UL (ref 1.18–3.74)
LYMPHOCYTES NFR BLD: 17.4 % (ref 19.3–53.1)
MCH RBC QN AUTO: 25.5 PG (ref 25.6–32.2)
MCHC RBC AUTO-ENTMCNC: 31.4 G/DL (ref 32.3–35.5)
MCV RBC AUTO: 81.3 FL (ref 79.4–94.8)
MONOCYTES # BLD: 0.24 K/UL (ref 0.24–0.82)
MONOCYTES NFR BLD: 7.1 % (ref 4.7–12.5)
NEUTROPHILS # BLD: 2.41 K/UL (ref 1.56–6.13)
NEUTS SEG NFR BLD: 71.1 % (ref 34–71.1)
PLATELET # BLD AUTO: 163 K/UL (ref 182–369)
PMV BLD AUTO: 9.4 FL (ref 7.4–10.4)
RBC # BLD AUTO: 3.96 M/UL (ref 3.93–5.22)
TIBC SERPL-MCNC: 294 UG/DL (ref 250–400)
VIT B12 SERPL-MCNC: 989 PG/ML (ref 211–946)
WBC # BLD AUTO: 3.39 K/UL (ref 3.98–10.04)

## 2023-12-22 PROCEDURE — 99212 OFFICE O/P EST SF 10 MIN: CPT

## 2023-12-22 PROCEDURE — 85025 COMPLETE CBC W/AUTO DIFF WBC: CPT

## 2023-12-22 PROCEDURE — 36415 COLL VENOUS BLD VENIPUNCTURE: CPT

## 2023-12-23 LAB
COPPER SERPL-MCNC: 138 UG/DL (ref 80–155)
ZINC SERPL-MCNC: 85.1 UG/DL (ref 60–120)

## 2023-12-25 LAB — BLOOD SMEAR REVIEW: NORMAL

## 2023-12-26 ENCOUNTER — HOSPITAL ENCOUNTER (OUTPATIENT)
Dept: GENERAL RADIOLOGY | Facility: HOSPITAL | Age: 63
Discharge: HOME | End: 2023-12-26
Payer: COMMERCIAL

## 2023-12-26 ENCOUNTER — APPOINTMENT (OUTPATIENT)
Dept: ULTRASOUND IMAGING | Facility: HOSPITAL | Age: 63
End: 2023-12-26
Payer: COMMERCIAL

## 2023-12-26 ENCOUNTER — HOSPITAL ENCOUNTER (OUTPATIENT)
Dept: ULTRASOUND IMAGING | Facility: HOSPITAL | Age: 63
Discharge: HOME | End: 2023-12-26
Payer: COMMERCIAL

## 2023-12-26 ENCOUNTER — PRE-ADMISSION TESTING (OUTPATIENT)
Dept: PREADMISSION TESTING | Facility: HOSPITAL | Age: 63
End: 2023-12-26
Payer: COMMERCIAL

## 2023-12-26 ENCOUNTER — HOSPITAL ENCOUNTER (OUTPATIENT)
Dept: MAMMOGRAPHY | Facility: HOSPITAL | Age: 63
Discharge: HOME | End: 2023-12-26
Payer: COMMERCIAL

## 2023-12-26 ENCOUNTER — HOSPITAL ENCOUNTER (OUTPATIENT)
Dept: MRI IMAGING | Facility: HOSPITAL | Age: 63
Discharge: HOME | End: 2023-12-26
Payer: COMMERCIAL

## 2023-12-26 VITALS
RESPIRATION RATE: 18 BRPM | OXYGEN SATURATION: 100 % | DIASTOLIC BLOOD PRESSURE: 57 MMHG | HEIGHT: 60 IN | WEIGHT: 119.49 LBS | BODY MASS INDEX: 23.46 KG/M2 | HEART RATE: 53 BPM | SYSTOLIC BLOOD PRESSURE: 147 MMHG

## 2023-12-26 DIAGNOSIS — R92.8 ABNORMAL MAMMOGRAM: ICD-10-CM

## 2023-12-26 DIAGNOSIS — R29.898 RIGHT LEG WEAKNESS: ICD-10-CM

## 2023-12-26 DIAGNOSIS — M54.41 ACUTE RIGHT-SIDED LOW BACK PAIN WITH RIGHT-SIDED SCIATICA: ICD-10-CM

## 2023-12-26 DIAGNOSIS — K64.2 GRADE III HEMORRHOIDS: ICD-10-CM

## 2023-12-26 LAB
ALBUMIN SERPL-MCNC: 4.3 G/DL (ref 3.5–5.2)
ALBUMIN/GLOB SERPL: 1.6 G/DL
ALP SERPL-CCNC: 269 U/L (ref 39–117)
ALT SERPL W P-5'-P-CCNC: 13 U/L (ref 1–33)
ANION GAP SERPL CALCULATED.3IONS-SCNC: 10 MMOL/L (ref 5–15)
AST SERPL-CCNC: 21 U/L (ref 1–32)
BASOPHILS # BLD AUTO: 0.03 10*3/MM3 (ref 0–0.2)
BASOPHILS NFR BLD AUTO: 0.7 % (ref 0–1.5)
BILIRUB SERPL-MCNC: 0.2 MG/DL (ref 0–1.2)
BUN SERPL-MCNC: 21 MG/DL (ref 8–23)
BUN/CREAT SERPL: 16.4 (ref 7–25)
CALCIUM SPEC-SCNC: 9.3 MG/DL (ref 8.6–10.5)
CHLORIDE SERPL-SCNC: 93 MMOL/L (ref 98–107)
CO2 SERPL-SCNC: 27 MMOL/L (ref 22–29)
CREAT SERPL-MCNC: 1.28 MG/DL (ref 0.57–1)
DEPRECATED RDW RBC AUTO: 50.8 FL (ref 37–54)
EGFRCR SERPLBLD CKD-EPI 2021: 47.8 ML/MIN/1.73
EOSINOPHIL # BLD AUTO: 0.16 10*3/MM3 (ref 0–0.4)
EOSINOPHIL NFR BLD AUTO: 3.7 % (ref 0.3–6.2)
ERYTHROCYTE [DISTWIDTH] IN BLOOD BY AUTOMATED COUNT: 17.1 % (ref 12.3–15.4)
GLOBULIN UR ELPH-MCNC: 2.7 GM/DL
GLUCOSE SERPL-MCNC: 92 MG/DL (ref 65–99)
HCT VFR BLD AUTO: 31.5 % (ref 34–46.6)
HGB BLD-MCNC: 9.6 G/DL (ref 12–15.9)
IMM GRANULOCYTES # BLD AUTO: 0.01 10*3/MM3 (ref 0–0.05)
IMM GRANULOCYTES NFR BLD AUTO: 0.2 % (ref 0–0.5)
LYMPHOCYTES # BLD AUTO: 0.89 10*3/MM3 (ref 0.7–3.1)
LYMPHOCYTES NFR BLD AUTO: 20.4 % (ref 19.6–45.3)
MCH RBC QN AUTO: 24.9 PG (ref 26.6–33)
MCHC RBC AUTO-ENTMCNC: 30.5 G/DL (ref 31.5–35.7)
MCV RBC AUTO: 81.6 FL (ref 79–97)
MONOCYTES # BLD AUTO: 0.35 10*3/MM3 (ref 0.1–0.9)
MONOCYTES NFR BLD AUTO: 8 % (ref 5–12)
NEUTROPHILS NFR BLD AUTO: 2.92 10*3/MM3 (ref 1.7–7)
NEUTROPHILS NFR BLD AUTO: 67 % (ref 42.7–76)
NRBC BLD AUTO-RTO: 0 /100 WBC (ref 0–0.2)
PLATELET # BLD AUTO: 188 10*3/MM3 (ref 140–450)
PMV BLD AUTO: 9.6 FL (ref 6–12)
POTASSIUM SERPL-SCNC: 5.8 MMOL/L (ref 3.5–5.2)
PROT SERPL-MCNC: 7 G/DL (ref 6–8.5)
RBC # BLD AUTO: 3.86 10*6/MM3 (ref 3.77–5.28)
SODIUM SERPL-SCNC: 130 MMOL/L (ref 136–145)
WBC NRBC COR # BLD AUTO: 4.36 10*3/MM3 (ref 3.4–10.8)

## 2023-12-26 PROCEDURE — G0279 TOMOSYNTHESIS, MAMMO: HCPCS

## 2023-12-26 PROCEDURE — 76642 ULTRASOUND BREAST LIMITED: CPT

## 2023-12-26 PROCEDURE — 85025 COMPLETE CBC W/AUTO DIFF WBC: CPT

## 2023-12-26 PROCEDURE — 93005 ELECTROCARDIOGRAM TRACING: CPT

## 2023-12-26 PROCEDURE — 80053 COMPREHEN METABOLIC PANEL: CPT

## 2023-12-26 PROCEDURE — 77065 DX MAMMO INCL CAD UNI: CPT

## 2023-12-26 PROCEDURE — 36415 COLL VENOUS BLD VENIPUNCTURE: CPT

## 2023-12-26 PROCEDURE — 71045 X-RAY EXAM CHEST 1 VIEW: CPT

## 2023-12-26 PROCEDURE — 72148 MRI LUMBAR SPINE W/O DYE: CPT

## 2023-12-26 NOTE — DISCHARGE INSTRUCTIONS
Before you come to the hospital        Arrival time: AS DIRECTED BY OFFICE     YOU MAY TAKE THE FOLLOWING MEDICATION(S) THE MORNING OF SURGERY WITH A SIP OF WATER:   Atenolol, PROTONIX,     HOLD LOSARTAN 24 HRS PRIOR TO SURGERY            ALL OTHER HOME MEDICATION CHECK WITH YOUR PHYSICIAN (especially if   you are taking diabetes medicines or blood thinners)                Eating and drinking restrictions prior to scheduled arrival time    2 Hours before arrival time STOP   Drinking Clear liquids (water, black coffee-NO CREAM,  apple juice-no pulp)    Clear Liquids    Water and flavored water                                                                      Clear Fruit juices, such as cranberry juice and apple juice.  Black coffee (NO cream of any kind, including powdered).  Plain tea  Clear bouillon or broth.  Flavored gelatin.  Soda.  Gatorade or Powerade.    8 Hours before arrival time STOP   All food, full liquids, and dairy products  Full liquid examples  Juices that have pulp.  Frozen ice pops that contain fruit pieces.  Coffee with creamer  Milk.  Yogurt.    (It is extremely important that you follow these guidelines to prevent delay or cancelation of your procedure)                       MANAGING PAIN AFTER SURGERY    We know you are probably wondering what your pain will be like after surgery.  Following surgery it is unrealistic to expect you will not have pain.   Pain is how our bodies let us know that something is wrong or cautions us to be careful.  That said, our goal is to make your pain tolerable.    Methods we may use to treat your pain include (oral or IV medications, PCAs, epidurals, nerve blocks, etc.)   While some procedures require IV pain medications for a short time after surgery, transitioning to pain medications by mouth allows for better management of pain.   Your nurse will encourage you to take oral pain medications whenever possible.  IV medications work almost immediately, but only  last a short while.  Taking medications by mouth allows for a more constant level of medication in your blood stream for a longer period of time.      Once your pain is out of control it is harder to get back under control.  It is important you are aware when your next dose of pain medication is due.  If you are admitted, your nurse may write the time of your next dose on the white board in your room to help you remember.      We are interested in your pain and encourage you to inform us about aggravating factors during your visit.   Many times a simple repositioning every few hours can make a big difference.    If your physician says it is okay, do not let your pain prevent you from getting out of bed. Be sure to call your nurse for assistance prior to getting up so you do not fall.      Before surgery, please decide your tolerable pain goal.  These faces help describe the pain ratings we use on a 0-10 scale.   Be prepared to tell us your goal and whether or not you take pain or anxiety medications at home.          Preparing for Surgery  Preparing for surgery is an important part of your care. It can make things go more smoothly and help you avoid complications. The steps leading up to surgery may vary among hospitals. Follow all instructions given to you by your health care providers. Ask questions if you do not understand something. Talk about any concerns that you have.  Here are some questions to consider asking before your surgery:  If my surgery is not an emergency (is elective), when would be the best time to have the surgery?  What arrangements do I need to make for work, home, or school?  What will my recovery be like? How long will it be before I can return to normal activities?  Will I need to prepare my home? Will I need to arrange care for me or my children?  Should I expect to have pain after surgery? What are my pain management options? Are there nonmedical options that I can try for pain?  Tell a  health care provider about:  Any allergies you have.  All medicines you are taking, including vitamins, herbs, eye drops, creams, and over-the-counter medicines.  Any problems you or family members have had with anesthetic medicines.  Any blood disorders you have.  Any surgeries you have had.  Any medical conditions you have.  Whether you are pregnant or may be pregnant.  What are the risks?  The risks and complications of surgery depend on the specific procedure that you have. Discuss all the risks with your health care providers before your surgery. Ask about common surgical complications, which may include:  Infection.  Bleeding or a need for blood replacement (transfusion).  Allergic reactions to medicines.  Damage to surrounding nerves, tissues, or structures.  A blood clot.  Scarring.  Failure of the surgery to correct the problem.  Follow these instructions before the procedure:  Several days or weeks before your procedure  You may have a physical exam by your primary health care provider to make sure it is safe for you to have surgery.  You may have testing. This may include a chest X-ray, blood and urine tests, electrocardiogram (ECG), or other testing.  Ask your health care provider about:  Changing or stopping your regular medicines. This is especially important if you are taking diabetes medicines or blood thinners.  Taking medicines such as aspirin and ibuprofen. These medicines can thin your blood. Do not take these medicines unless your health care provider tells you to take them.  Taking over-the-counter medicines, vitamins, herbs, and supplements.  Do not use any products that contain nicotine or tobacco, such as cigarettes and e-cigarettes. If you need help quitting, ask your health care provider.  Avoid alcohol.  Ask your health care provider if there are exercises you can do to prepare for surgery.  Eat a healthy diet.   Plan to have someone 18 years of age or older to take you home from the  hospital. We will need to verify your ride on the morning of surgery if you are being discharged home on the same day. Tell your ride to be expecting a call from the hospital prior to your procedure.   Plan to have a responsible adult care for you for at least 24 hours after you leave the hospital or clinic. This is important.  The day before your procedure  You may be given antibiotic medicine to take by mouth to help prevent infection. Take it as told by your health care provider.  You may be asked to shower with a germ-killing soap.  Follow instructions from your health care provider about eating and drinking restrictions. This includes gum, mints and hard candy.  Pack comfortable clothes according to your procedure.   The day of your procedure  You may need to take another shower with a germ-killing soap before you leave home in the morning.  With a small sip of water, take only the medicines that you are told to take.  Remove all jewelry including rings.   Leave anything you consider valuable at home except hearing aids if needed.  You do not need to bring your home medications into the hospital.   Do not wear any makeup, nail polish, powder, deodorant, lotion, hair accessories, or anything on your skin or body except your clothes.  If you will be staying in the hospital, bring a case to hold your glasses, contacts, or dentures. You may also want to bring your robe and non-skid footwear.       (Do not use denture adhesives since you will be asked to remove them during  surgery).   If you wear oxygen at home, bring it with you the day of surgery.  If instructed by your health care provider, bring your sleep apnea device with you on the day of your surgery (if this applies to you).  You may want to leave your suitcase and sleep apnea device in the car until after surgery.   Arrive at the hospital as scheduled.  Bring a friend or family member with you who can help to answer questions and be present while you meet  with your health care provider.  At the hospital  When you arrive at the hospital:  Go to registration located at the main entrance of the hospital. You will be registered and given a beeper and a sticker sheet. Take the stickers to the Outpatient nurses desk and place in the black tray. This is to notify staff that you have arrived. Then return to the lobby to wait.   When your beeper lights up and vibrates proceed through the double doors, under the stairs, and a member of the Outpatient Surgery staff will escort you to your preoperative room.  You may have to wear compression sleeves. These help to prevent blood clots and reduce swelling in your legs.  An IV may be inserted into one of your veins.              In the operating room, you may be given one or more of the following:        A medicine to help you relax (sedative).        A medicine to numb the area (local anesthetic).        A medicine to make you fall asleep (general anesthetic).        A medicine that is injected into an area of your body to numb everything below the                      injection site (regional anesthetic).  You may be given an antibiotic through your IV to help prevent infection.  Your surgical site will be marked or identified.    Contact a health care provider if you:  Develop a fever of more than 100.4°F (38°C) or other feelings of illness during the 48 hours before your surgery.  Have symptoms that get worse.  Have questions or concerns about your surgery.  Summary  Preparing for surgery can make the procedure go more smoothly and lower your risk of complications.  Before surgery, make a list of questions and concerns to discuss with your surgeon. Ask about the risks and possible complications.  In the days or weeks before your surgery, follow all instructions from your health care provider. You may need to stop smoking, avoid alcohol, follow eating restrictions, and change or stop your regular medicines.  Contact your surgeon  if you develop a fever or other signs of illness during the few days before your surgery.  This information is not intended to replace advice given to you by your health care provider. Make sure you discuss any questions you have with your health care provider.  Document Revised: 12/21/2018 Document Reviewed: 10/23/2018  Elsevier Patient Education © 2021 [employer]

## 2023-12-27 ENCOUNTER — HOSPITAL ENCOUNTER (OUTPATIENT)
Dept: ULTRASOUND IMAGING | Age: 63
Discharge: HOME | End: 2023-12-27
Payer: COMMERCIAL

## 2023-12-27 ENCOUNTER — HOSPITAL ENCOUNTER (OUTPATIENT)
Dept: MRI IMAGING | Facility: HOSPITAL | Age: 63
Discharge: HOME | End: 2023-12-27
Admitting: NURSE PRACTITIONER
Payer: COMMERCIAL

## 2023-12-27 DIAGNOSIS — D61.818 PANCYTOPENIA (HCC): ICD-10-CM

## 2023-12-27 DIAGNOSIS — M25.551 RIGHT HIP PAIN: ICD-10-CM

## 2023-12-27 PROCEDURE — 76705 ECHO EXAM OF ABDOMEN: CPT

## 2023-12-27 PROCEDURE — 73721 MRI JNT OF LWR EXTRE W/O DYE: CPT

## 2023-12-28 LAB
QT INTERVAL: 430 MS
QTC INTERVAL: 407 MS

## 2024-01-02 RX ORDER — MAGNESIUM OXIDE 400 MG/1
1 TABLET ORAL DAILY
Qty: 30 TABLET | Refills: 2 | Status: SHIPPED | OUTPATIENT
Start: 2024-01-02

## 2024-01-03 ENCOUNTER — TELEPHONE (OUTPATIENT)
Dept: INTERNAL MEDICINE | Age: 64
End: 2024-01-03

## 2024-01-03 NOTE — TELEPHONE ENCOUNTER
She has hemorrhoid surgery with Dr Gavin at [facility] on Jan. 9th and she wants you to look at recent labs and MRI and see if any red flags  - will low RBC and low iron be a factor?  She would like a call back when possible.

## 2024-01-04 ENCOUNTER — TELEPHONE (OUTPATIENT)
Dept: NEUROSURGERY | Facility: CLINIC | Age: 64
End: 2024-01-04
Payer: COMMERCIAL

## 2024-01-04 NOTE — TELEPHONE ENCOUNTER
----- Message from RAYMON Garcia sent at 1/4/2024  8:17 AM CST -----  Can you call the patient and let her know that the MRI of her back was okay.  The MRI of her hip did show a tendon tear and arthritis with degeneration so she needs to go see orthopedics  ----- Message -----  From: Nathaniel Tesfaye MD  Sent: 1/2/2024   3:28 PM CST  To: RAYMON Garcia    That MRI looks good to me, we can keep following her but she needs to see ortho also  ----- Message -----  From: Randy Mcbride APRN  Sent: 1/2/2024   8:15 AM CST  To: Nathaniel Tesfaye MD    Patient has had continued back pain and lower extremity pain and weakness.  I did image her back and went ahead and imaged her hip.  I think she needs to go see orthopedics.  What are your thoughts  ----- Message -----  From: Interface, [facility] [place]  Sent: 12/26/2023   2:14 PM CST  To: RAYMON Garcia

## 2024-01-04 NOTE — TELEPHONE ENCOUNTER
Pt was given imaging results and recommendations and per pt she wants to speak with family member that are physicians to decided where to go for orthopedics. Ended call with pt understanding and acknowledgment.

## 2024-01-08 NOTE — TELEPHONE ENCOUNTER
Let her know I looked at the labs and should be ok with surgery but needs to hydrate today and after surgery as kidneys were dehydrated--- stay off all nsaids.  They will watch her closely with surgery and getting the surgery and stopping the bleeding will in time improve iron/ blood ct etc -- have her let us know how she is doing later this week post surgery

## 2024-01-09 ENCOUNTER — HOSPITAL ENCOUNTER (OUTPATIENT)
Facility: HOSPITAL | Age: 64
Setting detail: HOSPITAL OUTPATIENT SURGERY
Discharge: HOME | End: 2024-01-09
Attending: STUDENT IN AN ORGANIZED HEALTH CARE EDUCATION/TRAINING PROGRAM | Admitting: STUDENT IN AN ORGANIZED HEALTH CARE EDUCATION/TRAINING PROGRAM
Payer: COMMERCIAL

## 2024-01-09 ENCOUNTER — ANESTHESIA EVENT (OUTPATIENT)
Dept: PERIOP | Facility: HOSPITAL | Age: 64
End: 2024-01-09
Payer: COMMERCIAL

## 2024-01-09 ENCOUNTER — ANESTHESIA (OUTPATIENT)
Dept: PERIOP | Facility: HOSPITAL | Age: 64
End: 2024-01-09
Payer: COMMERCIAL

## 2024-01-09 VITALS
DIASTOLIC BLOOD PRESSURE: 64 MMHG | RESPIRATION RATE: 16 BRPM | SYSTOLIC BLOOD PRESSURE: 150 MMHG | OXYGEN SATURATION: 99 % | TEMPERATURE: 97.4 F | HEART RATE: 47 BPM

## 2024-01-09 DIAGNOSIS — K64.2 GRADE III HEMORRHOIDS: ICD-10-CM

## 2024-01-09 LAB
ANION GAP SERPL CALCULATED.3IONS-SCNC: 8 MMOL/L (ref 5–15)
BUN SERPL-MCNC: 23 MG/DL (ref 8–23)
BUN/CREAT SERPL: 18.1 (ref 7–25)
CALCIUM SPEC-SCNC: 9.7 MG/DL (ref 8.6–10.5)
CHLORIDE SERPL-SCNC: 104 MMOL/L (ref 98–107)
CO2 SERPL-SCNC: 28 MMOL/L (ref 22–29)
CREAT SERPL-MCNC: 1.27 MG/DL (ref 0.57–1)
EGFRCR SERPLBLD CKD-EPI 2021: 48.2 ML/MIN/1.73
GLUCOSE BLDC GLUCOMTR-MCNC: 79 MG/DL (ref 70–130)
GLUCOSE BLDC GLUCOMTR-MCNC: 80 MG/DL (ref 70–130)
GLUCOSE SERPL-MCNC: 79 MG/DL (ref 65–99)
POTASSIUM SERPL-SCNC: 5.6 MMOL/L (ref 3.5–5.2)
SODIUM SERPL-SCNC: 140 MMOL/L (ref 136–145)

## 2024-01-09 PROCEDURE — 25010000002 HEPARIN (PORCINE) PER 1000 UNITS: Performed by: STUDENT IN AN ORGANIZED HEALTH CARE EDUCATION/TRAINING PROGRAM

## 2024-01-09 PROCEDURE — S0260 H&P FOR SURGERY: HCPCS | Performed by: STUDENT IN AN ORGANIZED HEALTH CARE EDUCATION/TRAINING PROGRAM

## 2024-01-09 PROCEDURE — 25010000002 PROPOFOL 10 MG/ML EMULSION: Performed by: NURSE ANESTHETIST, CERTIFIED REGISTERED

## 2024-01-09 PROCEDURE — 25010000002 ONDANSETRON PER 1 MG: Performed by: NURSE ANESTHETIST, CERTIFIED REGISTERED

## 2024-01-09 PROCEDURE — 0 BUPIVACAINE LIPOSOME 1.3 % SUSPENSION 20 ML VIAL: Performed by: STUDENT IN AN ORGANIZED HEALTH CARE EDUCATION/TRAINING PROGRAM

## 2024-01-09 PROCEDURE — 80048 BASIC METABOLIC PNL TOTAL CA: CPT | Performed by: STUDENT IN AN ORGANIZED HEALTH CARE EDUCATION/TRAINING PROGRAM

## 2024-01-09 PROCEDURE — 25010000002 GLYCOPYRROLATE 0.4 MG/2ML SOLUTION: Performed by: NURSE ANESTHETIST, CERTIFIED REGISTERED

## 2024-01-09 PROCEDURE — 25010000002 FENTANYL CITRATE (PF) 100 MCG/2ML SOLUTION: Performed by: NURSE ANESTHETIST, CERTIFIED REGISTERED

## 2024-01-09 PROCEDURE — 25010000002 MIDAZOLAM PER 1 MG: Performed by: ANESTHESIOLOGY

## 2024-01-09 PROCEDURE — 25810000003 LACTATED RINGERS PER 1000 ML: Performed by: STUDENT IN AN ORGANIZED HEALTH CARE EDUCATION/TRAINING PROGRAM

## 2024-01-09 PROCEDURE — 46922 EXCISION OF ANAL LESION(S): CPT | Performed by: STUDENT IN AN ORGANIZED HEALTH CARE EDUCATION/TRAINING PROGRAM

## 2024-01-09 PROCEDURE — 82948 REAGENT STRIP/BLOOD GLUCOSE: CPT

## 2024-01-09 PROCEDURE — C9290 INJ, BUPIVACAINE LIPOSOME: HCPCS | Performed by: STUDENT IN AN ORGANIZED HEALTH CARE EDUCATION/TRAINING PROGRAM

## 2024-01-09 PROCEDURE — 25010000002 METRONIDAZOLE 500 MG/100ML SOLUTION: Performed by: STUDENT IN AN ORGANIZED HEALTH CARE EDUCATION/TRAINING PROGRAM

## 2024-01-09 PROCEDURE — 25010000002 ONDANSETRON PER 1 MG: Performed by: ANESTHESIOLOGY

## 2024-01-09 PROCEDURE — 25010000002 CEFAZOLIN PER 500 MG: Performed by: NURSE ANESTHETIST, CERTIFIED REGISTERED

## 2024-01-09 PROCEDURE — 88305 TISSUE EXAM BY PATHOLOGIST: CPT | Performed by: STUDENT IN AN ORGANIZED HEALTH CARE EDUCATION/TRAINING PROGRAM

## 2024-01-09 DEVICE — HEMOST ABS SURGIFOAM SZ100 8X12 10MM: Type: IMPLANTABLE DEVICE | Site: RECTUM | Status: FUNCTIONAL

## 2024-01-09 RX ORDER — DROPERIDOL 2.5 MG/ML
0.62 INJECTION, SOLUTION INTRAMUSCULAR; INTRAVENOUS ONCE AS NEEDED
Status: DISCONTINUED | OUTPATIENT
Start: 2024-01-09 | End: 2024-01-09 | Stop reason: HOSPADM

## 2024-01-09 RX ORDER — METRONIDAZOLE 500 MG/100ML
500 INJECTION, SOLUTION INTRAVENOUS ONCE
Status: COMPLETED | OUTPATIENT
Start: 2024-01-09 | End: 2024-01-09

## 2024-01-09 RX ORDER — FENTANYL CITRATE 50 UG/ML
25 INJECTION, SOLUTION INTRAMUSCULAR; INTRAVENOUS
Status: DISCONTINUED | OUTPATIENT
Start: 2024-01-09 | End: 2024-01-09 | Stop reason: HOSPADM

## 2024-01-09 RX ORDER — NALOXONE HCL 0.4 MG/ML
0.4 VIAL (ML) INJECTION AS NEEDED
Status: DISCONTINUED | OUTPATIENT
Start: 2024-01-09 | End: 2024-01-09 | Stop reason: HOSPADM

## 2024-01-09 RX ORDER — FLUMAZENIL 0.1 MG/ML
0.2 INJECTION INTRAVENOUS AS NEEDED
Status: DISCONTINUED | OUTPATIENT
Start: 2024-01-09 | End: 2024-01-09 | Stop reason: HOSPADM

## 2024-01-09 RX ORDER — CEFAZOLIN SODIUM 1 G/3ML
INJECTION, POWDER, FOR SOLUTION INTRAMUSCULAR; INTRAVENOUS AS NEEDED
Status: DISCONTINUED | OUTPATIENT
Start: 2024-01-09 | End: 2024-01-09 | Stop reason: SURG

## 2024-01-09 RX ORDER — SODIUM CHLORIDE 0.9 % (FLUSH) 0.9 %
3 SYRINGE (ML) INJECTION AS NEEDED
Status: DISCONTINUED | OUTPATIENT
Start: 2024-01-09 | End: 2024-01-09 | Stop reason: HOSPADM

## 2024-01-09 RX ORDER — SODIUM CHLORIDE 0.9 % (FLUSH) 0.9 %
10 SYRINGE (ML) INJECTION EVERY 12 HOURS SCHEDULED
Status: DISCONTINUED | OUTPATIENT
Start: 2024-01-09 | End: 2024-01-09 | Stop reason: HOSPADM

## 2024-01-09 RX ORDER — HYDROCODONE BITARTRATE AND ACETAMINOPHEN 10; 325 MG/1; MG/1
1 TABLET ORAL EVERY 4 HOURS PRN
Status: DISCONTINUED | OUTPATIENT
Start: 2024-01-09 | End: 2024-01-09 | Stop reason: HOSPADM

## 2024-01-09 RX ORDER — LIDOCAINE HYDROCHLORIDE 20 MG/ML
INJECTION, SOLUTION EPIDURAL; INFILTRATION; INTRACAUDAL; PERINEURAL AS NEEDED
Status: DISCONTINUED | OUTPATIENT
Start: 2024-01-09 | End: 2024-01-09 | Stop reason: SURG

## 2024-01-09 RX ORDER — MIDAZOLAM HYDROCHLORIDE 1 MG/ML
1 INJECTION INTRAMUSCULAR; INTRAVENOUS
Status: COMPLETED | OUTPATIENT
Start: 2024-01-09 | End: 2024-01-09

## 2024-01-09 RX ORDER — DOCUSATE SODIUM 100 MG/1
100 CAPSULE, LIQUID FILLED ORAL 2 TIMES DAILY
Qty: 60 CAPSULE | Refills: 0 | Status: SHIPPED | OUTPATIENT
Start: 2024-01-09 | End: 2024-02-08

## 2024-01-09 RX ORDER — BUPIVACAINE HYDROCHLORIDE AND EPINEPHRINE 5; 5 MG/ML; UG/ML
INJECTION, SOLUTION PERINEURAL AS NEEDED
Status: DISCONTINUED | OUTPATIENT
Start: 2024-01-09 | End: 2024-01-09 | Stop reason: HOSPADM

## 2024-01-09 RX ORDER — SODIUM CHLORIDE, SODIUM LACTATE, POTASSIUM CHLORIDE, CALCIUM CHLORIDE 600; 310; 30; 20 MG/100ML; MG/100ML; MG/100ML; MG/100ML
9 INJECTION, SOLUTION INTRAVENOUS CONTINUOUS
Status: DISCONTINUED | OUTPATIENT
Start: 2024-01-09 | End: 2024-01-09 | Stop reason: HOSPADM

## 2024-01-09 RX ORDER — SODIUM CHLORIDE, SODIUM LACTATE, POTASSIUM CHLORIDE, CALCIUM CHLORIDE 600; 310; 30; 20 MG/100ML; MG/100ML; MG/100ML; MG/100ML
1000 INJECTION, SOLUTION INTRAVENOUS CONTINUOUS
Status: DISCONTINUED | OUTPATIENT
Start: 2024-01-09 | End: 2024-01-09 | Stop reason: HOSPADM

## 2024-01-09 RX ORDER — ROCURONIUM BROMIDE 10 MG/ML
INJECTION, SOLUTION INTRAVENOUS AS NEEDED
Status: DISCONTINUED | OUTPATIENT
Start: 2024-01-09 | End: 2024-01-09 | Stop reason: SURG

## 2024-01-09 RX ORDER — POLYETHYLENE GLYCOL 3350 17 G/17G
17 POWDER, FOR SOLUTION ORAL DAILY
Qty: 14 PACKET | Refills: 0 | Status: SHIPPED | OUTPATIENT
Start: 2024-01-09 | End: 2024-01-23

## 2024-01-09 RX ORDER — IBUPROFEN 200 MG
600 TABLET ORAL EVERY 8 HOURS
Start: 2024-01-09 | End: 2025-01-08

## 2024-01-09 RX ORDER — ONDANSETRON 2 MG/ML
INJECTION INTRAMUSCULAR; INTRAVENOUS AS NEEDED
Status: DISCONTINUED | OUTPATIENT
Start: 2024-01-09 | End: 2024-01-09 | Stop reason: SURG

## 2024-01-09 RX ORDER — ACETAMINOPHEN 325 MG/1
975 TABLET ORAL EVERY 8 HOURS
Start: 2024-01-09 | End: 2025-01-08

## 2024-01-09 RX ORDER — LABETALOL HYDROCHLORIDE 5 MG/ML
5 INJECTION, SOLUTION INTRAVENOUS
Status: DISCONTINUED | OUTPATIENT
Start: 2024-01-09 | End: 2024-01-09 | Stop reason: HOSPADM

## 2024-01-09 RX ORDER — DEXTROSE MONOHYDRATE 25 G/50ML
12.5 INJECTION, SOLUTION INTRAVENOUS AS NEEDED
Status: DISCONTINUED | OUTPATIENT
Start: 2024-01-09 | End: 2024-01-09 | Stop reason: HOSPADM

## 2024-01-09 RX ORDER — ONDANSETRON 2 MG/ML
4 INJECTION INTRAMUSCULAR; INTRAVENOUS ONCE AS NEEDED
Status: COMPLETED | OUTPATIENT
Start: 2024-01-09 | End: 2024-01-09

## 2024-01-09 RX ORDER — HEPARIN SODIUM 5000 [USP'U]/ML
5000 INJECTION, SOLUTION INTRAVENOUS; SUBCUTANEOUS ONCE
Status: COMPLETED | OUTPATIENT
Start: 2024-01-09 | End: 2024-01-09

## 2024-01-09 RX ORDER — PROPOFOL 10 MG/ML
VIAL (ML) INTRAVENOUS AS NEEDED
Status: DISCONTINUED | OUTPATIENT
Start: 2024-01-09 | End: 2024-01-09 | Stop reason: SURG

## 2024-01-09 RX ORDER — HYDROCODONE BITARTRATE AND ACETAMINOPHEN 5; 325 MG/1; MG/1
1 TABLET ORAL ONCE AS NEEDED
Status: DISCONTINUED | OUTPATIENT
Start: 2024-01-09 | End: 2024-01-09 | Stop reason: HOSPADM

## 2024-01-09 RX ORDER — NEOSTIGMINE METHYLSULFATE 5 MG/5 ML
SYRINGE (ML) INTRAVENOUS AS NEEDED
Status: DISCONTINUED | OUTPATIENT
Start: 2024-01-09 | End: 2024-01-09 | Stop reason: SURG

## 2024-01-09 RX ORDER — SENNOSIDES 8.6 MG
650 CAPSULE ORAL EVERY 8 HOURS PRN
COMMUNITY

## 2024-01-09 RX ORDER — IBUPROFEN 600 MG/1
600 TABLET ORAL ONCE AS NEEDED
Status: DISCONTINUED | OUTPATIENT
Start: 2024-01-09 | End: 2024-01-09 | Stop reason: HOSPADM

## 2024-01-09 RX ORDER — SODIUM CHLORIDE 0.9 % (FLUSH) 0.9 %
10 SYRINGE (ML) INJECTION AS NEEDED
Status: DISCONTINUED | OUTPATIENT
Start: 2024-01-09 | End: 2024-01-09 | Stop reason: HOSPADM

## 2024-01-09 RX ORDER — MAGNESIUM HYDROXIDE 1200 MG/15ML
LIQUID ORAL AS NEEDED
Status: DISCONTINUED | OUTPATIENT
Start: 2024-01-09 | End: 2024-01-09 | Stop reason: HOSPADM

## 2024-01-09 RX ORDER — OXYCODONE HYDROCHLORIDE 5 MG/1
5 TABLET ORAL EVERY 8 HOURS PRN
Qty: 10 TABLET | Refills: 0 | Status: SHIPPED | OUTPATIENT
Start: 2024-01-09 | End: 2025-01-08

## 2024-01-09 RX ORDER — LIDOCAINE HYDROCHLORIDE 10 MG/ML
0.5 INJECTION, SOLUTION EPIDURAL; INFILTRATION; INTRACAUDAL; PERINEURAL ONCE AS NEEDED
Status: DISCONTINUED | OUTPATIENT
Start: 2024-01-09 | End: 2024-01-09 | Stop reason: HOSPADM

## 2024-01-09 RX ORDER — ONDANSETRON 4 MG/1
4 TABLET, FILM COATED ORAL EVERY 8 HOURS PRN
Qty: 15 TABLET | Refills: 0 | Status: SHIPPED | OUTPATIENT
Start: 2024-01-09 | End: 2025-01-08

## 2024-01-09 RX ORDER — FENTANYL CITRATE 50 UG/ML
INJECTION, SOLUTION INTRAMUSCULAR; INTRAVENOUS AS NEEDED
Status: DISCONTINUED | OUTPATIENT
Start: 2024-01-09 | End: 2024-01-09 | Stop reason: SURG

## 2024-01-09 RX ADMIN — GLYCOPYRROLATE 0.4 MG: 0.2 INJECTION INTRAMUSCULAR; INTRAVENOUS at 10:13

## 2024-01-09 RX ADMIN — PROPOFOL INJECTABLE EMULSION 150 MG: 10 INJECTION, EMULSION INTRAVENOUS at 09:49

## 2024-01-09 RX ADMIN — FENTANYL CITRATE 100 MCG: 50 INJECTION, SOLUTION INTRAMUSCULAR; INTRAVENOUS at 09:49

## 2024-01-09 RX ADMIN — MIDAZOLAM HYDROCHLORIDE 1 MG: 1 INJECTION, SOLUTION INTRAMUSCULAR; INTRAVENOUS at 09:19

## 2024-01-09 RX ADMIN — METRONIDAZOLE 500 MG: 500 INJECTION, SOLUTION INTRAVENOUS at 08:11

## 2024-01-09 RX ADMIN — HEPARIN SODIUM 5000 UNITS: 5000 INJECTION, SOLUTION INTRAVENOUS; SUBCUTANEOUS at 08:11

## 2024-01-09 RX ADMIN — ONDANSETRON 4 MG: 2 INJECTION INTRAMUSCULAR; INTRAVENOUS at 10:13

## 2024-01-09 RX ADMIN — GLYCOPYRROLATE 0.4 MG: 0.2 INJECTION INTRAMUSCULAR; INTRAVENOUS at 10:15

## 2024-01-09 RX ADMIN — SODIUM CHLORIDE, POTASSIUM CHLORIDE, SODIUM LACTATE AND CALCIUM CHLORIDE 1000 ML: 600; 310; 30; 20 INJECTION, SOLUTION INTRAVENOUS at 07:43

## 2024-01-09 RX ADMIN — CEFAZOLIN 2 G: 330 INJECTION, POWDER, FOR SOLUTION INTRAMUSCULAR; INTRAVENOUS at 09:55

## 2024-01-09 RX ADMIN — ONDANSETRON 4 MG: 2 INJECTION INTRAMUSCULAR; INTRAVENOUS at 12:25

## 2024-01-09 RX ADMIN — ROCURONIUM BROMIDE 25 MG: 10 INJECTION, SOLUTION INTRAVENOUS at 09:49

## 2024-01-09 RX ADMIN — MIDAZOLAM HYDROCHLORIDE 1 MG: 1 INJECTION, SOLUTION INTRAMUSCULAR; INTRAVENOUS at 08:16

## 2024-01-09 RX ADMIN — Medication 3 MG: at 10:13

## 2024-01-09 RX ADMIN — LIDOCAINE HYDROCHLORIDE 60 MG: 20 INJECTION, SOLUTION EPIDURAL; INFILTRATION; INTRACAUDAL; PERINEURAL at 09:49

## 2024-01-09 NOTE — ANESTHESIA PROCEDURE NOTES
Airway  Urgency: elective    Date/Time: 1/9/2024 9:50 AM  Airway not difficult    General Information and Staff    Patient location during procedure: OR  CRNA/CAA: Imtiaz Gibbs CRNA    Indications and Patient Condition  Indications for airway management: airway protection    Preoxygenated: yes  Mask difficulty assessment: 1 - vent by mask    Final Airway Details  Final airway type: endotracheal airway      Successful airway: ETT     Successful intubation technique: direct laryngoscopy  Endotracheal tube insertion site: oral  Blade: Radha  Blade size: 3.5 (3.5)  ETT size (mm): 7.5  Cormack-Lehane Classification: grade I - full view of glottis  Placement verified by: chest auscultation and capnometry   Measured from: lips  ETT/EBT  to lips (cm): 21  Number of attempts at approach: 1  Assessment: lips, teeth, and gum same as pre-op and atraumatic intubation

## 2024-01-09 NOTE — ANESTHESIA PREPROCEDURE EVALUATION
Anesthesia Evaluation     Patient summary reviewed   no history of anesthetic complications:   NPO Solid Status: > 8 hours             Airway   Mallampati: II  Dental      Pulmonary    (-) COPD, asthma, sleep apnea, not a smoker  Cardiovascular   Exercise tolerance: good (4-7 METS)    (+) hypertension, hyperlipidemia  (-) pacemaker, past MI, angina, cardiac stents      Neuro/Psych  (-) seizures, TIA, CVA  GI/Hepatic/Renal/Endo    (+) GERD, renal disease- CRI, diabetes mellitus using insulin, thyroid problem hypothyroidism  (-) liver disease    Musculoskeletal     Abdominal    Substance History      OB/GYN          Other                    Anesthesia Plan    ASA 3     general     intravenous induction     Anesthetic plan, risks, benefits, and alternatives have been provided, discussed and informed consent has been obtained with: patient.    CODE STATUS:

## 2024-01-09 NOTE — ANESTHESIA POSTPROCEDURE EVALUATION
Patient: Fiordaliza Lyons    Procedure Summary       Date: 01/09/24 Room / Location: [facility] OR  /  PAD OR    Anesthesia Start: 0945 Anesthesia Stop: 1035    Procedure: HEMORRHOIDECTOMY WITH EXAM UNDER ANESTHESIA (Anus) Diagnosis:       Grade III hemorrhoids      (Grade III hemorrhoids [K64.2])    Surgeons: Rocio Read MD Provider: Imtiaz Gibbs CRNA    Anesthesia Type: general ASA Status: 3            Anesthesia Type: general    Vitals  Vitals Value Taken Time   /63 01/09/24 1050   Temp 97.4 °F (36.3 °C) 01/09/24 1050   Pulse 61 01/09/24 1050   Resp 15 01/09/24 1050   SpO2 97 % 01/09/24 1050           Post Anesthesia Care and Evaluation    Patient location during evaluation: PACU  Patient participation: complete - patient participated  Level of consciousness: awake and alert  Pain management: adequate    Airway patency: patent  Anesthetic complications: No anesthetic complications  PONV Status: none  Cardiovascular status: acceptable and hemodynamically stable  Respiratory status: acceptable  Hydration status: acceptable    Comments: Blood pressure 140/62, pulse 59, temperature 97.4 °F (36.3 °C), temperature source Temporal, resp. rate 16, SpO2 99%, not currently breastfeeding.    Patient discharged from PACU based upon Jana score. Please see RN notes for further details

## 2024-01-09 NOTE — OP NOTE
Excision of anal Polyp Operative Report:     Patient: Fiordaliaz Lyons  MRN: [id number]    YOB: 1960  Age: 63 y.o.  Sex: female  Unit:  PAD OR Room/Bed: PAD OR/MAIN OR Location: [facility]      Admitting Physician: CÉSAR READ    Primary Care Physician: Brittany Solis MD             INDICATIONS: Fiordaliza Lyons is a 63 y.o. female with grade Grade III internal hemorrhoids. We discussed the options for management including continued medical management with trying different topical treatments vs. banding of the internal hemorrhoids vs. hemorrhoidectomy. She does not wish to continue medical management as she has failed this. The patient is not a candidate for banding 2/2 the grade of the hemorrhoids. The patient would like to proceed with hemorrhoidectomy. We discussed the risks of surgery including bleeding, infection, stricture, urinary retention, and hemorrhoid recurrence. the patient understands that hemorrhoids can recur and that they will need to be on a bowel regimen post operatively with stool softeners and laxatives initially. After the patient has healed, they will need to be vigilant about avoiding constipation. The patient understands all of this and would like to proceed.      DATE OF OPERATION: 1/9/2024     Surgeon(s) and Role:     * César Read MD - Primary    ANESTHESIA: General     PREOPERATIVE DIAGNOSIS: Grade III hemorrhoids [K64.2]    POSTOPERATIVE DIAGNOSIS: Anterior 3.5 cm anal polyp     PROCEDURES PERFORMED:    (1) Excision of Anal Polyp     PROCEDURE DETAILS:   The patient was consented. Preoperative antibiotics and heparin were given. The patient was transferred to the OR and general anesthesia was induced. The patient was then positioned in the prone position with jackknife. The area was prepped and draped in sterile fashion. A timeout was performed. Exparel mixed with injectable saline was injected as a perianal block. The anus was carefully dilated  until three fingers could be introduced. A retractor was introduced and there was a large 3.5 cm anterior anal polyp. No hemorrhoids were identified.  A Johnson clamp was first placed on the polyp. It was excised with cautery. Hemostasis was achieved with cautery. The skin and mucosal incision was closed with a running locking 2-0 chromic suture.  A Gelfoam soaked in Marcaine with epinephrine was placed in the anal canal. Fluffs and panties were placed. The patient tolerated the procedure well and was transferred to PACU in good condition.      Findings: 3.5 cm anal polyp anteriorly   Estimated Blood Loss:  20 mL   Complications: none apparent            Specimens: anterior anal polyp        Disposition: PACU - hemodynamically stable.           Condition: stable    Rocio Read MD  11/20/2023

## 2024-01-09 NOTE — H&P
Rocio Read MD - General Surgery History and Physical     Referring Provider: Rocio Read MD    Patient Care Team:  Brittany Solis MD as PCP - General (Internal Medicine)  Nathaniel Tesfaye MD as Surgeon (Neurosurgery)  Cody Cruz MD as Consulting Physician (Physical Medicine and Rehabilitation)  Catia Miranda MD as Surgeon (General Surgery)  Rocio Read MD as Consulting Physician (General Surgery)    Chief complaint hemorrhoids    Subjective .     History of present illness:  Fiordaliza Lyons is a 63 y.o. female well known to our office for hemorrhoids (she saw Dr. Miranda for this). She was scheduled for hemorrhoidectomy on 6/23/23 but she had back surgery on 5/1/23 and needed to recover before she did another surgery. She has had hemorrhoids for years. She reports they are only reducible manually and never reduce on their own. She has intermittent pressure/pulling discomfort/ache. She has daily bleeding from her hemorrhoids; she has anemia from this. She is not on blood thinners. Non-smoker. She has never had an intervention on her hemorrhoids. She is related to Dr. Jacklyn Vázquez.      Review of Systems    Review of Systems - General ROS: negative  ENT ROS: negative  Respiratory ROS: no cough, shortness of breath, or wheezing  Cardiovascular ROS: no chest pain or dyspnea on exertion  Gastrointestinal ROS: no abdominal pain, change in bowel habits, or black or bloody stools  Genito-Urinary ROS: no dysuria, trouble voiding, or hematuria  Dermatological ROS: negative   Breast ROS: negative for breast lumps  Hematological and Lymphatic ROS: negative  Musculoskeletal ROS: negative   Neurological ROS: no TIA or stroke symptoms    Psychological ROS: negative  Endocrine ROS: negative    History  Past Medical History:   Diagnosis Date    Alcoholic hepatitis with ascites     Allergic rhinitis     Cancer     ovarian    Degeneration of lumbar or lumbosacral intervertebral disc  09/15/2022    Diabetes mellitus     Disease of thyroid gland     Elevated d-dimer     Endometriosis     Fatty liver     GERD (gastroesophageal reflux disease)     History of transfusion     Hyperlipidemia     Hypertension     Low back pain     Osteoporosis     Ovarian cyst     Pancreatitis     Postmenopausal     Rectal mass     Rectal prolapse     Sleep apnea     has corrrected it self with w/ weight loss   ,   Past Surgical History:   Procedure Laterality Date    ARM NERVE EXPLORATION Left     6 nerves scraped ,cleaned nerves off    BILATERAL SALPINGO OOPHORECTOMY  2008    CHOLECYSTECTOMY  1996    COLONOSCOPY  07/2017    Dr Murphy- per patient normal     COLONOSCOPY N/A 11/09/2021    Procedure: COLONOSCOPY WITH ANESTHESIA;  Surgeon: Ray Serna MD;  Location: [facility] ENDOSCOPY;  Service: Gastroenterology;  Laterality: N/A;  pre rectal mass  post  Dr Shields    DILATION AND CURETTAGE, DIAGNOSTIC / THERAPEUTIC      ENDOSCOPY N/A 11/09/2021    Procedure: ESOPHAGOGASTRODUODENOSCOPY WITH ANESTHESIA;  Surgeon: Ray Serna MD;  Location: [facility] ENDOSCOPY;  Service: Gastroenterology;  Laterality: N/A;  pre anemia  post hiatal hernia; gastric polyp  Dr. Shields    KNEE SURGERY Right 1982    LUMBAR LAMINECTOMY WITH FUSION Right 05/01/2023    Procedure: LUMBAR DISCECTOMY, JADON- LAMINECTOMY TRANSFORAMINAL LUMBAR INTERBODY FUSION, POSTERIOR PEDICLE SCREW INSTRUMENTATION, L5-S1 RIGHT. USE OF THE ROBOT;  Surgeon: Nathaniel Tesfaye MD;  Location: [facility] OR;  Service: Neurosurgery;  Laterality: Right;   ,   Family History   Problem Relation Age of Onset    Ovarian cancer Mother     Ovarian cancer Maternal Aunt     Ovarian cancer Maternal Grandmother     Ovarian cancer Paternal Grandmother     Colon polyps Father     Colon polyps Brother     Breast cancer Neg Hx     Colon cancer Neg Hx    ,   Social History     Tobacco Use    Smoking status: Never     Passive exposure: Never    Smokeless tobacco: Never   Vaping Use     Vaping Use: Never used   Substance Use Topics    Alcohol use: Not Currently    Drug use: No   ,   Medications Prior to Admission   Medication Sig Dispense Refill Last Dose    atenolol (TENORMIN) 25 MG tablet Take 1 tablet by mouth Daily. 90 tablet 3 1/9/2024 at 0430    gabapentin (NEURONTIN) 300 MG capsule Take 1 capsule by mouth Daily for 7 days. (Patient taking differently: Take 1 capsule by mouth Every Night.) 7 capsule 0 1/8/2024 at 2000    insulin aspart (novoLOG FLEXPEN) 100 UNIT/ML solution pen-injector sc pen Inject 2-5 Units under the skin into the appropriate area as directed 3 (Three) Times a Day With Meals. 2-5 units with meals sliding scale   1/8/2024 at 2100    levothyroxine (SYNTHROID, LEVOTHROID) 50 MCG tablet Take 1 tablet by mouth Every Morning.   1/8/2024 at 0800    losartan (Cozaar) 25 MG tablet Take 1 tablet by mouth Daily. 90 tablet 3 1/8/2024 at 0800    magnesium oxide (MAG-OX) 400 MG tablet Take 1 tablet by mouth Daily.   1/8/2024 at 0800    Nutritional Supplements (JUICE PLUS FIBRE PO) Take 1 dose by mouth Daily.   1/8/2024 at 0800    pantoprazole (PROTONIX) 40 MG EC tablet Take 1 tablet by mouth Daily.   1/9/2024 at 0430    Toujeo Max SoloStar 300 UNIT/ML solution pen-injector injection Inject 22 Units under the skin into the appropriate area as directed Every Night.   1/8/2024 at 2100    acetaminophen (TYLENOL) 650 MG 8 hr tablet Take 1 tablet by mouth Every 8 (Eight) Hours As Needed for Mild Pain.   1/7/2024    BD Pen Needle Tatyana U/F 32G X 4 MM misc 4 (Four) Times a Day. use as directed       Blood Glucose Monitoring Suppl (ONE TOUCH ULTRA 2) w/Device kit See Admin Instructions.       Continuous Blood Gluc Sensor (Dexcom G6 Sensor) See Admin Instructions.       Continuous Blood Gluc Transmit (Dexcom G6 Transmitter) misc See Admin Instructions.       cyclobenzaprine (FLEXERIL) 10 MG tablet Take 1 tablet by mouth 3 (Three) Times a Day As Needed for Muscle Spasms. (Patient not taking:  Reported on 12/26/2023) 90 tablet 0     glucose blood test strip 5 times a day and prn .       Gvoke HypoPen 2-Pack 1 MG/0.2ML solution auto-injector INJECT 1 MG UNDER THE SKIN ONCE FOR 1 DOSE. USE AS INSTRUCTED AS NEEDED FOR SEVERE HYPOGLYCEMIA. (Patient not taking: Reported on 12/26/2023)       Lancets (OneTouch Delica Plus [id number]) misc TEST 5 TIMES DAILY AND AS NEEDED       Multiple Vitamins-Iron (multivitamin with iron) tablet tablet Take 1 tablet by mouth Daily.   1/6/2024    potassium chloride (K-DUR,KLOR-CON) 10 MEQ CR tablet Take 1 tablet by mouth Daily As Needed.   1/2/2024    tiZANidine (Zanaflex) 4 MG tablet Take 1 tablet by mouth Every 6 (Six) Hours As Needed for Muscle Spasms. (Patient not taking: Reported on 12/26/2023) 12 tablet 0     vitamin D (ERGOCALCIFEROL) 1.25 MG (76631 UT) capsule capsule Take 1 capsule by mouth 1 (One) Time Per Week. sunday 1/7/2024    and Allergies:  Lisinopril, Tizanidine hcl, and Augmentin [amoxicillin-pot clavulanate]    Current Facility-Administered Medications:     dextrose (D50W) (25 g/50 mL) IV injection 12.5 g, 12.5 g, Intravenous, PRN, Randy Daly MD    fentaNYL citrate (PF) (SUBLIMAZE) injection 25 mcg, 25 mcg, Intravenous, Q5 Min PRN, Randy Daly MD    lactated ringers infusion 1,000 mL, 1,000 mL, Intravenous, Continuous, Rocio Read MD, Last Rate: 25 mL/hr at 01/09/24 0743, 1,000 mL at 01/09/24 0743    lactated ringers infusion, 9 mL/hr, Intravenous, Continuous, Randy Daly MD    lidocaine PF 1% (XYLOCAINE) injection 0.5 mL, 0.5 mL, Intradermal, Once PRN, Rocio Read MD    metroNIDAZOLE (FLAGYL) IVPB 500 mg, 500 mg, Intravenous, Once, Rocio Read MD, Last Rate: 100 mL/hr at 01/09/24 0811, 500 mg at 01/09/24 0811    midazolam (VERSED) injection 1 mg, 1 mg, Intravenous, Q10 Min PRN, Randy Daly MD, 1 mg at 01/09/24 0816    sodium chloride 0.9 % flush 10 mL, 10 mL,  Intravenous, Q12H, Randy Daly MD    sodium chloride 0.9 % flush 10 mL, 10 mL, Intravenous, PRN, Randy Daly MD    sodium chloride 0.9 % flush 3 mL, 3 mL, Intravenous, PRN, Rocio Read MD    Objective     Vital Signs   Temp:  [97.2 °F (36.2 °C)] 97.2 °F (36.2 °C)  Heart Rate:  [59] 59  Resp:  [16] 16  BP: (136)/(82) 136/82    Physical Exam:  General appearance - alert, well appearing, and in no distress  Mental status - alert, oriented to person, place, and time  Eyes - pupils equal and reactive, extraocular eye movements intact  Neck - supple, no significant adenopathy  Chest - no tachypnea, retractions or cyanosis  Heart - normal rate and regular rhythm  Abdomen - soft, nontender, nondistended, no masses or organomegaly  Rectal - 2 column grade III hemorrhoids   Neurological - alert, oriented, normal speech, no focal findings or movement disorder noted  Musculoskeletal - no joint tenderness, deformity or swelling       Results Review:     Lab Results (last 24 hours)       Procedure Component Value Units Date/Time    Basic Metabolic Panel [[id number]]  (Abnormal) Collected: 01/09/24 0728    Specimen: Blood Updated: 01/09/24 0756     Glucose 79 mg/dL      BUN 23 mg/dL      Creatinine 1.27 mg/dL      Sodium 140 mmol/L      Potassium 5.6 mmol/L      Chloride 104 mmol/L      CO2 28.0 mmol/L      Calcium 9.7 mg/dL      BUN/Creatinine Ratio 18.1     Anion Gap 8.0 mmol/L      eGFR 48.2 mL/min/1.73     Narrative:      GFR Normal >60  Chronic Kidney Disease <60  Kidney Failure <15      POC Glucose Once [[id number]]  (Normal) Collected: 01/09/24 0733    Specimen: Blood Updated: 01/09/24 0744     Glucose 79 mg/dL      Comment: : [id number] Jacek Mason ID: [id number]             Imaging Results (Last 24 Hours)       ** No results found for the last 24 hours. **              Assessment & Plan       Fiordaliza Lyons is a 63 y.o. female with grade Grade III internal hemorrhoids.  We discussed the options for management including continued medical management with trying different topical treatments vs. banding of the internal hemorrhoids vs. hemorrhoidectomy. She does not wish to continue medical management as she has failed this. The patient is not a candidate for banding 2/2 the grade of the hemorrhoids. The patient would like to proceed with hemorrhoidectomy. We discussed the risks of surgery including bleeding, infection, stricture, urinary retention, and hemorrhoid recurrence. the patient understands that hemorrhoids can recur and that they will need to be on a bowel regimen post operatively with stool softeners and laxatives initially. After the patient has healed, they will need to be vigilant about avoiding constipation. The patient understands all of this and would like to proceed.       Rocio Read MD  01/09/24  08:23 CST

## 2024-01-09 NOTE — DISCHARGE INSTRUCTIONS
Wound:   - you have skin glue on your incisions. Okay to shower tomorrow.   - Leave skin glue in place, it should slowly fall off over 2 weeks   - No swimming/soaking/bathing x 2 weeks to allow incisions to heal.     Activity:   - Activity as tolerated. NO heavy lifting x 4 weeks - no more than 25lb at a time.   - No driving or operating machinery on narcotic pain medication.     Pain medication:   - Take 1000mg of tylenol every 8 hours for 3 days. After three days, take it prn.   - Take 600mg of ibuprofen (motrin) every 8 hours for 3 days. After three days, take it prn.   - You have a prescription for a narcotic. It will be roxicodone 5mg tabs. Take these only as needed after you have taken the tylenol and ibuprofen. If you are taking the roxicodone, make sure to take a stool softener (colace) with it as it can cause constipation.   - The narcotic may make you nauseated, you will have a prescription for zofran in case of nausea.     Follow up:   - make an appointment to see me in 2 weeks  - If you have any concerns before then, call me office at [phone]

## 2024-01-10 LAB
CYTO UR: NORMAL
LAB AP CASE REPORT: NORMAL
Lab: NORMAL
PATH REPORT.FINAL DX SPEC: NORMAL
PATH REPORT.GROSS SPEC: NORMAL

## 2024-01-10 NOTE — PROGRESS NOTES
Patient called and discussed results. She voiced understanding. She has follow up with Dr. Read on 1/31/24.

## 2024-01-10 NOTE — PROGRESS NOTES
Please call the patient and let her know that it was a benign polyp - no cancer nor dysplastic changes.

## 2024-01-19 PROBLEM — D64.9 ANEMIA: Status: RESOLVED | Noted: 2021-10-05 | Resolved: 2024-01-19

## 2024-01-19 PROBLEM — E03.9 ACQUIRED HYPOTHYROIDISM: Status: ACTIVE | Noted: 2022-03-30

## 2024-01-19 PROBLEM — H25.9 AGE-RELATED CATARACT: Status: ACTIVE | Noted: 2022-09-23

## 2024-01-19 PROBLEM — M21.371 RIGHT FOOT DROP: Status: ACTIVE | Noted: 2023-04-12

## 2024-01-19 PROBLEM — H40.1190 PRIMARY OPEN ANGLE GLAUCOMA (POAG): Status: ACTIVE | Noted: 2023-08-28

## 2024-01-19 PROBLEM — M51.379 DEGENERATION OF LUMBAR OR LUMBOSACRAL INTERVERTEBRAL DISC: Status: ACTIVE | Noted: 2022-09-15

## 2024-01-19 PROBLEM — M43.10 ACQUIRED SPONDYLOLISTHESIS: Status: ACTIVE | Noted: 2023-04-12

## 2024-01-19 PROBLEM — I10 PRIMARY HYPERTENSION: Status: ACTIVE | Noted: 2023-06-09

## 2024-01-19 PROBLEM — D12.6 ADENOMATOUS POLYP OF COLON: Status: ACTIVE | Noted: 2022-05-09

## 2024-01-19 PROBLEM — I47.10 PAROXYSMAL SVT (SUPRAVENTRICULAR TACHYCARDIA): Status: ACTIVE | Noted: 2023-09-19

## 2024-01-19 PROBLEM — R60.0 LOCALIZED EDEMA: Status: RESOLVED | Noted: 2021-10-12 | Resolved: 2024-01-19

## 2024-01-19 PROBLEM — M54.16 LUMBAR RADICULOPATHY: Status: ACTIVE | Noted: 2022-09-15

## 2024-01-19 PROBLEM — N30.00 ACUTE CYSTITIS: Status: RESOLVED | Noted: 2021-11-01 | Resolved: 2024-01-19

## 2024-01-19 PROBLEM — E11.3399 MODERATE NONPROLIFERATIVE DIABETIC RETINOPATHY (HCC): Status: ACTIVE | Noted: 2022-09-23

## 2024-01-19 PROBLEM — R19.8 CHANGE IN BOWEL MOVEMENT: Status: RESOLVED | Noted: 2021-10-12 | Resolved: 2024-01-19

## 2024-01-19 PROBLEM — K70.30 ALCOHOLIC CIRRHOSIS (HCC): Status: ACTIVE | Noted: 2021-10-29

## 2024-01-19 PROBLEM — K64.3 GRADE IV HEMORRHOIDS: Status: ACTIVE | Noted: 2023-03-29

## 2024-01-19 PROBLEM — M51.37 DEGENERATION OF LUMBAR OR LUMBOSACRAL INTERVERTEBRAL DISC: Status: ACTIVE | Noted: 2022-09-15

## 2024-01-31 ENCOUNTER — OFFICE VISIT (OUTPATIENT)
Dept: SURGERY | Facility: CLINIC | Age: 64
End: 2024-01-31
Payer: COMMERCIAL

## 2024-01-31 VITALS — OXYGEN SATURATION: 99 % | HEART RATE: 60 BPM | SYSTOLIC BLOOD PRESSURE: 150 MMHG | DIASTOLIC BLOOD PRESSURE: 70 MMHG

## 2024-01-31 DIAGNOSIS — K62.0 ANAL POLYP: Primary | ICD-10-CM

## 2024-01-31 PROCEDURE — 99024 POSTOP FOLLOW-UP VISIT: CPT

## 2024-01-31 NOTE — PROGRESS NOTES
"Patient: Fiordaliza Lyons    YOB: 1960    Date: 01/31/2024    Primary Care Provider: Brittany Solis MD    Vital Signs:   Vitals:    01/31/24 1402   BP: 150/70   Pulse: 60   SpO2: 99%       The patient is tolerating a regular diet and has no complaints s/p polyp removal by Dr. Read on 1/9/24. The patient denies fevers, chills, nausea, vomiting, and excessive pain. She reports having regular bowel movements with minimal pain.     Results Review:   I reviewed the patient's new clinical results.    Tissue Pathology Exam (01/09/2024 10:11)   \"Anterior rectal polyp\": Hyperplastic polyp arising at the anorectal junction exhibiting foci of mucosal erosion and acute inflammation.     Assessment / Plan:    Diagnoses and all orders for this visit:    1. Anal polyp (Primary)    Ms. Lyons is 3 weeks s/p anal polyp excision. She is overall doing well. Her bowel movements are normal with no bleeding and minimal pain. At this time, I have instructed her to make sure she has follow up with GI as they may want to do a colonoscopy sooner than originally planned. She states that she already is required to have them every 3 years but will make sure they are aware. I have also instructed her to call for an appointment if she has any new problems or concerns. She voiced understanding and is agreeable to the plan.     Follow up:     Return if symptoms worsen or fail to improve.        Electronically signed by Hailee Bustillos PA-C  02/01/24  20:04 CST                  "

## 2024-02-02 NOTE — PROGRESS NOTES
[facility] - PODIATRY    Today's Date: 02/06/2024     Patient Name: Fiordaliza Lyons  MRN: [id number]  CSN: [id number]  PCP: Brittany Solis MD  Referring Provider: No ref. provider found    SUBJECTIVE     Chief Complaint   Patient presents with    Follow-up     Brittany Solis MD 12/01/2023 3 MTH FU- pt states  has some sores on back of both heals from walking to much couple weeks ago. Right foot pains in arch and top of foot into toes that wakes her up at night hurts so much. Right foot still having issues since back surgery last may- pt pain 10/10 at worst, mostly the middle of night when it wakes her up.     Diabetes     193mg/dl BG after breakfast this am      HPI: Fiordaliza Lyons, a 63 y.o.female, comes to clinic as a(n) established patient presenting for diabetic foot exam and complaining of toenail/callus issues. Patient has h/o alcoholic hepatitis, breast cysts, DM I, endometreosis, GERD, HLD, HTN, pancreatitis, sleep apnea . Patient is IDDM with last stated BG level of 193mg/dl.  Relates ongoing issues with hyperglycemia.  Notes continued issues with right hip and low back pain.  Has f/u with Dr. Tesfaye and may see Ortho.  Notes overall improvement but does have some weakness to her right lower leg causing some mild foot drop.  Otherwise, notes some mild numbness and tingling in her feet.  Denies open wounds or sores.  Continues to have mild issues with her toenails. Admits pain at 10/10 level and described as aching, nagging, dull, numbness and tingling. Relates previous treatment(s) including foot care by podiatry . Denies any constitutional symptoms. No other pedal complaints at this time.    Past Medical History:   Diagnosis Date    Alcoholic hepatitis with ascites     Allergic rhinitis     Cancer     ovarian    Degeneration of lumbar or lumbosacral intervertebral disc 09/15/2022    Diabetes mellitus     Disease of thyroid gland     Elevated d-dimer     Endometriosis     Fatty liver      GERD (gastroesophageal reflux disease)     History of transfusion     Hyperlipidemia     Hypertension     Low back pain     Osteoporosis     Ovarian cyst     Pancreatitis     Postmenopausal     Rectal mass     Rectal prolapse     Sleep apnea     has corrrected it self with w/ weight loss     Past Surgical History:   Procedure Laterality Date    ARM NERVE EXPLORATION Left     6 nerves scraped ,cleaned nerves off    BILATERAL SALPINGO OOPHORECTOMY  2008    CHOLECYSTECTOMY  1996    COLONOSCOPY  07/2017    Dr Murphy- per patient normal     COLONOSCOPY N/A 11/09/2021    Procedure: COLONOSCOPY WITH ANESTHESIA;  Surgeon: Ray Serna MD;  Location: [facility] ENDOSCOPY;  Service: Gastroenterology;  Laterality: N/A;  pre rectal mass  post  Dr Shields    DILATION AND CURETTAGE, DIAGNOSTIC / THERAPEUTIC      ENDOSCOPY N/A 11/09/2021    Procedure: ESOPHAGOGASTRODUODENOSCOPY WITH ANESTHESIA;  Surgeon: Ray Serna MD;  Location: [facility] ENDOSCOPY;  Service: Gastroenterology;  Laterality: N/A;  pre anemia  post hiatal hernia; gastric polyp  Dr. Shields    HEMORRHOIDECTOMY SIGMOIDOSCOPY N/A 1/9/2024    Procedure: HEMORRHOIDECTOMY WITH EXAM UNDER ANESTHESIA;  Surgeon: Rocio Read MD;  Location: [facility] OR;  Service: General;  Laterality: N/A;    KNEE SURGERY Right 1982    LUMBAR LAMINECTOMY WITH FUSION Right 05/01/2023    Procedure: LUMBAR DISCECTOMY, JADON- LAMINECTOMY TRANSFORAMINAL LUMBAR INTERBODY FUSION, POSTERIOR PEDICLE SCREW INSTRUMENTATION, L5-S1 RIGHT. USE OF THE ROBOT;  Surgeon: Nathaniel Tesfaye MD;  Location: [facility] OR;  Service: Neurosurgery;  Laterality: Right;     Family History   Problem Relation Age of Onset    Ovarian cancer Mother     Ovarian cancer Maternal Aunt     Ovarian cancer Maternal Grandmother     Ovarian cancer Paternal Grandmother     Colon polyps Father     Colon polyps Brother     Breast cancer Neg Hx     Colon cancer Neg Hx      Social History     Socioeconomic History    Marital  "status: Single   Tobacco Use    Smoking status: Never     Passive exposure: Never    Smokeless tobacco: Never   Vaping Use    Vaping Use: Never used   Substance and Sexual Activity    Alcohol use: Not Currently    Drug use: No    Sexual activity: Not Currently     Allergies   Allergen Reactions    Lisinopril Cough    Tizanidine Hcl Dizziness     Pt stated \"horrible dry mouth, dizziness, couldn't see, it was awful\" drops blood sugar down     Augmentin [Amoxicillin-Pot Clavulanate] Nausea And Vomiting     Current Outpatient Medications   Medication Sig Dispense Refill    acetaminophen (Tylenol) 325 MG tablet Take 3 tablets by mouth Every 8 (Eight) Hours. Take every 8 hours for 3 days then take prn as needed.      acetaminophen (TYLENOL) 650 MG 8 hr tablet Take 1 tablet by mouth Every 8 (Eight) Hours As Needed for Mild Pain.      atenolol (TENORMIN) 25 MG tablet Take 1 tablet by mouth Daily. 90 tablet 3    BD Pen Needle Tatyana U/F 32G X 4 MM misc 4 (Four) Times a Day. use as directed      Blood Glucose Monitoring Suppl (ONE TOUCH ULTRA 2) w/Device kit See Admin Instructions.      Continuous Blood Gluc Sensor (Dexcom G6 Sensor) See Admin Instructions.      Continuous Blood Gluc Transmit (Dexcom G6 Transmitter) misc See Admin Instructions.      cyclobenzaprine (FLEXERIL) 10 MG tablet Take 1 tablet by mouth 3 (Three) Times a Day As Needed for Muscle Spasms. 90 tablet 0    docusate sodium (Colace) 100 MG capsule Take 1 capsule by mouth 2 (Two) Times a Day for 30 days. 60 capsule 0    glucose blood test strip 5 times a day and prn .      Gvoke HypoPen 2-Pack 1 MG/0.2ML solution auto-injector       ibuprofen (Motrin IB) 200 MG tablet Take 3 tablets by mouth Every 8 (Eight) Hours. Take every 8 hours for three days then take as needed.      insulin aspart (novoLOG FLEXPEN) 100 UNIT/ML solution pen-injector sc pen Inject 2-5 Units under the skin into the appropriate area as directed 3 (Three) Times a Day With Meals. 2-5 units " with meals sliding scale      Lancets (OneTouch Delica Plus [id number]) misc TEST 5 TIMES DAILY AND AS NEEDED      levothyroxine (SYNTHROID, LEVOTHROID) 50 MCG tablet Take 1 tablet by mouth Every Morning.      losartan (Cozaar) 25 MG tablet Take 1 tablet by mouth Daily. 90 tablet 3    magnesium oxide (MAG-OX) 400 MG tablet Take 1 tablet by mouth Daily.      Multiple Vitamins-Iron (multivitamin with iron) tablet tablet Take 1 tablet by mouth Daily.      Nutritional Supplements (JUICE PLUS FIBRE PO) Take 1 dose by mouth Daily.      pantoprazole (PROTONIX) 40 MG EC tablet Take 1 tablet by mouth Daily.      potassium chloride (K-DUR,KLOR-CON) 10 MEQ CR tablet Take 1 tablet by mouth Daily As Needed.      Toujeo Max SoloStar 300 UNIT/ML solution pen-injector injection Inject 22 Units under the skin into the appropriate area as directed Every Night.      vitamin D (ERGOCALCIFEROL) 1.25 MG (67003 UT) capsule capsule Take 1 capsule by mouth 1 (One) Time Per Week. sunday      gabapentin (NEURONTIN) 300 MG capsule Take 1 capsule by mouth Daily for 7 days. (Patient taking differently: Take 1 capsule by mouth Every Night.) 7 capsule 0     No current facility-administered medications for this visit.     Review of Systems   Constitutional:  Negative for chills and fever.   HENT:  Negative for congestion.    Respiratory:  Negative for shortness of breath.    Cardiovascular:  Positive for leg swelling. Negative for chest pain.   Gastrointestinal:  Negative for constipation, diarrhea, nausea and vomiting.   Musculoskeletal:  Positive for arthralgias and back pain.        Foot pain   Skin:  Negative for wound.   Neurological:  Positive for numbness.       OBJECTIVE     Vitals:    02/06/24 0803   BP: 108/58   Pulse: 74   SpO2: 100%         PHYSICAL EXAM  GEN:   Accompanied by none.     Foot/Ankle Exam    GENERAL  Diabetic foot exam performed    Appearance:  appears stated age  Orientation:  AAOx3  Affect:  appropriate  Gait:   unimpaired  Assistance:  independent  Right shoe gear: casual shoe  Left shoe gear: casual shoe    VASCULAR     Right Foot Vascularity   Dorsalis pedis:  2+  Posterior tibial:  1+  Skin temperature:  warm  Edema grading:  Trace  CFT:  3  Pedal hair growth:  Present  Varicosities:  mild varicosities     Left Foot Vascularity   Dorsalis pedis:  2+  Posterior tibial:  1+  Skin temperature:  warm  Edema grading:  Trace  CFT:  3  Pedal hair growth:  Present  Varicosities:  mild varicosities     NEUROLOGIC     Right Foot Neurologic   Light touch sensation: diminished  Vibratory sensation: diminished  Hot/Cold sensation: diminished  Protective Sensation using [place] Monofilament:   Sites intact: 5  Sites tested: 10     Left Foot Neurologic   Light touch sensation: diminished  Vibratory sensation: diminished  Hot/Cold sensation:  diminished  Protective Sensation using [place] Monofilament:   Sites intact: 5  Sites tested: 10    MUSCULOSKELETAL     Right Foot Musculoskeletal   Ecchymosis:  none  Tenderness:  toenail problem    Arch:  Normal  Hallux valgus: No    Hallux limitus: Yes (Dorsal spurring)       Left Foot Musculoskeletal   Ecchymosis:  none  Tenderness:  toenail problem  Arch:  Normal  Hallux valgus: No      MUSCLE STRENGTH     Right Foot Muscle Strength   Foot dorsiflexion:  4  Foot plantar flexion:  5  Foot inversion:  4+  Foot eversion:  5     Left Foot Muscle Strength   Foot dorsiflexion:  5  Foot plantar flexion:  5  Foot inversion:  5  Foot eversion:  5    RANGE OF MOTION     Right Foot Range of Motion   Foot and ankle ROM within normal limits       Left Foot Range of Motion   Foot and ankle ROM within normal limits      DERMATOLOGIC      Right Foot Dermatologic   Skin  Right foot skin is intact.   Nails  1.  Positive for elongated, onychomycosis, abnormal thickness and subungual debris.  2.  Positive for elongated, onychomycosis, abnormal thickness and subungual debris.  3.  Positive for  elongated, onychomycosis, abnormal thickness and subungual debris.  4.  Positive for elongated, onychomycosis, abnormal thickness and subungual debris.  5.  Positive for elongated, onychomycosis, abnormal thickness and subungual debris.  Nails comment:  1-5     Left Foot Dermatologic   Skin  Left foot skin is intact.   Nails comment:  1-5  Nails  1.  Positive for elongated, onychomycosis, abnormal thickness and subungual debris.  2.  Positive for elongated, onychomycosis, abnormal thickness and subungual debris.  3.  Positive for elongated, onychomycosis, abnormal thickness and subungual debris.  4.  Positive for elongated, onychomycosis, abnormally thick and subungual debris.  5.  Positive for elongated, onychomycosis, abnormally thick and subungual debris.      RADIOLOGY/NUCLEAR:  No results found.      LABORATORY/CULTURE RESULTS:      PATHOLOGY RESULTS:       ASSESSMENT/PLAN     Diagnoses and all orders for this visit:    1. Onychomycosis (Primary)    2. Type 1 diabetes mellitus with diabetic neuropathy    3. Hallux limitus of right foot    4. Foot drop, right    5. Degenerative disc disease, lumbar        Comprehensive lower extremity examination and evaluation was performed.  Discussed findings and treatment plan including risks, benefits, and treatment options with patient in detail. Patient agreed with treatment plan.  After verbal consent obtained, nail(s) x10 debrided of length and thickness with nail nipper without incidence  Patient may maintain nails and calluses at home utilizing emery board or pumice stone between visits as needed  Reviewed at home diabetic foot care including daily foot checks   Continue follow-up with neurosurgery.  Agree with ortho eval for hip pain.  Discussed right foot foot drop.  Patient should continue strengthening exercises to improve the condition.  If her functionality does not improve, she may ultimately benefit from AFO.  Discussed treatment of hallux limitus including  conservative measures with hard soled shoes vs. Surgical correction with arthrodesis. Will remain conservative at this time.  An After Visit Summary was printed and given to the patient at discharge, including (if requested) any available informative/educational handouts regarding diagnosis, treatment, or medications. All questions were answered to patient/family satisfaction. Should symptoms fail to improve or worsen they agree to call or return to clinic or to go to the Emergency Department. Discussed the importance of following up with any needed screening tests/labs/specialist appointments and any requested follow-up recommended by me today. Importance of maintaining follow-up discussed and patient accepts that missed appointments can delay diagnosis and potentially lead to worsening of conditions.  Return in about 3 months (around 5/6/2024) for Follow-up with Podiatry APRN, Schedule [facility]., or sooner if acute issues arise.    Lab Frequency Next Occurrence   US Paracentesis Once 10/31/2021   Follow Anesthesia Guidelines / Protocol Once 11/05/2021   Obtain Informed Consent Once 11/10/2021   Diet: Once 11/09/2021   Comprehensive Metabolic Panel Once 03/31/2022       This document has been electronically signed by Morro Tony DPM on February 6, 2024 08:19 CST

## 2024-02-03 DIAGNOSIS — E10.40 TYPE 1 DIABETES MELLITUS WITH DIABETIC NEUROPATHY (HCC): ICD-10-CM

## 2024-02-03 DIAGNOSIS — N30.00 ACUTE CYSTITIS WITHOUT HEMATURIA: ICD-10-CM

## 2024-02-05 ENCOUNTER — TELEPHONE (OUTPATIENT)
Dept: PODIATRY | Facility: CLINIC | Age: 64
End: 2024-02-05
Payer: COMMERCIAL

## 2024-02-05 ENCOUNTER — TELEPHONE (OUTPATIENT)
Dept: NEUROSURGERY | Facility: CLINIC | Age: 64
End: 2024-02-05
Payer: COMMERCIAL

## 2024-02-05 NOTE — TELEPHONE ENCOUNTER
Randy Mcbride, RAYMON sent to Kristyn Sandoval CMA  Nothing for me to see sooner for.  If you need to reschedule with Brien then reschedule but she needs to go see orthopedics first

## 2024-02-05 NOTE — TELEPHONE ENCOUNTER
PATIENT CALLED BACK - WANTS TO KNOW OF SHE CAN CHANGE HER APPOINTMENT TO THIS THURSDAY, AS ONE OF HER FRIENDS WAS SCHEDULED FOR THIS THURSDAY AND CHANGED IT, SO SHE WANTS TO SEE IF SHE CAN TAKE HER. THE PATIENT RECENTLY HAD IMAGING DONE AND IS CONCERNED ABOUT THE RESULTS, WANTS TO GET IN ASAP. PLEASE ADVISE THE PATIENT. THANKS.    CALL BACK 3 TODAY.

## 2024-02-05 NOTE — TELEPHONE ENCOUNTER
Called patient to give her a new time for her appt on 3/28/24; however, she didn't answer so I left a VM asking if it would be okay to change the time from 3:45 pm to 12 noon on the same day.    FRANSISCA PITTMAN  [facility] [place] [facility]      IT IS OKAY FOR THE HUB TO DELIVER THIS INFORMATION TO THE PATIENT IF THEY RECEIVE THIS CALL BACK

## 2024-02-05 NOTE — TELEPHONE ENCOUNTER
Called patient regarding appt on 02/06/2024. Left message for patient to return call if any questions or concerns arise.

## 2024-02-05 NOTE — TELEPHONE ENCOUNTER
Patient called me back and I explained to her that I don't have an opening on Thursday & I also asked if she had seen the orthopedic physician.  She wasn't aware of needing to see orthopedic.  I read her the message from 1/4/24 where Fernandosandra spoke w/her and she wanted to speak with family members who were physicians and then get back with us.  She states she will talk to her family and let me know.    FRANSISCA PITTMAN  [facility] [place] [facility]

## 2024-02-05 NOTE — TELEPHONE ENCOUNTER
I called patient back to let her know I do not have an opening this week as that opening has already been filled.  She didn't answer so I left a VM asking her to call me back.    FRANSISCA PITTMAN  [facility] [place] [facility]

## 2024-02-06 ENCOUNTER — OFFICE VISIT (OUTPATIENT)
Dept: PODIATRY | Facility: CLINIC | Age: 64
End: 2024-02-06
Payer: COMMERCIAL

## 2024-02-06 VITALS
BODY MASS INDEX: 22.97 KG/M2 | SYSTOLIC BLOOD PRESSURE: 108 MMHG | WEIGHT: 117 LBS | DIASTOLIC BLOOD PRESSURE: 58 MMHG | OXYGEN SATURATION: 100 % | HEIGHT: 60 IN | HEART RATE: 74 BPM

## 2024-02-06 DIAGNOSIS — M51.36 DEGENERATIVE DISC DISEASE, LUMBAR: ICD-10-CM

## 2024-02-06 DIAGNOSIS — M21.371 FOOT DROP, RIGHT: ICD-10-CM

## 2024-02-06 DIAGNOSIS — B35.1 ONYCHOMYCOSIS: Primary | ICD-10-CM

## 2024-02-06 DIAGNOSIS — E10.40 TYPE 1 DIABETES MELLITUS WITH DIABETIC NEUROPATHY: ICD-10-CM

## 2024-02-06 DIAGNOSIS — M20.5X1 HALLUX LIMITUS OF RIGHT FOOT: ICD-10-CM

## 2024-02-06 PROCEDURE — 11721 DEBRIDE NAIL 6 OR MORE: CPT | Performed by: PODIATRIST

## 2024-02-06 RX ORDER — GABAPENTIN 300 MG/1
300 CAPSULE ORAL NIGHTLY
Qty: 30 CAPSULE | Refills: 2 | Status: SHIPPED | OUTPATIENT
Start: 2024-02-06 | End: 2024-05-06

## 2024-02-06 RX ORDER — SULFAMETHOXAZOLE AND TRIMETHOPRIM 800; 160 MG/1; MG/1
TABLET ORAL
Qty: 20 TABLET | Refills: 1 | Status: SHIPPED | OUTPATIENT
Start: 2024-02-06

## 2024-02-07 DIAGNOSIS — R93.7 ABNORMAL MRI SCAN, BONE: ICD-10-CM

## 2024-02-07 DIAGNOSIS — M25.551 RIGHT HIP PAIN: Primary | ICD-10-CM

## 2024-02-07 NOTE — TELEPHONE ENCOUNTER
Patient called and left a VM stating she would like to see Dr Newton for her hip but then stated she would like a call back to explain exactly what it is he will be doing.  I called her back to let her know I have placed the order for the referral & that this is a consultation to discuss her hip issues and to see if there is anything that needs to be done.  She expressed understanding.    FRANSISCA PITTMAN  [facility] [place] [facility]

## 2024-02-22 ENCOUNTER — OFFICE VISIT (OUTPATIENT)
Dept: NEUROSURGERY | Facility: CLINIC | Age: 64
End: 2024-02-22
Payer: COMMERCIAL

## 2024-02-22 VITALS — HEIGHT: 60 IN | WEIGHT: 115 LBS | BODY MASS INDEX: 22.58 KG/M2

## 2024-02-22 DIAGNOSIS — Z78.9 NON-SMOKER: ICD-10-CM

## 2024-02-22 DIAGNOSIS — R29.898 RIGHT LEG WEAKNESS: ICD-10-CM

## 2024-02-22 DIAGNOSIS — M54.41 ACUTE RIGHT-SIDED LOW BACK PAIN WITH RIGHT-SIDED SCIATICA: Primary | ICD-10-CM

## 2024-02-22 DIAGNOSIS — M25.551 RIGHT HIP PAIN: ICD-10-CM

## 2024-02-22 PROCEDURE — 99213 OFFICE O/P EST LOW 20 MIN: CPT | Performed by: NEUROLOGICAL SURGERY

## 2024-02-22 RX ORDER — CLOBETASOL PROPIONATE 0.46 MG/ML
SOLUTION TOPICAL
COMMUNITY
Start: 2024-02-07

## 2024-02-22 NOTE — PATIENT INSTRUCTIONS
PATIENT TO CONTINUE TO FOLLOW UP WITH HER PRIMARY CARE PROVIDER FOR YEARLY PHYSICAL EXAMS TO ENSURE COMPLETE HEALTH MAINTENANCE

## 2024-02-22 NOTE — PROGRESS NOTES
SUBJECTIVE:  Patient ID: Fiordaliza Lyons is a 64 y.o. female is here today for follow-up.    Chief Complaint: Back  Chief Complaint   Patient presents with    3 mo follow up symptom check     Patient continues to complain of back & RT leg pain.  She has some weakness of her RT leg.  She saw orthopedic for her hip but nothing surgical.  She continues to have falls.  5/1/23 patient underwent L5/S1 TLIF       HPI  64-year-old female that underwent an L5-S1 TLIF from the left  May for 2023.  She has had some persistent right paraspinal back pain since the surgery.  Today she continues to complain of right paraspinal back pain and some radicular pain down the lateral aspect of the right leg.  She was seen by an orthopedic specialist regarding her hip and they felt that no surgery was necessary.  She has done and postoperative course of physical therapy and continues to do home exercises to some benefit.    The following portions of the patient's history were reviewed and updated as appropriate: allergies, current medications, past family history, past medical history, past social history, past surgical history and problem list.    OBJECTIVE:    Review of Systems   Musculoskeletal:  Positive for arthralgias and back pain.   All other systems reviewed and are negative.         Physical Exam  Eyes:      Extraocular Movements: EOM normal.      Pupils: Pupils are equal, round, and reactive to light.   Neurological:      Mental Status: She is oriented to person, place, and time.      Motor: Motor strength is normal.     Coordination: Finger-Nose-Finger Test normal.      Gait: Gait is intact.   Psychiatric:         Speech: Speech normal.         Neurologic Exam     Mental Status   Oriented to person, place, and time.   Attention: normal.   Speech: speech is normal   Level of consciousness: alert  Knowledge: good.     Cranial Nerves     CN II   Visual fields full to confrontation.     CN III, IV, VI   Pupils are equal, round,  and reactive to light.  Extraocular motions are normal.     CN V   Facial sensation intact.     CN VII   Facial expression full, symmetric.     CN VIII   CN VIII normal.     CN IX, X   CN IX normal.   CN X normal.     CN XI   CN XI normal.     CN XII   CN XII normal.     Motor Exam   Muscle bulk: normal  Overall muscle tone: normal  Right arm pronator drift: absent  Left arm pronator drift: absent    Strength   Strength 5/5 throughout.     Sensory Exam   Light touch normal.   Pinprick normal.     Gait, Coordination, and Reflexes     Gait  Gait: normal    Coordination   Finger to nose coordination: normal    Tremor   Resting tremor: absent  Intention tremor: absent  Action tremor: absent    Reflexes   Reflexes 2+ except as noted.       Independent Review of Radiographic Studies:   Postoperative plain x-rays, MRI, CAT scan show good position of all interbody device and hardware.  There is no significant neuroforaminal compromise or compression at any of the other levels of her lumbar spine.    ASSESSMENT/PLAN:  The patient has some persistent right paraspinal back pain after her surgery and some right leg and hip pain.  She does not require any further surgical intervention.  We discussed the imaging findings at length.  Her questions and concerns were addressed.  We are going to make referral to [facility] for injection treatments as a neck step.      1. Acute right-sided low back pain with right-sided sciatica    2. Right leg weakness    3. Right hip pain    4. Non-smoker    5. Body mass index (BMI) of 22.0 to 22.9 in adult        The patient's Body mass index is 22.28 kg/m².. BMI is within normal parameters. No follow-up required.    No follow-ups on file.      Nathaniel Tesfaye MD

## 2024-03-06 RX ORDER — PROCHLORPERAZINE 25 MG/1
SUPPOSITORY RECTAL
Qty: 3 EACH | Refills: 0 | Status: SHIPPED | OUTPATIENT
Start: 2024-03-06

## 2024-03-11 ENCOUNTER — TELEPHONE (OUTPATIENT)
Dept: HEMATOLOGY | Age: 64
End: 2024-03-11

## 2024-03-13 ENCOUNTER — TELEPHONE (OUTPATIENT)
Dept: HEMATOLOGY | Age: 64
End: 2024-03-13

## 2024-03-13 NOTE — TELEPHONE ENCOUNTER
Called pt. to remind them of appointment on 3/15/2024 and had to leave a detailed voicemail with appointment date and time

## 2024-04-08 ENCOUNTER — TRANSCRIBE ORDERS (OUTPATIENT)
Dept: ADMINISTRATIVE | Facility: HOSPITAL | Age: 64
End: 2024-04-08
Payer: COMMERCIAL

## 2024-04-08 DIAGNOSIS — R30.0 DYSURIA: Primary | ICD-10-CM

## 2024-04-08 DIAGNOSIS — E11.40 TYPE 2 DIABETES MELLITUS WITH DIABETIC NEUROPATHY, UNSPECIFIED WHETHER LONG TERM INSULIN USE: ICD-10-CM

## 2024-04-08 DIAGNOSIS — D64.9 ANEMIA, UNSPECIFIED TYPE: ICD-10-CM

## 2024-04-08 DIAGNOSIS — R74.8 ELEVATED ALKALINE PHOSPHATASE LEVEL: ICD-10-CM

## 2024-04-08 DIAGNOSIS — E55.9 VITAMIN D DEFICIENCY: ICD-10-CM

## 2024-04-09 ENCOUNTER — LAB (OUTPATIENT)
Dept: LAB | Facility: HOSPITAL | Age: 64
End: 2024-04-09
Payer: COMMERCIAL

## 2024-04-09 DIAGNOSIS — R30.0 DYSURIA: ICD-10-CM

## 2024-04-09 DIAGNOSIS — D64.9 ANEMIA, UNSPECIFIED TYPE: ICD-10-CM

## 2024-04-09 DIAGNOSIS — E11.40 TYPE 2 DIABETES MELLITUS WITH DIABETIC NEUROPATHY, UNSPECIFIED WHETHER LONG TERM INSULIN USE: ICD-10-CM

## 2024-04-09 DIAGNOSIS — E55.9 VITAMIN D DEFICIENCY: ICD-10-CM

## 2024-04-09 DIAGNOSIS — R74.8 ELEVATED ALKALINE PHOSPHATASE LEVEL: ICD-10-CM

## 2024-04-09 LAB
ALBUMIN SERPL-MCNC: 4.5 G/DL (ref 3.5–5)
ALBUMIN/GLOB SERPL: 1.5 G/DL (ref 1.1–2.5)
ALP SERPL-CCNC: 166 U/L (ref 24–120)
ALT SERPL W P-5'-P-CCNC: 21 U/L (ref 0–35)
ANION GAP SERPL CALCULATED.3IONS-SCNC: 12 MMOL/L (ref 4–13)
AST SERPL-CCNC: 35 U/L (ref 7–45)
AUTO MIXED CELLS #: 0.5 10*3/MM3 (ref 0.1–2.6)
AUTO MIXED CELLS %: 8.9 % (ref 0.1–24)
BACTERIA UR QL AUTO: ABNORMAL /HPF
BILIRUB SERPL-MCNC: 0.4 MG/DL (ref 0.1–1)
BILIRUB UR QL STRIP: NEGATIVE
BUN SERPL-MCNC: 19 MG/DL (ref 5–21)
BUN/CREAT SERPL: 21.3
CALCIUM SPEC-SCNC: 9.3 MG/DL (ref 8.4–10.4)
CHLORIDE SERPL-SCNC: 97 MMOL/L (ref 98–110)
CHOLEST SERPL-MCNC: 141 MG/DL (ref 130–200)
CLARITY UR: CLEAR
CO2 SERPL-SCNC: 28 MMOL/L (ref 24–31)
COLOR UR: YELLOW
CREAT SERPL-MCNC: 0.89 MG/DL (ref 0.5–1.4)
EGFRCR SERPLBLD CKD-EPI 2021: 73.4 ML/MIN/1.73
ERYTHROCYTE [DISTWIDTH] IN BLOOD BY AUTOMATED COUNT: 14.9 % (ref 12.3–15.4)
GLOBULIN UR ELPH-MCNC: 3 GM/DL
GLUCOSE SERPL-MCNC: 298 MG/DL (ref 70–100)
GLUCOSE UR STRIP-MCNC: ABNORMAL MG/DL
HBA1C MFR BLD: 5.7 % (ref 4.8–5.9)
HCT VFR BLD AUTO: 36.8 % (ref 34–46.6)
HDLC SERPL-MCNC: 54 MG/DL
HGB BLD-MCNC: 11.6 G/DL (ref 12–15.9)
HGB UR QL STRIP.AUTO: ABNORMAL
HYALINE CASTS UR QL AUTO: ABNORMAL /LPF
KETONES UR QL STRIP: NEGATIVE
LDLC SERPL CALC-MCNC: 63 MG/DL (ref 0–99)
LDLC/HDLC SERPL: 1.1 {RATIO}
LEUKOCYTE ESTERASE UR QL STRIP.AUTO: ABNORMAL
LYMPHOCYTES # BLD AUTO: 0.5 10*3/MM3 (ref 0.7–3.1)
LYMPHOCYTES NFR BLD AUTO: 9.9 % (ref 19.6–45.3)
MCH RBC QN AUTO: 27 PG (ref 26.6–33)
MCHC RBC AUTO-ENTMCNC: 31.5 G/DL (ref 31.5–35.7)
MCV RBC AUTO: 85.6 FL (ref 79–97)
NEUTROPHILS NFR BLD AUTO: 4.4 10*3/MM3 (ref 1.7–7)
NEUTROPHILS NFR BLD AUTO: 81.2 % (ref 42.7–76)
NITRITE UR QL STRIP: POSITIVE
PH UR STRIP.AUTO: 6 [PH] (ref 5–8)
PLATELET # BLD AUTO: 178 10*3/MM3 (ref 140–450)
PMV BLD AUTO: 8.6 FL (ref 6–12)
POTASSIUM SERPL-SCNC: 4.1 MMOL/L (ref 3.5–5.3)
PROT SERPL-MCNC: 7.5 G/DL (ref 6.3–8.7)
PROT UR QL STRIP: NEGATIVE
RBC # BLD AUTO: 4.3 10*6/MM3 (ref 3.77–5.28)
RBC # UR STRIP: ABNORMAL /HPF
REF LAB TEST METHOD: ABNORMAL
SODIUM SERPL-SCNC: 137 MMOL/L (ref 135–145)
SP GR UR STRIP: <=1.005 (ref 1–1.03)
SQUAMOUS #/AREA URNS HPF: ABNORMAL /HPF
TRIGL SERPL-MCNC: 139 MG/DL (ref 0–149)
UROBILINOGEN UR QL STRIP: ABNORMAL
VLDLC SERPL-MCNC: 24 MG/DL (ref 5–40)
WBC # UR STRIP: ABNORMAL /HPF
WBC NRBC COR # BLD AUTO: 5.4 10*3/MM3 (ref 3.4–10.8)

## 2024-04-09 PROCEDURE — 87086 URINE CULTURE/COLONY COUNT: CPT

## 2024-04-09 PROCEDURE — 84466 ASSAY OF TRANSFERRIN: CPT

## 2024-04-09 PROCEDURE — 82607 VITAMIN B-12: CPT

## 2024-04-09 PROCEDURE — 80061 LIPID PANEL: CPT

## 2024-04-09 PROCEDURE — 83036 HEMOGLOBIN GLYCOSYLATED A1C: CPT | Performed by: INTERNAL MEDICINE

## 2024-04-09 PROCEDURE — 81001 URINALYSIS AUTO W/SCOPE: CPT

## 2024-04-09 PROCEDURE — 87186 SC STD MICRODIL/AGAR DIL: CPT

## 2024-04-09 PROCEDURE — 36415 COLL VENOUS BLD VENIPUNCTURE: CPT

## 2024-04-09 PROCEDURE — 83540 ASSAY OF IRON: CPT

## 2024-04-09 PROCEDURE — 82728 ASSAY OF FERRITIN: CPT

## 2024-04-09 PROCEDURE — 82746 ASSAY OF FOLIC ACID SERUM: CPT

## 2024-04-09 PROCEDURE — 82306 VITAMIN D 25 HYDROXY: CPT

## 2024-04-09 PROCEDURE — 87088 URINE BACTERIA CULTURE: CPT

## 2024-04-09 PROCEDURE — 80050 GENERAL HEALTH PANEL: CPT

## 2024-04-10 LAB
25(OH)D3 SERPL-MCNC: 41.2 NG/ML (ref 30–100)
FERRITIN SERPL-MCNC: 14.7 NG/ML (ref 13–150)
FOLATE SERPL-MCNC: >20 NG/ML (ref 4.78–24.2)
IRON 24H UR-MRATE: 40 MCG/DL (ref 37–145)
IRON SATN MFR SERPL: 10 % (ref 20–50)
TIBC SERPL-MCNC: 413 MCG/DL (ref 298–536)
TRANSFERRIN SERPL-MCNC: 277 MG/DL (ref 200–360)
TSH SERPL DL<=0.05 MIU/L-ACNC: 0.6 UIU/ML (ref 0.27–4.2)
VIT B12 BLD-MCNC: 1048 PG/ML (ref 211–946)

## 2024-04-11 LAB — BACTERIA SPEC AEROBE CULT: ABNORMAL

## 2024-04-12 DIAGNOSIS — K62.5 GASTROINTESTINAL HEMORRHAGE ASSOCIATED WITH ANORECTAL SOURCE: ICD-10-CM

## 2024-04-12 DIAGNOSIS — K21.9 GASTROESOPHAGEAL REFLUX DISEASE WITHOUT ESOPHAGITIS: ICD-10-CM

## 2024-04-12 RX ORDER — PANTOPRAZOLE SODIUM 40 MG/1
40 TABLET, DELAYED RELEASE ORAL
Qty: 30 TABLET | Refills: 3 | Status: SHIPPED | OUTPATIENT
Start: 2024-04-12

## 2024-04-12 RX ORDER — LANOLIN ALCOHOL/MO/W.PET/CERES
400 CREAM (GRAM) TOPICAL DAILY
Qty: 90 TABLET | Refills: 3 | Status: SHIPPED | OUTPATIENT
Start: 2024-04-12

## 2024-04-15 RX ORDER — SULFAMETHOXAZOLE AND TRIMETHOPRIM 800; 160 MG/1; MG/1
1 TABLET ORAL 2 TIMES DAILY
Qty: 14 TABLET | Refills: 0 | Status: SHIPPED | OUTPATIENT
Start: 2024-04-15 | End: 2024-04-22

## 2024-04-17 RX ORDER — PROCHLORPERAZINE 25 MG/1
SUPPOSITORY RECTAL
Qty: 3 EACH | Refills: 0 | Status: SHIPPED | OUTPATIENT
Start: 2024-04-17

## 2024-04-22 ENCOUNTER — TELEPHONE (OUTPATIENT)
Dept: HEMATOLOGY | Age: 64
End: 2024-04-22

## 2024-04-22 NOTE — TELEPHONE ENCOUNTER
Called pt. to remind them of appointment on 4/24/2024 and had to leave a detailed voicemail with appointment date and time.

## 2024-04-25 ENCOUNTER — TELEPHONE (OUTPATIENT)
Dept: NEUROSURGERY | Facility: CLINIC | Age: 64
End: 2024-04-25
Payer: COMMERCIAL

## 2024-04-25 NOTE — TELEPHONE ENCOUNTER
Patient called and left a VM today at 4:15 pm.  She stated she has an appointment with [facility] on Monday 4/29/24 for an injection and wanted to ask me some questions.  I attempted to contact her but she didn't answer.  I left her a VM asking her to call me back tomorrow & if I didn't answer to leave her questions on the message and maybe when I called her back if she didn't answer I could leave the answers on her VM.    Will wait for patient to call back.    FRANSISCA HERRMANN [facility]  PHYSICIAN LEAD  DR MARY PITTMAN  [facility] [place]      IT IS OKAY FOR THE HUB TO DELIVER THIS INFORMATION TO THE PATIENT IF THEY RECEIVE THIS CALL BACK

## 2024-05-03 ENCOUNTER — TELEPHONE (OUTPATIENT)
Dept: PODIATRY | Facility: CLINIC | Age: 64
End: 2024-05-03
Payer: COMMERCIAL

## 2024-05-03 NOTE — TELEPHONE ENCOUNTER
Hub to relay  Called patient regarding appt on 05/06/2024. Left message for patient to return call if any questions or concerns arise.

## 2024-05-06 ENCOUNTER — OFFICE VISIT (OUTPATIENT)
Dept: INTERNAL MEDICINE | Age: 64
End: 2024-05-06
Payer: COMMERCIAL

## 2024-05-06 ENCOUNTER — OFFICE VISIT (OUTPATIENT)
Dept: PODIATRY | Facility: CLINIC | Age: 64
End: 2024-05-06
Payer: COMMERCIAL

## 2024-05-06 VITALS
OXYGEN SATURATION: 97 % | WEIGHT: 119 LBS | BODY MASS INDEX: 23.36 KG/M2 | DIASTOLIC BLOOD PRESSURE: 54 MMHG | HEART RATE: 52 BPM | SYSTOLIC BLOOD PRESSURE: 106 MMHG | HEIGHT: 60 IN

## 2024-05-06 VITALS
BODY MASS INDEX: 23.16 KG/M2 | DIASTOLIC BLOOD PRESSURE: 64 MMHG | OXYGEN SATURATION: 99 % | HEIGHT: 60 IN | HEART RATE: 78 BPM | SYSTOLIC BLOOD PRESSURE: 122 MMHG | WEIGHT: 118 LBS

## 2024-05-06 DIAGNOSIS — E03.9 ACQUIRED HYPOTHYROIDISM: ICD-10-CM

## 2024-05-06 DIAGNOSIS — I10 PRIMARY HYPERTENSION: ICD-10-CM

## 2024-05-06 DIAGNOSIS — E11.9 ENCOUNTER FOR DIABETIC FOOT EXAM: ICD-10-CM

## 2024-05-06 DIAGNOSIS — M21.371 FOOT DROP, RIGHT: ICD-10-CM

## 2024-05-06 DIAGNOSIS — M19.071 ARTHRITIS OF FIRST METATARSOPHALANGEAL (MTP) JOINT OF RIGHT FOOT: ICD-10-CM

## 2024-05-06 DIAGNOSIS — Z00.00 ANNUAL PHYSICAL EXAM: Primary | ICD-10-CM

## 2024-05-06 DIAGNOSIS — B35.1 ONYCHOMYCOSIS: Primary | ICD-10-CM

## 2024-05-06 DIAGNOSIS — M20.5X1 HALLUX LIMITUS OF RIGHT FOOT: ICD-10-CM

## 2024-05-06 DIAGNOSIS — R82.90 FOUL SMELLING URINE: ICD-10-CM

## 2024-05-06 DIAGNOSIS — E10.9 TYPE 1 DIABETES MELLITUS WITHOUT COMPLICATION (HCC): ICD-10-CM

## 2024-05-06 DIAGNOSIS — M79.671 RIGHT FOOT PAIN: ICD-10-CM

## 2024-05-06 DIAGNOSIS — F10.11 HISTORY OF ALCOHOL ABUSE: ICD-10-CM

## 2024-05-06 DIAGNOSIS — M51.36 DEGENERATIVE DISC DISEASE, LUMBAR: ICD-10-CM

## 2024-05-06 DIAGNOSIS — N39.0 RECURRENT UTI: ICD-10-CM

## 2024-05-06 DIAGNOSIS — E55.9 VITAMIN D DEFICIENCY: ICD-10-CM

## 2024-05-06 DIAGNOSIS — S76.311S PARTIAL HAMSTRING TEAR, RIGHT, SEQUELA: ICD-10-CM

## 2024-05-06 DIAGNOSIS — E10.40 TYPE 1 DIABETES MELLITUS WITH DIABETIC NEUROPATHY: ICD-10-CM

## 2024-05-06 PROBLEM — H25.9 AGE-RELATED CATARACT: Status: ACTIVE | Noted: 2022-09-23

## 2024-05-06 PROBLEM — Z65.9 OTHER SOCIAL STRESSOR: Status: ACTIVE | Noted: 2022-03-09

## 2024-05-06 PROBLEM — Z78.0 POSTMENOPAUSAL: Status: ACTIVE | Noted: 2019-01-03

## 2024-05-06 PROBLEM — E78.2 MIXED HYPERLIPIDEMIA: Status: ACTIVE | Noted: 2019-01-09

## 2024-05-06 PROBLEM — M85.89 OSTEOPENIA OF MULTIPLE SITES: Status: ACTIVE | Noted: 2019-01-03

## 2024-05-06 PROBLEM — E11.3399 MODERATE NONPROLIFERATIVE DIABETIC RETINOPATHY: Status: ACTIVE | Noted: 2022-09-23

## 2024-05-06 PROBLEM — E10.42 DIABETIC PERIPHERAL NEUROPATHY ASSOCIATED WITH TYPE 1 DIABETES MELLITUS: Status: ACTIVE | Noted: 2023-06-05

## 2024-05-06 PROBLEM — K21.9 GASTROESOPHAGEAL REFLUX DISEASE WITHOUT ESOPHAGITIS: Status: ACTIVE | Noted: 2019-01-09

## 2024-05-06 PROBLEM — K70.9 ALCOHOLIC LIVER DISEASE: Status: ACTIVE | Noted: 2021-10-29

## 2024-05-06 PROBLEM — M47.26 OSTEOARTHRITIS OF SPINE WITH RADICULOPATHY, LUMBAR REGION: Status: ACTIVE | Noted: 2023-03-14

## 2024-05-06 PROBLEM — Z79.4 ENCOUNTER FOR LONG-TERM (CURRENT) USE OF INSULIN: Status: ACTIVE | Noted: 2023-06-10

## 2024-05-06 PROBLEM — H40.1190 PRIMARY OPEN ANGLE GLAUCOMA (POAG): Status: ACTIVE | Noted: 2023-08-28

## 2024-05-06 LAB
BACTERIA URNS QL MICRO: ABNORMAL /HPF
BILIRUB UR QL STRIP: NEGATIVE
CLARITY UR: ABNORMAL
COLOR UR: YELLOW
CRYSTALS URNS MICRO: ABNORMAL /HPF
EPI CELLS #/AREA URNS AUTO: 0 /HPF (ref 0–5)
GLUCOSE UR STRIP.AUTO-MCNC: NEGATIVE MG/DL
HGB UR STRIP.AUTO-MCNC: ABNORMAL MG/L
HYALINE CASTS #/AREA URNS AUTO: 4 /HPF (ref 0–8)
KETONES UR STRIP.AUTO-MCNC: NEGATIVE MG/DL
LEUKOCYTE ESTERASE UR QL STRIP.AUTO: ABNORMAL
NITRITE UR QL STRIP.AUTO: NEGATIVE
PH UR STRIP.AUTO: 5.5 [PH] (ref 5–8)
PROT UR STRIP.AUTO-MCNC: NEGATIVE MG/DL
RBC #/AREA URNS AUTO: 1 /HPF (ref 0–4)
SP GR UR STRIP.AUTO: 1.01 (ref 1–1.03)
UROBILINOGEN UR STRIP.AUTO-MCNC: 0.2 E.U./DL
WBC #/AREA URNS AUTO: 150 /HPF (ref 0–5)

## 2024-05-06 PROCEDURE — 99396 PREV VISIT EST AGE 40-64: CPT | Performed by: INTERNAL MEDICINE

## 2024-05-06 PROCEDURE — 11721 DEBRIDE NAIL 6 OR MORE: CPT

## 2024-05-06 PROCEDURE — 3078F DIAST BP <80 MM HG: CPT | Performed by: INTERNAL MEDICINE

## 2024-05-06 PROCEDURE — 3074F SYST BP LT 130 MM HG: CPT | Performed by: INTERNAL MEDICINE

## 2024-05-06 PROCEDURE — 99213 OFFICE O/P EST LOW 20 MIN: CPT

## 2024-05-06 RX ORDER — INSULIN GLARGINE 300 U/ML
22 INJECTION, SOLUTION SUBCUTANEOUS NIGHTLY
Qty: 9 ML | Refills: 3
Start: 2024-05-06 | End: 2024-08-04

## 2024-05-06 RX ORDER — ESTRADIOL 0.1 MG/G
CREAM VAGINAL
Qty: 42.5 G | Refills: 3 | Status: SHIPPED | OUTPATIENT
Start: 2024-05-06

## 2024-05-06 RX ORDER — SULFAMETHOXAZOLE AND TRIMETHOPRIM 800; 160 MG/1; MG/1
TABLET ORAL
COMMUNITY
Start: 2024-04-15

## 2024-05-06 SDOH — ECONOMIC STABILITY: INCOME INSECURITY: HOW HARD IS IT FOR YOU TO PAY FOR THE VERY BASICS LIKE FOOD, HOUSING, MEDICAL CARE, AND HEATING?: NOT HARD AT ALL

## 2024-05-06 SDOH — ECONOMIC STABILITY: FOOD INSECURITY: WITHIN THE PAST 12 MONTHS, YOU WORRIED THAT YOUR FOOD WOULD RUN OUT BEFORE YOU GOT MONEY TO BUY MORE.: NEVER TRUE

## 2024-05-06 SDOH — ECONOMIC STABILITY: FOOD INSECURITY: WITHIN THE PAST 12 MONTHS, THE FOOD YOU BOUGHT JUST DIDN'T LAST AND YOU DIDN'T HAVE MONEY TO GET MORE.: NEVER TRUE

## 2024-05-06 ASSESSMENT — PATIENT HEALTH QUESTIONNAIRE - PHQ9
SUM OF ALL RESPONSES TO PHQ QUESTIONS 1-9: 1
1. LITTLE INTEREST OR PLEASURE IN DOING THINGS: SEVERAL DAYS
SUM OF ALL RESPONSES TO PHQ9 QUESTIONS 1 & 2: 1
SUM OF ALL RESPONSES TO PHQ QUESTIONS 1-9: 1
2. FEELING DOWN, DEPRESSED OR HOPELESS: NOT AT ALL
SUM OF ALL RESPONSES TO PHQ QUESTIONS 1-9: 1
SUM OF ALL RESPONSES TO PHQ QUESTIONS 1-9: 1

## 2024-05-06 NOTE — PROGRESS NOTES
Chief Complaint   Patient presents with    Annual Exam       HPI: Patient is here today for annual physical exam.  She has done a great job of staying off alcohol since October 2021 we congratulated her on this she has done very well.  She feels okay has some fatigue and arthralgias.  She keeps active and busy.  She does have recurrent cloudy urine really does not have dysuria no fever no chills we have treated her for UTI recurrently she had hemorrhoid surgery we thought this was contributing but since the surgery still has issues when we discussed with her it is really not dysuria but just the appearance of her urine.  She has for referral to gynecology she feels like the tissue in the perineum is different especially since her surgery.    Past Medical History:   Diagnosis Date    Alcohol abuse 02/06/2019    quit 10/5/2021    Allergic rhinitis     Diabetes mellitus (HCC)     GERD (gastroesophageal reflux disease)     Hyperlipidemia     Hypertension     Postmenopausal 01/03/2019    Sleep apnea        Past Surgical History:   Procedure Laterality Date    BACK SURGERY  05/01/2023    CHOLECYSTECTOMY      KNEE ARTHROPLASTY      PARTIAL HYSTERECTOMY (CERVIX NOT REMOVED)         Family History   Problem Relation Age of Onset    Ovarian Cancer Mother     Ovarian Cancer Maternal Grandmother     Ovarian Cancer Paternal Grandmother        Social History     Socioeconomic History    Marital status: Single     Spouse name: Not on file    Number of children: Not on file    Years of education: Not on file    Highest education level: Not on file   Occupational History    Not on file   Tobacco Use    Smoking status: Never    Smokeless tobacco: Never   Vaping Use    Vaping Use: Never used   Substance and Sexual Activity    Alcohol use: Not Currently     Comment: history of alcohol abuse 2/6/2019 - 10/5/2021    Drug use: Never    Sexual activity: Not Currently   Other Topics Concern    Not on file   Social History Narrative    Not

## 2024-05-08 LAB
BACTERIA UR CULT: ABNORMAL
BACTERIA UR CULT: ABNORMAL
ORGANISM: ABNORMAL

## 2024-05-10 NOTE — PROGRESS NOTES
Subjective    Ms. Lyons is 64 y.o. female    Chief Complaint: Recurrent UTI    History of Present Illness    64-year-old female new patient referred for recurrent urinary tract infections over the past 2 years.  Urine cultures consistently positive for E. coli bacteria.  Denies systemic symptoms reports only symptoms consist of foul odor of urine and discoloration.  Reports an ongoing barahona with fecal leakage that initially started during a barahona with internal hemorrhoids ultimately requiring hemorrhoidectomy.  Denies urinary incontinence.    The following portions of the patient's history were reviewed and updated as appropriate: allergies, current medications, past family history, past medical history, past social history, past surgical history and problem list.    Review of Systems   Constitutional:  Negative for chills, fatigue and fever.   Gastrointestinal:  Positive for diarrhea. Negative for nausea and vomiting.   Genitourinary:  Negative for frequency, pelvic pain and urgency.         Current Outpatient Medications:     acetaminophen (Tylenol) 325 MG tablet, Take 3 tablets by mouth Every 8 (Eight) Hours. Take every 8 hours for 3 days then take prn as needed., Disp: , Rfl:     acetaminophen (TYLENOL) 650 MG 8 hr tablet, Take 1 tablet by mouth Every 8 (Eight) Hours As Needed for Mild Pain., Disp: , Rfl:     atenolol (TENORMIN) 25 MG tablet, Take 1 tablet by mouth Daily., Disp: 90 tablet, Rfl: 3    BD Pen Needle Tatyana U/F 32G X 4 MM misc, 4 (Four) Times a Day. use as directed, Disp: , Rfl:     Blood Glucose Monitoring Suppl (ONE TOUCH ULTRA 2) w/Device kit, See Admin Instructions., Disp: , Rfl:     clobetasol (TEMOVATE) 0.05 % external solution, APPLY TO SCALP DAILY FOR 1 MONTH, Disp: , Rfl:     Continuous Blood Gluc Sensor (Dexcom G6 Sensor), See Admin Instructions., Disp: , Rfl:     Continuous Blood Gluc Transmit (Dexcom G6 Transmitter) misc, See Admin Instructions., Disp: , Rfl:     cyclobenzaprine  (FLEXERIL) 10 MG tablet, Take 1 tablet by mouth 3 (Three) Times a Day As Needed for Muscle Spasms., Disp: 90 tablet, Rfl: 0    glucose blood test strip, 5 times a day and prn ., Disp: , Rfl:     Gvoke HypoPen 2-Pack 1 MG/0.2ML solution auto-injector, , Disp: , Rfl:     ibuprofen (Motrin IB) 200 MG tablet, Take 3 tablets by mouth Every 8 (Eight) Hours. Take every 8 hours for three days then take as needed., Disp: , Rfl:     insulin aspart (novoLOG FLEXPEN) 100 UNIT/ML solution pen-injector sc pen, Inject 2-5 Units under the skin into the appropriate area as directed 3 (Three) Times a Day With Meals. 2-5 units with meals sliding scale, Disp: , Rfl:     Lancets (OneTouch Delica Plus [id number]) misc, TEST 5 TIMES DAILY AND AS NEEDED, Disp: , Rfl:     levothyroxine (SYNTHROID, LEVOTHROID) 50 MCG tablet, Take 1 tablet by mouth Every Morning., Disp: , Rfl:     losartan (Cozaar) 25 MG tablet, Take 1 tablet by mouth Daily., Disp: 90 tablet, Rfl: 3    magnesium oxide (MAG-OX) 400 MG tablet, Take 1 tablet by mouth Daily., Disp: , Rfl:     Multiple Vitamins-Iron (multivitamin with iron) tablet tablet, Take 1 tablet by mouth Daily., Disp: , Rfl:     Nutritional Supplements (JUICE PLUS FIBRE PO), Take 1 dose by mouth Daily., Disp: , Rfl:     pantoprazole (PROTONIX) 40 MG EC tablet, Take 1 tablet by mouth Daily., Disp: , Rfl:     potassium chloride (K-DUR,KLOR-CON) 10 MEQ CR tablet, Take 1 tablet by mouth Daily As Needed., Disp: , Rfl:     sulfamethoxazole-trimethoprim (BACTRIM DS,SEPTRA DS) 800-160 MG per tablet, TAKE 1 TABLET BY MOUTH 2 TIMES DAILY FOR 7 DAYS, Disp: , Rfl:     Toujeo Max SoloStar 300 UNIT/ML solution pen-injector injection, Inject 22 Units under the skin into the appropriate area as directed Every Night., Disp: , Rfl:     vitamin D (ERGOCALCIFEROL) 1.25 MG (41605 UT) capsule capsule, Take 1 capsule by mouth 1 (One) Time Per Week. sunday, Disp: , Rfl:     gabapentin (NEURONTIN) 300 MG capsule, Take 1 capsule by  mouth Daily for 7 days. (Patient taking differently: Take 1 capsule by mouth Every Night.), Disp: 7 capsule, Rfl: 0    Past Medical History:   Diagnosis Date    Alcoholic hepatitis with ascites     Allergic rhinitis     Cancer     ovarian    Cirrhosis 2021    Degeneration of lumbar or lumbosacral intervertebral disc 09/15/2022    Diabetes mellitus     Disease of thyroid gland     Elevated d-dimer     Endometriosis     Fatty liver     GERD (gastroesophageal reflux disease)     History of transfusion     Hyperlipidemia     Hypertension     Low back pain     Lumbosacral disc disease 2022    Osteoporosis     Ovarian cyst     Pancreatitis     Postmenopausal     Rectal mass     Rectal prolapse     Sleep apnea     has corrrected it self with w/ weight loss       Past Surgical History:   Procedure Laterality Date    ARM NERVE EXPLORATION Left     6 nerves scraped ,cleaned nerves off    BACK SURGERY  2023    BILATERAL SALPINGO OOPHORECTOMY  2008    CARPAL TUNNEL RELEASE  2016    CHOLECYSTECTOMY  1996    COLONOSCOPY  07/2017    Dr Murphy- per patient normal     COLONOSCOPY N/A 11/09/2021    Procedure: COLONOSCOPY WITH ANESTHESIA;  Surgeon: Ray Serna MD;  Location: [facility] ENDOSCOPY;  Service: Gastroenterology;  Laterality: N/A;  pre rectal mass  post  Dr Shields    DILATION AND CURETTAGE, DIAGNOSTIC / THERAPEUTIC      ENDOSCOPY N/A 11/09/2021    Procedure: ESOPHAGOGASTRODUODENOSCOPY WITH ANESTHESIA;  Surgeon: Ray Serna MD;  Location: [facility] ENDOSCOPY;  Service: Gastroenterology;  Laterality: N/A;  pre anemia  post hiatal hernia; gastric polyp  Dr. Shields    HEMORRHOIDECTOMY SIGMOIDOSCOPY N/A 01/09/2024    Procedure: HEMORRHOIDECTOMY WITH EXAM UNDER ANESTHESIA;  Surgeon: Rocio Read MD;  Location: [facility] OR;  Service: General;  Laterality: N/A;    KNEE SURGERY Right 1982    LUMBAR LAMINECTOMY WITH FUSION Right 05/01/2023    Procedure: LUMBAR DISCECTOMY, JADON- LAMINECTOMY TRANSFORAMINAL LUMBAR INTERBODY  "FUSION, POSTERIOR PEDICLE SCREW INSTRUMENTATION, L5-S1 RIGHT. USE OF THE ROBOT;  Surgeon: Nathaniel Tesfaye MD;  Location:  [facility] OR;  Service: Neurosurgery;  Laterality: Right;    SPINAL FUSION  2023    TRIGGER POINT INJECTION  2023       Social History     Socioeconomic History    Marital status: Single   Tobacco Use    Smoking status: Never     Passive exposure: Never    Smokeless tobacco: Never   Vaping Use    Vaping status: Never Used   Substance and Sexual Activity    Alcohol use: Not Currently    Drug use: No    Sexual activity: Not Currently       Family History   Problem Relation Age of Onset    Ovarian cancer Mother     Ovarian cancer Maternal Aunt     Ovarian cancer Maternal Grandmother     Ovarian cancer Paternal Grandmother     Colon polyps Father     Colon polyps Brother     Breast cancer Neg Hx     Colon cancer Neg Hx        Objective    Temp 98 °F (36.7 °C)   Ht 152.4 cm (60\")   Wt 54.3 kg (119 lb 12.8 oz)   BMI 23.40 kg/m²     Physical Exam  Nursing note reviewed.   Constitutional:       General: She is not in acute distress.     Appearance: Normal appearance. She is not ill-appearing.   HENT:      Nose: No congestion.   Abdominal:      Tenderness: There is no right CVA tenderness or left CVA tenderness.      Hernia: No hernia is present.   Skin:     General: Skin is warm and dry.   Neurological:      Mental Status: She is alert and oriented to person, place, and time.   Psychiatric:         Mood and Affect: Mood normal.         Behavior: Behavior normal.             Results for orders placed or performed in visit on 05/20/24   POC Urinalysis Dipstick, Multipro    Specimen: Urine   Result Value Ref Range    Color Yellow Yellow, Straw, Dark Yellow, Ana Lilia    Clarity, UA Clear Clear    Glucose, UA Negative Negative mg/dL    Bilirubin Negative Negative    Ketones, UA Negative Negative    Specific Gravity  1.005 1.005 - 1.030    Blood, UA Trace (A) Negative    pH, Urine 5.5 5.0 - 8.0    Protein, POC " Negative Negative mg/dL    Urobilinogen, UA 0.2 E.U./dL Normal, 0.2 E.U./dL    Nitrite, UA Negative Negative    Leukocytes Small (1+) (A) Negative     Assessment and Plan    Diagnoses and all orders for this visit:    1. Recurrent UTI (Primary)  -     POC Urinalysis Dipstick, Multipro        Overall asymptomatic other than foul-smelling urine.  Based on this finding do not recommend treatment without systemic symptoms.  Recommend starting the previously prescribed vaginal estrogen cream as patient reports she has yet to use the cream as she wanted to see our office first.  Recommend starting twice weekly.  Have also discussed possible [employer] sacral neurostimulator placement due to the report of ongoing barahona with fecal incontinence as I to agree this is very likely the cause to patient's ongoing infections.    Will have patient follow-up in 3 months for reevaluation

## 2024-05-16 DIAGNOSIS — E10.40 TYPE 1 DIABETES MELLITUS WITH DIABETIC NEUROPATHY (HCC): ICD-10-CM

## 2024-05-17 RX ORDER — GABAPENTIN 300 MG/1
300 CAPSULE ORAL NIGHTLY
Qty: 30 CAPSULE | Refills: 2 | Status: SHIPPED | OUTPATIENT
Start: 2024-05-17 | End: 2024-08-15

## 2024-05-20 ENCOUNTER — PATIENT ROUNDING (BHMG ONLY) (OUTPATIENT)
Dept: UROLOGY | Facility: CLINIC | Age: 64
End: 2024-05-20
Payer: COMMERCIAL

## 2024-05-20 ENCOUNTER — OFFICE VISIT (OUTPATIENT)
Dept: UROLOGY | Facility: CLINIC | Age: 64
End: 2024-05-20
Payer: COMMERCIAL

## 2024-05-20 VITALS — BODY MASS INDEX: 23.52 KG/M2 | HEIGHT: 60 IN | TEMPERATURE: 98 F | WEIGHT: 119.8 LBS

## 2024-05-20 DIAGNOSIS — N39.0 RECURRENT UTI: Primary | ICD-10-CM

## 2024-05-20 LAB
BILIRUB BLD-MCNC: NEGATIVE MG/DL
CLARITY, POC: CLEAR
COLOR UR: YELLOW
GLUCOSE UR STRIP-MCNC: NEGATIVE MG/DL
KETONES UR QL: NEGATIVE
LEUKOCYTE EST, POC: ABNORMAL
NITRITE UR-MCNC: NEGATIVE MG/ML
PH UR: 5.5 [PH] (ref 5–8)
PROT UR STRIP-MCNC: NEGATIVE MG/DL
RBC # UR STRIP: ABNORMAL /UL
SP GR UR: 1 (ref 1–1.03)
UROBILINOGEN UR QL: ABNORMAL

## 2024-05-20 RX ORDER — PROCHLORPERAZINE 25 MG/1
SUPPOSITORY RECTAL
Qty: 3 EACH | Refills: 0 | Status: SHIPPED | OUTPATIENT
Start: 2024-05-20

## 2024-05-20 NOTE — PROGRESS NOTES
May 20, 2024    Hello, may I speak with Fiordaliza Lyons?    My name is Ileana      I am  with [facility]  [address], [address]  [place] [address] [phone].    Before we get started may I verify your date of birth? 1960    I am calling to officially welcome you to our practice and ask about your recent visit. Is this a good time to talk? yes    Tell me about your visit with us. What things went well?  Yes the visit went great. Monica was so nice and answered my questions.       We're always looking for ways to make our patients' experiences even better. Do you have recommendations on ways we may improve?  no    Overall were you satisfied with your first visit to our practice? yes       I appreciate you taking the time to speak with me today. Is there anything else I can do for you? no      Thank you, and have a great day.

## 2024-05-23 ENCOUNTER — OFFICE VISIT (OUTPATIENT)
Dept: INTERNAL MEDICINE | Age: 64
End: 2024-05-23
Payer: COMMERCIAL

## 2024-05-23 VITALS
WEIGHT: 118 LBS | SYSTOLIC BLOOD PRESSURE: 120 MMHG | OXYGEN SATURATION: 97 % | HEIGHT: 60 IN | BODY MASS INDEX: 23.16 KG/M2 | DIASTOLIC BLOOD PRESSURE: 60 MMHG | HEART RATE: 61 BPM

## 2024-05-23 DIAGNOSIS — E10.65 TYPE 1 DIABETES MELLITUS WITH HYPERGLYCEMIA (HCC): ICD-10-CM

## 2024-05-23 DIAGNOSIS — H81.13 BENIGN PAROXYSMAL POSITIONAL VERTIGO DUE TO BILATERAL VESTIBULAR DISORDER: Primary | ICD-10-CM

## 2024-05-23 DIAGNOSIS — J02.9 PHARYNGITIS, UNSPECIFIED ETIOLOGY: ICD-10-CM

## 2024-05-23 DIAGNOSIS — K64.4 EXTERNAL HEMORRHOIDS: ICD-10-CM

## 2024-05-23 DIAGNOSIS — Z12.4 PAP SMEAR FOR CERVICAL CANCER SCREENING: ICD-10-CM

## 2024-05-23 PROCEDURE — 3078F DIAST BP <80 MM HG: CPT | Performed by: INTERNAL MEDICINE

## 2024-05-23 PROCEDURE — 3074F SYST BP LT 130 MM HG: CPT | Performed by: INTERNAL MEDICINE

## 2024-05-23 PROCEDURE — 99214 OFFICE O/P EST MOD 30 MIN: CPT | Performed by: INTERNAL MEDICINE

## 2024-05-23 RX ORDER — AZITHROMYCIN 250 MG/1
TABLET, FILM COATED ORAL
Qty: 6 TABLET | Refills: 0 | Status: SHIPPED | OUTPATIENT
Start: 2024-05-23 | End: 2024-06-02

## 2024-05-23 NOTE — PROGRESS NOTES
Chief Complaint   Patient presents with    Nausea     Lightheaded & nausea x several days       HPI: Patient is here today for problem visit with nausea and lightheadedness.  Sore throat Saturday night and Sunday - lots saliva in back of mouth and could not swallow it.  Some nasal congestion but not much and some cough.  Still with a sore throat.  Negative covid test. Still sore throat but can swallow better. Dizzy.  Weak. Some nausea.  We also spoke about recent urology visit.  She did not have an exam- also do for a pap smear.   Past Medical History:   Diagnosis Date    Alcohol abuse 02/06/2019    quit 10/5/2021    Allergic rhinitis     Diabetes mellitus (HCC)     GERD (gastroesophageal reflux disease)     Hyperlipidemia     Hypertension     Postmenopausal 01/03/2019    Sleep apnea        Past Surgical History:   Procedure Laterality Date    BACK SURGERY  05/01/2023    CHOLECYSTECTOMY      KNEE ARTHROPLASTY      PARTIAL HYSTERECTOMY (CERVIX NOT REMOVED)         Family History   Problem Relation Age of Onset    Ovarian Cancer Mother     Ovarian Cancer Maternal Grandmother     Ovarian Cancer Paternal Grandmother        Social History     Socioeconomic History    Marital status: Single     Spouse name: Not on file    Number of children: Not on file    Years of education: Not on file    Highest education level: Not on file   Occupational History    Not on file   Tobacco Use    Smoking status: Never    Smokeless tobacco: Never   Vaping Use    Vaping Use: Never used   Substance and Sexual Activity    Alcohol use: Not Currently     Comment: history of alcohol abuse 2/6/2019 - 10/5/2021    Drug use: Never    Sexual activity: Not Currently   Other Topics Concern    Not on file   Social History Narrative    Not on file     Social Determinants of Health     Financial Resource Strain: Low Risk  (5/6/2024)    Overall Financial Resource Strain (CARDIA)     Difficulty of Paying Living Expenses: Not hard at all   Food Insecurity:

## 2024-05-30 LAB
HPV HR 12 DNA SPEC QL NAA+PROBE: NOT DETECTED
HPV16 DNA SPEC QL NAA+PROBE: NOT DETECTED
HPV16+18+H RISK 12 DNA SPEC-IMP: NORMAL
HPV18 DNA SPEC QL NAA+PROBE: NOT DETECTED

## 2024-05-31 ENCOUNTER — TELEPHONE (OUTPATIENT)
Dept: HEMATOLOGY | Age: 64
End: 2024-05-31

## 2024-05-31 NOTE — TELEPHONE ENCOUNTER
Called pt. to remind them of appointment on 06/2024 and had to leave a detailed voicemail with appointment date and time.

## 2024-06-02 PROBLEM — H81.13 BENIGN PAROXYSMAL POSITIONAL VERTIGO DUE TO BILATERAL VESTIBULAR DISORDER: Status: ACTIVE | Noted: 2024-06-02

## 2024-06-05 ENCOUNTER — TELEPHONE (OUTPATIENT)
Dept: HEMATOLOGY | Age: 64
End: 2024-06-05

## 2024-06-06 ENCOUNTER — HOSPITAL ENCOUNTER (EMERGENCY)
Facility: HOSPITAL | Age: 64
Discharge: HOME | End: 2024-06-07
Attending: EMERGENCY MEDICINE
Payer: COMMERCIAL

## 2024-06-06 DIAGNOSIS — N39.0 ACUTE UTI: Primary | ICD-10-CM

## 2024-06-06 DIAGNOSIS — T50.901A ACCIDENTAL OVERDOSE, INITIAL ENCOUNTER: ICD-10-CM

## 2024-06-06 PROCEDURE — 82948 REAGENT STRIP/BLOOD GLUCOSE: CPT

## 2024-06-06 PROCEDURE — 99283 EMERGENCY DEPT VISIT LOW MDM: CPT

## 2024-06-07 VITALS
DIASTOLIC BLOOD PRESSURE: 74 MMHG | HEART RATE: 58 BPM | OXYGEN SATURATION: 97 % | HEIGHT: 60 IN | SYSTOLIC BLOOD PRESSURE: 120 MMHG | BODY MASS INDEX: 23.56 KG/M2 | TEMPERATURE: 98.2 F | RESPIRATION RATE: 18 BRPM | WEIGHT: 120 LBS

## 2024-06-07 LAB
ANION GAP SERPL CALCULATED.3IONS-SCNC: 8 MMOL/L (ref 5–15)
BACTERIA UR QL AUTO: ABNORMAL /HPF
BASOPHILS # BLD AUTO: 0.02 10*3/MM3 (ref 0–0.2)
BASOPHILS NFR BLD AUTO: 0.4 % (ref 0–1.5)
BILIRUB UR QL STRIP: NEGATIVE
BUN SERPL-MCNC: 13 MG/DL (ref 8–23)
BUN/CREAT SERPL: 13.8 (ref 7–25)
CALCIUM SPEC-SCNC: 9.6 MG/DL (ref 8.6–10.5)
CHLORIDE SERPL-SCNC: 93 MMOL/L (ref 98–107)
CLARITY UR: CLEAR
CO2 SERPL-SCNC: 29 MMOL/L (ref 22–29)
COLOR UR: YELLOW
CREAT SERPL-MCNC: 0.94 MG/DL (ref 0.57–1)
DEPRECATED RDW RBC AUTO: 45.1 FL (ref 37–54)
EGFRCR SERPLBLD CKD-EPI 2021: 68.7 ML/MIN/1.73
EOSINOPHIL # BLD AUTO: 0.18 10*3/MM3 (ref 0–0.4)
EOSINOPHIL NFR BLD AUTO: 3.3 % (ref 0.3–6.2)
ERYTHROCYTE [DISTWIDTH] IN BLOOD BY AUTOMATED COUNT: 14.6 % (ref 12.3–15.4)
GLUCOSE BLDC GLUCOMTR-MCNC: 103 MG/DL (ref 70–130)
GLUCOSE BLDC GLUCOMTR-MCNC: 112 MG/DL (ref 70–130)
GLUCOSE BLDC GLUCOMTR-MCNC: 81 MG/DL (ref 70–130)
GLUCOSE SERPL-MCNC: 76 MG/DL (ref 65–99)
GLUCOSE UR STRIP-MCNC: NEGATIVE MG/DL
HCT VFR BLD AUTO: 30.4 % (ref 34–46.6)
HGB BLD-MCNC: 10.2 G/DL (ref 12–15.9)
HGB UR QL STRIP.AUTO: NEGATIVE
HYALINE CASTS UR QL AUTO: ABNORMAL /LPF
IMM GRANULOCYTES # BLD AUTO: 0.02 10*3/MM3 (ref 0–0.05)
IMM GRANULOCYTES NFR BLD AUTO: 0.4 % (ref 0–0.5)
KETONES UR QL STRIP: NEGATIVE
LEUKOCYTE ESTERASE UR QL STRIP.AUTO: ABNORMAL
LYMPHOCYTES # BLD AUTO: 0.49 10*3/MM3 (ref 0.7–3.1)
LYMPHOCYTES NFR BLD AUTO: 9.1 % (ref 19.6–45.3)
MCH RBC QN AUTO: 28.3 PG (ref 26.6–33)
MCHC RBC AUTO-ENTMCNC: 33.6 G/DL (ref 31.5–35.7)
MCV RBC AUTO: 84.2 FL (ref 79–97)
MONOCYTES # BLD AUTO: 0.67 10*3/MM3 (ref 0.1–0.9)
MONOCYTES NFR BLD AUTO: 12.5 % (ref 5–12)
NEUTROPHILS NFR BLD AUTO: 4 10*3/MM3 (ref 1.7–7)
NEUTROPHILS NFR BLD AUTO: 74.3 % (ref 42.7–76)
NITRITE UR QL STRIP: POSITIVE
NRBC BLD AUTO-RTO: 0 /100 WBC (ref 0–0.2)
PH UR STRIP.AUTO: 6.5 [PH] (ref 5–8)
PLATELET # BLD AUTO: 169 10*3/MM3 (ref 140–450)
PMV BLD AUTO: 9 FL (ref 6–12)
POTASSIUM SERPL-SCNC: 3.6 MMOL/L (ref 3.5–5.2)
PROT UR QL STRIP: NEGATIVE
QT INTERVAL: 406 MS
QTC INTERVAL: 401 MS
RBC # BLD AUTO: 3.61 10*6/MM3 (ref 3.77–5.28)
RBC # UR STRIP: ABNORMAL /HPF
REF LAB TEST METHOD: ABNORMAL
SODIUM SERPL-SCNC: 130 MMOL/L (ref 136–145)
SP GR UR STRIP: <=1.005 (ref 1–1.03)
SQUAMOUS #/AREA URNS HPF: ABNORMAL /HPF
TROPONIN T SERPL HS-MCNC: 12 NG/L
UROBILINOGEN UR QL STRIP: ABNORMAL
WBC # UR STRIP: ABNORMAL /HPF
WBC NRBC COR # BLD AUTO: 5.38 10*3/MM3 (ref 3.4–10.8)

## 2024-06-07 PROCEDURE — 25810000003 SODIUM CHLORIDE 0.9 % SOLUTION: Performed by: EMERGENCY MEDICINE

## 2024-06-07 PROCEDURE — 36415 COLL VENOUS BLD VENIPUNCTURE: CPT

## 2024-06-07 PROCEDURE — 81001 URINALYSIS AUTO W/SCOPE: CPT | Performed by: EMERGENCY MEDICINE

## 2024-06-07 PROCEDURE — 93005 ELECTROCARDIOGRAM TRACING: CPT | Performed by: EMERGENCY MEDICINE

## 2024-06-07 PROCEDURE — 84484 ASSAY OF TROPONIN QUANT: CPT | Performed by: EMERGENCY MEDICINE

## 2024-06-07 PROCEDURE — 87088 URINE BACTERIA CULTURE: CPT | Performed by: EMERGENCY MEDICINE

## 2024-06-07 PROCEDURE — 96365 THER/PROPH/DIAG IV INF INIT: CPT

## 2024-06-07 PROCEDURE — 82948 REAGENT STRIP/BLOOD GLUCOSE: CPT

## 2024-06-07 PROCEDURE — 87086 URINE CULTURE/COLONY COUNT: CPT | Performed by: EMERGENCY MEDICINE

## 2024-06-07 PROCEDURE — 87186 SC STD MICRODIL/AGAR DIL: CPT | Performed by: EMERGENCY MEDICINE

## 2024-06-07 PROCEDURE — 80048 BASIC METABOLIC PNL TOTAL CA: CPT | Performed by: EMERGENCY MEDICINE

## 2024-06-07 PROCEDURE — 93010 ELECTROCARDIOGRAM REPORT: CPT | Performed by: EMERGENCY MEDICINE

## 2024-06-07 PROCEDURE — 25010000002 CEFTRIAXONE PER 250 MG: Performed by: EMERGENCY MEDICINE

## 2024-06-07 PROCEDURE — 85025 COMPLETE CBC W/AUTO DIFF WBC: CPT | Performed by: EMERGENCY MEDICINE

## 2024-06-07 RX ORDER — CEFDINIR 300 MG/1
300 CAPSULE ORAL 2 TIMES DAILY
Qty: 14 CAPSULE | Refills: 0 | Status: SHIPPED | OUTPATIENT
Start: 2024-06-07 | End: 2024-06-14

## 2024-06-07 RX ADMIN — SODIUM CHLORIDE 1000 MG: 900 INJECTION INTRAVENOUS at 01:51

## 2024-06-07 RX ADMIN — SODIUM CHLORIDE 1000 ML: 9 INJECTION, SOLUTION INTRAVENOUS at 00:53

## 2024-06-07 NOTE — ED PROVIDER NOTES
"Subjective   History of Present Illness  Pt presents to the  with report that she took 16units of her novolog tonight by accident - was only supposed to take 8.  She reports that she began to feel confused and was sweaty c/w hypoglycemia - took several oral glucose tablets and this improved her symptoms.  Denies any other new medications.  No CP/SOB. No n/v/f/c. No injuries.        Review of Systems   Constitutional:  Positive for diaphoresis. Negative for chills and fever.   Respiratory:  Negative for shortness of breath.    Cardiovascular:  Negative for chest pain.   Gastrointestinal:  Negative for abdominal pain, nausea and vomiting.   Genitourinary:  Negative for dysuria.   Neurological:  Negative for headaches.   Psychiatric/Behavioral:  Positive for confusion.    All other systems reviewed and are negative.      Past Medical History:   Diagnosis Date    Alcoholic hepatitis with ascites     Allergic rhinitis     Cancer     ovarian    Cirrhosis 2021    Degeneration of lumbar or lumbosacral intervertebral disc 09/15/2022    Diabetes mellitus     Disease of thyroid gland     Elevated d-dimer     Endometriosis     Fatty liver     GERD (gastroesophageal reflux disease)     History of transfusion     Hyperlipidemia     Hypertension     Low back pain     Lumbosacral disc disease 2022    Osteoporosis     Ovarian cyst     Pancreatitis     Postmenopausal     Rectal mass     Rectal prolapse     Sleep apnea     has corrrected it self with w/ weight loss       Allergies   Allergen Reactions    Lisinopril Cough    Tizanidine Hcl Dizziness     Pt stated \"horrible dry mouth, dizziness, couldn't see, it was awful\" drops blood sugar down     Augmentin [Amoxicillin-Pot Clavulanate] Nausea And Vomiting       Past Surgical History:   Procedure Laterality Date    ARM NERVE EXPLORATION Left     6 nerves scraped ,cleaned nerves off    BACK SURGERY  2023    BILATERAL SALPINGO OOPHORECTOMY  2008    CARPAL TUNNEL RELEASE  2016    " CHOLECYSTECTOMY  1996    COLONOSCOPY  07/2017    Dr Murphy- per patient normal     COLONOSCOPY N/A 11/09/2021    Procedure: COLONOSCOPY WITH ANESTHESIA;  Surgeon: Ray Serna MD;  Location: [facility] ENDOSCOPY;  Service: Gastroenterology;  Laterality: N/A;  pre rectal mass  post  Dr Shields    DILATION AND CURETTAGE, DIAGNOSTIC / THERAPEUTIC      ENDOSCOPY N/A 11/09/2021    Procedure: ESOPHAGOGASTRODUODENOSCOPY WITH ANESTHESIA;  Surgeon: Ray Serna MD;  Location: [facility] ENDOSCOPY;  Service: Gastroenterology;  Laterality: N/A;  pre anemia  post hiatal hernia; gastric polyp  Dr. Shields    HEMORRHOIDECTOMY SIGMOIDOSCOPY N/A 01/09/2024    Procedure: HEMORRHOIDECTOMY WITH EXAM UNDER ANESTHESIA;  Surgeon: Rocio Read MD;  Location: [facility] OR;  Service: General;  Laterality: N/A;    KNEE SURGERY Right 1982    LUMBAR LAMINECTOMY WITH FUSION Right 05/01/2023    Procedure: LUMBAR DISCECTOMY, JADON- LAMINECTOMY TRANSFORAMINAL LUMBAR INTERBODY FUSION, POSTERIOR PEDICLE SCREW INSTRUMENTATION, L5-S1 RIGHT. USE OF THE ROBOT;  Surgeon: Nathaniel Tesfaye MD;  Location: [facility] OR;  Service: Neurosurgery;  Laterality: Right;    SPINAL FUSION  2023    TRIGGER POINT INJECTION  2023       Family History   Problem Relation Age of Onset    Ovarian cancer Mother     Ovarian cancer Maternal Aunt     Ovarian cancer Maternal Grandmother     Ovarian cancer Paternal Grandmother     Colon polyps Father     Colon polyps Brother     Breast cancer Neg Hx     Colon cancer Neg Hx        Social History     Socioeconomic History    Marital status: Single   Tobacco Use    Smoking status: Never     Passive exposure: Never    Smokeless tobacco: Never   Vaping Use    Vaping status: Never Used   Substance and Sexual Activity    Alcohol use: Not Currently    Drug use: No    Sexual activity: Not Currently           Objective   Physical Exam  Vitals and nursing note reviewed.   Constitutional:       General: She is not in acute distress.  HENT:       Head: Normocephalic and atraumatic.      Nose: Nose normal.      Mouth/Throat:      Mouth: Mucous membranes are moist.   Eyes:      Extraocular Movements: Extraocular movements intact.      Conjunctiva/sclera: Conjunctivae normal.      Pupils: Pupils are equal, round, and reactive to light.   Cardiovascular:      Rate and Rhythm: Normal rate and regular rhythm.      Pulses: Normal pulses.      Heart sounds: Normal heart sounds.   Pulmonary:      Effort: Pulmonary effort is normal.      Breath sounds: Normal breath sounds.   Abdominal:      General: Abdomen is flat. Bowel sounds are normal.      Palpations: Abdomen is soft.   Musculoskeletal:      Cervical back: Neck supple. No tenderness.   Skin:     General: Skin is warm and dry.      Capillary Refill: Capillary refill takes less than 2 seconds.   Neurological:      General: No focal deficit present.      Mental Status: She is alert and oriented to person, place, and time.      Cranial Nerves: No cranial nerve deficit.      Sensory: No sensory deficit.      Motor: No weakness.         Procedures           ED Course      Labs Reviewed   BASIC METABOLIC PANEL - Abnormal; Notable for the following components:       Result Value    Sodium 130 (*)     Chloride 93 (*)     All other components within normal limits    Narrative:     GFR Normal >60  Chronic Kidney Disease <60  Kidney Failure <15     URINALYSIS W/ MICROSCOPIC IF INDICATED (NO CULTURE) - Abnormal; Notable for the following components:    Leuk Esterase, UA Large (3+) (*)     Nitrite, UA Positive (*)     All other components within normal limits   CBC WITH AUTO DIFFERENTIAL - Abnormal; Notable for the following components:    RBC 3.61 (*)     Hemoglobin 10.2 (*)     Hematocrit 30.4 (*)     Lymphocyte % 9.1 (*)     Monocyte % 12.5 (*)     Lymphocytes, Absolute 0.49 (*)     All other components within normal limits   URINALYSIS, MICROSCOPIC ONLY - Abnormal; Notable for the following components:    WBC, UA  Too Numerous to Count (*)     Bacteria, UA 4+ (*)     All other components within normal limits   SINGLE HS TROPONIN T - Normal    Narrative:     High Sensitive Troponin T Reference Range:  <14.0 ng/L- Negative Female for AMI  <22.0 ng/L- Negative Male for AMI  >=14 - Abnormal Female indicating possible myocardial injury.  >=22 - Abnormal Male indicating possible myocardial injury.   Clinicians would have to utilize clinical acumen, EKG, Troponin, and serial changes to determine if it is an Acute Myocardial Infarction or myocardial injury due to an underlying chronic condition.        POCT GLUCOSE FINGERSTICK - Normal   POCT GLUCOSE FINGERSTICK - Normal   POCT GLUCOSE FINGERSTICK - Normal   URINE CULTURE   POCT GLUCOSE FINGERSTICK   CBC AND DIFFERENTIAL    Narrative:     The following orders were created for panel order CBC & Differential.  Procedure                               Abnormality         Status                     ---------                               -----------         ------                     CBC Auto Differential[[id number]]        Abnormal            Final result                 Please view results for these tests on the individual orders.                                            Medical Decision Making  Pt stable in EC - has astrid PO and blood glucose has been stable.  No evid of SBI/sepsis.  She does have UTI - given dose of rocephin and will d/c on omnicef.  Prec given - recommend outpt /fu    Amount and/or Complexity of Data Reviewed  Labs: ordered.  ECG/medicine tests: ordered and independent interpretation performed.     Details: NSR, LAD, LVH, no acute STT changes        Final diagnoses:   Acute UTI   Accidental overdose, initial encounter       ED Disposition  ED Disposition       ED Disposition   Discharge    Condition   Stable    Comment   --               Brittany Solis MD  [address] [place] [address] [address]  [facility] [address]  [phone]               Medication List        New  Prescriptions      cefdinir 300 MG capsule  Commonly known as: OMNICEF  Take 1 capsule by mouth 2 (Two) Times a Day for 7 days.            Changed      gabapentin 300 MG capsule  Commonly known as: NEURONTIN  Take 1 capsule by mouth Daily for 7 days.  What changed: when to take this               Where to Get Your Medications        These medications were sent to [facility] [place] - [address]  - [phone]  - [phone]   [address], [facility] [address]      Phone: [phone]   cefdinir 300 MG capsule            Heriberto Ramos, DO  06/07/24 0024       Heriberto Ramos, DO  06/07/24 0200

## 2024-06-09 LAB — BACTERIA SPEC AEROBE CULT: ABNORMAL

## 2024-06-13 RX ORDER — PROCHLORPERAZINE 25 MG/1
SUPPOSITORY RECTAL
Qty: 3 EACH | Refills: 0 | Status: SHIPPED | OUTPATIENT
Start: 2024-06-13

## 2024-06-17 ENCOUNTER — OFFICE VISIT (OUTPATIENT)
Dept: SURGERY | Facility: CLINIC | Age: 64
End: 2024-06-17
Payer: COMMERCIAL

## 2024-06-17 VITALS
WEIGHT: 122.2 LBS | HEIGHT: 60 IN | DIASTOLIC BLOOD PRESSURE: 71 MMHG | SYSTOLIC BLOOD PRESSURE: 198 MMHG | BODY MASS INDEX: 23.99 KG/M2 | OXYGEN SATURATION: 99 % | HEART RATE: 72 BPM

## 2024-06-17 DIAGNOSIS — K62.5 RECTAL BLEEDING: Primary | ICD-10-CM

## 2024-06-17 PROCEDURE — 99213 OFFICE O/P EST LOW 20 MIN: CPT | Performed by: STUDENT IN AN ORGANIZED HEALTH CARE EDUCATION/TRAINING PROGRAM

## 2024-06-17 NOTE — PROGRESS NOTES
Office New Patient History and Physical:     Referring Provider: No ref. provider found    Chief Complaint   Patient presents with    Follow-up     Patient here for evaluation of hemorrhoids       Subjective .     History of present illness:  Fiordaliza Lyons is a 64 y.o. female with a history of anal polypectomy. She has had continued rectal bleeding. The bleeding is coming from between the anal opening and vaginal opening. She was examined by her PCP who saw a tag and hole between her anal opening and vagina.     BM is 23. No blood thinners. She is a non-smoker.     History  Past Medical History:   Diagnosis Date    Alcoholic hepatitis with ascites     Allergic rhinitis     Cancer     ovarian    Cirrhosis 2021    Degeneration of lumbar or lumbosacral intervertebral disc 09/15/2022    Diabetes mellitus     Disease of thyroid gland     Elevated d-dimer     Endometriosis     Fatty liver     GERD (gastroesophageal reflux disease)     History of transfusion     Hyperlipidemia     Hypertension     Low back pain     Lumbosacral disc disease 2022    Osteoporosis     Ovarian cyst     Pancreatitis     Postmenopausal     Rectal mass     Rectal prolapse     Sleep apnea     has corrrected it self with w/ weight loss   ,   Past Surgical History:   Procedure Laterality Date    ARM NERVE EXPLORATION Left     6 nerves scraped ,cleaned nerves off    BACK SURGERY  2023    BILATERAL SALPINGO OOPHORECTOMY  2008    CARPAL TUNNEL RELEASE  2016    CHOLECYSTECTOMY  1996    COLONOSCOPY  07/2017    Dr Murphy- per patient normal     COLONOSCOPY N/A 11/09/2021    Procedure: COLONOSCOPY WITH ANESTHESIA;  Surgeon: Ray Serna MD;  Location: [facility] ENDOSCOPY;  Service: Gastroenterology;  Laterality: N/A;  pre rectal mass  post  Dr Shields    DILATION AND CURETTAGE, DIAGNOSTIC / THERAPEUTIC      ENDOSCOPY N/A 11/09/2021    Procedure: ESOPHAGOGASTRODUODENOSCOPY WITH ANESTHESIA;  Surgeon: Ray Serna MD;  Location: [facility] ENDOSCOPY;   Service: Gastroenterology;  Laterality: N/A;  pre anemia  post hiatal hernia; gastric polyp  Dr. Shields    HEMORRHOIDECTOMY SIGMOIDOSCOPY N/A 01/09/2024    Procedure: HEMORRHOIDECTOMY WITH EXAM UNDER ANESTHESIA;  Surgeon: Rocio Read MD;  Location:  [facility] OR;  Service: General;  Laterality: N/A;    KNEE SURGERY Right 1982    LUMBAR LAMINECTOMY WITH FUSION Right 05/01/2023    Procedure: LUMBAR DISCECTOMY, JADON- LAMINECTOMY TRANSFORAMINAL LUMBAR INTERBODY FUSION, POSTERIOR PEDICLE SCREW INSTRUMENTATION, L5-S1 RIGHT. USE OF THE ROBOT;  Surgeon: Nathaniel Tesfaye MD;  Location:  [facility] OR;  Service: Neurosurgery;  Laterality: Right;    SPINAL FUSION  2023    TRIGGER POINT INJECTION  2023   ,   Family History   Problem Relation Age of Onset    Ovarian cancer Mother     Ovarian cancer Maternal Aunt     Ovarian cancer Maternal Grandmother     Ovarian cancer Paternal Grandmother     Colon polyps Father     Colon polyps Brother     Breast cancer Neg Hx     Colon cancer Neg Hx    ,   Social History     Tobacco Use    Smoking status: Never     Passive exposure: Never    Smokeless tobacco: Never   Vaping Use    Vaping status: Never Used   Substance Use Topics    Alcohol use: Not Currently    Drug use: No   , (Not in a hospital admission)   and Allergies:  Lisinopril, Tizanidine hcl, and Augmentin [amoxicillin-pot clavulanate]    Current Outpatient Medications:     acetaminophen (Tylenol) 325 MG tablet, Take 3 tablets by mouth Every 8 (Eight) Hours. Take every 8 hours for 3 days then take prn as needed., Disp: , Rfl:     acetaminophen (TYLENOL) 650 MG 8 hr tablet, Take 1 tablet by mouth Every 8 (Eight) Hours As Needed for Mild Pain., Disp: , Rfl:     atenolol (TENORMIN) 25 MG tablet, Take 1 tablet by mouth Daily., Disp: 90 tablet, Rfl: 3    BD Pen Needle Tatyana U/F 32G X 4 MM misc, 4 (Four) Times a Day. use as directed, Disp: , Rfl:     Blood Glucose Monitoring Suppl (ONE TOUCH ULTRA 2) w/Device kit, See Admin  Instructions., Disp: , Rfl:     clobetasol (TEMOVATE) 0.05 % external solution, APPLY TO SCALP DAILY FOR 1 MONTH, Disp: , Rfl:     Continuous Blood Gluc Sensor (Dexcom G6 Sensor), See Admin Instructions., Disp: , Rfl:     Continuous Blood Gluc Transmit (Dexcom G6 Transmitter) misc, See Admin Instructions., Disp: , Rfl:     cyclobenzaprine (FLEXERIL) 10 MG tablet, Take 1 tablet by mouth 3 (Three) Times a Day As Needed for Muscle Spasms., Disp: 90 tablet, Rfl: 0    glucose blood test strip, 5 times a day and prn ., Disp: , Rfl:     Gvoke HypoPen 2-Pack 1 MG/0.2ML solution auto-injector, , Disp: , Rfl:     ibuprofen (Motrin IB) 200 MG tablet, Take 3 tablets by mouth Every 8 (Eight) Hours. Take every 8 hours for three days then take as needed., Disp: , Rfl:     insulin aspart (novoLOG FLEXPEN) 100 UNIT/ML solution pen-injector sc pen, Inject 2-5 Units under the skin into the appropriate area as directed 3 (Three) Times a Day With Meals. 2-5 units with meals sliding scale, Disp: , Rfl:     Lancets (OneTouch Delica Plus [id number]) misc, TEST 5 TIMES DAILY AND AS NEEDED, Disp: , Rfl:     levothyroxine (SYNTHROID, LEVOTHROID) 50 MCG tablet, Take 1 tablet by mouth Every Morning., Disp: , Rfl:     losartan (Cozaar) 25 MG tablet, Take 1 tablet by mouth Daily., Disp: 90 tablet, Rfl: 3    magnesium oxide (MAG-OX) 400 MG tablet, Take 1 tablet by mouth Daily., Disp: , Rfl:     Multiple Vitamins-Iron (multivitamin with iron) tablet tablet, Take 1 tablet by mouth Daily., Disp: , Rfl:     Nutritional Supplements (JUICE PLUS FIBRE PO), Take 1 dose by mouth Daily., Disp: , Rfl:     pantoprazole (PROTONIX) 40 MG EC tablet, Take 1 tablet by mouth Daily., Disp: , Rfl:     potassium chloride (K-DUR,KLOR-CON) 10 MEQ CR tablet, Take 1 tablet by mouth Daily As Needed., Disp: , Rfl:     sulfamethoxazole-trimethoprim (BACTRIM DS,SEPTRA DS) 800-160 MG per tablet, TAKE 1 TABLET BY MOUTH 2 TIMES DAILY FOR 7 DAYS, Disp: , Rfl:     Toujeo Max  "SoloStar 300 UNIT/ML solution pen-injector injection, Inject 22 Units under the skin into the appropriate area as directed Every Night., Disp: , Rfl:     vitamin D (ERGOCALCIFEROL) 1.25 MG (70098 UT) capsule capsule, Take 1 capsule by mouth 1 (One) Time Per Week. sunday, Disp: , Rfl:     gabapentin (NEURONTIN) 300 MG capsule, Take 1 capsule by mouth Daily for 7 days. (Patient taking differently: Take 1 capsule by mouth Every Night.), Disp: 7 capsule, Rfl: 0        Objective     Vital Signs   BP (!) 198/71 (BP Location: Right arm, Patient Position: Sitting, Cuff Size: Adult)   Pulse 72   Ht 152.4 cm (60\")   Wt 55.4 kg (122 lb 3.2 oz)   SpO2 99%   BMI 23.87 kg/m²      Physical Exam:  General appearance - alert, well appearing, and in no distress  Mental status - alert, oriented to person, place, and time  Eyes - pupils equal and reactive, extraocular eye movements intact  Neck - supple, no significant adenopathy  Chest - no tachypnea, retractions or cyanosis  Heart - normal rate and regular rhythm  Abdomen - soft, nontender, nondistended, no masses or organomegaly  Rectal - no significant hemorrhoids. Skin tag at polypectomy site. No fissure nor skin defect.   Neurological - alert, oriented, normal speech, no focal findings or movement disorder noted    Results Review:     The following data was reviewed by: Rocio Read MD on 06/17/2024:  REFERRAL/PRE-AUTH MRN - SCAN - REFERRAL-[facility]-[id number] (05/29/2024)     Assessment & Plan       Diagnoses and all orders for this visit:    1. Rectal bleeding (Primary)         Fiordaliza Lyons is a 64 y.o. female s/p anal polypectomy with bleeding. She has no significant hemorrhoids on exam. She does have a skin tag from her polypectomy. On exam I do not see a fissure nor a defect. She is not currently bleeding. I would like to re-examine her when she is bleeding. If it recurs, she will call the office for a follow up appointment.     BMI is within normal " parameters. No other follow-up for BMI required.      Rocio Read MD  06/19/24  22:14 CDT

## 2024-06-22 DIAGNOSIS — E03.9 ACQUIRED HYPOTHYROIDISM: ICD-10-CM

## 2024-06-24 RX ORDER — LEVOTHYROXINE SODIUM 0.05 MG/1
TABLET ORAL
Qty: 30 TABLET | Refills: 10 | Status: SHIPPED | OUTPATIENT
Start: 2024-06-24

## 2024-06-27 DIAGNOSIS — E10.9 TYPE 1 DIABETES MELLITUS WITHOUT COMPLICATION (HCC): ICD-10-CM

## 2024-06-28 RX ORDER — INSULIN GLARGINE 300 U/ML
INJECTION, SOLUTION SUBCUTANEOUS
Qty: 6 ML | Refills: 2 | Status: SHIPPED | OUTPATIENT
Start: 2024-06-28

## 2024-06-28 NOTE — TELEPHONE ENCOUNTER
Last OV 5/23/2024  Next OV 8/13/2024      Requested Prescriptions     Pending Prescriptions Disp Refills    TOUJEO MAX SOLOSTAR 300 UNIT/ML SOPN [Pharmacy Med Name: TOUJEO MAX SOLOSTAR 300UNIT/ML SOLUTION PEN-INJECTOR] 6 mL 2     Sig: INJECT 22 UNITS UNDER THE SKIN EVERY NIGHT.

## 2024-07-03 DIAGNOSIS — E10.9 TYPE 1 DIABETES MELLITUS WITHOUT COMPLICATION (HCC): ICD-10-CM

## 2024-07-03 RX ORDER — PROCHLORPERAZINE 25 MG/1
SUPPOSITORY RECTAL
Qty: 1 EACH | Refills: 2 | Status: SHIPPED | OUTPATIENT
Start: 2024-07-03

## 2024-07-10 RX ORDER — PROCHLORPERAZINE 25 MG/1
SUPPOSITORY RECTAL
Qty: 3 EACH | Refills: 0 | Status: SHIPPED | OUTPATIENT
Start: 2024-07-10

## 2024-07-11 ENCOUNTER — OFFICE VISIT (OUTPATIENT)
Dept: GASTROENTEROLOGY | Facility: CLINIC | Age: 64
End: 2024-07-11
Payer: COMMERCIAL

## 2024-07-11 VITALS
SYSTOLIC BLOOD PRESSURE: 154 MMHG | HEART RATE: 56 BPM | TEMPERATURE: 97.3 F | HEIGHT: 60 IN | OXYGEN SATURATION: 99 % | DIASTOLIC BLOOD PRESSURE: 76 MMHG | WEIGHT: 122 LBS | BODY MASS INDEX: 23.95 KG/M2

## 2024-07-11 DIAGNOSIS — K70.30 ALCOHOLIC CIRRHOSIS OF LIVER WITHOUT ASCITES: Primary | ICD-10-CM

## 2024-07-11 DIAGNOSIS — D12.6 ADENOMATOUS POLYP OF COLON, UNSPECIFIED PART OF COLON: ICD-10-CM

## 2024-07-11 PROCEDURE — 99213 OFFICE O/P EST LOW 20 MIN: CPT | Performed by: INTERNAL MEDICINE

## 2024-07-11 NOTE — PROGRESS NOTES
[facility] Gastroenterology    Chief Complaint   Patient presents with    Cirrhosis       Subjective     HPI    Fiordaliza Lyons is a 64 y.o. female who presents with a chief complaint of cirrhosis    When she was here last year she was doing well.  Everything was stable.  She comes in this year for an annual visit.  She denies any troubles.  She is very active.  She did have surgery on her hemorrhoids with Dr. Read.  That went well.  She does do some exercises to try to strengthen her anal sphincter muscle.  She denies any GI symptoms.  She is not having diarrhea or bleeding.    She did have labs in April and I was able to review the outside labs.  Her LFTs were within normal limits.  Her last ultrasound of her liver was June of last year.  She did have an ultrasound of her spleen this past November noting mild splenomegaly.  Her hemoglobin A1c in April was 5.7 but she states her primary is working with her because she was volume out too much in the evenings and they are working on that.  All else is going quite well.    She is planning a trip to [facility] this coming October for couple weeks.  She is exercising, she is watching her weight.  She has not had any alcohol consumption since 2021.  She states she has had no trouble avoiding alcohol.    =========== copy May 23, 2023 HPI=================    HPI     Fiordaliza Lyons is a 63 y.o. female who presents with a chief complaint of cirrhosis and hemorrhoids     She comes in for 1 year checkup.  She tells me she is doing well.  She still has trouble with her hemorrhoids.  She states they have enlarged.  She is seeing Dr. Miranda and has surgery scheduled in later June.  She previously called them her testicles but now they are much bigger she states.     At her health issues she is that she had back surgery for collapsed L5 at the beginning of this month.  She is wearing her brace.  She notes that she started to have a little bit of numbness and tingling  "down her right leg and she is going back to see her surgeon for follow-up tomorrow.  Otherwise she is doing well with this.  She is happy with the results.     From a GI standpoint she does not have any other complaints.  She remains alcohol free since October 2021.  She is trying to eat well.  Denies any abdominal pain.  She did not get an ultrasound since last May.  No other imaging of her liver since last May.  She states she has been busy with other medical issues.     =========== copy of May 9, 2022 HPI==================    HPI     Fiordaliza Lyons is a 62 y.o. female who presents with a chief complaint of hemorrhoids and liver disease.     She comes in for 6-month checkup on her liver.  She is done quite well.  She states she has not had any alcohol consumption since October 4, 2021.  She denies any fluid retention.  She is not on any diuretics.  She is working hard to get her diabetes under control.  She is got her hemoglobin A1c down to 7.5.  Her only complaint today is that she still battles with her hemorrhoids.  She states she is still has them protrude at times.  She underwent colonoscopy this past fall noting internal hemorrhoids.  She notes a little bit of clear discharge from the hemorrhoids and after excessive wiping she may see a little bit of bright red blood.  She does use hydrocortisone cream prescribed by Dr. Shields.  She does not use it every night.  She did not deliver babies.  She is not having any incontinence of stool        ================= copy of November 2021 HPI==============================  HISTORY OF PRESENT ILLNESS:      Fiordaliza Lyons is a 61 y.o. female presents with rectal mass. This was noted by Dr. Shields 10-11-21.  She has had problems having bm's. Dr. Shields also noted rectal prolapse and the patient reports having to \" push it back in to have a bm\". She has also noted some red , small amount, on the tissue with bm. Has had some llq pain. No fever. No weight loss. No " abdominal pain. No n/v. No hematemesis. No black stool.            She has alcoholic liver disease. Last drink of etoh 10-4-21. She did drink 3 glasses of vodka/sprite.  Currently Dr. Solis is managing her diuretics  (see med list).        She was hospitalized here at [facility] 10-29-21 to 11-1-21.   Discharge diagnoses acute cystitis, alcoholic cirrhosis, SARAH, anasarca, thoracic spine pain ( abnormal CT T6-7), hyponatremia, and ascites.   Last labs 11-1-21 plt normal, hgb 7.0, mcv 71, wbc 8.8, bun 7, creat 0.51, na 131.  CMP 11-1-21 with  albumin 2.8, alt 14, ast 34, alk phos 180, bili normal. Last inr normal on 10-29-21.  She did have a ultrasound paracentesis with 1500 ml removed ( no infection).  She was on IV diuretic drip.       She did receive blood transfusion 11-3-21 for hgb 7.0.         CT abdomen/pelvis with contrast done 10-5-21 noted gastric wall thickening, thickening of ascending colon.      She had appointment with Dr. Solis 11-2-11 with f/u appointment scheduled for 11-11-21.           Past Medical History:   Diagnosis Date    Alcoholic hepatitis with ascites     Allergic rhinitis     Cancer     ovarian    Cirrhosis 2021    Degeneration of lumbar or lumbosacral intervertebral disc 09/15/2022    Diabetes mellitus     Disease of thyroid gland     Elevated d-dimer     Endometriosis     Fatty liver     GERD (gastroesophageal reflux disease)     History of transfusion     Hyperlipidemia     Hypertension     Low back pain     Lumbosacral disc disease 2022    Osteoporosis     Ovarian cyst     Pancreatitis     Postmenopausal     Rectal mass     Rectal prolapse     Sleep apnea     has corrrected it self with w/ weight loss       Past Surgical History:   Procedure Laterality Date    ARM NERVE EXPLORATION Left     6 nerves scraped ,cleaned nerves off    BACK SURGERY  2023    BILATERAL SALPINGO OOPHORECTOMY  2008    CARPAL TUNNEL RELEASE  2016    CHOLECYSTECTOMY  1996    COLONOSCOPY  07/2017    Dr Jeffrey- per  patient normal     COLONOSCOPY N/A 11/09/2021    Procedure: COLONOSCOPY WITH ANESTHESIA;  Surgeon: Ray Serna MD;  Location: [facility] ENDOSCOPY;  Service: Gastroenterology;  Laterality: N/A;  pre rectal mass  post  Dr Shields    DILATION AND CURETTAGE, DIAGNOSTIC / THERAPEUTIC      ENDOSCOPY N/A 11/09/2021    Procedure: ESOPHAGOGASTRODUODENOSCOPY WITH ANESTHESIA;  Surgeon: Ray Serna MD;  Location: [facility] ENDOSCOPY;  Service: Gastroenterology;  Laterality: N/A;  pre anemia  post hiatal hernia; gastric polyp  Dr. Shields    HEMORRHOIDECTOMY SIGMOIDOSCOPY N/A 01/09/2024    Procedure: HEMORRHOIDECTOMY WITH EXAM UNDER ANESTHESIA;  Surgeon: Rocio Read MD;  Location: [facility] OR;  Service: General;  Laterality: N/A;    KNEE SURGERY Right 1982    LUMBAR LAMINECTOMY WITH FUSION Right 05/01/2023    Procedure: LUMBAR DISCECTOMY, JADON- LAMINECTOMY TRANSFORAMINAL LUMBAR INTERBODY FUSION, POSTERIOR PEDICLE SCREW INSTRUMENTATION, L5-S1 RIGHT. USE OF THE ROBOT;  Surgeon: Nathaniel Tesfaye MD;  Location:  [facility] OR;  Service: Neurosurgery;  Laterality: Right;    SPINAL FUSION  2023    TRIGGER POINT INJECTION  2023         Current Outpatient Medications:     atenolol (TENORMIN) 25 MG tablet, Take 1 tablet by mouth Daily., Disp: 90 tablet, Rfl: 3    BD Pen Needle Tatyana U/F 32G X 4 MM misc, 4 (Four) Times a Day. use as directed, Disp: , Rfl:     Blood Glucose Monitoring Suppl (ONE TOUCH ULTRA 2) w/Device kit, See Admin Instructions., Disp: , Rfl:     Continuous Blood Gluc Sensor (Dexcom G6 Sensor), See Admin Instructions., Disp: , Rfl:     Continuous Blood Gluc Transmit (Dexcom G6 Transmitter) misc, See Admin Instructions., Disp: , Rfl:     glucose blood test strip, 5 times a day and prn ., Disp: , Rfl:     insulin aspart (novoLOG FLEXPEN) 100 UNIT/ML solution pen-injector sc pen, Inject 2-5 Units under the skin into the appropriate area as directed 3 (Three) Times a Day With Meals. 2-5 units with meals sliding  scale, Disp: , Rfl:     Lancets (OneTouch Delica Plus [id number]) misc, TEST 5 TIMES DAILY AND AS NEEDED, Disp: , Rfl:     levothyroxine (SYNTHROID, LEVOTHROID) 50 MCG tablet, Take 1 tablet by mouth Every Morning., Disp: , Rfl:     losartan (Cozaar) 25 MG tablet, Take 1 tablet by mouth Daily., Disp: 90 tablet, Rfl: 3    magnesium oxide (MAG-OX) 400 MG tablet, Take 1 tablet by mouth Daily., Disp: , Rfl:     Multiple Vitamins-Iron (multivitamin with iron) tablet tablet, Take 1 tablet by mouth Daily., Disp: , Rfl:     Nutritional Supplements (JUICE PLUS FIBRE PO), Take 1 dose by mouth Daily., Disp: , Rfl:     pantoprazole (PROTONIX) 40 MG EC tablet, Take 1 tablet by mouth Daily., Disp: , Rfl:     Toujeo Max SoloStar 300 UNIT/ML solution pen-injector injection, Inject 22 Units under the skin into the appropriate area as directed Every Night., Disp: , Rfl:     VITAMIN D-VITAMIN K PO, Take  by mouth 1 (One) Time Per Week., Disp: , Rfl:     acetaminophen (Tylenol) 325 MG tablet, Take 3 tablets by mouth Every 8 (Eight) Hours. Take every 8 hours for 3 days then take prn as needed. (Patient not taking: Reported on 7/11/2024), Disp: , Rfl:     acetaminophen (TYLENOL) 650 MG 8 hr tablet, Take 1 tablet by mouth Every 8 (Eight) Hours As Needed for Mild Pain. (Patient not taking: Reported on 7/11/2024), Disp: , Rfl:     clobetasol (TEMOVATE) 0.05 % external solution, APPLY TO SCALP DAILY FOR 1 MONTH (Patient not taking: Reported on 7/11/2024), Disp: , Rfl:     cyclobenzaprine (FLEXERIL) 10 MG tablet, Take 1 tablet by mouth 3 (Three) Times a Day As Needed for Muscle Spasms. (Patient not taking: Reported on 7/11/2024), Disp: 90 tablet, Rfl: 0    gabapentin (NEURONTIN) 300 MG capsule, Take 1 capsule by mouth Daily for 7 days. (Patient taking differently: Take 1 capsule by mouth Every Night.), Disp: 7 capsule, Rfl: 0    Gvoke HypoPen 2-Pack 1 MG/0.2ML solution auto-injector, , Disp: , Rfl:     ibuprofen (Motrin IB) 200 MG tablet, Take  "3 tablets by mouth Every 8 (Eight) Hours. Take every 8 hours for three days then take as needed. (Patient not taking: Reported on 7/11/2024), Disp: , Rfl:     potassium chloride (K-DUR,KLOR-CON) 10 MEQ CR tablet, Take 1 tablet by mouth Daily As Needed. (Patient not taking: Reported on 7/11/2024), Disp: , Rfl:     sulfamethoxazole-trimethoprim (BACTRIM DS,SEPTRA DS) 800-160 MG per tablet, TAKE 1 TABLET BY MOUTH 2 TIMES DAILY FOR 7 DAYS (Patient not taking: Reported on 7/11/2024), Disp: , Rfl:     vitamin D (ERGOCALCIFEROL) 1.25 MG (03029 UT) capsule capsule, Take 1 capsule by mouth 1 (One) Time Per Week. sunday (Patient not taking: Reported on 7/11/2024), Disp: , Rfl:     Allergies   Allergen Reactions    Lisinopril Cough    Tizanidine Hcl Dizziness     Pt stated \"horrible dry mouth, dizziness, couldn't see, it was awful\" drops blood sugar down     Augmentin [Amoxicillin-Pot Clavulanate] Nausea And Vomiting       Social History     Socioeconomic History    Marital status: Single   Tobacco Use    Smoking status: Never     Passive exposure: Never    Smokeless tobacco: Never   Vaping Use    Vaping status: Never Used   Substance and Sexual Activity    Alcohol use: Not Currently    Drug use: No    Sexual activity: Not Currently       Family History   Problem Relation Age of Onset    Ovarian cancer Mother     Ovarian cancer Maternal Aunt     Ovarian cancer Maternal Grandmother     Ovarian cancer Paternal Grandmother     Colon polyps Father     Colon polyps Brother     Breast cancer Neg Hx     Colon cancer Neg Hx        Review of Systems  No abdominal discomfort, no swelling    Objective     Vitals:    07/11/24 1432   BP: 154/76   Pulse: 56   Temp: 97.3 °F (36.3 °C)   SpO2: 99%       Physical Exam  Vitals reviewed.   Constitutional:       Appearance: Normal appearance. She is not ill-appearing or diaphoretic.   Pulmonary:      Effort: Pulmonary effort is normal.   Neurological:      Mental Status: She is alert. "   Psychiatric:         Thought Content: Thought content normal.         Judgment: Judgment normal.               Assessment & Plan   Problem List Items Addressed This Visit          Gastrointestinal Abdominal     Alcoholic cirrhosis - Primary    Overview     Alcohol free since October 2021         Relevant Orders    US Liver    Adenomatous polyp of colon    Overview     History of adenomatous polyps.  Surveillance colonoscopy recommended again approximately November 2024         Relevant Orders    Case Request (Completed)         Clinically she is doing well.  With her liver she is made lifestyle changes, cut out alcohol, exercise eating healthy and this is paid off.  Her labs in April were unremarkable.  She was due to have follow-up labs with her PCP thus we will not plan to obtain any.  I encouraged her to continue to take care of herself as she is doing and commended her for doing so.  We did discuss her risk is increased for developing hepatocellular carcinoma so I do suggest ultrasound of her liver for surveillance and we recommend this be done every 6 months.  Some individuals could have it done once yearly if they choose.  She agrees to get the ultrasound.    With her history of adenomatous colon polyps she will be due for colonoscopy this coming November.  She talked about getting it done before she went to [facility] by 1 not wanted to have it done within 2 [place] of [place].  Thus I recommend she wait wait till she is back from [facility].  She will use the MiraLAX prep.    Will see her back in the office in 1 year.    Continue ongoing management by primary care provider and other specialists.     Body mass index is 23.83 kg/m².  Normal BMI    The risk benefits and alternatives of colonoscopy were provided  EMR Dragon/transcription disclaimer:  Much of this encounter note is electronic transcription/translation of spoken language to printed text.  The electronic translation of spoken language may be  erroneous, or at times, nonsensical words or phrases may be inadvertently transcribed.  Although I have reviewed the note for such errors, some may still exist.    Ray Serna MD  15:14 CDT  07/11/24

## 2024-07-22 ENCOUNTER — HOSPITAL ENCOUNTER (OUTPATIENT)
Dept: ULTRASOUND IMAGING | Facility: HOSPITAL | Age: 64
Discharge: HOME | End: 2024-07-22
Admitting: INTERNAL MEDICINE
Payer: COMMERCIAL

## 2024-07-22 DIAGNOSIS — K70.30 ALCOHOLIC CIRRHOSIS OF LIVER WITHOUT ASCITES: ICD-10-CM

## 2024-07-22 PROCEDURE — 76705 ECHO EXAM OF ABDOMEN: CPT

## 2024-07-31 LAB
QT INTERVAL: 428 MS
QTC INTERVAL: 409 MS

## 2024-07-31 NOTE — PROGRESS NOTES
[facility] - PODIATRY    Today's Date: 08/07/2024     Patient Name: Fiordaliza Lyons  MRN: [id number]  CSN: [id number]  PCP: Brittany Solis MD  Referring Provider: No ref. provider found    SUBJECTIVE     Chief Complaint   Patient presents with    Follow-up     Brittany Solis MD 05/06/2024 3 MO FU [facility]- pt states left foot doing good, right foot area around bunion is very painful and out side edge of right great nail is feeling ingrown - pt pain 5/10 at worst     Diabetes     73mg/dl BG this am      HPI: Fiordaliza Lyons, a 64 y.o.female, comes to clinic as a(n) established patient presenting for diabetic foot exam and complaining of toenail/callus issues. Patient has h/o alcoholic hepatitis, breast cysts, DM I, endometreosis, GERD, HLD, HTN, pancreatitis, sleep apnea . Patient is IDDM with last stated BG level of 73mg/dl.  Continues to relay issues with right hip and low back pain. She does continue to follow with Dr. Larsen. Notes overall improvement but does continue to have some weakness to her right lower leg causing some mild foot drop.  She does report some mild numbness and tingling in her feet.  Denies open wounds or sores today.  Continues to complain of moderate amount of pain to her 1st MTPJ of her right foot.  Admits pain at 5/10 level and described as aching, nagging, dull, numbness and tingling. Relates previous treatment(s) including foot care by podiatry . Denies any constitutional symptoms. No other pedal complaints at this time.    Past Medical History:   Diagnosis Date    Alcoholic hepatitis with ascites     Allergic rhinitis     Cancer     ovarian    Cirrhosis 2021    Degeneration of lumbar or lumbosacral intervertebral disc 09/15/2022    Diabetes mellitus     Disease of thyroid gland     Elevated d-dimer     Endometriosis     Fatty liver     GERD (gastroesophageal reflux disease)     History of transfusion     Hyperlipidemia     Hypertension     Low back  pain     Lumbosacral disc disease 2022    Osteoporosis     Ovarian cyst     Pancreatitis     Postmenopausal     Rectal mass     Rectal prolapse     Sleep apnea     has corrrected it self with w/ weight loss     Past Surgical History:   Procedure Laterality Date    ARM NERVE EXPLORATION Left     6 nerves scraped ,cleaned nerves off    BACK SURGERY  2023    BILATERAL SALPINGO OOPHORECTOMY  2008    CARPAL TUNNEL RELEASE  2016    CHOLECYSTECTOMY  1996    COLONOSCOPY  07/2017    Dr Murphy- per patient normal     COLONOSCOPY N/A 11/09/2021    Procedure: COLONOSCOPY WITH ANESTHESIA;  Surgeon: Ray Serna MD;  Location: [facility] ENDOSCOPY;  Service: Gastroenterology;  Laterality: N/A;  pre rectal mass  post  Dr Shields    DILATION AND CURETTAGE, DIAGNOSTIC / THERAPEUTIC      ENDOSCOPY N/A 11/09/2021    Procedure: ESOPHAGOGASTRODUODENOSCOPY WITH ANESTHESIA;  Surgeon: Ray Serna MD;  Location: [facility] ENDOSCOPY;  Service: Gastroenterology;  Laterality: N/A;  pre anemia  post hiatal hernia; gastric polyp  Dr. Shields    HEMORRHOIDECTOMY SIGMOIDOSCOPY N/A 01/09/2024    Procedure: HEMORRHOIDECTOMY WITH EXAM UNDER ANESTHESIA;  Surgeon: Rocio Read MD;  Location: [facility] OR;  Service: General;  Laterality: N/A;    KNEE SURGERY Right 1982    LUMBAR LAMINECTOMY WITH FUSION Right 05/01/2023    Procedure: LUMBAR DISCECTOMY, JADON- LAMINECTOMY TRANSFORAMINAL LUMBAR INTERBODY FUSION, POSTERIOR PEDICLE SCREW INSTRUMENTATION, L5-S1 RIGHT. USE OF THE ROBOT;  Surgeon: Nathaniel Tesfaye MD;  Location:  [facility] OR;  Service: Neurosurgery;  Laterality: Right;    SPINAL FUSION  2023    TRIGGER POINT INJECTION  2023     Family History   Problem Relation Age of Onset    Ovarian cancer Mother     Ovarian cancer Maternal Aunt     Ovarian cancer Maternal Grandmother     Ovarian cancer Paternal Grandmother     Colon polyps Father     Colon polyps Brother     Breast cancer Neg Hx     Colon cancer Neg Hx      Social History  "    Socioeconomic History    Marital status: Single   Tobacco Use    Smoking status: Never     Passive exposure: Never    Smokeless tobacco: Never   Vaping Use    Vaping status: Never Used   Substance and Sexual Activity    Alcohol use: Not Currently    Drug use: No    Sexual activity: Not Currently     Allergies   Allergen Reactions    Lisinopril Cough    Tizanidine Hcl Dizziness     Pt stated \"horrible dry mouth, dizziness, couldn't see, it was awful\" drops blood sugar down     Augmentin [Amoxicillin-Pot Clavulanate] Nausea And Vomiting     Current Outpatient Medications   Medication Sig Dispense Refill    acetaminophen (Tylenol) 325 MG tablet Take 3 tablets by mouth Every 8 (Eight) Hours. Take every 8 hours for 3 days then take prn as needed.      atenolol (TENORMIN) 25 MG tablet Take 1 tablet by mouth Daily. 90 tablet 3    BD Pen Needle Tatyana U/F 32G X 4 MM misc 4 (Four) Times a Day. use as directed      Blood Glucose Monitoring Suppl (ONE TOUCH ULTRA 2) w/Device kit See Admin Instructions.      clobetasol (TEMOVATE) 0.05 % external solution       Continuous Blood Gluc Sensor (Dexcom G6 Sensor) See Admin Instructions.      Continuous Blood Gluc Transmit (Dexcom G6 Transmitter) misc See Admin Instructions.      glucose blood test strip 5 times a day and prn .      Gvoke HypoPen 2-Pack 1 MG/0.2ML solution auto-injector       ibuprofen (Motrin IB) 200 MG tablet Take 3 tablets by mouth Every 8 (Eight) Hours. Take every 8 hours for three days then take as needed.      insulin aspart (novoLOG FLEXPEN) 100 UNIT/ML solution pen-injector sc pen Inject 2-5 Units under the skin into the appropriate area as directed 3 (Three) Times a Day With Meals. 2-5 units with meals sliding scale      Lancets (OneTouch Delica Plus [id number]) misc TEST 5 TIMES DAILY AND AS NEEDED      levothyroxine (SYNTHROID, LEVOTHROID) 50 MCG tablet Take 1 tablet by mouth Every Morning.      losartan (Cozaar) 25 MG tablet Take 1 tablet by mouth Daily. " 90 tablet 3    magnesium oxide (MAG-OX) 400 MG tablet Take 1 tablet by mouth Daily.      Multiple Vitamins-Iron (multivitamin with iron) tablet tablet Take 1 tablet by mouth Daily.      Nutritional Supplements (JUICE PLUS FIBRE PO) Take 1 dose by mouth Daily.      pantoprazole (PROTONIX) 40 MG EC tablet Take 1 tablet by mouth Daily.      potassium chloride (K-DUR,KLOR-CON) 10 MEQ CR tablet Take 1 tablet by mouth Daily As Needed.      Toujeo Max SoloStar 300 UNIT/ML solution pen-injector injection Inject 22 Units under the skin into the appropriate area as directed Every Night.      vitamin D (ERGOCALCIFEROL) 1.25 MG (10594 UT) capsule capsule Take 1 capsule by mouth 1 (One) Time Per Week. sunday      VITAMIN D-VITAMIN K PO Take  by mouth 1 (One) Time Per Week.      acetaminophen (TYLENOL) 650 MG 8 hr tablet Take 1 tablet by mouth Every 8 (Eight) Hours As Needed for Mild Pain. (Patient not taking: Reported on 7/11/2024)      cyclobenzaprine (FLEXERIL) 10 MG tablet Take 1 tablet by mouth 3 (Three) Times a Day As Needed for Muscle Spasms. (Patient not taking: Reported on 7/11/2024) 90 tablet 0    gabapentin (NEURONTIN) 300 MG capsule Take 1 capsule by mouth Daily for 7 days. (Patient taking differently: Take 1 capsule by mouth Every Night.) 7 capsule 0    sulfamethoxazole-trimethoprim (BACTRIM DS,SEPTRA DS) 800-160 MG per tablet TAKE 1 TABLET BY MOUTH 2 TIMES DAILY FOR 7 DAYS (Patient not taking: Reported on 7/11/2024)       No current facility-administered medications for this visit.     Review of Systems   Constitutional:  Negative for chills and fever.   HENT:  Negative for congestion.    Respiratory:  Negative for shortness of breath.    Cardiovascular:  Positive for leg swelling. Negative for chest pain.   Gastrointestinal:  Negative for constipation, diarrhea, nausea and vomiting.   Musculoskeletal:  Positive for arthralgias and back pain.        Right foot pain   Skin:  Negative for wound.        Thickened,  elongated toenails. CAllus   Neurological:  Positive for weakness and numbness.   Psychiatric/Behavioral:  Negative for agitation, behavioral problems and confusion.        OBJECTIVE     Vitals:    08/07/24 0802   BP: 144/76   Pulse: 55   SpO2: 99%             PHYSICAL EXAM  GEN:   Accompanied by none.     Foot/Ankle Exam    GENERAL  Diabetic foot exam performed    Appearance:  appears stated age  Orientation:  AAOx3  Affect:  appropriate  Gait:  unimpaired  Assistance:  independent  Right shoe gear: casual shoe  Left shoe gear: casual shoe    VASCULAR     Right Foot Vascularity   Dorsalis pedis:  2+  Posterior tibial:  1+  Skin temperature:  warm  Edema grading:  Trace  CFT:  3  Pedal hair growth:  Present  Varicosities:  mild varicosities     Left Foot Vascularity   Dorsalis pedis:  2+  Posterior tibial:  1+  Skin temperature:  warm  Edema grading:  Trace  CFT:  3  Pedal hair growth:  Present  Varicosities:  mild varicosities     NEUROLOGIC     Right Foot Neurologic   Light touch sensation: diminished  Vibratory sensation: diminished  Hot/Cold sensation: diminished  Protective Sensation using [place] Monofilament:   Sites intact: 5  Sites tested: 10     Left Foot Neurologic   Light touch sensation: diminished  Vibratory sensation: diminished  Hot/Cold sensation:  diminished  Protective Sensation using [place] Monofilament:   Sites intact: 5  Sites tested: 10    MUSCULOSKELETAL     Right Foot Musculoskeletal   Ecchymosis:  none  Tenderness:  MTP 1 dorsal tenderness and toenail problem    Arch:  Normal  Hallux valgus: No    Hallux limitus: Yes (Dorsal spurring)       Left Foot Musculoskeletal   Ecchymosis:  none  Tenderness:  toenail problem  Arch:  Normal  Hallux valgus: No      MUSCLE STRENGTH     Right Foot Muscle Strength   Foot dorsiflexion:  4  Foot plantar flexion:  5  Foot inversion:  4+  Foot eversion:  5     Left Foot Muscle Strength   Foot dorsiflexion:  5  Foot plantar flexion:  5  Foot  inversion:  5  Foot eversion:  5    RANGE OF MOTION     Right Foot Range of Motion   Foot and ankle ROM within normal limits       Left Foot Range of Motion   Foot and ankle ROM within normal limits      DERMATOLOGIC      Right Foot Dermatologic   Skin  Right foot skin is intact.   Nails  1.  Positive for elongated, onychomycosis, abnormal thickness and subungual debris.  2.  Positive for elongated, onychomycosis, abnormal thickness and subungual debris.  3.  Positive for elongated, onychomycosis, abnormal thickness and subungual debris.  4.  Positive for elongated, onychomycosis, abnormal thickness and subungual debris.  5.  Positive for elongated, onychomycosis, abnormal thickness and subungual debris.  Nails comment:  1-5     Left Foot Dermatologic   Skin  Left foot skin is intact.   Nails comment:  1-5  Nails  1.  Positive for elongated, onychomycosis, abnormal thickness and subungual debris.  2.  Positive for elongated, onychomycosis, abnormal thickness and subungual debris.  3.  Positive for elongated, onychomycosis, abnormal thickness and subungual debris.  4.  Positive for elongated, onychomycosis, abnormally thick and subungual debris.  5.  Positive for elongated, onychomycosis, abnormally thick and subungual debris.    Image:       RADIOLOGY/NUCLEAR:  US Liver    Result Date: 7/22/2024  Narrative: EXAMINATION: US LIVER-  7/22/2024 7:17 AM  REASON FOR EXAM: cirrhosi, screen for HCC; K70.30-Alcoholic cirrhosis of liver without ascites  COMPARISON: 6/23/2023  TECHNIQUE: Multiple longitudinal and transverse real time sonographic images of the right upper quadrant of the abdomen are obtained. Doppler and grayscale images were provided. Images and report are stored per institutional and state regulations.  FINDINGS:  PANCREAS: Limited visualization. No focal abnormality. Bowel gas partially obscures the pancreas.  LIVER: No focal abnormality. Mildly coarsened echotexture. Normal echogenicity.  GALLBLADDER: No  intraluminal echoes, wall thickening, or pericholecystic fluid.  BILE DUCTS: The CBD measures 0.5 cm in diameter. There is no intrahepatic or extrahepatic ductal dilation.  OTHER: The visualized abdominal aorta is normal in caliber. No ascites identified.       Impression:  1.  Mildly coarsened echotexture. No focal hepatic abnormality identified.  This report was signed and finalized on 7/22/2024 7:20 AM by Dr. Ernesto Adler MD.         LABORATORY/CULTURE RESULTS:      PATHOLOGY RESULTS:       ASSESSMENT/PLAN     Diagnoses and all orders for this visit:    1. Onychomycosis (Primary)    2. Right foot pain    3. Type 1 diabetes mellitus with diabetic neuropathy    4. Arthritis of first metatarsophalangeal (MTP) joint of right foot    5. Hallux limitus of right foot    6. Foot drop, right    7. Degenerative disc disease, lumbar            Comprehensive lower extremity examination and evaluation was performed.  Discussed findings and treatment plan including risks, benefits, and treatment options with patient in detail. Patient agreed with treatment plan.  After verbal consent obtained, nail(s) x10 debrided of length and thickness with nail nipper without incidence  After verbal consent obtained, calluses x1 pared utilizing dermal curette and/or scalpel without incidence  Patient may maintain nails and calluses at home utilizing emery board or pumice stone between visits as needed  Reviewed at home diabetic foot care including daily foot checks   Continue follow-up with neurosurgery and pain management.  Discussed treatment of hallux limitus including conservative measures with hard soled shoes vs. Surgical correction with arthrodesis. Will remain conservative at this time.  Xray that was previously ordered at last visit still has not been obtained- encouraged patient to have imaging performed to evaluate foot structure.   Follow-up in 63 days for routine diabetic foot and nail care.    An After Visit Summary was  printed and given to the patient at discharge, including (if requested) any available informative/educational handouts regarding diagnosis, treatment, or medications. All questions were answered to patient/family satisfaction. Should symptoms fail to improve or worsen they agree to call or return to clinic or to go to the Emergency Department. Discussed the importance of following up with any needed screening tests/labs/specialist appointments and any requested follow-up recommended by me today. Importance of maintaining follow-up discussed and patient accepts that missed appointments can delay diagnosis and potentially lead to worsening of conditions.  Return in about 2 months (around 10/9/2024) for Follow-up with APRN, Follow-up in [facility]., or sooner if acute issues arise.    This document has been electronically signed by RAYMON Hernandez on August 7, 2024 08:34 CDT

## 2024-08-05 RX ORDER — PROCHLORPERAZINE 25 MG/1
SUPPOSITORY RECTAL
Qty: 3 EACH | Refills: 0 | Status: SHIPPED | OUTPATIENT
Start: 2024-08-05

## 2024-08-06 ENCOUNTER — TELEPHONE (OUTPATIENT)
Age: 64
End: 2024-08-06
Payer: COMMERCIAL

## 2024-08-06 NOTE — TELEPHONE ENCOUNTER
Hub to relay  Called patient regarding appt on 08/07/2024. Left message for patient to return call if any questions or concerns arise.

## 2024-08-07 ENCOUNTER — OFFICE VISIT (OUTPATIENT)
Age: 64
End: 2024-08-07
Payer: COMMERCIAL

## 2024-08-07 VITALS
BODY MASS INDEX: 22.78 KG/M2 | WEIGHT: 116 LBS | OXYGEN SATURATION: 99 % | SYSTOLIC BLOOD PRESSURE: 144 MMHG | HEART RATE: 55 BPM | DIASTOLIC BLOOD PRESSURE: 76 MMHG | HEIGHT: 60 IN

## 2024-08-07 DIAGNOSIS — M21.371 FOOT DROP, RIGHT: ICD-10-CM

## 2024-08-07 DIAGNOSIS — M51.36 DEGENERATIVE DISC DISEASE, LUMBAR: ICD-10-CM

## 2024-08-07 DIAGNOSIS — M79.671 RIGHT FOOT PAIN: ICD-10-CM

## 2024-08-07 DIAGNOSIS — B35.1 ONYCHOMYCOSIS: Primary | ICD-10-CM

## 2024-08-07 DIAGNOSIS — M20.5X1 HALLUX LIMITUS OF RIGHT FOOT: ICD-10-CM

## 2024-08-07 DIAGNOSIS — M19.071 ARTHRITIS OF FIRST METATARSOPHALANGEAL (MTP) JOINT OF RIGHT FOOT: ICD-10-CM

## 2024-08-07 DIAGNOSIS — E10.40 TYPE 1 DIABETES MELLITUS WITH DIABETIC NEUROPATHY: ICD-10-CM

## 2024-08-12 RX ORDER — PEN NEEDLE, DIABETIC 32GX 5/32"
NEEDLE, DISPOSABLE MISCELLANEOUS
Qty: 100 EACH | Refills: 10 | Status: SHIPPED | OUTPATIENT
Start: 2024-08-12

## 2024-08-12 RX ORDER — PROCHLORPERAZINE 25 MG/1
SUPPOSITORY RECTAL
Qty: 3 EACH | Refills: 0 | Status: SHIPPED | OUTPATIENT
Start: 2024-08-12

## 2024-08-12 NOTE — PROGRESS NOTES
Subjective    Ms. Lyons is 64 y.o. female    Chief Complaint: recurrent UTI follow up    History of Present Illness    64-year-old female established patient in for 3-month follow-up regarding history of recurrent urinary tract infections.  Reports was just prescribed another antibiotic by PCP has yet to start taking.  Denies any systemic symptoms reporting only a color and odor to urine.  Reports when having other labs drawn urinalyses are completed.  Reports just started using the vaginal estrogen cream within the last week.  Currently going to physical therapy for pelvic floor strengthening regarding the anal sphincter due to previous rectal surgery causing constant leakage prior for the last 2 years.  Patient reports has noted over the last 7 to 10 days leakage currently resolved.  Is hoping this will continue.    The following portions of the patient's history were reviewed and updated as appropriate: allergies, current medications, past family history, past medical history, past social history, past surgical history and problem list.    Review of Systems   Constitutional:  Negative for chills, fatigue and fever.   Gastrointestinal:  Negative for nausea and vomiting.   Genitourinary:  Negative for frequency and urgency.         Current Outpatient Medications:     acetaminophen (Tylenol) 325 MG tablet, Take 3 tablets by mouth Every 8 (Eight) Hours. Take every 8 hours for 3 days then take prn as needed., Disp: , Rfl:     atenolol (TENORMIN) 25 MG tablet, Take 1 tablet by mouth Daily., Disp: 90 tablet, Rfl: 3    BD Pen Needle Tatyana U/F 32G X 4 MM misc, 4 (Four) Times a Day. use as directed, Disp: , Rfl:     Blood Glucose Monitoring Suppl (ONE TOUCH ULTRA 2) w/Device kit, See Admin Instructions., Disp: , Rfl:     clobetasol (TEMOVATE) 0.05 % external solution, , Disp: , Rfl:     Continuous Blood Gluc Sensor (Dexcom G6 Sensor), See Admin Instructions., Disp: , Rfl:     Continuous Blood Gluc Transmit (Dexcom G6  Transmitter) misc, See Admin Instructions., Disp: , Rfl:     estradiol (ESTRACE) 0.1 MG/GM vaginal cream, Apply nightly for 2 weeks and then 3 nights a week, Disp: , Rfl:     glucose blood test strip, 5 times a day and prn ., Disp: , Rfl:     Gvoke HypoPen 2-Pack 1 MG/0.2ML solution auto-injector, , Disp: , Rfl:     HYDROcodone-acetaminophen (NORCO) 7.5-325 MG per tablet, Take 1 tablet by mouth Every 6 (Six) Hours As Needed. for pain, Disp: , Rfl:     ibuprofen (Motrin IB) 200 MG tablet, Take 3 tablets by mouth Every 8 (Eight) Hours. Take every 8 hours for three days then take as needed., Disp: , Rfl:     insulin aspart (novoLOG FLEXPEN) 100 UNIT/ML solution pen-injector sc pen, Inject 2-5 Units under the skin into the appropriate area as directed 3 (Three) Times a Day With Meals. 2-5 units with meals sliding scale, Disp: , Rfl:     Lancets (OneTouch Delica Plus [id number]) misc, TEST 5 TIMES DAILY AND AS NEEDED, Disp: , Rfl:     levothyroxine (SYNTHROID, LEVOTHROID) 50 MCG tablet, Take 1 tablet by mouth Every Morning., Disp: , Rfl:     losartan (Cozaar) 25 MG tablet, Take 1 tablet by mouth Daily., Disp: 90 tablet, Rfl: 3    magnesium oxide (MAG-OX) 400 MG tablet, Take 1 tablet by mouth Daily., Disp: , Rfl:     Multiple Vitamins-Iron (multivitamin with iron) tablet tablet, Take 1 tablet by mouth Daily., Disp: , Rfl:     Nutritional Supplements (JUICE PLUS FIBRE PO), Take 1 dose by mouth Daily., Disp: , Rfl:     ondansetron ODT (ZOFRAN-ODT) 4 MG disintegrating tablet, DISSOLVE 1 TABLET ON THE TONGUE EVERY 4-6 HOURS AS NEEDED FOR NAUSEA, Disp: , Rfl:     pantoprazole (PROTONIX) 40 MG EC tablet, Take 1 tablet by mouth Daily., Disp: , Rfl:     potassium chloride (K-DUR,KLOR-CON) 10 MEQ CR tablet, Take 1 tablet by mouth Daily As Needed., Disp: , Rfl:     Toujeo Max SoloStar 300 UNIT/ML solution pen-injector injection, Inject 22 Units under the skin into the appropriate area as directed Every Night., Disp: , Rfl:      vitamin D (ERGOCALCIFEROL) 1.25 MG (57396 UT) capsule capsule, Take 1 capsule by mouth 1 (One) Time Per Week. sunday, Disp: , Rfl:     VITAMIN D-VITAMIN K PO, Take  by mouth 1 (One) Time Per Week., Disp: , Rfl:     acetaminophen (TYLENOL) 650 MG 8 hr tablet, Take 1 tablet by mouth Every 8 (Eight) Hours As Needed for Mild Pain. (Patient not taking: Reported on 7/11/2024), Disp: , Rfl:     cyclobenzaprine (FLEXERIL) 10 MG tablet, Take 1 tablet by mouth 3 (Three) Times a Day As Needed for Muscle Spasms. (Patient not taking: Reported on 7/11/2024), Disp: 90 tablet, Rfl: 0    gabapentin (NEURONTIN) 300 MG capsule, Take 1 capsule by mouth Daily for 7 days. (Patient taking differently: Take 1 capsule by mouth Every Night.), Disp: 7 capsule, Rfl: 0    sulfamethoxazole-trimethoprim (BACTRIM DS,SEPTRA DS) 800-160 MG per tablet, TAKE 1 TABLET BY MOUTH 2 TIMES DAILY FOR 7 DAYS (Patient not taking: Reported on 7/11/2024), Disp: , Rfl:     Past Medical History:   Diagnosis Date    Alcoholic hepatitis with ascites     Allergic rhinitis     Cancer     ovarian    Cirrhosis 2021    Degeneration of lumbar or lumbosacral intervertebral disc 09/15/2022    Diabetes mellitus     Disease of thyroid gland     Elevated d-dimer     Endometriosis     Fatty liver     GERD (gastroesophageal reflux disease)     History of transfusion     Hyperlipidemia     Hypertension     Low back pain     Lumbosacral disc disease 2022    Osteoporosis     Ovarian cyst     Pancreatitis     Postmenopausal     Rectal mass     Rectal prolapse     Sleep apnea     has corrrected it self with w/ weight loss       Past Surgical History:   Procedure Laterality Date    ARM NERVE EXPLORATION Left     6 nerves scraped ,cleaned nerves off    BACK SURGERY  2023    BILATERAL SALPINGO OOPHORECTOMY  2008    CARPAL TUNNEL RELEASE  2016    CHOLECYSTECTOMY  1996    COLONOSCOPY  07/2017    Dr Murphy- per patient normal     COLONOSCOPY N/A 11/09/2021    Procedure: COLONOSCOPY WITH  ANESTHESIA;  Surgeon: Ray Serna MD;  Location: [facility] ENDOSCOPY;  Service: Gastroenterology;  Laterality: N/A;  pre rectal mass  post  Dr Shields    DILATION AND CURETTAGE, DIAGNOSTIC / THERAPEUTIC      ENDOSCOPY N/A 11/09/2021    Procedure: ESOPHAGOGASTRODUODENOSCOPY WITH ANESTHESIA;  Surgeon: Ray Serna MD;  Location: [facility] ENDOSCOPY;  Service: Gastroenterology;  Laterality: N/A;  pre anemia  post hiatal hernia; gastric polyp  Dr. Shields    HEMORRHOIDECTOMY SIGMOIDOSCOPY N/A 01/09/2024    Procedure: HEMORRHOIDECTOMY WITH EXAM UNDER ANESTHESIA;  Surgeon: Rocio Read MD;  Location: [facility] OR;  Service: General;  Laterality: N/A;    KNEE SURGERY Right 1982    LUMBAR LAMINECTOMY WITH FUSION Right 05/01/2023    Procedure: LUMBAR DISCECTOMY, JADON- LAMINECTOMY TRANSFORAMINAL LUMBAR INTERBODY FUSION, POSTERIOR PEDICLE SCREW INSTRUMENTATION, L5-S1 RIGHT. USE OF THE ROBOT;  Surgeon: Nathaniel Tesfaye MD;  Location: [facility] OR;  Service: Neurosurgery;  Laterality: Right;    SPINAL FUSION  2023    TRIGGER POINT INJECTION  2023       Social History     Socioeconomic History    Marital status: Single   Tobacco Use    Smoking status: Never     Passive exposure: Never    Smokeless tobacco: Never   Vaping Use    Vaping status: Never Used   Substance and Sexual Activity    Alcohol use: Not Currently    Drug use: No    Sexual activity: Not Currently       Family History   Problem Relation Age of Onset    Ovarian cancer Mother     Ovarian cancer Maternal Aunt     Ovarian cancer Maternal Grandmother     Ovarian cancer Paternal Grandmother     Colon polyps Father     Colon polyps Brother     Breast cancer Neg Hx     Colon cancer Neg Hx        Objective    There were no vitals taken for this visit.    Physical Exam  Nursing note reviewed.   Constitutional:       General: She is not in acute distress.     Appearance: Normal appearance. She is not ill-appearing.   HENT:      Nose: No congestion.   Abdominal:       Tenderness: There is no right CVA tenderness or left CVA tenderness.      Hernia: No hernia is present.   Skin:     General: Skin is warm and dry.   Neurological:      Mental Status: She is alert and oriented to person, place, and time.   Psychiatric:         Mood and Affect: Mood normal.         Behavior: Behavior normal.             Results for orders placed or performed in visit on 08/13/24   Comprehensive Metabolic Panel    Specimen: Blood   Result Value Ref Range    Glucose 144 (H) 70 - 100 mg/dL    BUN 16 5 - 21 mg/dL    Creatinine 1.06 0.50 - 1.40 mg/dL    Sodium 134 (L) 135 - 145 mmol/L    Potassium 4.9 3.5 - 5.3 mmol/L    Chloride 97 (L) 98 - 110 mmol/L    CO2 31.0 24.0 - 31.0 mmol/L    Calcium 9.7 8.6 - 10.5 mg/dL    Total Protein 7.1 6.3 - 8.7 g/dL    Albumin 4.4 3.5 - 5.0 g/dL    ALT (SGPT) 21 0 - 35 U/L    AST (SGOT) 35 7 - 45 U/L    Alkaline Phosphatase 152 (H) 24 - 120 U/L    Total Bilirubin 0.4 0.1 - 1.0 mg/dL    Globulin 2.7 gm/dL    A/G Ratio 1.6 1.1 - 2.5 g/dL    BUN/Creatinine Ratio 15.1      Anion Gap 6.0 4.0 - 13.0 mmol/L    eGFR 59.5 (L) >60.0 mL/min/1.73   Hemoglobin A1c    Specimen: Blood   Result Value Ref Range    Hemoglobin A1C 5.5 4.8 - 5.9 %   TSH    Specimen: Blood   Result Value Ref Range    TSH 0.794 0.270 - 4.200 uIU/mL   Lipid Panel    Specimen: Blood   Result Value Ref Range    Total Cholesterol 138 130 - 200 mg/dL    Triglycerides 138 0 - 149 mg/dL    HDL Cholesterol 51 >=50 mg/dL    LDL Cholesterol  63 0 - 99 mg/dL    VLDL Cholesterol 24 5 - 40 mg/dL    LDL/HDL Ratio 1.16    Microalbumin / Creatinine Urine Ratio - Urine, Clean Catch    Specimen: Urine, Clean Catch   Result Value Ref Range    Microalbumin/Creatinine Ratio      Creatinine, Urine 27.0 mg/dL    Microalbumin, Urine <1.2 mg/dL   CBC Auto Differential    Specimen: Blood   Result Value Ref Range    WBC 4.20 3.40 - 10.80 10*3/mm3    RBC 4.03 3.77 - 5.28 10*6/mm3    Hemoglobin 11.6 (L) 12.0 - 15.9 g/dL    Hematocrit 34.7  34.0 - 46.6 %    MCV 86.1 79.0 - 97.0 fL    MCH 28.8 26.6 - 33.0 pg    MCHC 33.4 31.5 - 35.7 g/dL    RDW 12.8 12.3 - 15.4 %    MPV 8.0 6.0 - 12.0 fL    Platelets 185 140 - 450 10*3/mm3    Neutrophil % 73.4 42.7 - 76.0 %    Lymphocyte % 16.0 (L) 19.6 - 45.3 %    Auto Mixed Cells % 10.6 0.1 - 24.0 %    Neutrophils, Absolute 3.10 1.70 - 7.00 10*3/mm3    Lymphocytes, Absolute 0.70 0.70 - 3.10 10*3/mm3    Auto Mixed Cells # 0.40 0.10 - 2.60 10*3/mm3     Assessment and Plan    Diagnoses and all orders for this visit:    1. Recurrent UTI (Primary)  -     POC Urinalysis Dipstick, Multipro      Unable to provide a urine specimen today stating she just went when she walked into the hospital.  Is not experiencing any symptoms other than color and odor of urine.  Based on this and AUA guidelines do not recommend treatment for asymptomatic bacteriuria.  Have encouraged patient to contact our office if systemic symptoms occur.  For now have really encouraged continued usage of the vaginal estrogen cream at least 2 nights weekly.  Have encouraged increasing fluid intake along with daily probiotic, cranberry and d-mannose.    Patient is still interested in possible sacral neurostimulator device if physical therapy not effective    Follow-up 6 months

## 2024-08-13 ENCOUNTER — OFFICE VISIT (OUTPATIENT)
Dept: INTERNAL MEDICINE | Age: 64
End: 2024-08-13
Payer: COMMERCIAL

## 2024-08-13 ENCOUNTER — TRANSCRIBE ORDERS (OUTPATIENT)
Dept: ADMINISTRATIVE | Facility: HOSPITAL | Age: 64
End: 2024-08-13
Payer: COMMERCIAL

## 2024-08-13 ENCOUNTER — LAB (OUTPATIENT)
Dept: LAB | Facility: HOSPITAL | Age: 64
End: 2024-08-13
Payer: COMMERCIAL

## 2024-08-13 VITALS
OXYGEN SATURATION: 98 % | SYSTOLIC BLOOD PRESSURE: 122 MMHG | WEIGHT: 119 LBS | DIASTOLIC BLOOD PRESSURE: 72 MMHG | TEMPERATURE: 97.3 F | BODY MASS INDEX: 23.24 KG/M2 | HEART RATE: 62 BPM

## 2024-08-13 DIAGNOSIS — M47.26 OSTEOARTHRITIS OF SPINE WITH RADICULOPATHY, LUMBAR REGION: ICD-10-CM

## 2024-08-13 DIAGNOSIS — K64.3 GRADE IV HEMORRHOIDS: ICD-10-CM

## 2024-08-13 DIAGNOSIS — E78.2 MIXED HYPERLIPIDEMIA: ICD-10-CM

## 2024-08-13 DIAGNOSIS — E10.65 POOR CONTROL TYPE I DIABETES MELLITUS: Primary | ICD-10-CM

## 2024-08-13 DIAGNOSIS — E10.65 TYPE 1 DIABETES MELLITUS WITH HYPERGLYCEMIA (HCC): Primary | ICD-10-CM

## 2024-08-13 DIAGNOSIS — F10.11 HISTORY OF ALCOHOL ABUSE: ICD-10-CM

## 2024-08-13 DIAGNOSIS — N39.0 RECURRENT UTI: ICD-10-CM

## 2024-08-13 DIAGNOSIS — I10 PRIMARY HYPERTENSION: ICD-10-CM

## 2024-08-13 DIAGNOSIS — E10.42 DIABETIC PERIPHERAL NEUROPATHY ASSOCIATED WITH TYPE 1 DIABETES MELLITUS (HCC): ICD-10-CM

## 2024-08-13 DIAGNOSIS — E03.9 ACQUIRED HYPOTHYROIDISM: ICD-10-CM

## 2024-08-13 DIAGNOSIS — D50.0 IRON DEFICIENCY ANEMIA SECONDARY TO BLOOD LOSS (CHRONIC): ICD-10-CM

## 2024-08-13 DIAGNOSIS — E10.65 POOR CONTROL TYPE I DIABETES MELLITUS: ICD-10-CM

## 2024-08-13 LAB
ALBUMIN SERPL-MCNC: 4.4 G/DL (ref 3.5–5)
ALBUMIN/GLOB SERPL: 1.6 G/DL (ref 1.1–2.5)
ALP SERPL-CCNC: 152 U/L (ref 24–120)
ALT SERPL W P-5'-P-CCNC: 21 U/L (ref 0–35)
ANION GAP SERPL CALCULATED.3IONS-SCNC: 6 MMOL/L (ref 4–13)
AST SERPL-CCNC: 35 U/L (ref 7–45)
AUTO MIXED CELLS #: 0.4 10*3/MM3 (ref 0.1–2.6)
AUTO MIXED CELLS %: 10.6 % (ref 0.1–24)
BILIRUB SERPL-MCNC: 0.4 MG/DL (ref 0.1–1)
BUN SERPL-MCNC: 16 MG/DL (ref 5–21)
BUN/CREAT SERPL: 15.1
CALCIUM SPEC-SCNC: 9.7 MG/DL (ref 8.6–10.5)
CHLORIDE SERPL-SCNC: 97 MMOL/L (ref 98–110)
CHOLEST SERPL-MCNC: 138 MG/DL (ref 130–200)
CO2 SERPL-SCNC: 31 MMOL/L (ref 24–31)
CREAT SERPL-MCNC: 1.06 MG/DL (ref 0.5–1.4)
EGFRCR SERPLBLD CKD-EPI 2021: 59.5 ML/MIN/1.73
ERYTHROCYTE [DISTWIDTH] IN BLOOD BY AUTOMATED COUNT: 12.8 % (ref 12.3–15.4)
GLOBULIN UR ELPH-MCNC: 2.7 GM/DL
GLUCOSE SERPL-MCNC: 144 MG/DL (ref 70–100)
HBA1C MFR BLD: 5.5 % (ref 4.8–5.9)
HCT VFR BLD AUTO: 34.7 % (ref 34–46.6)
HDLC SERPL-MCNC: 51 MG/DL
HGB BLD-MCNC: 11.6 G/DL (ref 12–15.9)
LDLC SERPL CALC-MCNC: 63 MG/DL (ref 0–99)
LDLC/HDLC SERPL: 1.16 {RATIO}
LYMPHOCYTES # BLD AUTO: 0.7 10*3/MM3 (ref 0.7–3.1)
LYMPHOCYTES NFR BLD AUTO: 16 % (ref 19.6–45.3)
MCH RBC QN AUTO: 28.8 PG (ref 26.6–33)
MCHC RBC AUTO-ENTMCNC: 33.4 G/DL (ref 31.5–35.7)
MCV RBC AUTO: 86.1 FL (ref 79–97)
NEUTROPHILS NFR BLD AUTO: 3.1 10*3/MM3 (ref 1.7–7)
NEUTROPHILS NFR BLD AUTO: 73.4 % (ref 42.7–76)
PLATELET # BLD AUTO: 185 10*3/MM3 (ref 140–450)
PMV BLD AUTO: 8 FL (ref 6–12)
POTASSIUM SERPL-SCNC: 4.9 MMOL/L (ref 3.5–5.3)
PROT SERPL-MCNC: 7.1 G/DL (ref 6.3–8.7)
RBC # BLD AUTO: 4.03 10*6/MM3 (ref 3.77–5.28)
SODIUM SERPL-SCNC: 134 MMOL/L (ref 135–145)
TRIGL SERPL-MCNC: 138 MG/DL (ref 0–149)
VLDLC SERPL-MCNC: 24 MG/DL (ref 5–40)
WBC NRBC COR # BLD AUTO: 4.2 10*3/MM3 (ref 3.4–10.8)

## 2024-08-13 PROCEDURE — 80053 COMPREHEN METABOLIC PANEL: CPT

## 2024-08-13 PROCEDURE — 80050 GENERAL HEALTH PANEL: CPT

## 2024-08-13 PROCEDURE — 99214 OFFICE O/P EST MOD 30 MIN: CPT | Performed by: INTERNAL MEDICINE

## 2024-08-13 PROCEDURE — 82043 UR ALBUMIN QUANTITATIVE: CPT

## 2024-08-13 PROCEDURE — 3074F SYST BP LT 130 MM HG: CPT | Performed by: INTERNAL MEDICINE

## 2024-08-13 PROCEDURE — 85025 COMPLETE CBC W/AUTO DIFF WBC: CPT

## 2024-08-13 PROCEDURE — 3078F DIAST BP <80 MM HG: CPT | Performed by: INTERNAL MEDICINE

## 2024-08-13 PROCEDURE — 36415 COLL VENOUS BLD VENIPUNCTURE: CPT

## 2024-08-13 PROCEDURE — 80061 LIPID PANEL: CPT

## 2024-08-13 PROCEDURE — 83036 HEMOGLOBIN GLYCOSYLATED A1C: CPT

## 2024-08-13 PROCEDURE — 82570 ASSAY OF URINE CREATININE: CPT

## 2024-08-13 RX ORDER — ESTRADIOL 0.1 MG/G
CREAM VAGINAL
Qty: 42.5 G | Refills: 3 | Status: SHIPPED | OUTPATIENT
Start: 2024-08-13

## 2024-08-13 RX ORDER — FLURBIPROFEN SODIUM 0.3 MG/ML
SOLUTION/ DROPS OPHTHALMIC
Qty: 100 EACH | Refills: 3 | Status: SHIPPED | OUTPATIENT
Start: 2024-08-13

## 2024-08-13 NOTE — PROGRESS NOTES
Chief Complaint   Patient presents with    Diabetes       HPI: Patient is here today to follow-up diabetes and other medical problems.  She remains off alcohol for 3 years now doing really well in that regard and we will always congratulate her on that.  She is doing okay some ongoing issues with hemorrhoids and recurrent UTI.  She needs hormone cream renewed to help prevent recurrent UTI.  Diabetes brittle up-and-down diabetic neuropathy persists.  Overall she seems okay today.    Past Medical History:   Diagnosis Date    Alcohol abuse 02/06/2019    quit 10/5/2021    Allergic rhinitis     Diabetes mellitus (HCC)     GERD (gastroesophageal reflux disease)     Hyperlipidemia     Hypertension     Postmenopausal 01/03/2019    Sleep apnea        Past Surgical History:   Procedure Laterality Date    BACK SURGERY  05/01/2023    CHOLECYSTECTOMY      KNEE ARTHROPLASTY      PARTIAL HYSTERECTOMY (CERVIX NOT REMOVED)         Family History   Problem Relation Age of Onset    Ovarian Cancer Mother     Ovarian Cancer Maternal Grandmother     Ovarian Cancer Paternal Grandmother        Social History     Socioeconomic History    Marital status: Single     Spouse name: Not on file    Number of children: Not on file    Years of education: Not on file    Highest education level: Not on file   Occupational History    Not on file   Tobacco Use    Smoking status: Never    Smokeless tobacco: Never   Vaping Use    Vaping status: Never Used   Substance and Sexual Activity    Alcohol use: Not Currently     Comment: history of alcohol abuse 2/6/2019 - 10/5/2021    Drug use: Never    Sexual activity: Not Currently   Other Topics Concern    Not on file   Social History Narrative    Not on file     Social Determinants of Health     Financial Resource Strain: Low Risk  (5/6/2024)    Overall Financial Resource Strain (CARDIA)     Difficulty of Paying Living Expenses: Not hard at all   Food Insecurity: No Food Insecurity (5/6/2024)    Hunger Vital

## 2024-08-14 DIAGNOSIS — R74.8 ELEVATED ALKALINE PHOSPHATASE LEVEL: ICD-10-CM

## 2024-08-14 DIAGNOSIS — D64.9 ANEMIA, UNSPECIFIED TYPE: ICD-10-CM

## 2024-08-14 LAB
ALBUMIN UR-MCNC: <1.2 MG/DL
CREAT UR-MCNC: 27 MG/DL
MICROALBUMIN/CREAT UR: NORMAL MG/G{CREAT}
TSH SERPL DL<=0.05 MIU/L-ACNC: 0.79 UIU/ML (ref 0.27–4.2)

## 2024-08-20 ENCOUNTER — OFFICE VISIT (OUTPATIENT)
Dept: UROLOGY | Facility: CLINIC | Age: 64
End: 2024-08-20
Payer: COMMERCIAL

## 2024-08-20 DIAGNOSIS — N39.0 RECURRENT UTI: Primary | ICD-10-CM

## 2024-08-20 RX ORDER — ESTRADIOL 0.1 MG/G
CREAM VAGINAL
COMMUNITY
Start: 2024-08-13

## 2024-08-20 RX ORDER — ONDANSETRON 4 MG/1
TABLET, ORALLY DISINTEGRATING ORAL
COMMUNITY
Start: 2024-07-24

## 2024-08-20 RX ORDER — HYDROCODONE BITARTRATE AND ACETAMINOPHEN 7.5; 325 MG/1; MG/1
1 TABLET ORAL EVERY 6 HOURS PRN
COMMUNITY
Start: 2024-07-24

## 2024-08-23 ENCOUNTER — TELEPHONE (OUTPATIENT)
Dept: HEMATOLOGY | Age: 64
End: 2024-08-23

## 2024-08-23 NOTE — TELEPHONE ENCOUNTER
I called and left patient a detailed voicemail with their appointment date and time for 08/27/24 and to come at the follow up appointment time and not the lab appointment time. I also made them aware of what that time was.  I made patient aware that we are in the [facility] and where it is located and gave the address in case, they use GPS. Left message for patient to call our office if they could not keep this appointment or if they did not know where our new building is located.

## 2024-08-27 ENCOUNTER — HOSPITAL ENCOUNTER (OUTPATIENT)
Dept: INFUSION THERAPY | Age: 64
Discharge: HOME | End: 2024-08-27
Payer: COMMERCIAL

## 2024-08-27 ENCOUNTER — OFFICE VISIT (OUTPATIENT)
Dept: HEMATOLOGY | Age: 64
End: 2024-08-27
Payer: COMMERCIAL

## 2024-08-27 VITALS
HEIGHT: 60 IN | OXYGEN SATURATION: 98 % | BODY MASS INDEX: 23.01 KG/M2 | TEMPERATURE: 98 F | WEIGHT: 117.2 LBS | DIASTOLIC BLOOD PRESSURE: 68 MMHG | HEART RATE: 73 BPM | SYSTOLIC BLOOD PRESSURE: 128 MMHG

## 2024-08-27 DIAGNOSIS — K21.9 GASTROESOPHAGEAL REFLUX DISEASE WITHOUT ESOPHAGITIS: ICD-10-CM

## 2024-08-27 DIAGNOSIS — D61.818 PANCYTOPENIA (HCC): Primary | ICD-10-CM

## 2024-08-27 DIAGNOSIS — E10.40 TYPE 1 DIABETES MELLITUS WITH DIABETIC NEUROPATHY (HCC): ICD-10-CM

## 2024-08-27 DIAGNOSIS — D50.9 IRON DEFICIENCY ANEMIA, UNSPECIFIED IRON DEFICIENCY ANEMIA TYPE: ICD-10-CM

## 2024-08-27 DIAGNOSIS — K62.5 GASTROINTESTINAL HEMORRHAGE ASSOCIATED WITH ANORECTAL SOURCE: ICD-10-CM

## 2024-08-27 DIAGNOSIS — K74.60 CIRRHOSIS OF LIVER WITHOUT ASCITES, UNSPECIFIED HEPATIC CIRRHOSIS TYPE (HCC): ICD-10-CM

## 2024-08-27 DIAGNOSIS — Z71.89 CARE PLAN DISCUSSED WITH PATIENT: ICD-10-CM

## 2024-08-27 DIAGNOSIS — D50.0 IRON DEFICIENCY ANEMIA DUE TO CHRONIC BLOOD LOSS: ICD-10-CM

## 2024-08-27 LAB
BASOPHILS # BLD: 0.01 K/UL (ref 0.01–0.08)
BASOPHILS NFR BLD: 0.2 % (ref 0.1–1.2)
EOSINOPHIL # BLD: 0.11 K/UL (ref 0.04–0.54)
EOSINOPHIL NFR BLD: 2.2 % (ref 0.7–7)
ERYTHROCYTE [DISTWIDTH] IN BLOOD BY AUTOMATED COUNT: 12.6 % (ref 11.7–14.4)
FERRITIN SERPL-MCNC: 95.8 NG/ML (ref 13–150)
HCT VFR BLD AUTO: 36.2 % (ref 34.1–44.9)
HGB BLD-MCNC: 12.2 G/DL (ref 11.2–15.7)
IRON SATN MFR SERPL: 17 % (ref 14–50)
IRON SERPL-MCNC: 43 UG/DL (ref 37–145)
LYMPHOCYTES # BLD: 0.48 K/UL (ref 1.18–3.74)
LYMPHOCYTES NFR BLD: 9.8 % (ref 19.3–53.1)
MCH RBC QN AUTO: 29 PG (ref 25.6–32.2)
MCHC RBC AUTO-ENTMCNC: 33.7 G/DL (ref 32.3–35.5)
MCV RBC AUTO: 86 FL (ref 79.4–94.8)
MONOCYTES # BLD: 0.31 K/UL (ref 0.24–0.82)
MONOCYTES NFR BLD: 6.3 % (ref 4.7–12.5)
NEUTROPHILS # BLD: 3.98 K/UL (ref 1.56–6.13)
NEUTS SEG NFR BLD: 81.3 % (ref 34–71.1)
PLATELET # BLD AUTO: 156 K/UL (ref 182–369)
PMV BLD AUTO: 9.2 FL (ref 7.4–10.4)
RBC # BLD AUTO: 4.21 M/UL (ref 3.93–5.22)
TIBC SERPL-MCNC: 256 UG/DL (ref 250–400)
WBC # BLD AUTO: 4.9 K/UL (ref 3.98–10.04)

## 2024-08-27 PROCEDURE — 85025 COMPLETE CBC W/AUTO DIFF WBC: CPT

## 2024-08-27 PROCEDURE — 3074F SYST BP LT 130 MM HG: CPT | Performed by: NURSE PRACTITIONER

## 2024-08-27 PROCEDURE — 99212 OFFICE O/P EST SF 10 MIN: CPT

## 2024-08-27 PROCEDURE — 36415 COLL VENOUS BLD VENIPUNCTURE: CPT

## 2024-08-27 PROCEDURE — 99213 OFFICE O/P EST LOW 20 MIN: CPT | Performed by: NURSE PRACTITIONER

## 2024-08-27 PROCEDURE — 3078F DIAST BP <80 MM HG: CPT | Performed by: NURSE PRACTITIONER

## 2024-08-27 RX ORDER — ONDANSETRON 4 MG/1
4 TABLET, ORALLY DISINTEGRATING ORAL
COMMUNITY
Start: 2024-07-24

## 2024-08-27 ASSESSMENT — ENCOUNTER SYMPTOMS
CONSTIPATION: 0
BLOOD IN STOOL: 0
TROUBLE SWALLOWING: 0
BACK PAIN: 1
SHORTNESS OF BREATH: 0
WHEEZING: 0
EYE DISCHARGE: 0
EYE ITCHING: 0
NAUSEA: 0
SORE THROAT: 0
DIARRHEA: 0
COUGH: 0
VOMITING: 0
ABDOMINAL PAIN: 0

## 2024-08-28 RX ORDER — GABAPENTIN 300 MG/1
300 CAPSULE ORAL NIGHTLY
Qty: 30 CAPSULE | Refills: 2 | Status: SHIPPED | OUTPATIENT
Start: 2024-08-28 | End: 2024-11-26

## 2024-08-28 RX ORDER — PANTOPRAZOLE SODIUM 40 MG/1
40 TABLET, DELAYED RELEASE ORAL
Qty: 30 TABLET | Refills: 3 | Status: SHIPPED | OUTPATIENT
Start: 2024-08-28

## 2024-09-02 PROBLEM — E87.1 HYPONATREMIA: Status: RESOLVED | Noted: 2021-10-29 | Resolved: 2024-09-02

## 2024-09-02 PROBLEM — M43.10 ACQUIRED SPONDYLOLISTHESIS: Status: RESOLVED | Noted: 2023-04-12 | Resolved: 2024-09-02

## 2024-09-02 PROBLEM — M54.16 LUMBAR RADICULOPATHY: Status: RESOLVED | Noted: 2022-09-15 | Resolved: 2024-09-02

## 2024-09-02 PROBLEM — N39.0 RECURRENT UTI: Status: ACTIVE | Noted: 2024-09-02

## 2024-09-02 PROBLEM — H81.13 BENIGN PAROXYSMAL POSITIONAL VERTIGO DUE TO BILATERAL VESTIBULAR DISORDER: Status: RESOLVED | Noted: 2024-06-02 | Resolved: 2024-09-02

## 2024-09-02 PROBLEM — D50.9 IRON DEFICIENCY ANEMIA: Status: RESOLVED | Noted: 2021-10-29 | Resolved: 2024-09-02

## 2024-09-02 PROBLEM — R74.8 ELEVATED ALKALINE PHOSPHATASE LEVEL: Status: RESOLVED | Noted: 2019-01-31 | Resolved: 2024-09-02

## 2024-09-02 PROBLEM — M21.371 RIGHT FOOT DROP: Status: RESOLVED | Noted: 2023-04-12 | Resolved: 2024-09-02

## 2024-09-02 PROBLEM — K70.30 ALCOHOLIC CIRRHOSIS (HCC): Status: RESOLVED | Noted: 2021-10-29 | Resolved: 2024-09-02

## 2024-09-02 PROBLEM — E10.9 TYPE 1 DIABETES MELLITUS (HCC): Status: RESOLVED | Noted: 2019-01-03 | Resolved: 2024-09-02

## 2024-09-02 PROBLEM — Z65.9 OTHER SOCIAL STRESSOR: Status: RESOLVED | Noted: 2022-03-09 | Resolved: 2024-09-02

## 2024-09-02 PROBLEM — M54.6 THORACIC SPINE PAIN: Status: RESOLVED | Noted: 2021-10-29 | Resolved: 2024-09-02

## 2024-09-02 PROBLEM — M25.551 RIGHT HIP PAIN: Status: RESOLVED | Noted: 2023-12-01 | Resolved: 2024-09-02

## 2024-09-03 DIAGNOSIS — E10.9 TYPE 1 DIABETES MELLITUS WITHOUT COMPLICATION (HCC): ICD-10-CM

## 2024-09-03 RX ORDER — INSULIN ASPART 100 [IU]/ML
INJECTION, SOLUTION INTRAVENOUS; SUBCUTANEOUS
Qty: 15 ML | Refills: 4 | Status: SHIPPED | OUTPATIENT
Start: 2024-09-03

## 2024-09-30 RX ORDER — ATENOLOL 25 MG/1
25 TABLET ORAL DAILY
Qty: 30 TABLET | Refills: 0 | Status: SHIPPED | OUTPATIENT
Start: 2024-09-30

## 2024-10-01 RX ORDER — PROCHLORPERAZINE 25 MG/1
SUPPOSITORY RECTAL
Qty: 3 EACH | Refills: 0 | Status: SHIPPED | OUTPATIENT
Start: 2024-10-01

## 2024-10-07 RX ORDER — LOSARTAN POTASSIUM 25 MG/1
25 TABLET ORAL DAILY
Qty: 30 TABLET | Refills: 0 | OUTPATIENT
Start: 2024-10-07

## 2024-10-07 NOTE — TELEPHONE ENCOUNTER
Patient needs an appointment or should call pcp to see if they will continue to fill this medication.

## 2024-10-09 ENCOUNTER — TELEPHONE (OUTPATIENT)
Age: 64
End: 2024-10-09
Payer: COMMERCIAL

## 2024-10-09 RX ORDER — LOSARTAN POTASSIUM 25 MG/1
25 TABLET ORAL DAILY
Qty: 30 TABLET | Refills: 0 | Status: SHIPPED | OUTPATIENT
Start: 2024-10-09

## 2024-10-17 RX ORDER — ATENOLOL 25 MG/1
25 TABLET ORAL DAILY
Refills: 0 | OUTPATIENT
Start: 2024-10-17

## 2024-11-06 ENCOUNTER — OFFICE VISIT (OUTPATIENT)
Age: 64
End: 2024-11-06
Payer: COMMERCIAL

## 2024-11-06 VITALS
WEIGHT: 113 LBS | DIASTOLIC BLOOD PRESSURE: 80 MMHG | HEIGHT: 60 IN | BODY MASS INDEX: 22.19 KG/M2 | OXYGEN SATURATION: 98 % | SYSTOLIC BLOOD PRESSURE: 130 MMHG | HEART RATE: 63 BPM

## 2024-11-06 DIAGNOSIS — M19.071 ARTHRITIS OF FIRST METATARSOPHALANGEAL (MTP) JOINT OF RIGHT FOOT: ICD-10-CM

## 2024-11-06 DIAGNOSIS — M21.371 FOOT DROP, RIGHT: ICD-10-CM

## 2024-11-06 DIAGNOSIS — E10.40 TYPE 1 DIABETES MELLITUS WITH DIABETIC NEUROPATHY: ICD-10-CM

## 2024-11-06 DIAGNOSIS — L84 FOOT CALLUS: ICD-10-CM

## 2024-11-06 DIAGNOSIS — M79.671 RIGHT FOOT PAIN: ICD-10-CM

## 2024-11-06 DIAGNOSIS — B35.1 ONYCHOMYCOSIS: Primary | ICD-10-CM

## 2024-11-06 DIAGNOSIS — M20.5X1 HALLUX LIMITUS OF RIGHT FOOT: ICD-10-CM

## 2024-11-06 NOTE — PROGRESS NOTES
[facility] - PODIATRY    Today's Date: 11/06/2024     Patient Name: Fiordaliza Lyons  MRN: [id number]  CSN: [id number]  PCP: Brittany Solis MD  Referring Provider: No ref. provider found    SUBJECTIVE     Chief Complaint   Patient presents with    Follow-up     Brittany Solis MD 05/06/2024 2 MTH F/U IN [facility]-pt states she is here today diabetic foot/nail care-pt reports pain level 3/10    Diabetes     HPI: Fiordaliza Lyons, a 64 y.o.female, comes to clinic as a(n) established patient presenting for diabetic foot exam and complaining of toenail/callus issues. Patient has h/o alcoholic hepatitis, breast cysts, DM I, endometreosis, GERD, HLD, HTN, pancreatitis, sleep apnea . Patient is IDDM and unsure of last BG level.  Continues to relay issues with right hip and low back pain as well as nerve damage after having low back surgery. . Relays residual weakness to her right lower leg causing some mild foot drop. Continues to follow with Dr. Larsen She does report some mild numbness and tingling in her feet.  Denies open wounds or sores today. Reports toenails are elongated and thickened and in need of care today.  Admits pain at 3/10 level and described as aching, nagging, dull, numbness and tingling. Relates previous treatment(s) including foot care by podiatry . Denies any constitutional symptoms. No other pedal complaints at this time.    Past Medical History:   Diagnosis Date    Alcoholic hepatitis with ascites     Allergic rhinitis     Cancer     ovarian    Cirrhosis 2021    Degeneration of lumbar or lumbosacral intervertebral disc 09/15/2022    Diabetes mellitus     Disease of thyroid gland     Elevated d-dimer     Endometriosis     Fatty liver     GERD (gastroesophageal reflux disease)     History of transfusion     Hyperlipidemia     Hypertension     Low back pain     Lumbosacral disc disease 2022    Osteoporosis     Ovarian cyst     Pancreatitis     Postmenopausal      Rectal mass     Rectal prolapse     Sleep apnea     has corrrected it self with w/ weight loss     Past Surgical History:   Procedure Laterality Date    ARM NERVE EXPLORATION Left     6 nerves scraped ,cleaned nerves off    BACK SURGERY  2023    BILATERAL SALPINGO OOPHORECTOMY  2008    CARPAL TUNNEL RELEASE  2016    CHOLECYSTECTOMY  1996    COLONOSCOPY  07/2017    Dr Murphy- per patient normal     COLONOSCOPY N/A 11/09/2021    Procedure: COLONOSCOPY WITH ANESTHESIA;  Surgeon: Ray Serna MD;  Location: [facility] ENDOSCOPY;  Service: Gastroenterology;  Laterality: N/A;  pre rectal mass  post  Dr Shields    DILATION AND CURETTAGE, DIAGNOSTIC / THERAPEUTIC      ENDOSCOPY N/A 11/09/2021    Procedure: ESOPHAGOGASTRODUODENOSCOPY WITH ANESTHESIA;  Surgeon: Ray Serna MD;  Location: [facility] ENDOSCOPY;  Service: Gastroenterology;  Laterality: N/A;  pre anemia  post hiatal hernia; gastric polyp  Dr. Shields    HEMORRHOIDECTOMY SIGMOIDOSCOPY N/A 01/09/2024    Procedure: HEMORRHOIDECTOMY WITH EXAM UNDER ANESTHESIA;  Surgeon: Rocio Read MD;  Location: [facility] OR;  Service: General;  Laterality: N/A;    KNEE SURGERY Right 1982    LUMBAR LAMINECTOMY WITH FUSION Right 05/01/2023    Procedure: LUMBAR DISCECTOMY, JADON- LAMINECTOMY TRANSFORAMINAL LUMBAR INTERBODY FUSION, POSTERIOR PEDICLE SCREW INSTRUMENTATION, L5-S1 RIGHT. USE OF THE ROBOT;  Surgeon: Nathaniel Tesfaye MD;  Location: [facility] OR;  Service: Neurosurgery;  Laterality: Right;    SPINAL FUSION  2023    TRIGGER POINT INJECTION  2023     Family History   Problem Relation Age of Onset    Ovarian cancer Mother     Ovarian cancer Maternal Aunt     Ovarian cancer Maternal Grandmother     Ovarian cancer Paternal Grandmother     Colon polyps Father     Colon polyps Brother     Breast cancer Neg Hx     Colon cancer Neg Hx      Social History     Socioeconomic History    Marital status: Single   Tobacco Use    Smoking status: Never     Passive exposure: Never     "Smokeless tobacco: Never   Vaping Use    Vaping status: Never Used   Substance and Sexual Activity    Alcohol use: Not Currently    Drug use: No    Sexual activity: Not Currently     Allergies   Allergen Reactions    Lisinopril Cough    Tizanidine Hcl Dizziness     Pt stated \"horrible dry mouth, dizziness, couldn't see, it was awful\" drops blood sugar down     Augmentin [Amoxicillin-Pot Clavulanate] Nausea And Vomiting     Current Outpatient Medications   Medication Sig Dispense Refill    acetaminophen (Tylenol) 325 MG tablet Take 3 tablets by mouth Every 8 (Eight) Hours. Take every 8 hours for 3 days then take prn as needed.      acetaminophen (TYLENOL) 650 MG 8 hr tablet Take 1 tablet by mouth Every 8 (Eight) Hours As Needed for Mild Pain.      atenolol (TENORMIN) 25 MG tablet TAKE 1 TABLET BY MOUTH DAILY. 30 tablet 0    BD Pen Needle Tatyana U/F 32G X 4 MM misc 4 (Four) Times a Day. use as directed      Blood Glucose Monitoring Suppl (ONE TOUCH ULTRA 2) w/Device kit See Admin Instructions.      clobetasol (TEMOVATE) 0.05 % external solution       Continuous Blood Gluc Transmit (Dexcom G6 Transmitter) misc See Admin Instructions.      cyclobenzaprine (FLEXERIL) 10 MG tablet Take 1 tablet by mouth 3 (Three) Times a Day As Needed for Muscle Spasms. 90 tablet 0    estradiol (ESTRACE) 0.1 MG/GM vaginal cream Apply nightly for 2 weeks and then 3 nights a week      glucose blood test strip 5 times a day and prn .      Gvoke HypoPen 2-Pack 1 MG/0.2ML solution auto-injector       HYDROcodone-acetaminophen (NORCO) 7.5-325 MG per tablet Take 1 tablet by mouth Every 6 (Six) Hours As Needed. for pain      ibuprofen (Motrin IB) 200 MG tablet Take 3 tablets by mouth Every 8 (Eight) Hours. Take every 8 hours for three days then take as needed.      insulin aspart (novoLOG FLEXPEN) 100 UNIT/ML solution pen-injector sc pen Inject 2-5 Units under the skin into the appropriate area as directed 3 (Three) Times a Day With Meals. 2-5 " units with meals sliding scale      Lancets (OneTouch Delica Plus Fwlpyd09Q) misc TEST 5 TIMES DAILY AND AS NEEDED      levothyroxine (SYNTHROID, LEVOTHROID) 50 MCG tablet Take 1 tablet by mouth Every Morning.      losartan (COZAAR) 25 MG tablet TAKE 1 TABLET BY MOUTH DAILY. 30 tablet 0    magnesium oxide (MAG-OX) 400 MG tablet Take 1 tablet by mouth Daily.      Multiple Vitamins-Iron (multivitamin with iron) tablet tablet Take 1 tablet by mouth Daily.      Nutritional Supplements (JUICE PLUS FIBRE PO) Take 1 dose by mouth Daily.      ondansetron ODT (ZOFRAN-ODT) 4 MG disintegrating tablet DISSOLVE 1 TABLET ON THE TONGUE EVERY 4-6 HOURS AS NEEDED FOR NAUSEA      pantoprazole (PROTONIX) 40 MG EC tablet Take 1 tablet by mouth Daily.      potassium chloride (K-DUR,KLOR-CON) 10 MEQ CR tablet Take 1 tablet by mouth Daily As Needed.      sulfamethoxazole-trimethoprim (BACTRIM DS,SEPTRA DS) 800-160 MG per tablet       Toujeo Max SoloStar 300 UNIT/ML solution pen-injector injection Inject 22 Units under the skin into the appropriate area as directed Every Night.      vitamin D (ERGOCALCIFEROL) 1.25 MG (43433 UT) capsule capsule Take 1 capsule by mouth 1 (One) Time Per Week. sunday      VITAMIN D-VITAMIN K PO Take  by mouth 1 (One) Time Per Week.      Continuous Blood Gluc Sensor (Dexcom G6 Sensor) See Admin Instructions. (Patient not taking: Reported on 11/6/2024)      gabapentin (NEURONTIN) 300 MG capsule Take 1 capsule by mouth Daily for 7 days. (Patient taking differently: Take 1 capsule by mouth Every Night.) 7 capsule 0     No current facility-administered medications for this visit.     Review of Systems   Constitutional:  Negative for chills and fever.   HENT:  Negative for congestion.    Respiratory:  Negative for shortness of breath.    Cardiovascular:  Positive for leg swelling. Negative for chest pain.   Gastrointestinal:  Negative for constipation, diarrhea, nausea and vomiting.   Musculoskeletal:  Positive for  arthralgias and back pain.        Right foot pain   Skin:  Negative for wound.        Thickened, elongated toenails. CAllus   Neurological:  Positive for weakness and numbness.   Psychiatric/Behavioral:  Negative for agitation, behavioral problems and confusion.        OBJECTIVE     Vitals:    11/06/24 1307   BP: 130/80   Pulse: 63   SpO2: 98%               PHYSICAL EXAM  GEN:   Accompanied by none.     Foot/Ankle Exam    GENERAL  Diabetic foot exam performed    Appearance:  appears stated age  Orientation:  AAOx3  Affect:  appropriate  Gait:  unimpaired  Assistance:  independent  Right shoe gear: casual shoe  Left shoe gear: casual shoe    VASCULAR     Right Foot Vascularity   Dorsalis pedis:  2+  Posterior tibial:  1+  Skin temperature:  warm  Edema grading:  Trace  CFT:  3  Pedal hair growth:  Present  Varicosities:  mild varicosities     Left Foot Vascularity   Dorsalis pedis:  2+  Posterior tibial:  1+  Skin temperature:  warm  Edema grading:  Trace  CFT:  3  Pedal hair growth:  Present  Varicosities:  mild varicosities     NEUROLOGIC     Right Foot Neurologic   Light touch sensation: diminished  Vibratory sensation: diminished  Hot/Cold sensation: diminished  Protective Sensation using [place] Monofilament:   Sites intact: 5  Sites tested: 10     Left Foot Neurologic   Light touch sensation: diminished  Vibratory sensation: diminished  Hot/Cold sensation:  diminished  Protective Sensation using [place] Monofilament:   Sites intact: 5  Sites tested: 10    MUSCULOSKELETAL     Right Foot Musculoskeletal   Ecchymosis:  none  Tenderness:  MTP 1 dorsal tenderness and toenail problem    Arch:  Normal  Hallux valgus: No    Hallux limitus: Yes (Dorsal spurring)       Left Foot Musculoskeletal   Ecchymosis:  none  Tenderness:  toenail problem  Arch:  Normal  Hallux valgus: No      MUSCLE STRENGTH     Right Foot Muscle Strength   Foot dorsiflexion:  4  Foot plantar flexion:  5  Foot inversion:  4+  Foot  eversion:  5     Left Foot Muscle Strength   Foot dorsiflexion:  5  Foot plantar flexion:  5  Foot inversion:  5  Foot eversion:  5    RANGE OF MOTION     Right Foot Range of Motion   Foot and ankle ROM within normal limits       Left Foot Range of Motion   Foot and ankle ROM within normal limits      DERMATOLOGIC      Right Foot Dermatologic   Skin  Right foot skin is intact.   Nails  1.  Positive for elongated, onychomycosis, abnormal thickness and subungual debris.  2.  Positive for elongated and abnormal thickness.  3.  Positive for elongated and abnormal thickness.  4.  Positive for elongated and abnormal thickness.  5.  Positive for elongated and abnormal thickness.  Nails comment:  1-5     Left Foot Dermatologic   Skin  Left foot skin is intact.   Nails comment:  1-5  Nails  1.  Positive for elongated, onychomycosis, abnormal thickness and subungual debris.  2.  Positive for elongated and abnormal thickness.  3.  Positive for elongated and abnormal thickness.  4.  Positive for elongated and abnormally thick.  5.  Positive for elongated and abnormally thick.    Image:       RADIOLOGY/NUCLEAR:  No results found.      LABORATORY/CULTURE RESULTS:      PATHOLOGY RESULTS:       ASSESSMENT/PLAN     Diagnoses and all orders for this visit:    1. Onychomycosis (Primary)    2. Right foot pain    3. Type 1 diabetes mellitus with diabetic neuropathy    4. Arthritis of first metatarsophalangeal (MTP) joint of right foot    5. Hallux limitus of right foot    6. Foot drop, right    7. Foot callus        Comprehensive lower extremity examination and evaluation was performed.  Discussed findings and treatment plan including risks, benefits, and treatment options with patient in detail. Patient agreed with treatment plan.  After verbal consent obtained, nail(s) x10 debrided of length and thickness with nail nipper without incidence  After verbal consent obtained, calluses x1 pared utilizing dermal curette and/or scalpel  without incidence  Patient may maintain nails and calluses at home utilizing emery board or pumice stone between visits as needed  Reviewed at home diabetic foot care including daily foot checks   Concerned about slight discoloration to bilateral greater toenails. Discussed several different treatment options for fungal nail infections including conservative measures with keeping the nails trimmed back to the shortest point of attachment and maintaining them at that length vs oral and/or topical antifungal treatment. Discussed that Oral Terbinafine used in combination with a topical antifungal nail treatment is the most effective treatment on the market today.   Patient to remain conservative at this point in time.  Continue follow-up with neurosurgery and pain management.  Discussed treatment of hallux limitus including conservative measures with hard soled shoes vs. Surgical correction with arthrodesis.  Prefers to remain conservative at this time.   Would like to go back to every 3 months for routine care.     An After Visit Summary was printed and given to the patient at discharge, including (if requested) any available informative/educational handouts regarding diagnosis, treatment, or medications. All questions were answered to patient/family satisfaction. Should symptoms fail to improve or worsen they agree to call or return to clinic or to go to the Emergency Department. Discussed the importance of following up with any needed screening tests/labs/specialist appointments and any requested follow-up recommended by me today. Importance of maintaining follow-up discussed and patient accepts that missed appointments can delay diagnosis and potentially lead to worsening of conditions.  Return in about 3 months (around 2/6/2025) for Follow-up with APRN, Follow-up in [facility]., or sooner if acute issues arise.  Advance Care Planning   ACP discussion was declined by the patient. Patient does not have an  advance directive, declines further assistance.       This document has been electronically signed by RAYMON Hernandez on November 6, 2024 13:37 CST

## 2024-11-09 DIAGNOSIS — E10.9 TYPE 1 DIABETES MELLITUS WITHOUT COMPLICATION (HCC): ICD-10-CM

## 2024-11-11 RX ORDER — INSULIN GLARGINE 300 U/ML
INJECTION, SOLUTION SUBCUTANEOUS
Qty: 6 ML | Refills: 1 | Status: SHIPPED | OUTPATIENT
Start: 2024-11-11

## 2024-11-11 RX ORDER — PROCHLORPERAZINE 25 MG/1
SUPPOSITORY RECTAL
Qty: 3 EACH | Refills: 0 | Status: SHIPPED | OUTPATIENT
Start: 2024-11-11

## 2024-11-15 DIAGNOSIS — E10.40 TYPE 1 DIABETES MELLITUS WITH DIABETIC NEUROPATHY (HCC): ICD-10-CM

## 2024-11-15 RX ORDER — LOSARTAN POTASSIUM 25 MG/1
25 TABLET ORAL DAILY
Qty: 30 TABLET | Refills: 3 | Status: SHIPPED | OUTPATIENT
Start: 2024-11-15

## 2024-11-15 RX ORDER — ATENOLOL 25 MG/1
25 TABLET ORAL DAILY
Refills: 0 | OUTPATIENT
Start: 2024-11-15

## 2024-11-15 RX ORDER — LOSARTAN POTASSIUM 25 MG/1
25 TABLET ORAL DAILY
Refills: 0 | OUTPATIENT
Start: 2024-11-15

## 2024-11-15 RX ORDER — ATENOLOL 25 MG/1
25 TABLET ORAL DAILY
Qty: 30 TABLET | Refills: 3 | Status: SHIPPED | OUTPATIENT
Start: 2024-11-15

## 2024-11-19 ENCOUNTER — TRANSCRIBE ORDERS (OUTPATIENT)
Dept: ADMINISTRATIVE | Facility: HOSPITAL | Age: 64
End: 2024-11-19
Payer: COMMERCIAL

## 2024-11-19 ENCOUNTER — OFFICE VISIT (OUTPATIENT)
Dept: INTERNAL MEDICINE | Age: 64
End: 2024-11-19

## 2024-11-19 ENCOUNTER — LAB (OUTPATIENT)
Dept: LAB | Facility: HOSPITAL | Age: 64
End: 2024-11-19
Payer: COMMERCIAL

## 2024-11-19 VITALS
SYSTOLIC BLOOD PRESSURE: 120 MMHG | OXYGEN SATURATION: 97 % | DIASTOLIC BLOOD PRESSURE: 70 MMHG | HEART RATE: 70 BPM | WEIGHT: 113 LBS | BODY MASS INDEX: 22.07 KG/M2

## 2024-11-19 DIAGNOSIS — E10.9 TYPE 1 DIABETES MELLITUS WITHOUT COMPLICATION (HCC): Primary | ICD-10-CM

## 2024-11-19 DIAGNOSIS — E55.9 VITAMIN D DEFICIENCY: ICD-10-CM

## 2024-11-19 DIAGNOSIS — E78.2 MIXED HYPERLIPIDEMIA: ICD-10-CM

## 2024-11-19 DIAGNOSIS — E10.9 TYPE 1 DIABETES MELLITUS WITHOUT COMPLICATION: ICD-10-CM

## 2024-11-19 DIAGNOSIS — E03.9 ACQUIRED HYPOTHYROIDISM: ICD-10-CM

## 2024-11-19 DIAGNOSIS — Z23 INFLUENZA VACCINE NEEDED: ICD-10-CM

## 2024-11-19 DIAGNOSIS — E10.9 TYPE 1 DIABETES MELLITUS WITHOUT COMPLICATION: Primary | ICD-10-CM

## 2024-11-19 DIAGNOSIS — I10 PRIMARY HYPERTENSION: ICD-10-CM

## 2024-11-19 LAB
ALBUMIN SERPL-MCNC: 4.7 G/DL (ref 3.5–5)
ALBUMIN/GLOB SERPL: 1.6 G/DL (ref 1.1–2.5)
ALP SERPL-CCNC: 186 U/L (ref 24–120)
ALT SERPL W P-5'-P-CCNC: 20 U/L (ref 0–35)
ANION GAP SERPL CALCULATED.3IONS-SCNC: 11 MMOL/L (ref 4–13)
AST SERPL-CCNC: 42 U/L (ref 7–45)
AUTO MIXED CELLS #: 0.4 10*3/MM3 (ref 0.1–2.6)
AUTO MIXED CELLS %: 8 % (ref 0.1–24)
BILIRUB SERPL-MCNC: 0.3 MG/DL (ref 0.1–1)
BUN SERPL-MCNC: 16 MG/DL (ref 5–21)
BUN/CREAT SERPL: 16
CALCIUM SPEC-SCNC: 10.4 MG/DL (ref 8.6–10.5)
CHLORIDE SERPL-SCNC: 89 MMOL/L (ref 98–110)
CHOLEST SERPL-MCNC: 132 MG/DL (ref 130–200)
CO2 SERPL-SCNC: 33 MMOL/L (ref 24–31)
CREAT SERPL-MCNC: 1 MG/DL (ref 0.5–1.4)
EGFRCR SERPLBLD CKD-EPI 2021: 63.8 ML/MIN/1.73
ERYTHROCYTE [DISTWIDTH] IN BLOOD BY AUTOMATED COUNT: 13.4 % (ref 12.3–15.4)
GLOBULIN UR ELPH-MCNC: 3 GM/DL
GLUCOSE SERPL-MCNC: 74 MG/DL (ref 65–99)
HBA1C MFR BLD: 5.6 % (ref 4.8–5.9)
HCT VFR BLD AUTO: 36.9 % (ref 34–46.6)
HDLC SERPL-MCNC: 72 MG/DL
HGB BLD-MCNC: 12.2 G/DL (ref 12–15.9)
LDLC SERPL CALC-MCNC: 42 MG/DL (ref 0–99)
LDLC/HDLC SERPL: 0.57 {RATIO}
LYMPHOCYTES # BLD AUTO: 0.6 10*3/MM3 (ref 0.7–3.1)
LYMPHOCYTES NFR BLD AUTO: 13.5 % (ref 19.6–45.3)
MCH RBC QN AUTO: 28.4 PG (ref 26.6–33)
MCHC RBC AUTO-ENTMCNC: 33.1 G/DL (ref 31.5–35.7)
MCV RBC AUTO: 85.8 FL (ref 79–97)
NEUTROPHILS NFR BLD AUTO: 3.7 10*3/MM3 (ref 1.7–7)
NEUTROPHILS NFR BLD AUTO: 78.5 % (ref 42.7–76)
PLATELET # BLD AUTO: 207 10*3/MM3 (ref 140–450)
PMV BLD AUTO: 7.8 FL (ref 6–12)
POTASSIUM SERPL-SCNC: 4 MMOL/L (ref 3.5–5.3)
PROT SERPL-MCNC: 7.7 G/DL (ref 6.3–8.7)
RBC # BLD AUTO: 4.3 10*6/MM3 (ref 3.77–5.28)
SODIUM SERPL-SCNC: 133 MMOL/L (ref 135–145)
TRIGL SERPL-MCNC: 95 MG/DL (ref 0–149)
VLDLC SERPL-MCNC: 18 MG/DL (ref 5–40)
WBC NRBC COR # BLD AUTO: 4.7 10*3/MM3 (ref 3.4–10.8)

## 2024-11-19 PROCEDURE — 80061 LIPID PANEL: CPT

## 2024-11-19 PROCEDURE — 80053 COMPREHEN METABOLIC PANEL: CPT

## 2024-11-19 PROCEDURE — 36415 COLL VENOUS BLD VENIPUNCTURE: CPT

## 2024-11-19 PROCEDURE — 83036 HEMOGLOBIN GLYCOSYLATED A1C: CPT

## 2024-11-19 PROCEDURE — 85025 COMPLETE CBC W/AUTO DIFF WBC: CPT

## 2024-11-19 RX ORDER — INSULIN GLARGINE 300 U/ML
14 INJECTION, SOLUTION SUBCUTANEOUS NIGHTLY
Qty: 6 ML | Refills: 1
Start: 2024-11-19

## 2024-11-19 RX ORDER — INSULIN ASPART 100 [IU]/ML
INJECTION, SOLUTION INTRAVENOUS; SUBCUTANEOUS
Qty: 15 ML | Refills: 4
Start: 2024-11-19

## 2024-11-19 NOTE — PROGRESS NOTES
diabetes remote history of alcohol abuse she has now been off alcohol for 3 years really has done great with that and we have been able to better manage diabetes.  She is not having issues with hypoglycemia she would benefit from endocrinology and diabetic educator help working to try the [facility] endocrinology referral she has been seen at [facility] and [facility] [place] before used to follow with endocrinology when she lived in [place].  In the meantime I need to cut her Toujeo back to 14 units with severe hypoglycemia she has a Dexcom it has shows that it is covered but it cost way too much we are going to contact the drug rep for her Dexcom.  - Insulin Glargine, 2 Unit Dial, (TOUJEO MAX SOLOSTAR) 300 UNIT/ML concentrated injection pen; Inject 14 Units into the skin nightly  Dispense: 6 mL; Refill: 1  - Insulin Aspart FlexPen 100 UNIT/ML SOPN; Take 1-3 units with each meal  Dispense: 15 mL; Refill: 4  - CBC; Future  - Comprehensive Metabolic Panel; Future  - Hemoglobin A1C; Future  - Lipid Panel; Future  -  Endocrinologist- Telehealth    2. Acquired hypothyroidism  Stable     3. Primary hypertension  Stable and follow     4. Mixed hyperlipidemia  Lipid panel has been doing well we will reassess and follow  5. Vitamin D deficiency  Follow and reassess in the future     6. Influenza vaccine needed  Chart, medications, labs, vaccines reviewed.  Keep up to date with routine care and follow up.  Call with any problems or complaints.  Keep up to date with routine screening recomendations and vaccines.     - Influenza, FLUCELVAX Trivalent, (age 6 mo+) IM, Preservative Free, 0.5mL

## 2024-11-20 RX ORDER — GABAPENTIN 300 MG/1
CAPSULE ORAL
Qty: 30 CAPSULE | Refills: 2 | Status: SHIPPED | OUTPATIENT
Start: 2024-11-20 | End: 2025-02-18

## 2024-12-03 ENCOUNTER — OFFICE VISIT (OUTPATIENT)
Dept: CARDIOLOGY | Facility: CLINIC | Age: 64
End: 2024-12-03
Payer: COMMERCIAL

## 2024-12-03 VITALS
SYSTOLIC BLOOD PRESSURE: 150 MMHG | OXYGEN SATURATION: 99 % | DIASTOLIC BLOOD PRESSURE: 70 MMHG | HEART RATE: 68 BPM | WEIGHT: 112 LBS | BODY MASS INDEX: 21.99 KG/M2 | HEIGHT: 60 IN

## 2024-12-03 DIAGNOSIS — I10 ESSENTIAL HYPERTENSION: Primary | ICD-10-CM

## 2024-12-03 DIAGNOSIS — I47.10 PAROXYSMAL SVT (SUPRAVENTRICULAR TACHYCARDIA): ICD-10-CM

## 2024-12-03 PROCEDURE — 99214 OFFICE O/P EST MOD 30 MIN: CPT | Performed by: NURSE PRACTITIONER

## 2024-12-03 RX ORDER — LOSARTAN POTASSIUM 25 MG/1
25 TABLET ORAL DAILY
Qty: 90 TABLET | Refills: 3 | Status: SHIPPED | OUTPATIENT
Start: 2024-12-03

## 2024-12-03 RX ORDER — INSULIN GLARGINE 300 U/ML
14 INJECTION, SOLUTION SUBCUTANEOUS
COMMUNITY
Start: 2024-11-19

## 2024-12-03 RX ORDER — ATENOLOL 25 MG/1
25 TABLET ORAL DAILY
Qty: 90 TABLET | Refills: 3 | Status: SHIPPED | OUTPATIENT
Start: 2024-12-03

## 2024-12-03 NOTE — PROGRESS NOTES
"    Subjective:     Encounter Date:12/03/2024      Patient ID: Fiordaliza Lyons is a 64 y.o. female.    Chief Complaint:\"no complaints\"  History of Present Illness  Patient presents today as an overdue follow up and needing her medication refilled. She was referred to Dr. Culver in 9/2023 for further management of palpitations. She had a stress test in 6/2023 after she was admitted to the hospital briefly with complaints of chest pain with results being low risk for ischemia. She reported palpitations to her PCP in 6/2023 therefore was placed in a 7 day holter monitor which revealed brief, asymptomatic episodes of SVT. She was started on Losartan and Atenolol at her initial visit with Dr. Culver in Sept.     She presents today and reports she has been well. Her BP is higher than normal however she notes the last 48hrs have been \"crazy\" and she has had little sleep and has been out of her routine, therefore is not surprised with her elevated BP. She reports she did have some palpitations this morning which she has not had in a long time and attributes her palpitations to her lack of sleep. She notes her BP at home is typically in the 110-120 range. She reports occasional issues with positional lightheadedness and dizziness. She denies chest pain, dyspnea and edema.       The following portions of the patient's history were reviewed and updated as appropriate: allergies, current medications, past family history, past medical history, past social history, past surgical history and problem list.    Allergies   Allergen Reactions    Lisinopril Cough    Tizanidine Hcl Dizziness     Pt stated \"horrible dry mouth, dizziness, couldn't see, it was awful\" drops blood sugar down     Augmentin [Amoxicillin-Pot Clavulanate] Nausea And Vomiting       Current Outpatient Medications:     acetaminophen (Tylenol) 325 MG tablet, Take 3 tablets by mouth Every 8 (Eight) Hours. Take every 8 hours for 3 days then take prn as needed., Disp: , " Rfl:     atenolol (TENORMIN) 25 MG tablet, Take 1 tablet by mouth Daily., Disp: 90 tablet, Rfl: 3    BD Pen Needle Tatyana U/F 32G X 4 MM misc, 4 (Four) Times a Day. use as directed, Disp: , Rfl:     clobetasol (TEMOVATE) 0.05 % external solution, , Disp: , Rfl:     Continuous Blood Gluc Sensor (Dexcom G6 Sensor), See Admin Instructions., Disp: , Rfl:     Continuous Blood Gluc Transmit (Dexcom G6 Transmitter) misc, See Admin Instructions., Disp: , Rfl:     estradiol (ESTRACE) 0.1 MG/GM vaginal cream, Apply nightly for 2 weeks and then 3 nights a week, Disp: , Rfl:     Gvoke HypoPen 2-Pack 1 MG/0.2ML solution auto-injector, , Disp: , Rfl:     HYDROcodone-acetaminophen (NORCO) 7.5-325 MG per tablet, Take 1 tablet by mouth Every 6 (Six) Hours As Needed. for pain, Disp: , Rfl:     ibuprofen (Motrin IB) 200 MG tablet, Take 3 tablets by mouth Every 8 (Eight) Hours. Take every 8 hours for three days then take as needed., Disp: , Rfl:     insulin aspart (novoLOG FLEXPEN) 100 UNIT/ML solution pen-injector sc pen, Inject 2-5 Units under the skin into the appropriate area as directed 3 (Three) Times a Day With Meals. 2-5 units with meals sliding scale, Disp: , Rfl:     Insulin Glargine, 2 Unit Dial, (Toujeo Max SoloStar) 300 UNIT/ML solution pen-injector injection, Inject 14 Units under the skin into the appropriate area as directed., Disp: , Rfl:     levothyroxine (SYNTHROID, LEVOTHROID) 50 MCG tablet, Take 1 tablet by mouth Every Morning., Disp: , Rfl:     losartan (COZAAR) 25 MG tablet, Take 1 tablet by mouth Daily., Disp: 90 tablet, Rfl: 3    magnesium oxide (MAG-OX) 400 MG tablet, Take 1 tablet by mouth Daily., Disp: , Rfl:     Multiple Vitamins-Iron (multivitamin with iron) tablet tablet, Take 1 tablet by mouth Daily., Disp: , Rfl:     Nutritional Supplements (JUICE PLUS FIBRE PO), Take 1 dose by mouth Daily., Disp: , Rfl:     ondansetron ODT (ZOFRAN-ODT) 4 MG disintegrating tablet, DISSOLVE 1 TABLET ON THE TONGUE EVERY 4-6  HOURS AS NEEDED FOR NAUSEA, Disp: , Rfl:     pantoprazole (PROTONIX) 40 MG EC tablet, Take 1 tablet by mouth Daily., Disp: , Rfl:     potassium chloride (K-DUR,KLOR-CON) 10 MEQ CR tablet, Take 1 tablet by mouth Daily As Needed., Disp: , Rfl:     VITAMIN D-VITAMIN K PO, Take  by mouth 1 (One) Time Per Week., Disp: , Rfl:   Past Medical History:   Diagnosis Date    Alcoholic hepatitis with ascites     Allergic rhinitis     Cancer     ovarian    Cirrhosis 2021    Degeneration of lumbar or lumbosacral intervertebral disc 09/15/2022    Diabetes mellitus     Disease of thyroid gland     Elevated d-dimer     Endometriosis     Fatty liver     GERD (gastroesophageal reflux disease)     History of transfusion     Hyperlipidemia     Hypertension     Low back pain     Lumbosacral disc disease 2022    Osteoporosis     Ovarian cyst     Pancreatitis     Postmenopausal     Rectal mass     Rectal prolapse     Sleep apnea     has corrrected it self with w/ weight loss       Social History     Socioeconomic History    Marital status: Single   Tobacco Use    Smoking status: Never     Passive exposure: Never    Smokeless tobacco: Never   Vaping Use    Vaping status: Never Used   Substance and Sexual Activity    Alcohol use: Not Currently    Drug use: No    Sexual activity: Not Currently       Review of Systems   Constitutional: Negative for malaise/fatigue, weight gain and weight loss.   Cardiovascular:  Negative for chest pain, dyspnea on exertion, irregular heartbeat, leg swelling, near-syncope, orthopnea, palpitations, paroxysmal nocturnal dyspnea and syncope.   Respiratory:  Negative for cough, shortness of breath, sleep disturbances due to breathing, sputum production and wheezing.    Skin:  Negative for dry skin, flushing, itching and rash.   Gastrointestinal:  Negative for hematemesis and hematochezia.   Neurological:  Negative for dizziness, light-headedness, loss of balance and weakness.   All other systems reviewed and are  "negative.         Objective:     Vitals reviewed.   Constitutional:       General: Not in acute distress.     Appearance: Healthy appearance. Well-developed. Not diaphoretic.   Eyes:      General: No scleral icterus.     Conjunctiva/sclera: Conjunctivae normal.      Pupils: Pupils are equal, round, and reactive to light.   HENT:      Head: Normocephalic.    Mouth/Throat:      Pharynx: No oropharyngeal exudate.   Neck:      Vascular: No JVR.   Pulmonary:      Effort: Pulmonary effort is normal. No respiratory distress.      Breath sounds: Normal breath sounds. No wheezing. No rhonchi. No rales.   Chest:      Chest wall: Not tender to palpatation.   Cardiovascular:      Normal rate. Regular rhythm.   Pulses:     Intact distal pulses.   Edema:     Peripheral edema absent.   Abdominal:      General: Bowel sounds are normal. There is no distension.      Palpations: Abdomen is soft.      Tenderness: There is no abdominal tenderness.   Musculoskeletal: Normal range of motion.      Cervical back: Normal range of motion and neck supple. Skin:     General: Skin is warm and dry.      Coloration: Skin is not pale.      Findings: No erythema or rash.   Neurological:      Mental Status: Alert, oriented to person, place, and time and oriented to person, place and time.      Deep Tendon Reflexes: Reflexes are normal and symmetric.   Psychiatric:         Behavior: Behavior normal.           Procedures  /70 (BP Location: Right arm, Patient Position: Sitting, Cuff Size: Adult)   Pulse 68   Ht 152.4 cm (60\")   Wt 50.8 kg (112 lb)   SpO2 99%   BMI 21.87 kg/m²     Lab Review:   I have reviewed previous office notes, recent labs and recent cardiac testing.     Holter 8/2023:  Interpretation Summary         Average HR: 96. Min HR: 49. Max HR: 235.     Entire report was reviewed.   The predominant rhythm noted during the testing period was sinus with rates as above while in sinus rhythm.     Rare supraventricular ectopics with an " APC burden of: < 1%    Rare premature ventricular contractions with a PVC burden of: < 1%     No correlated arrhythmia  No significant pauses                  Conclusion:      Baseline rhythm was sinus.  Slowest rate of 49 bpm consisted of marked sinus bradycardia at 3:23 AM presumably during sleep  No significant ectopy   35 runs of supraventricular tachycardia longest 13 beats at an average rate of 142 bpm  Most rapid SVT episode was 7 beats at a maximum rate of 235 bpm and average rate of 208 bpm at 11:52 AM     Stress echo 6/2023:    Interpretation Summary    Dobutamine stress echocardiogram is low risk for ischemia.         Assessment:          Diagnosis Plan   1. Essential hypertension        2. Paroxysmal SVT (supraventricular tachycardia)               Plan:       1 HTN- elevated in office however reports controlled readings at home. Recommend she continue to monitor BP and call with SBP >140. Continue Atenolol 25mg daily and Losartan 25mg daily. RF sent to pharmacy.   2. PSVT- brief, asymptomatic episodes noted on holter. Continue atenolol. Monitor and reduce caffeine intake.       Follow up in 1 year or sooner if symptoms worsen.     I spent 30 minutes caring for Fiordaliza on this date of service. This time includes time spent by me in the following activities:preparing for the visit, reviewing tests, obtaining and/or reviewing a separately obtained history, performing a medically appropriate examination and/or evaluation , counseling and educating the patient/family/caregiver, ordering medications, tests, or procedures, and documenting information in the medical record

## 2024-12-04 ENCOUNTER — TELEPHONE (OUTPATIENT)
Dept: INTERNAL MEDICINE | Age: 64
End: 2024-12-04

## 2024-12-04 DIAGNOSIS — E10.65 TYPE 1 DIABETES MELLITUS WITH HYPERGLYCEMIA (HCC): ICD-10-CM

## 2024-12-04 DIAGNOSIS — E03.9 ACQUIRED HYPOTHYROIDISM: Primary | ICD-10-CM

## 2024-12-04 NOTE — TELEPHONE ENCOUNTER
Her endocrinology MD, Dr Katlyn Boudreaux, is back in [facility] and she would like a referral.      Fax Number is [phone]

## 2024-12-11 ENCOUNTER — TRANSCRIBE ORDERS (OUTPATIENT)
Dept: ADMINISTRATIVE | Facility: HOSPITAL | Age: 64
End: 2024-12-11
Payer: COMMERCIAL

## 2024-12-11 ENCOUNTER — TELEPHONE (OUTPATIENT)
Dept: INTERNAL MEDICINE | Age: 64
End: 2024-12-11

## 2024-12-11 DIAGNOSIS — R42 DIZZINESS: Primary | ICD-10-CM

## 2024-12-11 NOTE — TELEPHONE ENCOUNTER
- Echocardiogram (ultrasound of your heart) to assess the function as well as for any valve abnormalities  - virtual follow up after to review results    It was my pleasure to meet you. I look forward to being your cardiac Nurse Practitioner. I am a huge believer in communicating with my patients. Please contact me at any time, if anything is not clear to you regarding anything we have discussed, or if new questions occur to you.    She has been having dizziness for past 5 days, more so in the morning and if gets up during the night.  Asking if needs bloodwork?  May be anemic again?

## 2024-12-11 NOTE — TELEPHONE ENCOUNTER
I put in lab order - please fax to [place] she prefers to get lab there-- find out if lightheaded/ faint -- or if spinning like vertigo and let me know

## 2024-12-16 ENCOUNTER — TELEPHONE (OUTPATIENT)
Dept: INTERNAL MEDICINE | Age: 64
End: 2024-12-16

## 2024-12-16 NOTE — TELEPHONE ENCOUNTER
She has been dizzy lately and did fall and is sore, but also went to ER or Urgent Care yesterday and also has shingles in side area and was put on valtrex.  She just wanted you to be aware.

## 2024-12-19 ENCOUNTER — PROCEDURE VISIT (OUTPATIENT)
Dept: OTOLARYNGOLOGY | Facility: CLINIC | Age: 64
End: 2024-12-19
Payer: COMMERCIAL

## 2024-12-19 ENCOUNTER — OFFICE VISIT (OUTPATIENT)
Dept: OTOLARYNGOLOGY | Facility: CLINIC | Age: 64
End: 2024-12-19
Payer: COMMERCIAL

## 2024-12-19 VITALS
BODY MASS INDEX: 22.78 KG/M2 | TEMPERATURE: 98 F | SYSTOLIC BLOOD PRESSURE: 142 MMHG | HEIGHT: 60 IN | WEIGHT: 116 LBS | DIASTOLIC BLOOD PRESSURE: 76 MMHG | RESPIRATION RATE: 16 BRPM | HEART RATE: 53 BPM

## 2024-12-19 DIAGNOSIS — R42 DIZZINESS: Primary | ICD-10-CM

## 2024-12-19 DIAGNOSIS — R26.89 IMBALANCE: ICD-10-CM

## 2024-12-19 DIAGNOSIS — Z01.10 HEARING WITHIN NORMAL LIMITS IN BOTH EARS: Primary | ICD-10-CM

## 2024-12-19 RX ORDER — GABAPENTIN 300 MG/1
CAPSULE ORAL
COMMUNITY
Start: 2024-11-20 | End: 2025-02-19

## 2024-12-19 RX ORDER — VALACYCLOVIR HYDROCHLORIDE 1 G/1
TABLET, FILM COATED ORAL
COMMUNITY
Start: 2024-12-16

## 2024-12-19 NOTE — PATIENT INSTRUCTIONS
>NASAL STEROID use:  Using nasal steroids:  You will be prescribed one of the following nasal steroids: Flonase, Nasacort, Nasonex, Rhinocort, Qnasl, Zetonna  2 puffs each nostril 2 times daily  Start as soon as possible  If you are using Afrin for 3 days with the nasal steroid,  Use Afrin first and wait 10 minutes to allow the nose to open. Then administer nasal steroids.   >NASAL SALINE:  Use 2 puffs each nostril 4-6 times daily and more frequently if possible.  You can buy saline spray or you can make your own and use an old spray bottle to administer  Use a humidifier at bedside  Recipe for saline:  Water                                 1 quart  Salt (table)                        1 tablespoon  Gylcerin (or Uye Syrup)    1 teaspoon  Sodium bicarbonate           1 teaspoon  Sprays or Luana pots are recommended    Do not allow to stand for more than 24 hrs. Make new solution. There is no preservative in this solution.    Sinus irrigation and saline application can be enhanced with various over-the-counter products.  A WaterPik can be quite useful to irrigate, especially following sinus surgery.  Navage makes a product that irrigates the nose and some of the sinuses.  NeilMed makes a Sinu-Gator to irrigate the sinuses.  Neomed also has canned saline that we will come out under pressure.  A Luana pot can also be helpful.  All of these products help keep the nose clear of debris.  Please use as directed on the instructions that come with the particular device.       VESTIBULAR EXERCISES:    Goals:  >To develop proprioceptive and visual mechanisms to compensate for a disturbance in balance function (labyrinthine system)  >To improve muscle coordination in general  T>o practice balancing under everyday conditions with special attention to using the eyes,  muscle and joint senses  >To train the movement of the eyes independent of the head  >To loosen the muscles of the neck and shoulder to overcome the protective  "muscular spasm and tendency to move \"in one piece\"  >To practice head movements that cause dizziness and thus gradually overcome the disability  >To encourage the restoration of self-confidence and easy spontaneous motion     Exercise Program:  A. Eye exercises (while seated in bed). Perform 5 to 10 minutes at least 5 times each day; first do slowly, then quickly. Perform 20 times each.  1.     Up and down  2.     Side to side  3.     Diagonal  4.     Focus on finger moving from 3 feet to one foot from face    B. Head exercises. To be done at first slowly with eyes open in primary position, then quickly. Later do with eyes closed. Perform 20 times each.  1.     Bending forward and backward  2.     Turning from side to side  3.     Tilting from side to side  4.     Diagonal movements    C. Coordinate the movement of head and eyes in the same direction as in B.    D. Shoulder shrugging and circling. Perform 20 times each.    E. While seated, bend forward and  objects from the ground. Perform 20 times.    F.  Standing exercises. Perform 20 times each.  1. Repeat section A  2. Change from a sitting to a standing position with the eyes open and shut.  3. Throw ball from hand to hand, above eye level             4. Throw ball from hand to hand, under knee  5. Change form from sitting to standing, turning around in between     Full activity: Perform 10 times each.  1. Walk across room with eyes open, then closed  2. Walk up and down a slope with eyes open, then closed  3. Walk up and down steps with eyes open, then closed  4. Sit up and lie down in bed  5. Stand up and sit down in a chair  6. Recover balance when pushed in any direction  7. Throw and catch a ball  8. Any game involving stooping, straining and aiming     The following are of value in rehabilitating patients with balance problems:  1. A light cane may be used, not for walking, but to provide additional proprioceptive            orienting input.  2. " While walking, the patient should not look down, but rather select a distant point for          fixation.  3. Night-lights should be left on in the patient’s home.  4. In-patients with cervical spine problems, especially in those that head turning        increase unsteadiness, the use of soft foam cervical collar limits motion and improves         stability. Exercises should be modified accordingly.  5. Mild central stimulants (Ritalin, Caffeine) are useful in alerting the patient to respond       quickly to orienting input.  6. Optimal visual correction should be achieved and maintained. If cataract surgery is           being performed, then contact lens are recommended to avoid optical distortion.  7.  Extreme fatigue and stress is to be avoided, as this may worsen the dizziness.  8.  Sedatives, vestibular suppressants and tranquilizers (Valium, Phenergan, Antivert, Dramamine, Klonopin, etc.) are to be avoided, as this may slow compensation by the brain and cause drowsiness.

## 2024-12-19 NOTE — PROGRESS NOTES
AUDIOMETRIC EVALUATION      Name:  Fiordaliza Lyons  :  1960  Age:  64 y.o.  Date of Evaluation:  2024       History:  Ms. Lyons is seen today for a hearing evaluation due to dizziness at the request of RAYMON Luong.    Audiologic Information:  Concerns for Hearing: no  PETs: no  Other otologic surgical history: no  Aural Pressure/Fullness: no  Otalgia: no  Otorrhea: no  Tinnitus: no  Dizziness: Off-balance dizziness without spinning sensation. Will consistently happen specifically in the morning time when she is standing. Will last until 9am when she gets up at 6am. Approximately lasting 3 hours.   Noise Exposure: no  Family history of hearing loss: Father  Head trauma requiring hospital stay: no  Chemotherapy: no  Other significant history: diabetes type 1, history of sleep apnea, history of high blood pressure    **Case history obtained in office and through EMR system      EVALUATION:        RESULTS:    Otoscopic Evaluation:  Right: clear canal, tympanic membrane visualized  Left: clear canal, tympanic membrane visualized    Tympanometry (226 Hz):  Right: Type A  Left: Type A    Pure Tone Audiometry:    Bilateral: hearing within normal limits     Speech Audiometry:   Right: Speech Reception Threshold (SRT) was obtained at 10 dB HL  Word Recognition scores - Excellent (100)% at 50 dB, using NU-6 List 1A with 10 words  Left: Speech Reception Threshold (SRT) was obtained at 0 dB HL  Word Recognition scores - Excellent (100)% at 50 dB, using NU-6 List 2A with 10 words  SRT/PTA in good agreement bilaterally.    IMPRESSIONS:  Tympanometry showed normal middle ear pressure and static compliance, consistent with normal middle ear function for both ears.      Pure tone thresholds for both ears show hearing within normal limits, suggesting normal outer/middle ear function and normal cochlear/retrocochlear function.     Patient was counseled with regard to the findings.    Diagnosis:  1. Hearing  within normal limits in both ears         RECOMMENDATIONS/PLAN:  Follow-up recommendations per RAYMON Luong.  Practice good communication strategies to assist with everyday listening (eye contact with speakers, reduce background noise, encourage others to communicate clearly and slowly).  Repeat hearing evaluation if changes in hearing are noted or concerns arise.  Discussed results and recommendations with patient. Questions were addressed and the patient was encouraged to contact our department should concerns arise.        Leslie Carrasquillo, CCC-A  Doctor of Audiology

## 2024-12-19 NOTE — PROGRESS NOTES
RAYMON Irby  [facility] EAR NOSE & THROAT  [address], [address]  [facility] [address]  Fax [phone]  Phone [phone]      Visit Type: NEW PATIENT   Chief Complaint   Patient presents with    Dizziness           HISTORY OBTAINED FROM: patient  HPI  Fiordaliza Lyons is a 64 y.o. female who presents for evaluation of dizziness.  Has been present for about a month.   Describes dizziness more as imbalance, states she falls to the right with standing and walking.   She reports the only time she does not have imbalance she has headache and fatigue but these symptoms do not occur at the same time.   She denies notable ear pain, pressure, fullness, drainage or notable recurrent ear infections.  She denies notable hearing loss or tinnitus.    She does have notable history of degenerative lumbar disc disease and low back pain, diabetes, chronic liver disease and is currently being seen by cardiology.  She did have recent Holter that revealed some brief reportedly asymptomatic proximal SVT episodes, on atenolol and was advised to reduce caffeine intake.        Past Medical History:   Diagnosis Date    Alcoholic hepatitis with ascites     Allergic rhinitis     Cancer     ovarian    Cirrhosis 2021    Degeneration of lumbar or lumbosacral intervertebral disc 09/15/2022    Diabetes mellitus     Disease of thyroid gland     Elevated d-dimer     Endometriosis     Fatty liver     GERD (gastroesophageal reflux disease)     History of transfusion     Hyperlipidemia     Hypertension     Low back pain     Lumbosacral disc disease 2022    Osteoporosis     Ovarian cyst     Pancreatitis     Postmenopausal     Rectal mass     Rectal prolapse     Sleep apnea     has corrrected it self with w/ weight loss       Past Surgical History:   Procedure Laterality Date    ARM NERVE EXPLORATION Left     6 nerves scraped ,cleaned nerves off    BACK SURGERY  2023    BILATERAL  SALPINGO OOPHORECTOMY  2008    CARPAL TUNNEL RELEASE  2016    CHOLECYSTECTOMY  1996    COLONOSCOPY  07/2017    Dr Murphy- per patient normal     COLONOSCOPY N/A 11/09/2021    Procedure: COLONOSCOPY WITH ANESTHESIA;  Surgeon: Ray Serna MD;  Location: [facility] ENDOSCOPY;  Service: Gastroenterology;  Laterality: N/A;  pre rectal mass  post  Dr Shields    DILATION AND CURETTAGE, DIAGNOSTIC / THERAPEUTIC      ENDOSCOPY N/A 11/09/2021    Procedure: ESOPHAGOGASTRODUODENOSCOPY WITH ANESTHESIA;  Surgeon: Ray Serna MD;  Location: [facility] ENDOSCOPY;  Service: Gastroenterology;  Laterality: N/A;  pre anemia  post hiatal hernia; gastric polyp  Dr. Shields    HEMORRHOIDECTOMY SIGMOIDOSCOPY N/A 01/09/2024    Procedure: HEMORRHOIDECTOMY WITH EXAM UNDER ANESTHESIA;  Surgeon: Rocio Read MD;  Location: [facility] OR;  Service: General;  Laterality: N/A;    KNEE SURGERY Right 1982    LUMBAR LAMINECTOMY WITH FUSION Right 05/01/2023    Procedure: LUMBAR DISCECTOMY, JADON- LAMINECTOMY TRANSFORAMINAL LUMBAR INTERBODY FUSION, POSTERIOR PEDICLE SCREW INSTRUMENTATION, L5-S1 RIGHT. USE OF THE ROBOT;  Surgeon: Nathaniel Tesfaye MD;  Location: [facility] OR;  Service: Neurosurgery;  Laterality: Right;    SPINAL FUSION  2023    TRIGGER POINT INJECTION  2023       Family History: Her family history includes Colon polyps in her brother and father; Ovarian cancer in her maternal aunt, maternal grandmother, mother, and paternal grandmother.     Social History: She  reports that she has never smoked. She has never been exposed to tobacco smoke. She has never used smokeless tobacco. She reports that she does not currently use alcohol. She reports that she does not use drugs.    Home Medications:  Dexcom G6 Sensor, Dexcom G6 Transmitter, Glucagon, HYDROcodone-acetaminophen, Insulin Glargine (2 Unit Dial), Insulin Pen Needle, Nutritional Supplements, Potassium, Vitamin D-Vitamin K, acetaminophen, atenolol, clobetasol, gabapentin, ibuprofen,  insulin aspart, levothyroxine, losartan, magnesium oxide, multivitamin with iron, ondansetron ODT, pantoprazole, potassium chloride, and valACYclovir    Allergies:  She is allergic to lisinopril, tizanidine hcl, and augmentin [amoxicillin-pot clavulanate].       Vital Signs:   Temp:  [98 °F (36.7 °C)] 98 °F (36.7 °C)  Heart Rate:  [53] 53  Resp:  [16] 16  BP: (142)/(76) 142/76  ENT Physical Exam  Constitutional  Appearance: patient appears well-developed, well-nourished and well-groomed,  Communication/Voice: communication appropriate for developmental age; vocal quality normal;  Head and Face  Appearance: head appears normal, face appears normal and face appears atraumatic;  Palpation: facial palpation normal;  Ear  Hearing: intact;  Auricles: bilateral auricles normal;  External Mastoids: bilateral external mastoids normal;  Ear Canals: bilateral ear canals normal;  Tympanic Membranes: bilateral tympanic membranes normal;  Nose  External Nose: nares patent bilaterally; external nose normal;  Internal Nose: nasal mucosa normal; bilateral inferior turbinates normal;  Oral Cavity/Oropharynx  Lips: normal;  Teeth: normal;  Gums: gingiva normal;  Tongue: normal;  Oral mucosa: normal;  Hard palate: normal;  Soft palate: normal;  Tonsils: normal;  Respiratory  Inspection: breathing unlabored; normal breathing rate;  Cardiovascular  Inspection: extremities are warm and well perfused;  Neurovestibular  Gait: gait normal;  Station: standing is normal;  Coordination: no tremors present;  Strength: normal strength  Vestibular: no pathologic nystagmus;  Mental Status: alert and oriented;               Result Review       RESULTS REVIEW    I have reviewed the patients old records in the chart.   The following results/records were reviewed:     REFERRAL/PRE-AUTH MRN - SCAN - ENT REFERRAL (09/26/2024) -grossly normal hearing exam bilaterally.  There is technically a slight asymmetry present at 1000 kHz has a 15 dB difference but  it is within normal limits for normal hearing.  Tympanometry is normal, auditory discrimination is 100% bilaterally.    Procedure visit with Leslie ColmenaresForeign (12/19/2024) - stable, grossly normal with only a slight asymmetry present at 1000 kHz.          Assessment & Plan  Dizziness    Imbalance       Orders Placed This Encounter   Procedures    MRI internal auditory canal w wo    MRI Brain With & Without Contrast           During visit patient had a episode of of dizziness and near syncope.  She states this is different than what she has been having, describes this more as lightheaded, feeling she was in a pass out.  During episodes she never did lose consciousness, heart rate remained stable and steady, she did check her blood sugar and was 100.  I discussed and advised because of the episode she go to ER for evaluation but she refuses.  States she feels fine and wants to go home.  We discussed potential risk of this and not having evaluated, she expressed understanding and that she will consider if she starts feeling bad again she will go to ER.    For her more chronic dizziness I do not have high suspicion that this is vestibular in nature.  She does not describe vertiginous episodes.  She reports that as imbalance with some lightheadedness disassociated with walking or standing up too rapidly.  I feel this does need to be evaluated further, possible cardiac versus neurological or other underlying pathological condition.  I will go ahead and order a MRI of the IACs and the brain and we will plan for Zuleika-Hallpike testing to rule out an ear problem.  I did go ahead and advised patient to follow-up PCP to discuss further considerations well though.    MRI of the IACs and brain, will call with results  Zuleika-Hallpike at follow-up  Can trial vestibular exercises, this may help with balance if nothing else.  Follow-up with PCP discussed possible other conditions that may be causing these reported  episodes  Flonase  Saline spray      Call with questions or concerns    Return in about 6 weeks (around 1/30/2025) for Recheck with Esequiel on the review MRI.        Electronically signed by RAYMON Irby 12/19/24 4:28 PM CST.

## 2024-12-23 DIAGNOSIS — E10.65 TYPE 1 DIABETES MELLITUS WITH HYPERGLYCEMIA (HCC): Primary | ICD-10-CM

## 2024-12-23 RX ORDER — PROCHLORPERAZINE 25 MG/1
SUPPOSITORY RECTAL
Qty: 3 EACH | Refills: 2 | Status: SHIPPED | OUTPATIENT
Start: 2024-12-23

## 2024-12-30 DIAGNOSIS — K21.9 GASTROESOPHAGEAL REFLUX DISEASE WITHOUT ESOPHAGITIS: ICD-10-CM

## 2024-12-30 DIAGNOSIS — K62.5 GASTROINTESTINAL HEMORRHAGE ASSOCIATED WITH ANORECTAL SOURCE: ICD-10-CM

## 2024-12-30 RX ORDER — PANTOPRAZOLE SODIUM 40 MG/1
40 TABLET, DELAYED RELEASE ORAL
Qty: 30 TABLET | Refills: 3 | Status: SHIPPED | OUTPATIENT
Start: 2024-12-30

## 2025-01-13 ENCOUNTER — TELEPHONE (OUTPATIENT)
Dept: GASTROENTEROLOGY | Facility: CLINIC | Age: 65
End: 2025-01-13

## 2025-01-13 NOTE — TELEPHONE ENCOUNTER
"  Caller: Fiordaliza Lyons \"Irlanda\"    Relationship: Self    Best call back number: [phone]    What is the best time to reach you: [employer]    Who are you requesting to speak with (clinical staff, provider,  specific staff member):         What was the call regarding: PT REQUESTS PREP, TIME OF ARRIVAL, LOCATION, ETC BE SENT VIA [employer]. PROC IS 01.20.25. PLEASE CONTACT PT W/ANY QUESTIONS.     Is it okay if the provider responds through [employer]: YES    "

## 2025-01-20 ENCOUNTER — HOSPITAL ENCOUNTER (OUTPATIENT)
Facility: HOSPITAL | Age: 65
Setting detail: HOSPITAL OUTPATIENT SURGERY
Discharge: HOME | End: 2025-01-20
Attending: INTERNAL MEDICINE | Admitting: INTERNAL MEDICINE
Payer: COMMERCIAL

## 2025-01-20 ENCOUNTER — ANESTHESIA EVENT (OUTPATIENT)
Dept: GASTROENTEROLOGY | Facility: HOSPITAL | Age: 65
End: 2025-01-20
Payer: COMMERCIAL

## 2025-01-20 ENCOUNTER — ANESTHESIA (OUTPATIENT)
Dept: GASTROENTEROLOGY | Facility: HOSPITAL | Age: 65
End: 2025-01-20
Payer: COMMERCIAL

## 2025-01-20 VITALS
HEIGHT: 60 IN | WEIGHT: 108 LBS | TEMPERATURE: 97.9 F | SYSTOLIC BLOOD PRESSURE: 177 MMHG | OXYGEN SATURATION: 100 % | RESPIRATION RATE: 18 BRPM | BODY MASS INDEX: 21.2 KG/M2 | HEART RATE: 70 BPM | DIASTOLIC BLOOD PRESSURE: 80 MMHG

## 2025-01-20 DIAGNOSIS — D12.6 ADENOMATOUS POLYP OF COLON, UNSPECIFIED PART OF COLON: ICD-10-CM

## 2025-01-20 LAB
GLUCOSE BLDC GLUCOMTR-MCNC: 136 MG/DL (ref 70–130)
GLUCOSE BLDC GLUCOMTR-MCNC: 150 MG/DL (ref 70–130)
GLUCOSE BLDC GLUCOMTR-MCNC: 69 MG/DL (ref 70–130)

## 2025-01-20 PROCEDURE — 82948 REAGENT STRIP/BLOOD GLUCOSE: CPT

## 2025-01-20 PROCEDURE — 45385 COLONOSCOPY W/LESION REMOVAL: CPT | Performed by: INTERNAL MEDICINE

## 2025-01-20 PROCEDURE — 25810000003 SODIUM CHLORIDE 0.9 % SOLUTION: Performed by: NURSE ANESTHETIST, CERTIFIED REGISTERED

## 2025-01-20 PROCEDURE — 25010000002 LIDOCAINE PF 2% 2 % SOLUTION: Performed by: NURSE ANESTHETIST, CERTIFIED REGISTERED

## 2025-01-20 PROCEDURE — 88305 TISSUE EXAM BY PATHOLOGIST: CPT | Performed by: INTERNAL MEDICINE

## 2025-01-20 PROCEDURE — 25010000002 PROPOFOL 10 MG/ML EMULSION: Performed by: NURSE ANESTHETIST, CERTIFIED REGISTERED

## 2025-01-20 RX ORDER — SODIUM CHLORIDE 9 MG/ML
500 INJECTION, SOLUTION INTRAVENOUS CONTINUOUS PRN
Status: DISCONTINUED | OUTPATIENT
Start: 2025-01-20 | End: 2025-01-20 | Stop reason: HOSPADM

## 2025-01-20 RX ORDER — LIDOCAINE HYDROCHLORIDE 20 MG/ML
INJECTION, SOLUTION EPIDURAL; INFILTRATION; INTRACAUDAL; PERINEURAL AS NEEDED
Status: DISCONTINUED | OUTPATIENT
Start: 2025-01-20 | End: 2025-01-20 | Stop reason: SURG

## 2025-01-20 RX ORDER — SODIUM CHLORIDE 0.9 % (FLUSH) 0.9 %
10 SYRINGE (ML) INJECTION AS NEEDED
Status: DISCONTINUED | OUTPATIENT
Start: 2025-01-20 | End: 2025-01-20 | Stop reason: HOSPADM

## 2025-01-20 RX ORDER — DEXTROSE MONOHYDRATE 25 G/50ML
50 INJECTION, SOLUTION INTRAVENOUS
Status: DISCONTINUED | OUTPATIENT
Start: 2025-01-20 | End: 2025-01-20 | Stop reason: HOSPADM

## 2025-01-20 RX ORDER — ONDANSETRON 2 MG/ML
4 INJECTION INTRAMUSCULAR; INTRAVENOUS ONCE AS NEEDED
Status: DISCONTINUED | OUTPATIENT
Start: 2025-01-20 | End: 2025-01-20 | Stop reason: HOSPADM

## 2025-01-20 RX ORDER — PROPOFOL 10 MG/ML
VIAL (ML) INTRAVENOUS AS NEEDED
Status: DISCONTINUED | OUTPATIENT
Start: 2025-01-20 | End: 2025-01-20 | Stop reason: SURG

## 2025-01-20 RX ORDER — LIDOCAINE HYDROCHLORIDE 10 MG/ML
0.5 INJECTION, SOLUTION EPIDURAL; INFILTRATION; INTRACAUDAL; PERINEURAL ONCE AS NEEDED
Status: DISCONTINUED | OUTPATIENT
Start: 2025-01-20 | End: 2025-01-20 | Stop reason: HOSPADM

## 2025-01-20 RX ADMIN — LIDOCAINE HYDROCHLORIDE 100 MG: 20 INJECTION, SOLUTION EPIDURAL; INFILTRATION; INTRACAUDAL; PERINEURAL at 08:25

## 2025-01-20 RX ADMIN — PROPOFOL 300 MG: 10 INJECTION, EMULSION INTRAVENOUS at 08:25

## 2025-01-20 RX ADMIN — DEXTROSE MONOHYDRATE 25 ML: 25 INJECTION, SOLUTION INTRAVENOUS at 07:50

## 2025-01-20 RX ADMIN — SODIUM CHLORIDE 500 ML: 9 INJECTION, SOLUTION INTRAVENOUS at 07:43

## 2025-01-20 NOTE — ANESTHESIA POSTPROCEDURE EVALUATION
"Patient: Fiordaliza Lyons    Procedure Summary       Date: 01/20/25 Room / Location: [facility] ENDOSCOPY 2 /  [facility] ENDOSCOPY    Anesthesia Start: 0820 Anesthesia Stop: 0850    Procedure: COLONOSCOPY WITH ANESTHESIA Diagnosis:       Adenomatous polyp of colon, unspecified part of colon      (Adenomatous polyp of colon, unspecified part of colon [D12.6])    Surgeons: Ray Serna MD Provider: Rocio Shaw CRNA    Anesthesia Type: MAC ASA Status: 3            Anesthesia Type: MAC    Vitals  Vitals Value Taken Time   /76 01/20/25 0911   Temp     Pulse 54 01/20/25 0911   Resp 13 01/20/25 0910   SpO2 100 % 01/20/25 0910   Vitals shown include unfiled device data.        Post Anesthesia Care and Evaluation    Patient location during evaluation: PHASE II  Patient participation: complete - patient participated  Level of consciousness: awake and awake and alert  Pain score: 0  Pain management: adequate    Airway patency: patent  Anesthetic complications: No anesthetic complications  PONV Status: none  Cardiovascular status: acceptable  Respiratory status: acceptable  Hydration status: acceptable    Comments: Patient discharged according to acceptable Jana score per RN assessment. See nursing records for further information.     Blood pressure 158/76, pulse 57, temperature 97.9 °F (36.6 °C), temperature source Temporal, resp. rate 13, height 152.4 cm (60\"), weight 49 kg (108 lb), SpO2 100%, not currently breastfeeding.      "

## 2025-01-20 NOTE — H&P
[facility] Gastroenterology  Pre Procedure History & Physical    Chief Complaint:   Colon polyps    Subjective     HPI:   The patient has a history of colon polyps who presents for exam.    Past Medical History:   Past Medical History:   Diagnosis Date    Alcoholic hepatitis with ascites     Allergic rhinitis     Cancer     ovarian    Cirrhosis 2021    Degeneration of lumbar or lumbosacral intervertebral disc 09/15/2022    Diabetes mellitus     Disease of thyroid gland     Elevated d-dimer     Endometriosis     Fatty liver     GERD (gastroesophageal reflux disease)     History of transfusion     Hyperlipidemia     Hypertension     Low back pain     Lumbosacral disc disease 2022    Osteoporosis     Ovarian cyst     Pancreatitis     Postmenopausal     Rectal mass     Rectal prolapse     Sleep apnea     has corrrected it self with w/ weight loss       Past Surgical History:  Past Surgical History:   Procedure Laterality Date    ARM NERVE EXPLORATION Left     6 nerves scraped ,cleaned nerves off    BACK SURGERY  2023    BILATERAL SALPINGO OOPHORECTOMY  2008    CARPAL TUNNEL RELEASE  2016    CHOLECYSTECTOMY  1996    COLONOSCOPY  07/2017    Dr Murphy- per patient normal     COLONOSCOPY N/A 11/09/2021    Procedure: COLONOSCOPY WITH ANESTHESIA;  Surgeon: Ray Serna MD;  Location: [facility] ENDOSCOPY;  Service: Gastroenterology;  Laterality: N/A;  pre rectal mass  post  Dr Shields    DILATION AND CURETTAGE, DIAGNOSTIC / THERAPEUTIC      ENDOSCOPY N/A 11/09/2021    Procedure: ESOPHAGOGASTRODUODENOSCOPY WITH ANESTHESIA;  Surgeon: Ray Serna MD;  Location: [facility] ENDOSCOPY;  Service: Gastroenterology;  Laterality: N/A;  pre anemia  post hiatal hernia; gastric polyp  Dr. Shields    EYE SURGERY Bilateral     cataract with lens    HEMORRHOIDECTOMY SIGMOIDOSCOPY N/A 01/09/2024    Procedure: HEMORRHOIDECTOMY WITH EXAM UNDER ANESTHESIA;  Surgeon: Rocio Read MD;  Location: [facility] OR;  Service: General;   Laterality: N/A;    KNEE SURGERY Right 1982    LUMBAR LAMINECTOMY WITH FUSION Right 05/01/2023    Procedure: LUMBAR DISCECTOMY, JADON- LAMINECTOMY TRANSFORAMINAL LUMBAR INTERBODY FUSION, POSTERIOR PEDICLE SCREW INSTRUMENTATION, L5-S1 RIGHT. USE OF THE ROBOT;  Surgeon: Nathaniel Tesfaye MD;  Location: [facility] OR;  Service: Neurosurgery;  Laterality: Right;    SPINAL FUSION  2023    TRIGGER POINT INJECTION  2023       Family History:  Family History   Problem Relation Age of Onset    Ovarian cancer Mother     Ovarian cancer Maternal Aunt     Ovarian cancer Maternal Grandmother     Ovarian cancer Paternal Grandmother     Colon polyps Father     Colon polyps Brother     Breast cancer Neg Hx     Colon cancer Neg Hx        Social History:   reports that she has never smoked. She has never been exposed to tobacco smoke. She has never used smokeless tobacco. She reports that she does not currently use alcohol. She reports that she does not use drugs.    Medications:   Prior to Admission medications    Medication Sig Start Date End Date Taking? Authorizing Provider   atenolol (TENORMIN) 25 MG tablet Take 1 tablet by mouth Daily. 12/3/24  Yes Marilyn Bird APRN   BD Pen Needle Tatyana U/F 32G X 4 MM misc 4 (Four) Times a Day. use as directed 6/19/23   Samantha Chaney MD   clobetasol (TEMOVATE) 0.05 % external solution  2/7/24   Samantha Chaney MD   Continuous Blood Gluc Sensor (Dexcom G6 Sensor) See Admin Instructions. 9/9/22   Samantha Chaney MD   Continuous Blood Gluc Transmit (Dexcom G6 Transmitter) misc See Admin Instructions. 9/9/22   Samantha Chaney MD   gabapentin (NEURONTIN) 300 MG capsule TAKE 1 CAPSULE BY MOUTH NIGHTLY. MAX DAILY AMOUNT:1 CAPSULE 11/20/24 2/19/25  Samantha Chaney MD   Gvoke HypoPen 2-Pack 1 MG/0.2ML solution auto-injector  6/19/23   Samantha Chaney MD   HYDROcodone-acetaminophen (NORCO) 7.5-325 MG per tablet Take 1 tablet by mouth Every 6 (Six) Hours As Needed. for  pain 7/24/24   Samantha Chaney MD   insulin aspart (novoLOG FLEXPEN) 100 UNIT/ML solution pen-injector sc pen Inject 2-5 Units under the skin into the appropriate area as directed 3 (Three) Times a Day With Meals. 2-5 units with meals sliding scale    Samantha Chaney MD   Insulin Glargine, 2 Unit Dial, (Toujeo Max SoloStar) 300 UNIT/ML solution pen-injector injection Inject 14 Units under the skin into the appropriate area as directed. 11/19/24   Samantha Chaney MD   levothyroxine (SYNTHROID, LEVOTHROID) 50 MCG tablet Take 1 tablet by mouth Every Morning.    Samantha Chaney MD   losartan (COZAAR) 25 MG tablet Take 1 tablet by mouth Daily. 12/3/24   Marilyn Bird APRN   magnesium oxide (MAG-OX) 400 MG tablet Take 1 tablet by mouth Daily.    Samantha Chaney MD   Multiple Vitamins-Iron (multivitamin with iron) tablet tablet Take 1 tablet by mouth Daily.    Samantha Chaney MD   Nutritional Supplements (JUICE PLUS FIBRE PO) Take 1 dose by mouth Daily.    Samantha Chaney MD   ondansetron ODT (ZOFRAN-ODT) 4 MG disintegrating tablet DISSOLVE 1 TABLET ON THE TONGUE EVERY 4-6 HOURS AS NEEDED FOR NAUSEA 7/24/24   Samantha Chaney MD   pantoprazole (PROTONIX) 40 MG EC tablet Take 1 tablet by mouth Daily.    Samantha Chaney MD   Potassium 99 MG tablet Take  by mouth.    Samantha Chaney MD   potassium chloride (K-DUR,KLOR-CON) 10 MEQ CR tablet Take 1 tablet by mouth Daily As Needed. 6/6/23   Samantha Chaney MD   VITAMIN D-VITAMIN K PO Take  by mouth 1 (One) Time Per Week.    Samantha Chaney MD   valACYclovir (VALTREX) 1000 MG tablet TAKE 1 TABLET THREE TIMES DAILY FOR 7 DAYS. 12/16/24 1/20/25  Samantha Chaney MD       Allergies:  Lisinopril, Tizanidine hcl, and Augmentin [amoxicillin-pot clavulanate]    ROS:    General: Weight stable  Resp: No SOA  Cardiovascular: No CP    Objective     Blood pressure 180/56, pulse 54, temperature 97.9 °F (36.6 °C),  "temperature source Temporal, resp. rate 22, height 152.4 cm (60\"), weight 49 kg (108 lb), SpO2 100%, not currently breastfeeding.    Physical Exam   Constitutional: Pt is oriented to person, place, and in no distress.   Cardiovascular: Normal rate, regular rhythm.    Pulmonary/Chest: Effort normal. No respiratory distress.   Abdominal:  Non-distended.  Psychiatric: Mood, memory, affect and judgment appear normal.     Assessment & Plan     Diagnosis:  Colon polyps    Anticipated Surgical Procedure:  Colonoscopy    The risks, benefits, and alternatives of this procedure have been discussed with the patient or the responsible party- the patient understands and agrees to proceed.      EMR Dragon/transcription disclaimer:  Much of this encounter note is electronic transcription/translation of spoken language to printed text.  The electronic translation of spoken language may be erroneous, or at times, nonsensical words or phrases may be inadvertently transcribed.  Although I have reviewed the note for such errors, some may still exist.  "

## 2025-01-20 NOTE — ANESTHESIA PREPROCEDURE EVALUATION
Anesthesia Evaluation     Patient summary reviewed   no history of anesthetic complications:   NPO Solid Status: > 8 hours  NPO Liquid Status: > 2 hours           Airway   Mallampati: II  No difficulty expected  Dental      Pulmonary    (-) COPD, asthma, sleep apnea, not a smoker  Cardiovascular   Exercise tolerance: good (4-7 METS)    (+) hypertension, hyperlipidemia  (-) pacemaker, past MI, angina, cardiac stents      Neuro/Psych  (-) seizures, TIA, CVA  GI/Hepatic/Renal/Endo    (+) GERD, renal disease- CRI, diabetes mellitus using insulin, thyroid problem hypothyroidism  (-) liver disease    Musculoskeletal     Abdominal    Substance History      OB/GYN          Other   arthritis,                 Anesthesia Plan    ASA 3     MAC     intravenous induction     Anesthetic plan, risks, benefits, and alternatives have been provided, discussed and informed consent has been obtained with: patient.    CODE STATUS:

## 2025-02-03 ENCOUNTER — TELEPHONE (OUTPATIENT)
Dept: INTERNAL MEDICINE | Age: 65
End: 2025-02-03

## 2025-02-03 ENCOUNTER — HOSPITAL ENCOUNTER (OUTPATIENT)
Dept: GENERAL RADIOLOGY | Facility: HOSPITAL | Age: 65
Discharge: HOME | End: 2025-02-03
Admitting: INTERNAL MEDICINE
Payer: COMMERCIAL

## 2025-02-03 ENCOUNTER — TRANSCRIBE ORDERS (OUTPATIENT)
Dept: ADMINISTRATIVE | Facility: HOSPITAL | Age: 65
End: 2025-02-03
Payer: COMMERCIAL

## 2025-02-03 DIAGNOSIS — S69.91XA INJURY OF WRIST, RIGHT, INITIAL ENCOUNTER: ICD-10-CM

## 2025-02-03 DIAGNOSIS — S69.91XA INJURY OF WRIST, RIGHT, INITIAL ENCOUNTER: Primary | ICD-10-CM

## 2025-02-03 DIAGNOSIS — S69.91XA INJURY OF FINGER OF RIGHT HAND, INITIAL ENCOUNTER: Primary | ICD-10-CM

## 2025-02-03 PROCEDURE — 73140 X-RAY EXAM OF FINGER(S): CPT

## 2025-02-03 NOTE — TELEPHONE ENCOUNTER
She smashed her index finger on right hand and is going to go to the [facility] [place] to get it xrayed.

## 2025-02-06 ENCOUNTER — OFFICE VISIT (OUTPATIENT)
Dept: OTOLARYNGOLOGY | Facility: CLINIC | Age: 65
End: 2025-02-06
Payer: COMMERCIAL

## 2025-02-06 ENCOUNTER — PROCEDURE VISIT (OUTPATIENT)
Dept: OTOLARYNGOLOGY | Facility: CLINIC | Age: 65
End: 2025-02-06
Payer: COMMERCIAL

## 2025-02-06 DIAGNOSIS — R42 DIZZINESS: Primary | ICD-10-CM

## 2025-02-06 DIAGNOSIS — R26.89 IMBALANCE: ICD-10-CM

## 2025-02-06 NOTE — PROGRESS NOTES
RAYMON Irby  [facility]  [address], [address]  [facility] [address]  Fax [phone]  Phone [phone]      Visit Type: FOLLOW UP   No chief complaint on file.          HISTORY OBTAINED FROM: patient  HPI  Fiordaliza Lyons is a 64 y.o. female who presents for follow-up evaluation, recheck on dizziness.  Since last visit she reports she may have had some improvement in general symptoms, does not notice dizziness or imbalance constantly now but states she has episodes about every other day still.  She has not seen PCP to discuss this further since our last visit.  She denies other new complaints or concerns today.  Symptoms are still more consistent with imbalance and lightheaded sensation.  She denies spinning sensation or actual vertigo.  She did not obtain MRI after last visit  Zuleika-Hallpike testing was obtained in office today, reviewed with patient.    Past Medical History:   Diagnosis Date    Alcoholic hepatitis with ascites     Allergic rhinitis     Cancer     ovarian    Cirrhosis 2021    Degeneration of lumbar or lumbosacral intervertebral disc 09/15/2022    Diabetes mellitus     Disease of thyroid gland     Elevated d-dimer     Endometriosis     Fatty liver     GERD (gastroesophageal reflux disease)     History of transfusion     Hyperlipidemia     Hypertension     Low back pain     Lumbosacral disc disease 2022    Osteoporosis     Ovarian cyst     Pancreatitis     Postmenopausal     Rectal mass     Rectal prolapse     Sleep apnea     has corrrected it self with w/ weight loss       Past Surgical History:   Procedure Laterality Date    ARM NERVE EXPLORATION Left     6 nerves scraped ,cleaned nerves off    BACK SURGERY  2023    BILATERAL SALPINGO OOPHORECTOMY  2008    CARPAL TUNNEL RELEASE  2016    CHOLECYSTECTOMY  1996    COLONOSCOPY  07/2017    Dr Murphy- per patient normal     COLONOSCOPY N/A 11/09/2021    Procedure:  COLONOSCOPY WITH ANESTHESIA;  Surgeon: Ray Serna MD;  Location: [facility] ENDOSCOPY;  Service: Gastroenterology;  Laterality: N/A;  pre rectal mass  post  Dr Shields    COLONOSCOPY N/A 1/20/2025    Procedure: COLONOSCOPY WITH ANESTHESIA;  Surgeon: Ray Serna MD;  Location: [facility] ENDOSCOPY;  Service: Gastroenterology;  Laterality: N/A;  pre polyp  hx  post diverticulosis,polyp, hemorrfoids    Dr. Shields    DILATION AND CURETTAGE, DIAGNOSTIC / THERAPEUTIC      ENDOSCOPY N/A 11/09/2021    Procedure: ESOPHAGOGASTRODUODENOSCOPY WITH ANESTHESIA;  Surgeon: Ray Serna MD;  Location: [facility] ENDOSCOPY;  Service: Gastroenterology;  Laterality: N/A;  pre anemia  post hiatal hernia; gastric polyp  Dr. Shields    EYE SURGERY Bilateral     cataract with lens    HEMORRHOIDECTOMY SIGMOIDOSCOPY N/A 01/09/2024    Procedure: HEMORRHOIDECTOMY WITH EXAM UNDER ANESTHESIA;  Surgeon: Rocio Read MD;  Location: [facility] OR;  Service: General;  Laterality: N/A;    KNEE SURGERY Right 1982    LUMBAR LAMINECTOMY WITH FUSION Right 05/01/2023    Procedure: LUMBAR DISCECTOMY, JADON- LAMINECTOMY TRANSFORAMINAL LUMBAR INTERBODY FUSION, POSTERIOR PEDICLE SCREW INSTRUMENTATION, L5-S1 RIGHT. USE OF THE ROBOT;  Surgeon: Nathaniel Tesfaye MD;  Location: [facility] OR;  Service: Neurosurgery;  Laterality: Right;    SPINAL FUSION  2023    TRIGGER POINT INJECTION  2023       Family History: Her family history includes Colon polyps in her brother and father; Ovarian cancer in her maternal aunt, maternal grandmother, mother, and paternal grandmother.     Social History: She  reports that she has never smoked. She has never been exposed to tobacco smoke. She has never used smokeless tobacco. She reports that she does not currently use alcohol. She reports that she does not use drugs.    Home Medications:  Dexcom G6 Sensor, Dexcom G6 Transmitter, Glucagon, HYDROcodone-acetaminophen, Insulin Glargine (2 Unit Dial), Insulin Pen Needle,  Nutritional Supplements, Potassium, Vitamin D-Vitamin K, atenolol, clobetasol, gabapentin, insulin aspart, levothyroxine, losartan, magnesium oxide, multivitamin with iron, ondansetron ODT, pantoprazole, and potassium chloride    Allergies:  She is allergic to lisinopril, tizanidine hcl, and augmentin [amoxicillin-pot clavulanate].       Vital Signs:      ENT Physical Exam  Constitutional  Appearance: patient appears well-developed, well-nourished and well-groomed,  Communication/Voice: communication appropriate for developmental age; vocal quality normal;  Head and Face  Appearance: head appears normal, face appears normal and face appears atraumatic;  Palpation: facial palpation normal;  Ear  Hearing: intact;  Auricles: bilateral auricles normal;  External Mastoids: bilateral external mastoids normal;  Ear Canals: bilateral ear canals normal;  Tympanic Membranes: bilateral tympanic membranes normal;  Nose  External Nose: nares patent bilaterally; external nose normal;  Internal Nose: nasal mucosa normal; bilateral inferior turbinates normal;  Oral Cavity/Oropharynx  Lips: normal;  Teeth: normal;  Gums: gingiva normal;  Tongue: normal;  Oral mucosa: normal;  Hard palate: normal;  Soft palate: normal;  Tonsils: normal;  Respiratory  Inspection: breathing unlabored; normal breathing rate;  Cardiovascular  Inspection: extremities are warm and well perfused;  Neurovestibular  Gait: gait normal;  Station: standing is normal;  Coordination: no tremors present;  Strength: normal strength  Vestibular: no pathologic nystagmus; Frenzel glasses used during examination;  Mental Status: alert and oriented;           Result Review       RESULTS REVIEW    I have reviewed the patients old records in the chart.   The following results/records were reviewed:     Procedure visit with Hayley Bell AUD (02/06/2025)          Assessment & Plan  Dizziness    Imbalance                Zuleika-Hallpike testing obtained in office, reviewed  with patient.  Negative today.  This does not appear to be vertiginous in nature or an ear problem.  Advised patient to follow-up with primary care provider for further evaluation of this.  If new ear symptoms develop or she has vertiginous episodes call for recheck.    Call with questions or concerns    Return if symptoms worsen or fail to improve.        Electronically signed by RAYMON Irby 02/06/25 3:59 PM CST.

## 2025-02-06 NOTE — PROGRESS NOTES
AUDIOMETRIC EVALUATION      Name:  Fiordaliza Lyons  :  1960  Age:  64 y.o.  Date of Evaluation:  2025       History:  Ms. Lyons is seen today for a vestibular evaluation due to dizziness at the request of RAYMON Luong.    Audiologic Information:  Concerns for Hearing: No  PETs: No  Other otologic surgical history: No  Aural Pressure/Fullness: No  Otalgia: No  Otorrhea: No  Tinnitus: No  Dizziness: Off-balance dizziness without spinning sensation (for about 3-4 months now)  Noise Exposure: No  Family history of hearing loss: Father  Head trauma requiring hospital stay: No  Chemotherapy: No  Other significant history: type I diabetes, ESTRADA (past), hypertension    Vestibular Information:  Duration: Hours  Triggers: No  Heart Problems: No  Back/Neck Problems: back surgery 25  Vision Problems: None  Nausea: No  Weakness/Numbness: No  Motion Sickness: No  Migraine/Headache: yes  Falls: not since the dizziness started    **Case history obtained in office and through EMR system      EVALUATION:        RESULTS:    Otoscopic Evaluation:  Right: clear canal, tympanic membrane visualized  Left: clear canal, tympanic membrane visualized    Positional Screening:    Spontaneous Nystagmus Normal - No nystagmus observed or recorded   Naches-Hallpike Negative   Left No nystagmus observed or recorded   Right No nystagmus observed or recorded   Positional Normal   Supine No nystagmus observed or recorded   Head Right No nystagmus observed or recorded   Head Left No nystagmus observed or recorded       IMPRESSIONS:  Patient was counseled with regard to the findings.        Diagnosis:  1. Dizziness         RECOMMENDATIONS/PLAN:  Follow-up recommendations per RAYMON Luong.  Re-evaluate with ENT  Discussed results and recommendations with patient. Questions were addressed and the patient was encouraged to contact our department should concerns arise.        Hayley Carrasquillo, CCC-A, F-AAA  Doctor of  Audiology

## 2025-02-06 NOTE — PROGRESS NOTES
Subjective    Ms. Lyons is 65 y.o. female    Chief Complaint: Follow-up recurrent UTIs    History of Present Illness    65-year-old female established patient in for follow-up regarding history of recurrent urinary tract infections.  Has remained on vaginal estrogen cream 2 nights weekly.  Reports 0 confirmed infections since last seen.  Reporting ongoing issue with foul smelling urine.  Along with fecal leakage.  Patient has completed pelvic floor physical therapy and is still experiencing fecal leakage.  Reports area now protruding.  Is interested in possible [employer] sacral neuromodulation to help with the fecal leakage or a surgical procedure to help the rectum, states which ever 1 is warranted she is interested.  Denies external vaginal bulging.  Denies urinary incontinence.  Patient reports the fecal leakage started after undergoing hemorrhoidectomy with Dr. Rocio Read 1/9/2024.    The following portions of the patient's history were reviewed and updated as appropriate: allergies, current medications, past family history, past medical history, past social history, past surgical history and problem list.    Review of Systems   Constitutional:  Negative for chills and fever.   Gastrointestinal:  Positive for diarrhea. Negative for abdominal pain, anal bleeding, blood in stool, nausea and vomiting.   Genitourinary:  Negative for difficulty urinating, dysuria, frequency, hematuria, pelvic pain and urgency.         Current Outpatient Medications:     atenolol (TENORMIN) 25 MG tablet, Take 1 tablet by mouth Daily., Disp: 90 tablet, Rfl: 3    BD Pen Needle Tatyana U/F 32G X 4 MM misc, 4 (Four) Times a Day. use as directed, Disp: , Rfl:     Continuous Blood Gluc Sensor (Dexcom G6 Sensor), See Admin Instructions., Disp: , Rfl:     Continuous Blood Gluc Transmit (Dexcom G6 Transmitter) misc, See Admin Instructions., Disp: , Rfl:     Gvoke HypoPen 2-Pack 1 MG/0.2ML solution auto-injector, , Disp: , Rfl:      insulin aspart (novoLOG FLEXPEN) 100 UNIT/ML solution pen-injector sc pen, Inject 2-5 Units under the skin into the appropriate area as directed 3 (Three) Times a Day With Meals. 2-5 units with meals sliding scale, Disp: , Rfl:     Insulin Glargine, 2 Unit Dial, (Toujeo Max SoloStar) 300 UNIT/ML solution pen-injector injection, Inject 14 Units under the skin into the appropriate area as directed., Disp: , Rfl:     levothyroxine (SYNTHROID, LEVOTHROID) 50 MCG tablet, Take 1 tablet by mouth Every Morning., Disp: , Rfl:     losartan (COZAAR) 25 MG tablet, Take 1 tablet by mouth Daily., Disp: 90 tablet, Rfl: 3    magnesium oxide (MAG-OX) 400 MG tablet, Take 1 tablet by mouth Daily., Disp: , Rfl:     Multiple Vitamins-Iron (multivitamin with iron) tablet tablet, Take 1 tablet by mouth Daily., Disp: , Rfl:     Nutritional Supplements (JUICE PLUS FIBRE PO), Take 1 dose by mouth Daily., Disp: , Rfl:     pantoprazole (PROTONIX) 40 MG EC tablet, Take 1 tablet by mouth Daily., Disp: , Rfl:     Potassium 99 MG tablet, Take  by mouth., Disp: , Rfl:     VITAMIN D-VITAMIN K PO, Take  by mouth 1 (One) Time Per Week., Disp: , Rfl:     Past Medical History:   Diagnosis Date    Alcoholic hepatitis with ascites     Allergic rhinitis     Cancer     ovarian    Cirrhosis 2021    Degeneration of lumbar or lumbosacral intervertebral disc 09/15/2022    Diabetes mellitus     Disease of thyroid gland     Elevated d-dimer     Endometriosis     Fatty liver     GERD (gastroesophageal reflux disease)     History of transfusion     Hyperlipidemia     Hypertension     Low back pain     Lumbosacral disc disease 2022    Osteoporosis     Ovarian cyst     Pancreatitis     Postmenopausal     Rectal mass     Rectal prolapse     Sleep apnea     has corrrected it self with w/ weight loss       Past Surgical History:   Procedure Laterality Date    ARM NERVE EXPLORATION Left     6 nerves scraped ,cleaned nerves off    BACK SURGERY  2023    BILATERAL SALPINGO  OOPHORECTOMY  2008    CARPAL TUNNEL RELEASE  2016    CHOLECYSTECTOMY  1996    COLONOSCOPY  07/2017    Dr Murphy- per patient normal     COLONOSCOPY N/A 11/09/2021    Procedure: COLONOSCOPY WITH ANESTHESIA;  Surgeon: Ray Serna MD;  Location: [facility] ENDOSCOPY;  Service: Gastroenterology;  Laterality: N/A;  pre rectal mass  post  Dr Shields    COLONOSCOPY N/A 1/20/2025    Procedure: COLONOSCOPY WITH ANESTHESIA;  Surgeon: Ray Serna MD;  Location: [facility] ENDOSCOPY;  Service: Gastroenterology;  Laterality: N/A;  pre polyp  hx  post diverticulosis,polyp, hemorrfoids    Dr. Shields    DILATION AND CURETTAGE, DIAGNOSTIC / THERAPEUTIC      ENDOSCOPY N/A 11/09/2021    Procedure: ESOPHAGOGASTRODUODENOSCOPY WITH ANESTHESIA;  Surgeon: Ray Serna MD;  Location: [facility] ENDOSCOPY;  Service: Gastroenterology;  Laterality: N/A;  pre anemia  post hiatal hernia; gastric polyp  Dr. Shields    EYE SURGERY Bilateral     cataract with lens    HEMORRHOIDECTOMY SIGMOIDOSCOPY N/A 01/09/2024    Procedure: HEMORRHOIDECTOMY WITH EXAM UNDER ANESTHESIA;  Surgeon: Rocio Read MD;  Location: [facility] OR;  Service: General;  Laterality: N/A;    KNEE SURGERY Right 1982    LUMBAR LAMINECTOMY WITH FUSION Right 05/01/2023    Procedure: LUMBAR DISCECTOMY, JADON- LAMINECTOMY TRANSFORAMINAL LUMBAR INTERBODY FUSION, POSTERIOR PEDICLE SCREW INSTRUMENTATION, L5-S1 RIGHT. USE OF THE ROBOT;  Surgeon: Nathaniel Tesfaye MD;  Location: [facility] OR;  Service: Neurosurgery;  Laterality: Right;    SPINAL FUSION  2023    TRIGGER POINT INJECTION  2023       Social History     Socioeconomic History    Marital status: Single   Tobacco Use    Smoking status: Never     Passive exposure: Never    Smokeless tobacco: Never   Vaping Use    Vaping status: Never Used   Substance and Sexual Activity    Alcohol use: Not Currently    Drug use: No    Sexual activity: Not Currently       Family History   Problem Relation Age of Onset    Ovarian cancer Mother   "   Ovarian cancer Maternal Aunt     Ovarian cancer Maternal Grandmother     Ovarian cancer Paternal Grandmother     Colon polyps Father     Colon polyps Brother     Breast cancer Neg Hx     Colon cancer Neg Hx        Objective    Temp 97.8 °F (36.6 °C)   Ht 152.4 cm (60\")   Wt 50.7 kg (111 lb 12.8 oz)   BMI 21.83 kg/m²     Physical Exam  Nursing note reviewed.   Constitutional:       General: She is not in acute distress.     Appearance: Normal appearance. She is not ill-appearing.   HENT:      Nose: No congestion.   Abdominal:      Tenderness: There is no right CVA tenderness or left CVA tenderness.      Hernia: No hernia is present.   Skin:     General: Skin is warm and dry.   Neurological:      Mental Status: She is alert and oriented to person, place, and time.   Psychiatric:         Mood and Affect: Mood normal.         Behavior: Behavior normal.             Results for orders placed or performed in visit on 02/20/25   POC Urinalysis Dipstick, Multipro    Collection Time: 02/20/25 10:27 AM    Specimen: Urine   Result Value Ref Range    Color Yellow Yellow, Straw, Dark Yellow, Ana Lilia    Clarity, UA Clear Clear    Glucose, UA Negative Negative mg/dL    Bilirubin Negative Negative    Ketones, UA Negative Negative    Specific Gravity  1.010 1.005 - 1.030    Blood, UA Negative Negative    pH, Urine 5.5 5.0 - 8.0    Protein, POC Negative Negative mg/dL    Urobilinogen, UA 0.2 E.U./dL Normal, 0.2 E.U./dL    Nitrite, UA Negative Negative    Leukocytes Small (1+) (A) Negative     Assessment and Plan    Diagnoses and all orders for this visit:    1. Recurrent UTI (Primary)  -     POC Urinalysis Dipstick, Multipro  -     Ambulatory Referral to Obstetrics / Gynecology        Patient currently asymptomatic other than foul-smelling urine reported.  No other symptoms.    Recommend continuing the vaginal estrogen cream 2 nights weekly    After lengthy discussion with patient patient is very interested in treatment of her " fecal leakage I have provided patient paperwork on the [employer] sacral neurostimulator.  Patient would also like a referral to OB/GYN for further evaluation of rectocele

## 2025-02-07 ENCOUNTER — TELEPHONE (OUTPATIENT)
Dept: INTERNAL MEDICINE | Age: 65
End: 2025-02-07

## 2025-02-07 NOTE — TELEPHONE ENCOUNTER
She called and was crying because she thinks that she has dementia.  She said that she had been trying to hide it but something happened this morning and she would like to discuss with you.

## 2025-02-12 DIAGNOSIS — K70.30 ALCOHOLIC CIRRHOSIS OF LIVER WITHOUT ASCITES: Primary | ICD-10-CM

## 2025-02-15 DIAGNOSIS — E10.9 TYPE 1 DIABETES MELLITUS WITHOUT COMPLICATION (HCC): ICD-10-CM

## 2025-02-17 RX ORDER — PROCHLORPERAZINE 25 MG/1
SUPPOSITORY RECTAL
Qty: 1 EACH | Refills: 2 | Status: SHIPPED | OUTPATIENT
Start: 2025-02-17

## 2025-02-20 ENCOUNTER — OFFICE VISIT (OUTPATIENT)
Dept: UROLOGY | Facility: CLINIC | Age: 65
End: 2025-02-20
Payer: COMMERCIAL

## 2025-02-20 VITALS — WEIGHT: 111.8 LBS | HEIGHT: 60 IN | TEMPERATURE: 97.8 F | BODY MASS INDEX: 21.95 KG/M2

## 2025-02-20 DIAGNOSIS — N39.0 RECURRENT UTI: Primary | ICD-10-CM

## 2025-02-20 LAB
BILIRUB BLD-MCNC: NEGATIVE MG/DL
CLARITY, POC: CLEAR
COLOR UR: YELLOW
GLUCOSE UR STRIP-MCNC: NEGATIVE MG/DL
KETONES UR QL: NEGATIVE
LEUKOCYTE EST, POC: ABNORMAL
NITRITE UR-MCNC: NEGATIVE MG/ML
PH UR: 5.5 [PH] (ref 5–8)
PROT UR STRIP-MCNC: NEGATIVE MG/DL
RBC # UR STRIP: NEGATIVE /UL
SP GR UR: 1.01 (ref 1–1.03)
UROBILINOGEN UR QL: ABNORMAL

## 2025-02-21 NOTE — PROGRESS NOTES
[facility] - PODIATRY    Today's Date: 02/26/2025     Patient Name: Fiordaliza Lyons  MRN: [id number]  CSN: [id number]  PCP: Brittany Solis MD  Referring Provider: No ref. provider found    SUBJECTIVE     Chief Complaint   Patient presents with    Follow-up     Brittany Solis MD-11/19/2024   Return in about 3 months DIABETIC FOOT CARE CLINIC   Pt states she is here today for diabetic foot/nail care. Pt denies pain, except mild pain on right great toe with ingrown.       Diabetes     85 mg/dLbg     HPI: Fiordaliza Lyons, a 65 y.o.female, comes to clinic as a(n) established patient presenting for diabetic foot exam and complaining of toenail/callus issues. Patient has h/o alcoholic hepatitis, breast cysts, DM I, endometreosis, GERD, HLD, HTN, pancreatitis, sleep apnea . Patient is IDDM with last stated BG level of 85mg/dl.Unsure of last A1c.   Continues to relay issues with right hip and low back pain as well as nerve damage after having low back surgery. . Relays residual weakness to her right lower leg causing some mild foot drop. Continues to follow with Dr. Larsen. Continues to relay mild numbness and tingling in her feet.  Denies open wounds or sores today. Notes toenails are in need of care as they are elongated and thickened. Brought moisturizer cream with her today to see if it would be ok for her to use on calluses and areas of dry skin.   Denies pain currently however admits to pain with application of pressure to right greater toenail. Denies drainage. . Relates previous treatment(s) including foot care by podiatry . Denies any constitutional symptoms. No other pedal complaints at this time.    Past Medical History:   Diagnosis Date    Alcoholic hepatitis with ascites     Allergic rhinitis     Cancer     ovarian    Cirrhosis 2021    Degeneration of lumbar or lumbosacral intervertebral disc 09/15/2022    Diabetes mellitus     Disease of thyroid gland     Elevated d-dimer     Endometriosis      Fatty liver     GERD (gastroesophageal reflux disease)     History of transfusion     Hyperlipidemia     Hypertension     Low back pain     Lumbosacral disc disease 2022    Osteoporosis     Ovarian cyst     Pancreatitis     Postmenopausal     Rectal mass     Rectal prolapse     Sleep apnea     has corrrected it self with w/ weight loss     Past Surgical History:   Procedure Laterality Date    ARM NERVE EXPLORATION Left     6 nerves scraped ,cleaned nerves off    BACK SURGERY  2023    BILATERAL SALPINGO OOPHORECTOMY  2008    CARPAL TUNNEL RELEASE  2016    CHOLECYSTECTOMY  1996    COLONOSCOPY  07/2017    Dr Murphy- per patient normal     COLONOSCOPY N/A 11/09/2021    Procedure: COLONOSCOPY WITH ANESTHESIA;  Surgeon: Ray Serna MD;  Location: [facility] ENDOSCOPY;  Service: Gastroenterology;  Laterality: N/A;  pre rectal mass  post  Dr Shields    COLONOSCOPY N/A 1/20/2025    Procedure: COLONOSCOPY WITH ANESTHESIA;  Surgeon: Ray Serna MD;  Location: [facility] ENDOSCOPY;  Service: Gastroenterology;  Laterality: N/A;  pre polyp  hx  post diverticulosis,polyp, hemorrfoids    Dr. Shields    DILATION AND CURETTAGE, DIAGNOSTIC / THERAPEUTIC      ENDOSCOPY N/A 11/09/2021    Procedure: ESOPHAGOGASTRODUODENOSCOPY WITH ANESTHESIA;  Surgeon: Ray Serna MD;  Location: [facility] ENDOSCOPY;  Service: Gastroenterology;  Laterality: N/A;  pre anemia  post hiatal hernia; gastric polyp  Dr. Shields    EYE SURGERY Bilateral     cataract with lens    HEMORRHOIDECTOMY SIGMOIDOSCOPY N/A 01/09/2024    Procedure: HEMORRHOIDECTOMY WITH EXAM UNDER ANESTHESIA;  Surgeon: Rocio Read MD;  Location: [facility] OR;  Service: General;  Laterality: N/A;    KNEE SURGERY Right 1982    LUMBAR LAMINECTOMY WITH FUSION Right 05/01/2023    Procedure: LUMBAR DISCECTOMY, JADON- LAMINECTOMY TRANSFORAMINAL LUMBAR INTERBODY FUSION, POSTERIOR PEDICLE SCREW INSTRUMENTATION, L5-S1 RIGHT. USE OF THE ROBOT;  Surgeon: Nathaniel Tesfaye MD;  Location:  "[facility] OR;  Service: Neurosurgery;  Laterality: Right;    SPINAL FUSION  2023    TRIGGER POINT INJECTION  2023     Family History   Problem Relation Age of Onset    Ovarian cancer Mother     Ovarian cancer Maternal Aunt     Ovarian cancer Maternal Grandmother     Ovarian cancer Paternal Grandmother     Colon polyps Father     Colon polyps Brother     Breast cancer Neg Hx     Colon cancer Neg Hx      Social History     Socioeconomic History    Marital status: Single   Tobacco Use    Smoking status: Never     Passive exposure: Never    Smokeless tobacco: Never   Vaping Use    Vaping status: Never Used   Substance and Sexual Activity    Alcohol use: Not Currently    Drug use: No    Sexual activity: Not Currently     Allergies   Allergen Reactions    Lisinopril Cough    Tizanidine Hcl Dizziness     Pt stated \"horrible dry mouth, dizziness, couldn't see, it was awful\" drops blood sugar down     Augmentin [Amoxicillin-Pot Clavulanate] Nausea And Vomiting     Current Outpatient Medications   Medication Sig Dispense Refill    atenolol (TENORMIN) 25 MG tablet Take 1 tablet by mouth Daily. 90 tablet 3    BD Pen Needle Tatyana U/F 32G X 4 MM misc 4 (Four) Times a Day. use as directed      Continuous Blood Gluc Sensor (Dexcom G6 Sensor) See Admin Instructions.      Continuous Blood Gluc Transmit (Dexcom G6 Transmitter) misc See Admin Instructions.      Gvoke HypoPen 2-Pack 1 MG/0.2ML solution auto-injector       insulin aspart (novoLOG FLEXPEN) 100 UNIT/ML solution pen-injector sc pen Inject 2-5 Units under the skin into the appropriate area as directed 3 (Three) Times a Day With Meals. 2-5 units with meals sliding scale      Insulin Glargine, 2 Unit Dial, (Toujeo Max SoloStar) 300 UNIT/ML solution pen-injector injection Inject 14 Units under the skin into the appropriate area as directed.      levothyroxine (SYNTHROID, LEVOTHROID) 50 MCG tablet Take 1 tablet by mouth Every Morning.      losartan (COZAAR) 25 MG tablet Take 1 " tablet by mouth Daily. 90 tablet 3    magnesium oxide (MAG-OX) 400 MG tablet Take 1 tablet by mouth Daily.      Multiple Vitamins-Iron (multivitamin with iron) tablet tablet Take 1 tablet by mouth Daily.      Nutritional Supplements (JUICE PLUS FIBRE PO) Take 1 dose by mouth Daily.      pantoprazole (PROTONIX) 40 MG EC tablet Take 1 tablet by mouth Daily.      Potassium 99 MG tablet Take  by mouth.      VITAMIN D-VITAMIN K PO Take  by mouth 1 (One) Time Per Week.       No current facility-administered medications for this visit.     Review of Systems   Constitutional:  Negative for chills and fever.   HENT:  Negative for congestion.    Respiratory:  Negative for shortness of breath.    Cardiovascular:  Positive for leg swelling. Negative for chest pain.   Gastrointestinal:  Negative for constipation, diarrhea, nausea and vomiting.   Musculoskeletal:  Positive for arthralgias and back pain.        Right foot pain   Skin:  Negative for wound.        Thickened, elongated toenails. CAllus   Neurological:  Positive for weakness and numbness.   Psychiatric/Behavioral:  Negative for agitation, behavioral problems and confusion.        OBJECTIVE     Vitals:    02/26/25 0902   BP: 132/90   Pulse: 51   SpO2: 99%                 PHYSICAL EXAM  GEN:   Accompanied by none.     Foot/Ankle Exam    GENERAL  Diabetic foot exam performed    Appearance:  appears stated age  Orientation:  AAOx3  Affect:  appropriate  Gait:  unimpaired  Assistance:  independent  Right shoe gear: casual shoe  Left shoe gear: casual shoe    VASCULAR     Right Foot Vascularity   Dorsalis pedis:  2+  Posterior tibial:  1+  Skin temperature:  warm  Edema grading:  Trace  CFT:  3  Pedal hair growth:  Present  Varicosities:  mild varicosities     Left Foot Vascularity   Dorsalis pedis:  2+  Posterior tibial:  1+  Skin temperature:  warm  Edema grading:  Trace  CFT:  3  Pedal hair growth:  Present  Varicosities:  mild varicosities     NEUROLOGIC     Right Foot  Neurologic   Light touch sensation: diminished  Vibratory sensation: diminished  Hot/Cold sensation: diminished  Protective Sensation using Newhall-Paul Monofilament:   Sites intact: 5  Sites tested: 10     Left Foot Neurologic   Light touch sensation: diminished  Vibratory sensation: diminished  Hot/Cold sensation:  diminished  Protective Sensation using Newhall-Paul Monofilament:   Sites intact: 5  Sites tested: 10    MUSCULOSKELETAL     Right Foot Musculoskeletal   Ecchymosis:  none  Tenderness:  MTP 1 dorsal tenderness and toenail problem    Arch:  Normal  Hallux valgus: No    Hallux limitus: Yes (Dorsal spurring)       Left Foot Musculoskeletal   Ecchymosis:  none  Tenderness:  toenail problem  Arch:  Normal  Hallux valgus: No      MUSCLE STRENGTH     Right Foot Muscle Strength   Foot dorsiflexion:  4  Foot plantar flexion:  5  Foot inversion:  4+  Foot eversion:  5     Left Foot Muscle Strength   Foot dorsiflexion:  5  Foot plantar flexion:  5  Foot inversion:  5  Foot eversion:  5    RANGE OF MOTION     Right Foot Range of Motion   Foot and ankle ROM within normal limits       Left Foot Range of Motion   Foot and ankle ROM within normal limits      DERMATOLOGIC      Right Foot Dermatologic   Skin  Right foot skin is intact.   Nails  1.  Positive for elongated, onychomycosis, abnormal thickness and subungual debris.  2.  Positive for elongated and abnormal thickness.  3.  Positive for elongated and abnormal thickness.  4.  Positive for elongated and abnormal thickness.  5.  Positive for elongated and abnormal thickness.  Nails comment:  1-5     Left Foot Dermatologic   Skin  Left foot skin is intact.   Nails comment:  1-5  Nails  1.  Positive for elongated, onychomycosis, abnormal thickness and subungual debris.  2.  Positive for elongated and abnormal thickness.  3.  Positive for elongated and abnormal thickness.  4.  Positive for elongated and abnormally thick.  5.  Positive for elongated and  abnormally thick.    Image:       RADIOLOGY/NUCLEAR:  XR Finger 2+ View Right    Result Date: 2/3/2025  Narrative: EXAMINATION: XR FINGER 2+ VW RIGHT-  2/3/2025 12:44 PM  HISTORY: injury of finger of right hand; S69.91XA-Unspecified injury of right wrist, hand and finger(s), initial encounter  3 view RIGHT second finger exam.  The bones, joint spaces, and soft tissues are intact.  No fracture or dislocation.      Impression: 1. No acute bony abnormality.    This report was signed and finalized on 2/3/2025 3:03 PM by Dr. Khanh Esteban MD.         LABORATORY/CULTURE RESULTS:      PATHOLOGY RESULTS:       ASSESSMENT/PLAN     Diagnoses and all orders for this visit:    1. Onychomycosis (Primary)    2. Right foot pain    3. Type 1 diabetes mellitus with diabetic neuropathy    4. Arthritis of first metatarsophalangeal (MTP) joint of right foot    5. Encounter for diabetic foot exam    6. Hallux limitus of right foot    7. Foot drop, right    8. Foot callus          Comprehensive lower extremity examination and evaluation was performed.  Discussed findings and treatment plan including risks, benefits, and treatment options with patient in detail. Patient agreed with treatment plan.  After verbal consent obtained, nail(s) x10 debrided of length and thickness with nail nipper without incidence  After verbal consent obtained, calluses x1 pared utilizing dermal curette and/or scalpel without incidence  Patient may maintain nails and calluses at home utilizing emery board or pumice stone between visits as needed  Reviewed at home diabetic foot care including daily foot checks   DFE performed today.   Last A1c for review 5.6%.  Continue control and management of Type 2 DM per PCP.  Ingredient label for cream patient brought with her today does contain Urea. Do believe this would be appropriate for her to use as Urea is a good ingredient for moisturixing however advised her to test a small patch like area on the skin of her hand  prior to applying to feet to make sure no reaction would occur. Patient verbalizes understanding.   Continue follow-up with neurosurgery and pain management.  For pain and arthrtis to right foot- discussed treatment of hallux limitus including conservative measures with hard soled shoes vs. Surgical correction with arthrodesis.  Prefers to remain conservative at this time.   Would like to move appointments up to every 10 weeks.     An After Visit Summary was printed and given to the patient at discharge, including (if requested) any available informative/educational handouts regarding diagnosis, treatment, or medications. All questions were answered to patient/family satisfaction. Should symptoms fail to improve or worsen they agree to call or return to clinic or to go to the Emergency Department. Discussed the importance of following up with any needed screening tests/labs/specialist appointments and any requested follow-up recommended by me today. Importance of maintaining follow-up discussed and patient accepts that missed appointments can delay diagnosis and potentially lead to worsening of conditions.  Return in about 10 weeks (around 5/7/2025) for Follow-up with APRN, Follow-up in [facility]., or sooner if acute issues arise.  Advance Care Planning   ACP discussion was declined by the patient. Patient does not have an advance directive, declines further assistance.     I spent 20 minutes caring for Fiordaliza on this date of service. This time includes time spent by me in the following activities: preparing for the visit, reviewing tests, performing a medically appropriate examination and/or evaluation, counseling and educating the patient/family/caregiver, and documenting information in the medical record  I spent 5 minutes on the separately reported service of toenail and HPK debridement. This time is not included in the time used to support the E/M service also reported today.      This document has been  electronically signed by RAYMON Hernandez on February 26, 2025 09:39 CST

## 2025-02-26 ENCOUNTER — OFFICE VISIT (OUTPATIENT)
Age: 65
End: 2025-02-26
Payer: COMMERCIAL

## 2025-02-26 VITALS
HEIGHT: 60 IN | DIASTOLIC BLOOD PRESSURE: 90 MMHG | BODY MASS INDEX: 22.1 KG/M2 | HEART RATE: 51 BPM | WEIGHT: 112.6 LBS | SYSTOLIC BLOOD PRESSURE: 132 MMHG | OXYGEN SATURATION: 99 %

## 2025-02-26 DIAGNOSIS — L84 FOOT CALLUS: ICD-10-CM

## 2025-02-26 DIAGNOSIS — E11.9 ENCOUNTER FOR DIABETIC FOOT EXAM: ICD-10-CM

## 2025-02-26 DIAGNOSIS — M21.371 FOOT DROP, RIGHT: ICD-10-CM

## 2025-02-26 DIAGNOSIS — M19.071 ARTHRITIS OF FIRST METATARSOPHALANGEAL (MTP) JOINT OF RIGHT FOOT: ICD-10-CM

## 2025-02-26 DIAGNOSIS — M20.5X1 HALLUX LIMITUS OF RIGHT FOOT: ICD-10-CM

## 2025-02-26 DIAGNOSIS — B35.1 ONYCHOMYCOSIS: Primary | ICD-10-CM

## 2025-02-26 DIAGNOSIS — M79.671 RIGHT FOOT PAIN: ICD-10-CM

## 2025-02-26 DIAGNOSIS — E10.40 TYPE 1 DIABETES MELLITUS WITH DIABETIC NEUROPATHY: ICD-10-CM

## 2025-02-27 ENCOUNTER — OFFICE VISIT (OUTPATIENT)
Dept: INTERNAL MEDICINE | Age: 65
End: 2025-02-27
Payer: COMMERCIAL

## 2025-02-27 VITALS
HEART RATE: 48 BPM | SYSTOLIC BLOOD PRESSURE: 120 MMHG | OXYGEN SATURATION: 100 % | WEIGHT: 113 LBS | BODY MASS INDEX: 22.07 KG/M2 | DIASTOLIC BLOOD PRESSURE: 62 MMHG

## 2025-02-27 DIAGNOSIS — E10.65 TYPE 1 DIABETES MELLITUS WITH HYPERGLYCEMIA (HCC): ICD-10-CM

## 2025-02-27 DIAGNOSIS — G44.049 CHRONIC PAROXYSMAL HEMICRANIA, NOT INTRACTABLE: ICD-10-CM

## 2025-02-27 DIAGNOSIS — E55.9 VITAMIN D DEFICIENCY: ICD-10-CM

## 2025-02-27 DIAGNOSIS — E10.42 DIABETIC PERIPHERAL NEUROPATHY ASSOCIATED WITH TYPE 1 DIABETES MELLITUS (HCC): ICD-10-CM

## 2025-02-27 DIAGNOSIS — R41.3 MEMORY LOSS: Primary | ICD-10-CM

## 2025-02-27 PROCEDURE — 3074F SYST BP LT 130 MM HG: CPT | Performed by: INTERNAL MEDICINE

## 2025-02-27 PROCEDURE — 3078F DIAST BP <80 MM HG: CPT | Performed by: INTERNAL MEDICINE

## 2025-02-27 PROCEDURE — 99214 OFFICE O/P EST MOD 30 MIN: CPT | Performed by: INTERNAL MEDICINE

## 2025-02-27 RX ORDER — INSULIN GLARGINE 300 U/ML
12 INJECTION, SOLUTION SUBCUTANEOUS NIGHTLY
Qty: 6 ML | Refills: 1
Start: 2025-02-27

## 2025-02-27 SDOH — ECONOMIC STABILITY: INCOME INSECURITY: IN THE LAST 12 MONTHS, WAS THERE A TIME WHEN YOU WERE NOT ABLE TO PAY THE MORTGAGE OR RENT ON TIME?: NO

## 2025-02-27 SDOH — ECONOMIC STABILITY: TRANSPORTATION INSECURITY
IN THE PAST 12 MONTHS, HAS LACK OF TRANSPORTATION KEPT YOU FROM MEETINGS, WORK, OR FROM GETTING THINGS NEEDED FOR DAILY LIVING?: NO

## 2025-02-27 SDOH — ECONOMIC STABILITY: FOOD INSECURITY: WITHIN THE PAST 12 MONTHS, YOU WORRIED THAT YOUR FOOD WOULD RUN OUT BEFORE YOU GOT MONEY TO BUY MORE.: NEVER TRUE

## 2025-02-27 SDOH — ECONOMIC STABILITY: TRANSPORTATION INSECURITY
IN THE PAST 12 MONTHS, HAS THE LACK OF TRANSPORTATION KEPT YOU FROM MEDICAL APPOINTMENTS OR FROM GETTING MEDICATIONS?: NO

## 2025-02-27 SDOH — ECONOMIC STABILITY: FOOD INSECURITY: WITHIN THE PAST 12 MONTHS, THE FOOD YOU BOUGHT JUST DIDN'T LAST AND YOU DIDN'T HAVE MONEY TO GET MORE.: NEVER TRUE

## 2025-02-27 ASSESSMENT — PATIENT HEALTH QUESTIONNAIRE - PHQ9
SUM OF ALL RESPONSES TO PHQ QUESTIONS 1-9: 0
1. LITTLE INTEREST OR PLEASURE IN DOING THINGS: NOT AT ALL
SUM OF ALL RESPONSES TO PHQ9 QUESTIONS 1 & 2: 0
SUM OF ALL RESPONSES TO PHQ QUESTIONS 1-9: 0
2. FEELING DOWN, DEPRESSED OR HOPELESS: NOT AT ALL
2. FEELING DOWN, DEPRESSED OR HOPELESS: NOT AT ALL
SUM OF ALL RESPONSES TO PHQ QUESTIONS 1-9: 0
1. LITTLE INTEREST OR PLEASURE IN DOING THINGS: NOT AT ALL
SUM OF ALL RESPONSES TO PHQ QUESTIONS 1-9: 0

## 2025-02-27 NOTE — PROGRESS NOTES
One too small to review and the other sent out. He said he does not see any problems and told her to return in  5 years. She has an upcoming MRI of her liver scheduled. She is currently taking voice lessons from Nina Howell.    SOCIAL HISTORY  She admits to drinking alcohol.    FAMILY HISTORY  Her mother was diagnosed with vascular dementia in her mid to late 70s.    MEDICATIONS  Current: Toujeo insulin       Past Medical History:   Diagnosis Date    Alcohol abuse 02/06/2019    quit 10/5/2021    Allergic rhinitis     Diabetes mellitus (HCC)     GERD (gastroesophageal reflux disease)     Hyperlipidemia     Hypertension     Liver disease 2020    Die to Alcohol    Neuropathy 2023    Diabetes I    Osteoarthritis     Postmenopausal 01/03/2019    Sleep apnea        Past Surgical History:   Procedure Laterality Date    BACK SURGERY  05/01/2023    CHOLECYSTECTOMY      KNEE ARTHROPLASTY      PARTIAL HYSTERECTOMY (CERVIX NOT REMOVED)         Family History   Problem Relation Age of Onset    Ovarian Cancer Mother     Ovarian Cancer Maternal Grandmother     Ovarian Cancer Paternal Grandmother        Social History     Socioeconomic History    Marital status: Single     Spouse name: Not on file    Number of children: Not on file    Years of education: Not on file    Highest education level: Not on file   Occupational History    Not on file   Tobacco Use    Smoking status: Never    Smokeless tobacco: Never   Vaping Use    Vaping status: Never Used   Substance and Sexual Activity    Alcohol use: Not Currently     Comment: No alcohol intake.    Drug use: Never    Sexual activity: Not Currently   Other Topics Concern    Not on file   Social History Narrative    Not on file     Social Determinants of Health     Financial Resource Strain: Low Risk  (5/6/2024)    Overall Financial Resource Strain (CARDIA)     Difficulty of Paying Living Expenses: Not hard at all   Food Insecurity: No Food Insecurity (2/27/2025)    Hunger Vital Sign

## 2025-03-03 ENCOUNTER — HOSPITAL ENCOUNTER (OUTPATIENT)
Dept: ULTRASOUND IMAGING | Facility: HOSPITAL | Age: 65
Discharge: HOME | End: 2025-03-03
Payer: COMMERCIAL

## 2025-03-03 DIAGNOSIS — K70.30 ALCOHOLIC CIRRHOSIS OF LIVER WITHOUT ASCITES: ICD-10-CM

## 2025-03-03 DIAGNOSIS — E10.40 TYPE 1 DIABETES MELLITUS WITH DIABETIC NEUROPATHY (HCC): ICD-10-CM

## 2025-03-03 PROCEDURE — 0690T QUAN US TIS CHARAC W/DX US: CPT

## 2025-03-03 PROCEDURE — 76705 ECHO EXAM OF ABDOMEN: CPT

## 2025-03-07 RX ORDER — GABAPENTIN 300 MG/1
300 CAPSULE ORAL NIGHTLY
Qty: 30 CAPSULE | Refills: 2 | Status: SHIPPED | OUTPATIENT
Start: 2025-03-07 | End: 2025-06-05

## 2025-03-17 ENCOUNTER — TRANSCRIBE ORDERS (OUTPATIENT)
Dept: ADMINISTRATIVE | Facility: HOSPITAL | Age: 65
End: 2025-03-17
Payer: COMMERCIAL

## 2025-03-17 DIAGNOSIS — E55.9 AVITAMINOSIS D: ICD-10-CM

## 2025-03-17 DIAGNOSIS — R41.3 MEMORY LOSS: ICD-10-CM

## 2025-03-17 DIAGNOSIS — E10.65 POOR CONTROL TYPE I DIABETES MELLITUS: Primary | ICD-10-CM

## 2025-03-20 DIAGNOSIS — E10.65 TYPE 1 DIABETES MELLITUS WITH HYPERGLYCEMIA (HCC): ICD-10-CM

## 2025-03-20 RX ORDER — PROCHLORPERAZINE 25 MG/1
SUPPOSITORY RECTAL
Qty: 3 EACH | Refills: 2 | Status: SHIPPED | OUTPATIENT
Start: 2025-03-20

## 2025-04-25 ENCOUNTER — LAB (OUTPATIENT)
Dept: LAB | Facility: HOSPITAL | Age: 65
End: 2025-04-25
Payer: COMMERCIAL

## 2025-04-25 ENCOUNTER — OFFICE VISIT (OUTPATIENT)
Dept: OBSTETRICS AND GYNECOLOGY | Age: 65
End: 2025-04-25
Payer: COMMERCIAL

## 2025-04-25 VITALS
SYSTOLIC BLOOD PRESSURE: 112 MMHG | WEIGHT: 112 LBS | HEIGHT: 60 IN | DIASTOLIC BLOOD PRESSURE: 64 MMHG | BODY MASS INDEX: 21.99 KG/M2

## 2025-04-25 DIAGNOSIS — M62.89 PELVIC FLOOR DYSFUNCTION IN FEMALE: Primary | ICD-10-CM

## 2025-04-25 DIAGNOSIS — E55.9 AVITAMINOSIS D: ICD-10-CM

## 2025-04-25 DIAGNOSIS — R19.7 DIARRHEA, UNSPECIFIED TYPE: ICD-10-CM

## 2025-04-25 DIAGNOSIS — N81.6 RECTOCELE: ICD-10-CM

## 2025-04-25 DIAGNOSIS — N81.10 FEMALE CYSTOCELE: ICD-10-CM

## 2025-04-25 DIAGNOSIS — E10.65 TYPE 1 DIABETES MELLITUS WITH HYPERGLYCEMIA: ICD-10-CM

## 2025-04-25 DIAGNOSIS — R15.9 INCONTINENCE OF FECES, UNSPECIFIED FECAL INCONTINENCE TYPE: ICD-10-CM

## 2025-04-25 DIAGNOSIS — R41.3 MEMORY LOSS: ICD-10-CM

## 2025-04-25 DIAGNOSIS — R39.15 URINARY URGENCY: ICD-10-CM

## 2025-04-25 DIAGNOSIS — R32 URINARY INCONTINENCE, UNSPECIFIED TYPE: ICD-10-CM

## 2025-04-25 DIAGNOSIS — R35.0 URINARY FREQUENCY: ICD-10-CM

## 2025-04-25 DIAGNOSIS — E10.65 POOR CONTROL TYPE I DIABETES MELLITUS: ICD-10-CM

## 2025-04-25 LAB
ALBUMIN SERPL-MCNC: 4.6 G/DL (ref 3.5–5)
ALBUMIN/GLOB SERPL: 1.8 G/DL (ref 1.1–2.5)
ALP SERPL-CCNC: 169 U/L (ref 24–120)
ALT SERPL W P-5'-P-CCNC: 24 U/L (ref 0–35)
ANION GAP SERPL CALCULATED.3IONS-SCNC: 7 MMOL/L (ref 4–13)
AST SERPL-CCNC: 44 U/L (ref 7–45)
AUTO MIXED CELLS #: 0.4 10*3/MM3 (ref 0.1–2.6)
AUTO MIXED CELLS %: 10.5 % (ref 0.1–24)
BILIRUB SERPL-MCNC: 0.3 MG/DL (ref 0.1–1)
BUN SERPL-MCNC: 29 MG/DL (ref 5–21)
BUN/CREAT SERPL: 24.2
CALCIUM SPEC-SCNC: 9.7 MG/DL (ref 8.6–10.5)
CHLORIDE SERPL-SCNC: 95 MMOL/L (ref 98–110)
CHOLEST SERPL-MCNC: 177 MG/DL (ref 130–200)
CO2 SERPL-SCNC: 27 MMOL/L (ref 24–31)
CREAT SERPL-MCNC: 1.2 MG/DL (ref 0.5–1.4)
EGFRCR SERPLBLD CKD-EPI 2021: 51 ML/MIN/1.73
ERYTHROCYTE [DISTWIDTH] IN BLOOD BY AUTOMATED COUNT: 13.7 % (ref 12.3–15.4)
GLOBULIN UR ELPH-MCNC: 2.6 GM/DL
GLUCOSE SERPL-MCNC: 113 MG/DL (ref 65–99)
HBA1C MFR BLD: 4.9 % (ref 4.8–5.9)
HCT VFR BLD AUTO: 36.4 % (ref 34–46.6)
HDLC SERPL-MCNC: 91 MG/DL
HGB BLD-MCNC: 12.1 G/DL (ref 12–15.9)
LDLC SERPL CALC-MCNC: 59 MG/DL (ref 0–99)
LDLC/HDLC SERPL: 0.58 {RATIO}
LYMPHOCYTES # BLD AUTO: 0.5 10*3/MM3 (ref 0.7–3.1)
LYMPHOCYTES NFR BLD AUTO: 12.3 % (ref 19.6–45.3)
MCH RBC QN AUTO: 28.9 PG (ref 26.6–33)
MCHC RBC AUTO-ENTMCNC: 33.2 G/DL (ref 31.5–35.7)
MCV RBC AUTO: 87.1 FL (ref 79–97)
NEUTROPHILS NFR BLD AUTO: 3.3 10*3/MM3 (ref 1.7–7)
NEUTROPHILS NFR BLD AUTO: 77.2 % (ref 42.7–76)
PLATELET # BLD AUTO: 186 10*3/MM3 (ref 140–450)
PMV BLD AUTO: 7.8 FL (ref 6–12)
POTASSIUM SERPL-SCNC: 5.1 MMOL/L (ref 3.5–5.3)
PROT SERPL-MCNC: 7.2 G/DL (ref 6.3–8.7)
RBC # BLD AUTO: 4.18 10*6/MM3 (ref 3.77–5.28)
SODIUM SERPL-SCNC: 129 MMOL/L (ref 135–145)
TRIGL SERPL-MCNC: 165 MG/DL (ref 0–149)
VLDLC SERPL-MCNC: 27 MG/DL (ref 5–40)
WBC NRBC COR # BLD AUTO: 4.2 10*3/MM3 (ref 3.4–10.8)

## 2025-04-25 PROCEDURE — 99204 OFFICE O/P NEW MOD 45 MIN: CPT | Performed by: OBSTETRICS & GYNECOLOGY

## 2025-04-25 PROCEDURE — 82607 VITAMIN B-12: CPT

## 2025-04-25 PROCEDURE — 80061 LIPID PANEL: CPT

## 2025-04-25 PROCEDURE — 36415 COLL VENOUS BLD VENIPUNCTURE: CPT

## 2025-04-25 PROCEDURE — 82746 ASSAY OF FOLIC ACID SERUM: CPT

## 2025-04-25 PROCEDURE — 80050 GENERAL HEALTH PANEL: CPT

## 2025-04-25 PROCEDURE — 82306 VITAMIN D 25 HYDROXY: CPT

## 2025-04-25 PROCEDURE — 82570 ASSAY OF URINE CREATININE: CPT

## 2025-04-25 PROCEDURE — 83036 HEMOGLOBIN GLYCOSYLATED A1C: CPT

## 2025-04-25 PROCEDURE — 82043 UR ALBUMIN QUANTITATIVE: CPT

## 2025-04-25 RX ORDER — GABAPENTIN 300 MG/1
300 CAPSULE ORAL NIGHTLY
COMMUNITY
Start: 2025-04-11

## 2025-04-25 NOTE — PROGRESS NOTES
"Subjective     Chief Complaint   Patient presents with    Urinary Frequency     Pt here today as new pt with c/o recurrent UTI. Pt voices having urinary frequency. Pt voices having trouble passing stool sometimes. Pt voices no other concerns.        Fiordaliza Lyons is a 65 y.o. year old who presents as a referral from Urology for recurrent UTI's.  Patient says it feels like the infection never gets better.  She says that it all started about a year before her hemorrhoidectomy (\"because there was just constant drainage\") and goes on to say that the problem has never improved.    The patient is not s/p hysterectomy (she did have a BSO in 2008 for a benign growth on one ovary since she had a family history of ovarian cancer).  She reports positive urinary hesitancy, sensation of incomplete bladder emptying, urinary retention causing recurrent bladder infections, stool trapping, and urinary frequency , but denies pelvic pain and vaginal pressure.      Patient describes bowel movements as being hard to pass, but immediately following the expulsion of solid stool, there is a flood of watery diarrhea that follows - every single time.  She will also have \"explosions of watery stool\" in clothes, rarely.  Patient just had a colonoscopy in January of this year; there were polyps but an otherwise unremarkable study.  The patient is not taking fiber regularly.  She did not really discuss the character of her stool with gastroenterologist.    Patient does have watery leakage, but she cannot tell much of the time whether she is leaking urine or watery stool.    She is not currently sexually active, and has not been for many years - since before her  passed away.  Fiordaliza Lyons has not had surgery for this problem in the past.    Past Medical History:   Diagnosis Date    Alcoholic hepatitis with ascites     Allergic rhinitis     Cancer     ovarian    Cirrhosis 2021    Degeneration of lumbar or lumbosacral " intervertebral disc 09/15/2022    Diabetes mellitus     Disease of thyroid gland     Elevated d-dimer     Endometriosis     Fatty liver     GERD (gastroesophageal reflux disease)     History of transfusion     Hyperlipidemia     Hypertension     Low back pain     Lumbosacral disc disease 2022    Osteoporosis     Ovarian cyst     Pancreatitis     Postmenopausal     Rectal mass     Rectal prolapse     Sleep apnea     has corrrected it self with w/ weight loss     Past Surgical History:   Procedure Laterality Date    ARM NERVE EXPLORATION Left     6 nerves scraped ,cleaned nerves off    BACK SURGERY  2023    BILATERAL SALPINGO OOPHORECTOMY  2008    CARPAL TUNNEL RELEASE  2016    CHOLECYSTECTOMY  1996    COLONOSCOPY  07/2017    Dr Murphy- per patient normal     COLONOSCOPY N/A 11/09/2021    Procedure: COLONOSCOPY WITH ANESTHESIA;  Surgeon: Ray Serna MD;  Location: [facility];  Service: Gastroenterology;  Laterality: N/A;  pre rectal mass  post  Dr Shields    COLONOSCOPY N/A 1/20/2025    Procedure: COLONOSCOPY WITH ANESTHESIA;  Surgeon: Ray Serna MD;  Location: [facility];  Service: Gastroenterology;  Laterality: N/A;  pre polyp  hx  post diverticulosis,polyp, hemorrfoids    Dr. Shields    DILATION AND CURETTAGE, DIAGNOSTIC / THERAPEUTIC      ENDOSCOPY N/A 11/09/2021    Procedure: ESOPHAGOGASTRODUODENOSCOPY WITH ANESTHESIA;  Surgeon: Ray Serna MD;  Location: [facility];  Service: Gastroenterology;  Laterality: N/A;  pre anemia  post hiatal hernia; gastric polyp  Dr. Shields    EYE SURGERY Bilateral     cataract with lens    HEMORRHOIDECTOMY SIGMOIDOSCOPY N/A 01/09/2024    Procedure: HEMORRHOIDECTOMY WITH EXAM UNDER ANESTHESIA;  Surgeon: Rocio Read MD;  Location: [facility] OR;  Service: General;  Laterality: N/A;    KNEE SURGERY Right 1982    LUMBAR LAMINECTOMY WITH FUSION Right 05/01/2023    Procedure: LUMBAR DISCECTOMY, JADON- LAMINECTOMY TRANSFORAMINAL LUMBAR INTERBODY  FUSION, POSTERIOR PEDICLE SCREW INSTRUMENTATION, L5-S1 RIGHT. USE OF THE ROBOT;  Surgeon: Nathaniel Tesfaye MD;  Location: [facility] OR;  Service: Neurosurgery;  Laterality: Right;    SPINAL FUSION  2023    TRIGGER POINT INJECTION  2023        Current Outpatient Medications:     atenolol (TENORMIN) 25 MG tablet, Take 1 tablet by mouth Daily., Disp: 90 tablet, Rfl: 3    BD Pen Needle Tatyana U/F 32G X 4 MM misc, 4 (Four) Times a Day. use as directed, Disp: , Rfl:     Continuous Blood Gluc Sensor (Dexcom G6 Sensor), See Admin Instructions., Disp: , Rfl:     Continuous Blood Gluc Transmit (Dexcom G6 Transmitter) misc, See Admin Instructions., Disp: , Rfl:     gabapentin (NEURONTIN) 300 MG capsule, Take 1 capsule by mouth Every Night., Disp: , Rfl:     Gvoke HypoPen 2-Pack 1 MG/0.2ML solution auto-injector, , Disp: , Rfl:     insulin aspart (novoLOG FLEXPEN) 100 UNIT/ML solution pen-injector sc pen, Inject 2-5 Units under the skin into the appropriate area as directed 3 (Three) Times a Day With Meals. 2-5 units with meals sliding scale, Disp: , Rfl:     Insulin Glargine, 2 Unit Dial, (Toujeo Max SoloStar) 300 UNIT/ML solution pen-injector injection, Inject 14 Units under the skin into the appropriate area as directed., Disp: , Rfl:     levothyroxine (SYNTHROID, LEVOTHROID) 50 MCG tablet, Take 1 tablet by mouth Every Morning., Disp: , Rfl:     losartan (COZAAR) 25 MG tablet, Take 1 tablet by mouth Daily., Disp: 90 tablet, Rfl: 3    magnesium oxide (MAG-OX) 400 MG tablet, Take 1 tablet by mouth Daily., Disp: , Rfl:     Multiple Vitamins-Iron (multivitamin with iron) tablet tablet, Take 1 tablet by mouth Daily., Disp: , Rfl:     Nutritional Supplements (JUICE PLUS FIBRE PO), Take 1 dose by mouth Daily., Disp: , Rfl:     pantoprazole (PROTONIX) 40 MG EC tablet, Take 1 tablet by mouth Daily., Disp: , Rfl:     Potassium 99 MG tablet, Take  by mouth., Disp: , Rfl:     Specialty Vitamins Products (NERVIVE NERVE RELIEF PO), Take  by  "mouth., Disp: , Rfl:     VITAMIN D-VITAMIN K PO, Take  by mouth 1 (One) Time Per Week., Disp: , Rfl:   Family History   Problem Relation Age of Onset    Colon polyps Father     Ovarian cancer Mother     Colon polyps Brother     Ovarian cancer Paternal Grandmother     Ovarian cancer Maternal Grandmother     Ovarian cancer Maternal Aunt     Breast cancer Neg Hx     Colon cancer Neg Hx      Social History     Socioeconomic History    Marital status: Single   Tobacco Use    Smoking status: Never     Passive exposure: Never    Smokeless tobacco: Never   Vaping Use    Vaping status: Never Used   Substance and Sexual Activity    Alcohol use: Not Currently     Comment: in recovery for 4 years    Drug use: No    Sexual activity: Not Currently     Partners: Male     Allergies   Allergen Reactions    Tizanidine Hcl Dizziness     Pt stated \"horrible dry mouth, dizziness, couldn't see, it was awful\" drops blood sugar down     Augmentin [Amoxicillin-Pot Clavulanate] Nausea And Vomiting       Family History   Problem Relation Age of Onset    Colon polyps Father     Ovarian cancer Mother     Colon polyps Brother     Ovarian cancer Paternal Grandmother     Ovarian cancer Maternal Grandmother     Ovarian cancer Maternal Aunt     Breast cancer Neg Hx     Colon cancer Neg Hx      Review of Systems   Constitutional:  Negative for activity change and unexpected weight change.   Respiratory:  Negative for shortness of breath.    Cardiovascular:  Negative for chest pain.   Gastrointestinal:  Positive for abdominal pain, constipation and diarrhea.        Patient describes difficulty passing solid stool.  But then after every solid bowel movement there is an immediate explosion of watery diarrhea    Genitourinary:  Positive for difficulty urinating (hesitancy and feeling of incomplete emptying), enuresis and frequency. Negative for vaginal bleeding.        Not sexually active           Objective   /64   Ht 152.4 cm (60\")   Wt 50.8 kg " (112 lb)   BMI 21.87 kg/m²     Physical Exam  Vitals and nursing note reviewed.   Constitutional:       General: She is not in acute distress.     Appearance: Normal appearance. She is well-developed and normal weight.   HENT:      Head: Normocephalic and atraumatic.   Neck:      Thyroid: No thyromegaly.   Pulmonary:      Effort: Pulmonary effort is normal.   Abdominal:      General: There is no distension.      Palpations: Abdomen is soft.      Tenderness: There is no abdominal tenderness.   Genitourinary:     General: Normal vulva.      Comments: Normal external  exam.  No urethral prolapse.  No descent of uterus/cervix.  Grade I cystocele, grade I rectocele.  Diminished rectal tone   Musculoskeletal:         General: Normal range of motion.      Cervical back: Normal range of motion.   Skin:     General: Skin is warm and dry.   Neurological:      Mental Status: She is alert and oriented to person, place, and time.   Psychiatric:         Mood and Affect: Mood normal.         Behavior: Behavior normal.         Thought Content: Thought content normal.         Judgment: Judgment normal.         Imaging   No data reviewed       Assessment & Plan    Diagnoses and all orders for this visit:    1. Pelvic floor dysfunction in female (Primary): pelvic prolapse very mild and likely non-contributory to patient's complaints.  Patient offered Gemtesa for urinary urgency and frequency, but she prefers to try pelvic floor therapy and a trial of Interstim.  Patient already on several medications and is hesitant to add another; additionally, only Interstim will help have the potential to help with fecal incontinence, which is her most-bothersome symptom.  Patient will RTO on the next day we do Interstim trials.  In the meantime, she would also like to start pelvic floor therapy, and will do that in [place] since they have a much shorter wait time than [facility].  -     Ambulatory Referral to Physical Therapy for Evaluation &  Treatment    2. Urinary urgency: Discussed Gemtesa vs. Interstim.  The interstim device was explained and all of the patient's questions answered.  Also reviewed common bladder irritants, but she is only consuming one cup of coffee in the AM.  Patient is getting some artificial sweeteners since she is a diabetic.    3. Urinary frequency    4. Female cystocele: I do not feel pelvic organ prolapse is contributing to patient's symtoms  Comments:  grade I    5. Urinary incontinence, unspecified type: Patient advised there is a 70% chance that both urinary and fecal incontinence will improve with Interstim.  All questions about temporary device and then placement of a permanent implant were answered.  Patient advised that battery usually needs changed every 10-15 years, for patients with a permanent implant    6. Incontinence of feces, unspecified fecal incontinence type    7. Rectocele  Comments:  grade I    8. Diarrhea, unspecified type: Patient encouraged to discuss stool quality/pattern with gastroenterology.  I suspect that addition of daily fiber may be beneficial, but their input would be more-helpful in that area than mine.    9. Type 1 diabetes mellitus         Ana Lilia Saldaña MD  4/25/2025  09:55 CDT

## 2025-04-26 LAB
25(OH)D3 SERPL-MCNC: 53.6 NG/ML (ref 30–100)
ALBUMIN UR-MCNC: <1.2 MG/DL
CREAT UR-MCNC: 43.4 MG/DL
FOLATE SERPL-MCNC: >20 NG/ML (ref 4.78–24.2)
MICROALBUMIN/CREAT UR: NORMAL MG/G{CREAT}
TSH SERPL DL<=0.05 MIU/L-ACNC: 1.08 UIU/ML (ref 0.27–4.2)
VIT B12 BLD-MCNC: 999 PG/ML (ref 211–946)

## 2025-04-30 ENCOUNTER — TELEPHONE (OUTPATIENT)
Dept: INTERNAL MEDICINE | Age: 65
End: 2025-04-30

## 2025-05-01 NOTE — TELEPHONE ENCOUNTER
Have her stop the short acting insulin and cut her long acting dose in 1/2 -- see if she cna come see me Friday at 8 AM (Ill see if Yola Amy can open that spot)-- tell her to come then and we will get spot open and please bring all medicines with her so we can be sure none are contributing to labs and that we have all meds from other MDs --if she is take lasix or aldactone or hctz stop those meds (I dont have them on her list but be sure not taking)   
Orders and appt were called to patient  
She is calling about lab results (in Care everywhere), she is concerned about low A1c and some other low readings.  
Specialty Care (Immediate)...

## 2025-05-02 ENCOUNTER — OFFICE VISIT (OUTPATIENT)
Dept: INTERNAL MEDICINE | Age: 65
End: 2025-05-02
Payer: COMMERCIAL

## 2025-05-02 VITALS
DIASTOLIC BLOOD PRESSURE: 70 MMHG | SYSTOLIC BLOOD PRESSURE: 160 MMHG | HEART RATE: 58 BPM | HEIGHT: 61 IN | BODY MASS INDEX: 21.14 KG/M2 | WEIGHT: 112 LBS | OXYGEN SATURATION: 98 %

## 2025-05-02 DIAGNOSIS — K62.5 GASTROINTESTINAL HEMORRHAGE ASSOCIATED WITH ANORECTAL SOURCE: ICD-10-CM

## 2025-05-02 DIAGNOSIS — E10.649 TYPE 1 DIABETES MELLITUS WITH HYPOGLYCEMIA AND WITHOUT COMA (HCC): Primary | ICD-10-CM

## 2025-05-02 DIAGNOSIS — R39.15 URINARY URGENCY: ICD-10-CM

## 2025-05-02 DIAGNOSIS — R25.2 LEG CRAMPS: ICD-10-CM

## 2025-05-02 DIAGNOSIS — E87.1 HYPONATREMIA: ICD-10-CM

## 2025-05-02 DIAGNOSIS — K21.9 GASTROESOPHAGEAL REFLUX DISEASE WITHOUT ESOPHAGITIS: ICD-10-CM

## 2025-05-02 DIAGNOSIS — E03.9 ACQUIRED HYPOTHYROIDISM: ICD-10-CM

## 2025-05-02 DIAGNOSIS — I10 PRIMARY HYPERTENSION: ICD-10-CM

## 2025-05-02 PROCEDURE — 3077F SYST BP >= 140 MM HG: CPT | Performed by: INTERNAL MEDICINE

## 2025-05-02 PROCEDURE — 99214 OFFICE O/P EST MOD 30 MIN: CPT | Performed by: INTERNAL MEDICINE

## 2025-05-02 PROCEDURE — 3078F DIAST BP <80 MM HG: CPT | Performed by: INTERNAL MEDICINE

## 2025-05-02 RX ORDER — ATENOLOL 25 MG/1
25 TABLET ORAL DAILY
COMMUNITY
Start: 2025-04-03

## 2025-05-02 RX ORDER — LEVOTHYROXINE SODIUM 50 UG/1
50 TABLET ORAL DAILY
Qty: 90 TABLET | Refills: 3 | Status: SHIPPED | OUTPATIENT
Start: 2025-05-02

## 2025-05-02 RX ORDER — LOSARTAN POTASSIUM 25 MG/1
25 TABLET ORAL DAILY
COMMUNITY
Start: 2025-04-03

## 2025-05-02 RX ORDER — LANOLIN ALCOHOL/MO/W.PET/CERES
400 CREAM (GRAM) TOPICAL DAILY
Qty: 90 TABLET | Refills: 3 | Status: SHIPPED | OUTPATIENT
Start: 2025-05-02

## 2025-05-02 RX ORDER — PANTOPRAZOLE SODIUM 40 MG/1
40 TABLET, DELAYED RELEASE ORAL
Qty: 30 TABLET | Refills: 3 | Status: SHIPPED | OUTPATIENT
Start: 2025-05-02

## 2025-05-02 NOTE — PROGRESS NOTES
Chief Complaint   Patient presents with    Follow-up     Go over labs       HPI:   History of Present Illness  The patient presents for evaluation of diabetes, urinary hesitancy, hypothyroidism, and leg cramps.    Chief complaint: occasional hyperglycemia, with blood glucose levels reaching ~200 postprandially. Management includes consuming food before bedtime and monitoring levels for 30 minutes. Last night, blood glucose remained stable at 92. Currently, blood glucose is 113 after consuming 21-seed bread with avocado. Symptoms of headache and faintness occur when blood glucose rises to ~130. Efforts to maintain blood glucose lower before bedtime.    History of liver damage with improved liver function. Weight management goals: 109-115 lbs. Abstained from alcohol for 3.5 years.    Insulin management: previously 14 units of Toujeo, reduced to 8 units last night. Short-acting insulin administered if blood glucose >150, recommended dose 2 units. Avoid short-acting insulin at bedtime even if blood glucose is 200. Plan to continue 8 units of Toujeo for a week, may reduce to 6 units if stable. Goal: fasting blood glucose <140, 2-hour postprandial <180.    Severe cramps in legs, feet, and one hand began ~1 month ago. Magnesium oxide taken for management.    Urinary hesitancy experienced over the past few months, characterized by difficulty initiating urination and sensation of incomplete bladder emptying. No loss of bladder control or burning sensation, but persistent urine odor noted. Consulted OB/GYN who recommended temporary implant.    Currently on levothyroxine for thyroid management, inquires about discontinuing medication. Thyroid levels stable with current medication.    PAST SURGICAL HISTORY:  - Back surgery with two screws and a spacer  - Hysterectomy    Additional medical history: complete bone structure analysis with ideal weight recommendation of 109 lbs. Reports maintaining weight of 112.7 lbs and BMI of

## 2025-05-05 ENCOUNTER — TELEPHONE (OUTPATIENT)
Dept: OBSTETRICS AND GYNECOLOGY | Age: 65
End: 2025-05-05
Payer: COMMERCIAL

## 2025-05-05 NOTE — TELEPHONE ENCOUNTER
Pt called regarding appt from [facility] scheduled 5/6. Returned call to pt and she informs she has spoken with [facility] and they informed pt was scheduled for pelvic floor therapy per referral from Dr. Saldaña. Pt informed per last OV note about pelvic floor therapy being discussed and referral placed for incontinence. Pt voices understanding to conversation.

## 2025-05-07 ENCOUNTER — TRANSCRIBE ORDERS (OUTPATIENT)
Dept: ADMINISTRATIVE | Facility: HOSPITAL | Age: 65
End: 2025-05-07
Payer: COMMERCIAL

## 2025-05-07 DIAGNOSIS — E87.1 HYPONATREMIA: ICD-10-CM

## 2025-05-07 DIAGNOSIS — E03.9 ACQUIRED HYPOTHYROIDISM: Primary | ICD-10-CM

## 2025-05-08 ENCOUNTER — TELEPHONE (OUTPATIENT)
Age: 65
End: 2025-05-08
Payer: COMMERCIAL

## 2025-05-08 DIAGNOSIS — M79.671 RIGHT FOOT PAIN: Primary | ICD-10-CM

## 2025-05-08 NOTE — TELEPHONE ENCOUNTER
[facility] called stating pt was there for an xray but there was no orders in for the xray I placed order but told them they would need to be weight bearing therefor she would have to have them do at the hospital or [facility].

## 2025-05-09 ENCOUNTER — HOSPITAL ENCOUNTER (OUTPATIENT)
Dept: GENERAL RADIOLOGY | Facility: HOSPITAL | Age: 65
Discharge: HOME | End: 2025-05-09
Payer: COMMERCIAL

## 2025-05-09 ENCOUNTER — OFFICE VISIT (OUTPATIENT)
Age: 65
End: 2025-05-09
Payer: COMMERCIAL

## 2025-05-09 VITALS
BODY MASS INDEX: 22.19 KG/M2 | HEIGHT: 60 IN | DIASTOLIC BLOOD PRESSURE: 80 MMHG | OXYGEN SATURATION: 96 % | HEART RATE: 61 BPM | WEIGHT: 113 LBS | SYSTOLIC BLOOD PRESSURE: 122 MMHG

## 2025-05-09 DIAGNOSIS — L84 FOOT CALLUS: ICD-10-CM

## 2025-05-09 DIAGNOSIS — M79.671 RIGHT FOOT PAIN: ICD-10-CM

## 2025-05-09 DIAGNOSIS — M19.071 ARTHRITIS OF FIRST METATARSOPHALANGEAL (MTP) JOINT OF RIGHT FOOT: ICD-10-CM

## 2025-05-09 DIAGNOSIS — B35.1 ONYCHOMYCOSIS: Primary | ICD-10-CM

## 2025-05-09 DIAGNOSIS — E10.40 TYPE 1 DIABETES MELLITUS WITH DIABETIC NEUROPATHY: ICD-10-CM

## 2025-05-09 DIAGNOSIS — M21.371 FOOT DROP, RIGHT: ICD-10-CM

## 2025-05-09 PROCEDURE — 73630 X-RAY EXAM OF FOOT: CPT

## 2025-05-17 PROBLEM — E10.649 TYPE 1 DIABETES MELLITUS WITH HYPOGLYCEMIA AND WITHOUT COMA (HCC): Status: ACTIVE | Noted: 2021-10-05

## 2025-06-06 ENCOUNTER — TRANSCRIBE ORDERS (OUTPATIENT)
Dept: ADMINISTRATIVE | Facility: HOSPITAL | Age: 65
End: 2025-06-06
Payer: COMMERCIAL

## 2025-06-06 ENCOUNTER — OFFICE VISIT (OUTPATIENT)
Dept: INTERNAL MEDICINE | Age: 65
End: 2025-06-06
Payer: COMMERCIAL

## 2025-06-06 VITALS
WEIGHT: 111 LBS | OXYGEN SATURATION: 98 % | SYSTOLIC BLOOD PRESSURE: 118 MMHG | HEART RATE: 58 BPM | BODY MASS INDEX: 20.96 KG/M2 | DIASTOLIC BLOOD PRESSURE: 60 MMHG | HEIGHT: 61 IN

## 2025-06-06 DIAGNOSIS — E10.40 TYPE 1 DIABETES MELLITUS WITH DIABETIC NEUROPATHY (HCC): ICD-10-CM

## 2025-06-06 DIAGNOSIS — E10.65 TYPE 1 DIABETES MELLITUS WITH HYPERGLYCEMIA (HCC): ICD-10-CM

## 2025-06-06 DIAGNOSIS — E10.9 TYPE 1 DIABETES MELLITUS WITHOUT COMPLICATION (HCC): ICD-10-CM

## 2025-06-06 DIAGNOSIS — E03.9 ACQUIRED HYPOTHYROIDISM: ICD-10-CM

## 2025-06-06 DIAGNOSIS — Z00.00 ANNUAL PHYSICAL EXAM: Primary | ICD-10-CM

## 2025-06-06 DIAGNOSIS — Z12.31 ENCOUNTER FOR SCREENING MAMMOGRAM FOR MALIGNANT NEOPLASM OF BREAST: Primary | ICD-10-CM

## 2025-06-06 DIAGNOSIS — E55.9 VITAMIN D DEFICIENCY: ICD-10-CM

## 2025-06-06 DIAGNOSIS — M51.370 DEGENERATION OF INTERVERTEBRAL DISC OF LUMBOSACRAL REGION WITH DISCOGENIC BACK PAIN: ICD-10-CM

## 2025-06-06 DIAGNOSIS — Z78.0 POSTMENOPAUSAL: Primary | ICD-10-CM

## 2025-06-06 DIAGNOSIS — I10 PRIMARY HYPERTENSION: ICD-10-CM

## 2025-06-06 DIAGNOSIS — Z12.31 SCREENING MAMMOGRAM FOR BREAST CANCER: ICD-10-CM

## 2025-06-06 DIAGNOSIS — Z78.0 POSTMENOPAUSAL: ICD-10-CM

## 2025-06-06 PROCEDURE — 3074F SYST BP LT 130 MM HG: CPT | Performed by: INTERNAL MEDICINE

## 2025-06-06 PROCEDURE — 3078F DIAST BP <80 MM HG: CPT | Performed by: INTERNAL MEDICINE

## 2025-06-06 PROCEDURE — 99396 PREV VISIT EST AGE 40-64: CPT | Performed by: INTERNAL MEDICINE

## 2025-06-06 RX ORDER — GABAPENTIN 300 MG/1
300 CAPSULE ORAL NIGHTLY
Qty: 30 CAPSULE | Refills: 2 | Status: SHIPPED | OUTPATIENT
Start: 2025-06-06 | End: 2025-06-16

## 2025-06-06 RX ORDER — INSULIN GLARGINE 300 U/ML
6 INJECTION, SOLUTION SUBCUTANEOUS NIGHTLY
Qty: 6 ML | Refills: 1
Start: 2025-06-06 | End: 2025-06-16 | Stop reason: SDUPTHER

## 2025-06-06 RX ORDER — INSULIN LISPRO 100 [IU]/ML
INJECTION, SUSPENSION SUBCUTANEOUS
Qty: 9 ML | Refills: 0
Start: 2025-06-06 | End: 2025-06-16

## 2025-06-06 RX ORDER — LEVOTHYROXINE SODIUM 50 UG/1
50 TABLET ORAL DAILY
Qty: 90 TABLET | Refills: 3 | Status: SHIPPED | OUTPATIENT
Start: 2025-06-06

## 2025-06-06 NOTE — PROGRESS NOTES
Chief Complaint   Patient presents with    Annual Exam       HPI:   History of Present Illness  The patient presents for annual exam and evaluation of diabetes mellitus, and multiple medical problems, tonsil stones, and health maintenance.    She reports improved hypoglycemic episodes after adjusting Toujeo dosage but experienced weight gain upon discontinuation. Current regimen includes 12 units of Toujeo at night, though she self-administered 8 units last night due to blood glucose levels. Short-acting insulin dosage varies based on glucose levels; she uses 2 units when glucose is 179, reducing it to 75. Target glucose range is 70-80. No severe hypoglycemia in recent weeks. Average Toujeo dosage is 6 units, and she takes 2 units of lispro with meals. She adjusts diet if insulin pen is forgotten and keeps glucose above 100 during physical activity. She uses Dexcom G6 for monitoring and plans to switch to [employer] for supplies. Currently low on insulin and has not refilled prescriptions. Medications include Toujeo and lispro.    She is due for a mammogram in 12/2025 or 01/2026. Colonoscopy last year revealed two small polyps; one discarded, one tested. No recent bone density test. Takes vitamin D3 with K2 weekly, losartan, Protonix, AREDS, atenolol, levothyroxine, magnesium oxide, and gabapentin. Ophthalmologist noted decreased eye pressure from 25/24 to 19.7. Received shingles vaccine but contracted shingles at [place]. No recent tetanus vaccine. Stopped donating blood after diabetes diagnosis in 2005. Past blood transfusion. Pelvic exam in 04/2025 showed strong bladder; history of urinary hesitancy.    Reports sensation of nodule inside tonsil.       Past Medical History:   Diagnosis Date    Alcohol abuse 02/06/2019    quit 10/5/2021    Allergic rhinitis     Diabetes mellitus (HCC)     GERD (gastroesophageal reflux disease)     Hyperlipidemia     Hypertension     Liver disease 2020    Die to Alcohol    Neuropathy  Impression: Age-related nuclear cataract, bilateral: H25.13. Plan: Not visually significant. Observe until vision decreases.  Will re-evaluate at the next annual comprehensive eye exam.

## 2025-06-07 LAB
QT INTERVAL: 428 MS
QTC INTERVAL: 409 MS

## 2025-06-09 ENCOUNTER — TELEPHONE (OUTPATIENT)
Dept: OBSTETRICS AND GYNECOLOGY | Age: 65
End: 2025-06-09
Payer: COMMERCIAL

## 2025-06-09 NOTE — TELEPHONE ENCOUNTER
Attempted to reach pt re: interstim trial in the office. Left message for pt to return call to office

## 2025-06-10 ENCOUNTER — TELEPHONE (OUTPATIENT)
Dept: INTERNAL MEDICINE | Age: 65
End: 2025-06-10

## 2025-06-10 NOTE — TELEPHONE ENCOUNTER
I called her and covid and flu tests were negative - she is feeling better- if vomitting returns or worse or if bs high and won't come down then go to ER -- if sounds like stomach virus and like she is getting better

## 2025-06-13 DIAGNOSIS — E10.40 TYPE 1 DIABETES MELLITUS WITH DIABETIC NEUROPATHY (HCC): ICD-10-CM

## 2025-06-14 LAB
NCCN CRITERIA FLAG: NORMAL
TYRER CUZICK SCORE: 4.5

## 2025-06-16 RX ORDER — INSULIN LISPRO 100 [IU]/ML
INJECTION, SOLUTION INTRAVENOUS; SUBCUTANEOUS
Qty: 9 ML | Refills: 0
Start: 2025-06-16

## 2025-06-16 RX ORDER — GABAPENTIN 300 MG/1
300 CAPSULE ORAL NIGHTLY
Qty: 30 CAPSULE | Refills: 2 | Status: SHIPPED | OUTPATIENT
Start: 2025-06-16 | End: 2025-09-14

## 2025-06-16 RX ORDER — INSULIN GLARGINE 300 U/ML
6 INJECTION, SOLUTION SUBCUTANEOUS NIGHTLY
Qty: 6 ML | Refills: 1
Start: 2025-06-16

## 2025-06-16 RX ORDER — INSULIN LISPRO 100 [IU]/ML
INJECTION, SOLUTION INTRAVENOUS; SUBCUTANEOUS
Qty: 9 ML | Refills: 0
Start: 2025-06-16 | End: 2025-06-16 | Stop reason: SDUPTHER

## 2025-06-16 ASSESSMENT — ENCOUNTER SYMPTOMS
SHORTNESS OF BREATH: 0
EYE REDNESS: 0
ABDOMINAL DISTENTION: 0
ABDOMINAL PAIN: 0
COUGH: 0
EYE DISCHARGE: 0
SINUS PRESSURE: 0
BACK PAIN: 1

## 2025-06-17 ENCOUNTER — TELEPHONE (OUTPATIENT)
Dept: INTERNAL MEDICINE | Age: 65
End: 2025-06-17

## 2025-06-17 DIAGNOSIS — R11.2 REFRACTORY NAUSEA AND VOMITING: Primary | ICD-10-CM

## 2025-06-17 RX ORDER — ONDANSETRON 4 MG/1
4 TABLET, ORALLY DISINTEGRATING ORAL 3 TIMES DAILY PRN
Qty: 21 TABLET | Refills: 0 | Status: SHIPPED | OUTPATIENT
Start: 2025-06-17

## 2025-06-17 NOTE — TELEPHONE ENCOUNTER
She hasn't been able to keep anything on her stomach for a week now. She said she is weak. She is requesting Zofran.

## 2025-06-17 NOTE — TELEPHONE ENCOUNTER
I advised her to go to the ER and she said that that does not fit into her schedule because she is in the middle of planning a birthday party....  She said that she is not going to the ER and hung up.

## 2025-06-17 NOTE — TELEPHONE ENCOUNTER
I called her and explained with n/v that long in dm worry about ketoacidosis and getting very ill- please fax her lab orders for cbc,cmp,  magnesium to [place] [facility] and let her know I faxed those and sent in zofran for the future.

## 2025-06-17 NOTE — TELEPHONE ENCOUNTER
I am worried if she can't keep things on her stomach that we need her to go to ER to get hydrated / labs with her DM want to r/u ketoacidosis due to diabetes and to get hydrated--- I feel safer with that----- if wont go let me know and Ill need to get labs- -prefer her go toER

## 2025-06-18 ENCOUNTER — RESULTS FOLLOW-UP (OUTPATIENT)
Dept: INTERNAL MEDICINE | Age: 65
End: 2025-06-18

## 2025-06-18 ENCOUNTER — LAB (OUTPATIENT)
Dept: LAB | Facility: HOSPITAL | Age: 65
End: 2025-06-18
Payer: COMMERCIAL

## 2025-06-18 ENCOUNTER — TRANSCRIBE ORDERS (OUTPATIENT)
Dept: ADMINISTRATIVE | Facility: HOSPITAL | Age: 65
End: 2025-06-18
Payer: COMMERCIAL

## 2025-06-18 DIAGNOSIS — E87.1 HYPONATREMIA: ICD-10-CM

## 2025-06-18 DIAGNOSIS — E03.9 ACQUIRED HYPOTHYROIDISM: ICD-10-CM

## 2025-06-18 DIAGNOSIS — R11.2 REFRACTORY NAUSEA AND VOMITING: ICD-10-CM

## 2025-06-18 DIAGNOSIS — R11.2 REFRACTORY NAUSEA AND VOMITING: Primary | ICD-10-CM

## 2025-06-18 LAB
ALBUMIN SERPL-MCNC: 3.7 G/DL (ref 3.5–5)
ALBUMIN/GLOB SERPL: 1.5 G/DL (ref 1.1–2.5)
ALP SERPL-CCNC: 196 U/L (ref 24–120)
ALT SERPL W P-5'-P-CCNC: 13 U/L (ref 0–35)
ANION GAP SERPL CALCULATED.3IONS-SCNC: 6 MMOL/L (ref 4–13)
AST SERPL-CCNC: 28 U/L (ref 7–45)
AUTO MIXED CELLS #: 0.9 10*3/MM3 (ref 0.1–2.6)
AUTO MIXED CELLS %: 15.8 % (ref 0.1–24)
BILIRUB SERPL-MCNC: 0.4 MG/DL (ref 0.1–1)
BUN SERPL-MCNC: 9 MG/DL (ref 5–21)
BUN/CREAT SERPL: 10
CALCIUM SPEC-SCNC: 8.6 MG/DL (ref 8.6–10.5)
CHLORIDE SERPL-SCNC: 93 MMOL/L (ref 98–110)
CO2 SERPL-SCNC: 24 MMOL/L (ref 24–31)
CREAT SERPL-MCNC: 0.9 MG/DL (ref 0.5–1.4)
EGFRCR SERPLBLD CKD-EPI 2021: 72 ML/MIN/1.73
ERYTHROCYTE [DISTWIDTH] IN BLOOD BY AUTOMATED COUNT: 13.7 % (ref 12.3–15.4)
GLOBULIN UR ELPH-MCNC: 2.5 GM/DL
GLUCOSE SERPL-MCNC: 293 MG/DL (ref 65–99)
HCT VFR BLD AUTO: 31.9 % (ref 34–46.6)
HGB BLD-MCNC: 10.6 G/DL (ref 12–15.9)
LYMPHOCYTES # BLD AUTO: 0.3 10*3/MM3 (ref 0.7–3.1)
LYMPHOCYTES NFR BLD AUTO: 5 % (ref 19.6–45.3)
MAGNESIUM SERPL-MCNC: 2 MG/DL (ref 1.6–2.4)
MCH RBC QN AUTO: 29 PG (ref 26.6–33)
MCHC RBC AUTO-ENTMCNC: 33.2 G/DL (ref 31.5–35.7)
MCV RBC AUTO: 87.4 FL (ref 79–97)
NEUTROPHILS NFR BLD AUTO: 4.5 10*3/MM3 (ref 1.7–7)
NEUTROPHILS NFR BLD AUTO: 79.2 % (ref 42.7–76)
OSMOLALITY UR: 324 MOSM/KG
PLATELET # BLD AUTO: 173 10*3/MM3 (ref 140–450)
PMV BLD AUTO: 7.9 FL (ref 6–12)
POTASSIUM SERPL-SCNC: 5 MMOL/L (ref 3.5–5.3)
PROT SERPL-MCNC: 6.2 G/DL (ref 6.3–8.7)
RBC # BLD AUTO: 3.65 10*6/MM3 (ref 3.77–5.28)
SODIUM SERPL-SCNC: 123 MMOL/L (ref 135–145)
SODIUM UR-SCNC: 25 MMOL/L
WBC NRBC COR # BLD AUTO: 5.7 10*3/MM3 (ref 3.4–10.8)

## 2025-06-18 PROCEDURE — 83735 ASSAY OF MAGNESIUM: CPT

## 2025-06-18 PROCEDURE — 84300 ASSAY OF URINE SODIUM: CPT

## 2025-06-18 PROCEDURE — 85025 COMPLETE CBC W/AUTO DIFF WBC: CPT

## 2025-06-18 PROCEDURE — 80053 COMPREHEN METABOLIC PANEL: CPT

## 2025-06-18 PROCEDURE — 36415 COLL VENOUS BLD VENIPUNCTURE: CPT

## 2025-06-18 PROCEDURE — 83935 ASSAY OF URINE OSMOLALITY: CPT

## 2025-06-19 ENCOUNTER — HOSPITAL ENCOUNTER (OUTPATIENT)
Dept: MAMMOGRAPHY | Facility: HOSPITAL | Age: 65
Discharge: HOME | End: 2025-06-19
Payer: COMMERCIAL

## 2025-06-19 ENCOUNTER — HOSPITAL ENCOUNTER (OUTPATIENT)
Dept: BONE DENSITY | Facility: HOSPITAL | Age: 65
Discharge: HOME | End: 2025-06-19
Payer: COMMERCIAL

## 2025-06-19 DIAGNOSIS — Z78.0 POSTMENOPAUSAL: ICD-10-CM

## 2025-06-19 DIAGNOSIS — Z12.31 ENCOUNTER FOR SCREENING MAMMOGRAM FOR MALIGNANT NEOPLASM OF BREAST: ICD-10-CM

## 2025-06-19 PROCEDURE — 77067 SCR MAMMO BI INCL CAD: CPT

## 2025-06-19 PROCEDURE — 77063 BREAST TOMOSYNTHESIS BI: CPT

## 2025-06-19 PROCEDURE — 77080 DXA BONE DENSITY AXIAL: CPT

## 2025-06-25 ENCOUNTER — RESULTS FOLLOW-UP (OUTPATIENT)
Dept: INTERNAL MEDICINE | Age: 65
End: 2025-06-25

## 2025-06-27 DIAGNOSIS — E10.65 TYPE 1 DIABETES MELLITUS WITH HYPERGLYCEMIA (HCC): ICD-10-CM

## 2025-06-27 RX ORDER — PROCHLORPERAZINE 25 MG/1
SUPPOSITORY RECTAL
Qty: 3 EACH | Refills: 2 | Status: SHIPPED | OUTPATIENT
Start: 2025-06-27

## 2025-07-08 NOTE — PROGRESS NOTES
[facility] - PODIATRY    Today's Date: 07/15/2025     Patient Name: Fiordaliza Lyons  MRN: [id number]  CSN: [id number]  PCP: Brittany Solis MD  Referring Provider: No ref. provider found    SUBJECTIVE     Chief Complaint   Patient presents with    Follow-up     PCP: Brittany Solis MD 06/17/25  2 months (around 7/10/2025) for Follow-up with APRN, Follow[facility]up in [facility].   Pt states she is here today for diabetic foot/nail care.      Diabetes     108 mg/dLbg      HPI: Fiordaliza Lyons, a 65 y.o.female, comes to clinic as a(n) established patient presenting for diabetic foot exam and complaining of toenail/callus issues. Patient has h/o alcoholic hepatitis, breast cysts, DM I, endometreosis, GERD, HLD, HTN, pancreatitis, sleep apnea. Patient is IDDM with last stated BG level of 108mg/dl.  Last A1c 4.9%. Continues to relay issues with right hip and low back pain as well as nerve damage after having low back surgery. Residual weakness to her right lower leg has resulted in mild foot drop. Continues to follow with Dr. Larsen. Continues to relay mild numbness and tingling in her feet.  Denies open wounds or sores today. Toenails are in need of care as they are thick and lengthy. States she believes her nails were cut too short after her last visit. Has been having to apply Anbesol to her greater toenails d/t then pain and discomfort which lasted for about 2 months before it subsided. Continues to complain of pain to her right foot, most notably around her first MTPJ.   Notes that she does like to do a lot of walking and is typically limping by the end of a short distance walk.  She has days where the pain is not so bothersome then she has days where it is severe. Is not ready for a surgical procedure at this time.  Relates previous treatment(s) including foot care by podiatry. Denies any constitutional symptoms. No other pedal complaints at this time.    Past Medical History:   Diagnosis Date     Alcoholic hepatitis with ascites     Allergic rhinitis     Cancer     ovarian    Cirrhosis 2021    Degeneration of lumbar or lumbosacral intervertebral disc 09/15/2022    Diabetes mellitus     Disease of thyroid gland     Elevated d-dimer     Endometriosis     Fatty liver     GERD (gastroesophageal reflux disease)     History of transfusion     Hyperlipidemia     Hypertension     Low back pain     Lumbosacral disc disease 2022    Osteoporosis     Ovarian cyst     Pancreatitis     Postmenopausal     Rectal mass     Rectal prolapse     Sleep apnea     has corrrected it self with w/ weight loss     Past Surgical History:   Procedure Laterality Date    ARM NERVE EXPLORATION Left     6 nerves scraped ,cleaned nerves off    BACK SURGERY  2023    BILATERAL SALPINGO OOPHORECTOMY  2008    CARPAL TUNNEL RELEASE  2016    CHOLECYSTECTOMY  1996    COLONOSCOPY  07/2017    Dr Murphy- per patient normal     COLONOSCOPY N/A 11/09/2021    Procedure: COLONOSCOPY WITH ANESTHESIA;  Surgeon: Ray Serna MD;  Location: [facility] ENDOSCOPY;  Service: Gastroenterology;  Laterality: N/A;  pre rectal mass  post  Dr Shields    COLONOSCOPY N/A 1/20/2025    Procedure: COLONOSCOPY WITH ANESTHESIA;  Surgeon: Ray Serna MD;  Location: [facility] ENDOSCOPY;  Service: Gastroenterology;  Laterality: N/A;  pre polyp  hx  post diverticulosis,polyp, hemorrfoids    Dr. Shields    DILATION AND CURETTAGE, DIAGNOSTIC / THERAPEUTIC      ENDOSCOPY N/A 11/09/2021    Procedure: ESOPHAGOGASTRODUODENOSCOPY WITH ANESTHESIA;  Surgeon: Ray Serna MD;  Location: [facility] ENDOSCOPY;  Service: Gastroenterology;  Laterality: N/A;  pre anemia  post hiatal hernia; gastric polyp  Dr. Shields    EYE SURGERY Bilateral     cataract with lens    HEMORRHOIDECTOMY SIGMOIDOSCOPY N/A 01/09/2024    Procedure: HEMORRHOIDECTOMY WITH EXAM UNDER ANESTHESIA;  Surgeon: Rocio Read MD;  Location: [facility] OR;  Service: General;  Laterality: N/A;    KNEE SURGERY Right 1982  "   LUMBAR LAMINECTOMY WITH FUSION Right 05/01/2023    Procedure: LUMBAR DISCECTOMY, JADON- LAMINECTOMY TRANSFORAMINAL LUMBAR INTERBODY FUSION, POSTERIOR PEDICLE SCREW INSTRUMENTATION, L5-S1 RIGHT. USE OF THE ROBOT;  Surgeon: Nathaniel Tesfaye MD;  Location: [facility] OR;  Service: Neurosurgery;  Laterality: Right;    SPINAL FUSION  2023    TRIGGER POINT INJECTION  2023     Family History   Problem Relation Age of Onset    Colon polyps Father     Ovarian cancer Mother     Colon polyps Brother     Ovarian cancer Paternal Grandmother     Ovarian cancer Maternal Grandmother     Ovarian cancer Maternal Aunt     Breast cancer Neg Hx     Colon cancer Neg Hx      Social History     Socioeconomic History    Marital status: Single   Tobacco Use    Smoking status: Never     Passive exposure: Never    Smokeless tobacco: Never   Vaping Use    Vaping status: Never Used   Substance and Sexual Activity    Alcohol use: 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 90 tablet 3    BD Pen Needle Tatyana U/F 32G X 4 MM misc 4 (Four) Times a Day. use as directed      Continuous Blood Gluc Sensor (Dexcom G6 Sensor) See Admin Instructions.      Continuous Blood Gluc Transmit (Dexcom G6 Transmitter) misc See Admin Instructions.      gabapentin (NEURONTIN) 300 MG capsule Take 1 capsule by mouth Every Night.      Gvoke HypoPen 2-Pack 1 MG/0.2ML solution auto-injector       insulin aspart (novoLOG FLEXPEN) 100 UNIT/ML solution pen-injector sc pen Inject 2-5 Units under the skin into the appropriate area as directed 3 (Three) " Times a Day With Meals. 2-5 units with meals sliding scale      Insulin Glargine, 2 Unit Dial, (Toujeo Max SoloStar) 300 UNIT/ML solution pen-injector injection Inject 14 Units under the skin into the appropriate area as directed.      levothyroxine (SYNTHROID, LEVOTHROID) 50 MCG tablet Take 1 tablet by mouth Every Morning.      losartan (COZAAR) 25 MG tablet Take 1 tablet by mouth Daily. 90 tablet 3    magnesium oxide (MAG-OX) 400 MG tablet Take 1 tablet by mouth Daily.      Multiple Vitamins-Iron (multivitamin with iron) tablet tablet Take 1 tablet by mouth Daily.      Nutritional Supplements (JUICE PLUS FIBRE PO) Take 1 dose by mouth Daily.      pantoprazole (PROTONIX) 40 MG EC tablet Take 1 tablet by mouth Daily.      Potassium 99 MG tablet Take  by mouth.      Specialty Vitamins Products (NERVIVE NERVE RELIEF PO) Take  by mouth.      VITAMIN D-VITAMIN K PO Take  by mouth 1 (One) Time Per Week.       No current facility-administered medications for this visit.     Review of Systems   Constitutional:  Negative for chills and fever.   HENT:  Negative for congestion.    Respiratory:  Negative for shortness of breath.    Cardiovascular:  Positive for leg swelling. Negative for chest pain.   Gastrointestinal:  Negative for constipation, diarrhea, nausea and vomiting.   Musculoskeletal:  Positive for arthralgias and back pain.        Right foot pain; bilat greater toenails   Skin:  Negative for color change and wound.        Thickened, elongated toenails. CAllus   Neurological:  Positive for weakness and numbness.   Psychiatric/Behavioral:  Negative for agitation, behavioral problems and confusion.        OBJECTIVE     Vitals:    07/15/25 0804   BP: 122/68   Pulse: 62   SpO2: 100%       PHYSICAL EXAM  GEN:   Accompanied by none.     Foot/Ankle Exam    GENERAL  Diabetic foot exam performed    Appearance:  appears stated age  Orientation:  AAOx3  Affect:  appropriate  Gait:  unimpaired  Assistance:  independent  Right  shoe gear: casual shoe  Left shoe gear: casual shoe    VASCULAR     Right Foot Vascularity   Dorsalis pedis:  2+  Posterior tibial:  1+  Skin temperature:  warm  Edema grading:  Trace  CFT:  3  Pedal hair growth:  Present  Varicosities:  mild varicosities     Left Foot Vascularity   Dorsalis pedis:  2+  Posterior tibial:  1+  Skin temperature:  warm  Edema grading:  Trace  CFT:  3  Pedal hair growth:  Present  Varicosities:  mild varicosities     NEUROLOGIC     Right Foot Neurologic   Light touch sensation: diminished  Vibratory sensation: diminished  Hot/Cold sensation: diminished  Protective Sensation using [place]-Paul Monofilament:   Sites intact: 5  Sites tested: 10     Left Foot Neurologic   Light touch sensation: diminished  Vibratory sensation: diminished  Hot/Cold sensation:  diminished  Protective Sensation using [place]-Paul Monofilament:   Sites intact: 5  Sites tested: 10    MUSCULOSKELETAL     Right Foot Musculoskeletal   Ecchymosis:  none  Tenderness:  MTP 1 dorsal tenderness, toe 1 tenderness and toenail problem    Arch:  Normal  Hallux valgus: No    Hallux limitus: Yes (Dorsal spurring)       Left Foot Musculoskeletal   Ecchymosis:  none  Tenderness:  toe 1 tenderness and toenail problem  Arch:  Normal  Hallux valgus: No      MUSCLE STRENGTH     Right Foot Muscle Strength   Foot dorsiflexion:  4  Foot plantar flexion:  5  Foot inversion:  4+  Foot eversion:  5     Left Foot Muscle Strength   Foot dorsiflexion:  5  Foot plantar flexion:  5  Foot inversion:  5  Foot eversion:  5    RANGE OF MOTION     Right Foot Range of Motion   Foot and ankle ROM within normal limits       Left Foot Range of Motion   Foot and ankle ROM within normal limits      DERMATOLOGIC      Right Foot Dermatologic   Skin  Right foot skin is intact.   Nails  1.  Positive for elongated, onychomycosis, abnormal thickness and subungual debris. Negative for ingrown toenail and paronychia.  2.  Positive for elongated and  abnormal thickness.  3.  Positive for elongated and abnormal thickness.  4.  Positive for elongated and abnormal thickness.  5.  Positive for elongated and abnormal thickness.  Nails comment:  1-5     Left Foot Dermatologic   Skin  Left foot skin is intact.   Nails comment:  1-5  Nails  1.  Positive for elongated, onychomycosis, abnormal thickness and subungual debris. Negative for ingrown toenail and paronychia.  2.  Positive for elongated and abnormal thickness.  3.  Positive for elongated and abnormal thickness.  4.  Positive for elongated and abnormally thick.  5.  Positive for elongated and abnormally thick.    Image:       RADIOLOGY/NUCLEAR:  DEXA Bone Density Axial  Result Date: 6/19/2025  Narrative: DEXA BONE DENSITY AXIAL-  HISTORY: Z78.0; Z78.0-Asymptomatic menopausal state  COMPARISON: None  FINDINGS:  Bone densitometry has been performed using a GE lunar Prodigy bone densitometer. The routine evaluation includes the lumbar spine and left hip.  The calculated bone density of the femoral neck is 0.726 g/cm2 which corresponds to a T-score of -2.2.  Evaluation of the lumbar spine demonstrates an average bone mineral density measurement of 1.079 g/cm2 which corresponds to a T-score of -0.8.  The T-score corresponds to the number of standard deviations above or below the standard of a 30 year old patient.      Impression:  1. Osteopenia. Low bone mass. The bone density is between 1.0 and 2.5 standard deviations below the mean for a young adult woman. There is an increased risk of fracture in these patients.  This report was signed and finalized on 6/19/2025 2:12 PM by Dr. Carlos Erickson MD.      Mammo Screening Digital Tomosynthesis Bilateral With CAD  Result Date: 6/19/2025  Narrative: MAMMO SCREENING DIGITAL TOMOSYNTHESIS BILATERAL W CAD-  CLINICAL HISTORY: Breast cancer screening.  COMPARISON STUDIES: 11/29/2023, 10/28/2022, 4/16/2019.  TECHNIQUE: Standard mammographic views were obtained. Computer Aided  Detection was utilized.  BREAST COMPOSITION: Category C -- The breasts are heterogeneously dense, which may obscure small masses.  FINDINGS: Benign appearing calcifications are present. No architectural distortion, suspicious masses, or suspicious microcalcifications are identified. There has been no significant interval change. No skin changes are noted.  Arterial vascular calcification is present. A strong association has been shown between ALENA and cardiovascular disease (CVD) and/or coronary artery disease (CAD), independent of other known risk factors.  IMPRESSION AND RECOMMENDATION:  No mammographic evidence of malignancy. Recommendation is for the patient to return for routine mammography in one year or sooner if clinically indicated.  BIRADS Category 2 - Benign findings  The patient's information has been added to a reminder system with a target due date for the next mammogram.  The patient will receive a letter notifying her of the results of this exam.  Marcelaer-Cruzick lifetime risk score of 4.5 percent.  NCCN guidelines for genetic testing not met.  American Cancer Society guidelines recommend supplemental screening MRI for women with estimated lifetime risk of breast cancer greater than 20 percent.   This report was signed and finalized on 6/19/2025 1:47 PM by Dr Cain Saldaña.          LABORATORY/CULTURE RESULTS:      PATHOLOGY RESULTS:       ASSESSMENT/PLAN     Diagnoses and all orders for this visit:    1. Onychomycosis (Primary)    2. Type 1 diabetes mellitus with diabetic neuropathy    3. Pain around toenail    4. Right foot pain    5. Arthritis of first metatarsophalangeal (MTP) joint of right foot    6. Foot drop, right    7. Foot callus          Comprehensive lower extremity examination and evaluation was performed.  Discussed findings and treatment plan including risks, benefits, and treatment options with patient in detail. Patient agreed with treatment plan.  After verbal consent obtained,  nail(s) x10 debrided of length and thickness with nail nipper without incidence  After verbal consent obtained, calluses x1 pared utilizing dermal curette and/or scalpel without incidence  Patient may maintain nails and calluses at home utilizing emery board or pumice stone between visits as needed  Reviewed at home diabetic foot care including daily foot checks   Last A1c for review 4.9 %.  Continue control and management of Type 2 DM per PCP.  Again, we discussed conservative therapies versus surgical correction for arthritis to right 1st MTPJ.  Patient not ready to commit to surgery at this time.    For pain to bilateral greater toes- there is no evidence of IGTN or infection at this time. Recommend occasional warm epsom salt soaks and to avoid tight/narrow fitting shoes.  Patient to notify us if she decides she would like to have further consultation with Dr. Tony to discuss possible surgery.  Otherwise patient prefers to return in 63 days for routine care.  Encouraged to call with questions or concerns.      An After Visit Summary was printed and given to the patient at discharge, including (if requested) any available informative/educational handouts regarding diagnosis, treatment, or medications. All questions were answered to patient/family satisfaction. Should symptoms fail to improve or worsen they agree to call or return to clinic or to go to the Emergency Department. Discussed the importance of following up with any needed screening tests/labs/specialist appointments and any requested follow-up recommended by me today. Importance of maintaining follow-up discussed and patient accepts that missed appointments can delay diagnosis and potentially lead to worsening of conditions.  Return in about 2 months (around 9/19/2025) for Follow-up with APRN, Follow-up in [facility]., or sooner if acute issues arise.  Advance Care Planning   ACP discussion was declined by the patient. Patient does not have an  advance directive, declines further assistance.       This document has been electronically signed by RAYMON Hernandez on July 15, 2025 08:52 CDT

## 2025-07-15 ENCOUNTER — OFFICE VISIT (OUTPATIENT)
Age: 65
End: 2025-07-15
Payer: COMMERCIAL

## 2025-07-15 VITALS
SYSTOLIC BLOOD PRESSURE: 122 MMHG | OXYGEN SATURATION: 100 % | HEIGHT: 60 IN | BODY MASS INDEX: 21.22 KG/M2 | DIASTOLIC BLOOD PRESSURE: 68 MMHG | WEIGHT: 108.1 LBS | HEART RATE: 62 BPM

## 2025-07-15 DIAGNOSIS — M79.676 PAIN AROUND TOENAIL: ICD-10-CM

## 2025-07-15 DIAGNOSIS — E10.40 TYPE 1 DIABETES MELLITUS WITH DIABETIC NEUROPATHY: ICD-10-CM

## 2025-07-15 DIAGNOSIS — B35.1 ONYCHOMYCOSIS: Primary | ICD-10-CM

## 2025-07-15 DIAGNOSIS — M19.071 ARTHRITIS OF FIRST METATARSOPHALANGEAL (MTP) JOINT OF RIGHT FOOT: ICD-10-CM

## 2025-07-15 DIAGNOSIS — M79.671 RIGHT FOOT PAIN: ICD-10-CM

## 2025-07-15 DIAGNOSIS — M21.371 FOOT DROP, RIGHT: ICD-10-CM

## 2025-07-15 DIAGNOSIS — L84 FOOT CALLUS: ICD-10-CM

## 2025-08-19 RX ORDER — PEN NEEDLE, DIABETIC 32GX 5/32"
NEEDLE, DISPOSABLE MISCELLANEOUS
Qty: 100 EACH | Refills: 4 | Status: SHIPPED | OUTPATIENT
Start: 2025-08-19

## 2025-09-04 DIAGNOSIS — K62.5 GASTROINTESTINAL HEMORRHAGE ASSOCIATED WITH ANORECTAL SOURCE: ICD-10-CM

## 2025-09-04 DIAGNOSIS — K21.9 GASTROESOPHAGEAL REFLUX DISEASE WITHOUT ESOPHAGITIS: ICD-10-CM

## 2025-09-05 RX ORDER — PANTOPRAZOLE SODIUM 40 MG/1
TABLET, DELAYED RELEASE ORAL
Qty: 30 TABLET | Refills: 1 | Status: SHIPPED | OUTPATIENT
Start: 2025-09-05

## (undated) DEVICE — BAPTIST TURNOVER KIT: Brand: MEDLINE INDUSTRIES, INC.

## (undated) DEVICE — ANTIBACTERIAL UNDYED BRAIDED (POLYGLACTIN 910), SYNTHETIC ABSORBABLE SUTURE: Brand: COATED VICRYL

## (undated) DEVICE — PANTY KNIT MATERN L/XL

## (undated) DEVICE — ANTIBACTERIAL VIOLET BRAIDED (POLYGLACTIN 910), SYNTHETIC ABSORBABLE SUTURE: Brand: COATED VICRYL

## (undated) DEVICE — GLOVE,SURG,SENSICARE,ALOE,LF,PF,6: Brand: MEDLINE

## (undated) DEVICE — CLTH CLENS READYCLEANSE PERI CARE PK/5

## (undated) DEVICE — TRAP FLD MINIVAC MEGADYNE 100ML

## (undated) DEVICE — PK SPINE POST 30

## (undated) DEVICE — STRIP,CLOSURE,WOUND,MEDI-STRIP,1/2X4: Brand: MEDLINE

## (undated) DEVICE — GLV SURG BIOGEL LTX PF 6 1/2

## (undated) DEVICE — ELECTRD BLD EZ CLN MOD 4IN

## (undated) DEVICE — SUT MNCRYL 4/0 PS2 27IN UD MCP426H

## (undated) DEVICE — SPHR MARKR STEALTH STATION

## (undated) DEVICE — SYR LUERLOK 20CC BX/50

## (undated) DEVICE — SENSR O2 OXIMAX FNGR A/ 18IN NONSTR

## (undated) DEVICE — THE CHANNEL CLEANING BRUSH IS A NYLON FLEXI BRUSH ATTACHED TO A FLEXIBLE PLASTIC SHEATH DESIGNED TO SAFELY REMOVE DEBRIS FROM FLEXIBLE ENDOSCOPES.

## (undated) DEVICE — Device: Brand: DEFENDO AIR/WATER/SUCTION AND BIOPSY VALVE

## (undated) DEVICE — GLV SURG SENSICARE W/ALOE PF LF 6.5 STRL

## (undated) DEVICE — THE SINGLE USE ETRAP – POLYP TRAP IS USED FOR SUCTION RETRIEVAL OF ENDOSCOPICALLY REMOVED POLYPS.: Brand: ETRAP

## (undated) DEVICE — TOOL MR8-14MH30 MR8 14CM MATCH 3MM: Brand: MIDAS REX MR8

## (undated) DEVICE — KNIF BAYO METRX DISECT

## (undated) DEVICE — DRSNG TELFA PAD NONADH STR 1S 3X8IN

## (undated) DEVICE — ELECTRD BLD EZ CLN MOD XLNG 2.75IN

## (undated) DEVICE — KT SPINE MAZORX DISP

## (undated) DEVICE — GAUZE,SPONGE,FLUFF,6"X6.75",STRL,10/TRAY: Brand: MEDLINE

## (undated) DEVICE — YANKAUER,BULB TIP WITH VENT: Brand: ARGYLE

## (undated) DEVICE — FRCP BIOP ENDO CAPTURAPRO SPK SERR 2.8MM 230CM

## (undated) DEVICE — MASK,OXYGEN,MED CONC,ADLT,7' TUB, UC: Brand: PENDING

## (undated) DEVICE — HDRST INTUB GENTLETOUCH SLOT 7IN RT

## (undated) DEVICE — DRP C/ARMOR

## (undated) DEVICE — ELECTRD NDL EZ CLN MOD 2.75IN

## (undated) DEVICE — CVR UNIV C/ARM

## (undated) DEVICE — CUFF,BP,DISP,1 TUBE,ADULT,HP: Brand: MEDLINE

## (undated) DEVICE — SUT GUT CHRM 2/0 SH 27IN G123H

## (undated) DEVICE — PAD,ABDOMINAL,8"X10",ST,LF: Brand: MEDLINE

## (undated) DEVICE — PROXIMATE RH ROTATING HEAD SKIN STAPLERS (35 REGULAR) CONTAINS 35 STAINLESS STEEL STAPLES: Brand: PROXIMATE

## (undated) DEVICE — SNAR POLYP SENSATION MICRO OVL 13 240X40

## (undated) DEVICE — TOOL MR8-31TD3030 MR8 TWST DRIL 3MMX30MM: Brand: MIDAS REX

## (undated) DEVICE — VAGINAL PREP TRAY: Brand: MEDLINE INDUSTRIES, INC.

## (undated) DEVICE — GLV SURG DERMASSURE GRN LF PF 8.0

## (undated) DEVICE — SUT VIC 0 MO4 CR8 18IN VCP701D

## (undated) DEVICE — SPK10277 JACKSON/PRO-AXIS KIT: Brand: SPK10277 JACKSON/PRO-AXIS KIT

## (undated) DEVICE — SHEET,DRAPE,53X77,STERILE: Brand: MEDLINE

## (undated) DEVICE — 4-PORT MANIFOLD: Brand: NEPTUNE 2

## (undated) DEVICE — SNAR POLYP CAPTIVATOR RND STFF 2.4 240CM 10MM 1P/U

## (undated) DEVICE — CONN FLX BREATHE CIRCT

## (undated) DEVICE — TBG SMPL FLTR LINE NASL 02/C02 A/ BX/100

## (undated) DEVICE — RETR STAY HK ELAS SHRP 5MM 50PK

## (undated) DEVICE — PAD MINOR UNIVERSAL: Brand: MEDLINE INDUSTRIES, INC.

## (undated) DEVICE — CATH IV ANGIO FEP 12G 3IN LTBLU 10PK

## (undated) DEVICE — CONMED SCOPE SAVER BITE BLOCK, 20X27 MM: Brand: SCOPE SAVER

## (undated) DEVICE — GLV SURG BIOGEL LTX PF 8

## (undated) DEVICE — INSTRUMENT 9560658 QUADRANT ILLUMIN SYS: Brand: MAST QUADRANT™ RETRACTOR SYSTEM

## (undated) DEVICE — DISPOSABLE DRAPE, STERILE, FOR A CDS-3072 6 FOOT TABLE: Brand: PEDIGO PRODUCTS, INC.